# Patient Record
Sex: FEMALE | Race: WHITE | NOT HISPANIC OR LATINO
[De-identification: names, ages, dates, MRNs, and addresses within clinical notes are randomized per-mention and may not be internally consistent; named-entity substitution may affect disease eponyms.]

---

## 2017-03-30 ENCOUNTER — APPOINTMENT (OUTPATIENT)
Dept: OPHTHALMOLOGY | Facility: CLINIC | Age: 82
End: 2017-03-30

## 2017-04-17 ENCOUNTER — APPOINTMENT (OUTPATIENT)
Dept: OPHTHALMOLOGY | Facility: CLINIC | Age: 82
End: 2017-04-17

## 2017-04-17 DIAGNOSIS — H40.003 PREGLAUCOMA, UNSPECIFIED, BILATERAL: ICD-10-CM

## 2017-05-11 ENCOUNTER — APPOINTMENT (OUTPATIENT)
Dept: OPHTHALMOLOGY | Facility: CLINIC | Age: 82
End: 2017-05-11

## 2017-05-18 ENCOUNTER — APPOINTMENT (OUTPATIENT)
Dept: OPHTHALMOLOGY | Facility: CLINIC | Age: 82
End: 2017-05-18

## 2017-05-18 ENCOUNTER — OUTPATIENT (OUTPATIENT)
Dept: OUTPATIENT SERVICES | Facility: HOSPITAL | Age: 82
LOS: 1 days | End: 2017-05-18

## 2017-05-18 DIAGNOSIS — H40.051 OCULAR HYPERTENSION, RIGHT EYE: ICD-10-CM

## 2017-06-09 ENCOUNTER — APPOINTMENT (OUTPATIENT)
Dept: OPHTHALMOLOGY | Facility: CLINIC | Age: 82
End: 2017-06-09

## 2017-10-31 ENCOUNTER — APPOINTMENT (OUTPATIENT)
Dept: OPHTHALMOLOGY | Facility: CLINIC | Age: 82
End: 2017-10-31
Payer: MEDICARE

## 2017-10-31 PROCEDURE — 92012 INTRM OPH EXAM EST PATIENT: CPT

## 2018-05-18 ENCOUNTER — APPOINTMENT (OUTPATIENT)
Dept: OPHTHALMOLOGY | Facility: CLINIC | Age: 83
End: 2018-05-18
Payer: MEDICARE

## 2018-05-18 DIAGNOSIS — H26.8 OTHER SPECIFIED CATARACT: ICD-10-CM

## 2018-05-18 DIAGNOSIS — H40.052 OCULAR HYPERTENSION, LEFT EYE: ICD-10-CM

## 2018-05-18 DIAGNOSIS — H40.051 OCULAR HYPERTENSION, RIGHT EYE: ICD-10-CM

## 2018-05-18 DIAGNOSIS — H35.371 PUCKERING OF MACULA, RIGHT EYE: ICD-10-CM

## 2018-05-18 PROCEDURE — 92134 CPTRZ OPH DX IMG PST SGM RTA: CPT

## 2018-05-18 PROCEDURE — 92014 COMPRE OPH EXAM EST PT 1/>: CPT

## 2018-05-30 PROBLEM — H26.8 PSEUDOEXFOLIATION OF LENS CAPSULE, RIGHT EYE: Status: ACTIVE | Noted: 2017-10-31

## 2018-05-30 PROBLEM — H26.8 PSEUDOEXFOLIATION SYNDROME: Status: ACTIVE | Noted: 2017-04-17

## 2018-05-30 PROBLEM — H40.051 OCULAR HYPERTENSION OF RIGHT EYE: Status: ACTIVE | Noted: 2017-04-17

## 2018-05-30 PROBLEM — H40.052 OCULAR HYPERTENSION OF LEFT EYE: Status: ACTIVE | Noted: 2017-04-17

## 2018-05-30 PROBLEM — H35.371 EPIRETINAL MEMBRANE (ERM) OF RIGHT EYE: Status: ACTIVE | Noted: 2018-05-30

## 2018-12-14 ENCOUNTER — APPOINTMENT (OUTPATIENT)
Dept: OPHTHALMOLOGY | Facility: CLINIC | Age: 83
End: 2018-12-14
Payer: MEDICARE

## 2018-12-14 DIAGNOSIS — H25.11 AGE-RELATED NUCLEAR CATARACT, RIGHT EYE: ICD-10-CM

## 2018-12-14 PROCEDURE — 92014 COMPRE OPH EXAM EST PT 1/>: CPT

## 2018-12-14 PROCEDURE — 92136 OPHTHALMIC BIOMETRY: CPT

## 2019-02-04 RX ORDER — LATANOPROST/PF 0.005 %
0.01 DROPS OPHTHALMIC (EYE)
Qty: 3 | Refills: 6 | Status: ACTIVE | COMMUNITY
Start: 2017-10-31 | End: 1900-01-01

## 2020-03-05 ENCOUNTER — NON-APPOINTMENT (OUTPATIENT)
Age: 85
End: 2020-03-05

## 2020-03-05 ENCOUNTER — APPOINTMENT (OUTPATIENT)
Dept: OPHTHALMOLOGY | Facility: CLINIC | Age: 85
End: 2020-03-05
Payer: MEDICARE

## 2020-03-05 PROCEDURE — 92014 COMPRE OPH EXAM EST PT 1/>: CPT

## 2021-09-17 ENCOUNTER — NON-APPOINTMENT (OUTPATIENT)
Age: 86
End: 2021-09-17

## 2021-09-17 ENCOUNTER — APPOINTMENT (OUTPATIENT)
Dept: OPHTHALMOLOGY | Facility: CLINIC | Age: 86
End: 2021-09-17
Payer: MEDICARE

## 2021-09-17 PROCEDURE — 92134 CPTRZ OPH DX IMG PST SGM RTA: CPT

## 2021-09-17 PROCEDURE — 92014 COMPRE OPH EXAM EST PT 1/>: CPT

## 2022-05-01 ENCOUNTER — INPATIENT (INPATIENT)
Facility: HOSPITAL | Age: 87
LOS: 7 days | Discharge: HOME HEALTH | DRG: 149 | End: 2022-05-09
Attending: PSYCHIATRY & NEUROLOGY | Admitting: INTERNAL MEDICINE
Payer: MEDICARE

## 2022-05-01 VITALS
TEMPERATURE: 98 F | RESPIRATION RATE: 16 BRPM | SYSTOLIC BLOOD PRESSURE: 158 MMHG | OXYGEN SATURATION: 97 % | DIASTOLIC BLOOD PRESSURE: 92 MMHG | HEART RATE: 85 BPM | WEIGHT: 110.01 LBS

## 2022-05-01 LAB
ALBUMIN SERPL ELPH-MCNC: 4.5 G/DL — SIGNIFICANT CHANGE UP (ref 3.3–5)
ALP SERPL-CCNC: 78 U/L — SIGNIFICANT CHANGE UP (ref 40–120)
ALT FLD-CCNC: 12 U/L — SIGNIFICANT CHANGE UP (ref 10–45)
ANION GAP SERPL CALC-SCNC: 17 MMOL/L — SIGNIFICANT CHANGE UP (ref 5–17)
APPEARANCE UR: CLEAR — SIGNIFICANT CHANGE UP
APTT BLD: 31 SEC — SIGNIFICANT CHANGE UP (ref 27.5–35.5)
AST SERPL-CCNC: 24 U/L — SIGNIFICANT CHANGE UP (ref 10–40)
BACTERIA # UR AUTO: PRESENT /HPF
BASOPHILS # BLD AUTO: 0.06 K/UL — SIGNIFICANT CHANGE UP (ref 0–0.2)
BASOPHILS NFR BLD AUTO: 0.6 % — SIGNIFICANT CHANGE UP (ref 0–2)
BILIRUB SERPL-MCNC: 0.3 MG/DL — SIGNIFICANT CHANGE UP (ref 0.2–1.2)
BILIRUB UR-MCNC: NEGATIVE — SIGNIFICANT CHANGE UP
BUN SERPL-MCNC: 31 MG/DL — HIGH (ref 7–23)
CALCIUM SERPL-MCNC: 9.2 MG/DL — SIGNIFICANT CHANGE UP (ref 8.4–10.5)
CHLORIDE SERPL-SCNC: 102 MMOL/L — SIGNIFICANT CHANGE UP (ref 96–108)
CO2 SERPL-SCNC: 22 MMOL/L — SIGNIFICANT CHANGE UP (ref 22–31)
COLOR SPEC: YELLOW — SIGNIFICANT CHANGE UP
CREAT SERPL-MCNC: 0.65 MG/DL — SIGNIFICANT CHANGE UP (ref 0.5–1.3)
DIFF PNL FLD: ABNORMAL
EGFR: 83 ML/MIN/1.73M2 — SIGNIFICANT CHANGE UP
EOSINOPHIL # BLD AUTO: 0.08 K/UL — SIGNIFICANT CHANGE UP (ref 0–0.5)
EOSINOPHIL NFR BLD AUTO: 0.8 % — SIGNIFICANT CHANGE UP (ref 0–6)
EPI CELLS # UR: SIGNIFICANT CHANGE UP /HPF (ref 0–5)
GLUCOSE SERPL-MCNC: 162 MG/DL — HIGH (ref 70–99)
GLUCOSE UR QL: NEGATIVE — SIGNIFICANT CHANGE UP
HCT VFR BLD CALC: 43.6 % — SIGNIFICANT CHANGE UP (ref 34.5–45)
HGB BLD-MCNC: 14.2 G/DL — SIGNIFICANT CHANGE UP (ref 11.5–15.5)
IMM GRANULOCYTES NFR BLD AUTO: 0.4 % — SIGNIFICANT CHANGE UP (ref 0–1.5)
INR BLD: 0.89 — SIGNIFICANT CHANGE UP (ref 0.88–1.16)
KETONES UR-MCNC: 40 MG/DL
LEUKOCYTE ESTERASE UR-ACNC: NEGATIVE — SIGNIFICANT CHANGE UP
LYMPHOCYTES # BLD AUTO: 1.44 K/UL — SIGNIFICANT CHANGE UP (ref 1–3.3)
LYMPHOCYTES # BLD AUTO: 14.9 % — SIGNIFICANT CHANGE UP (ref 13–44)
MCHC RBC-ENTMCNC: 31.8 PG — SIGNIFICANT CHANGE UP (ref 27–34)
MCHC RBC-ENTMCNC: 32.6 GM/DL — SIGNIFICANT CHANGE UP (ref 32–36)
MCV RBC AUTO: 97.5 FL — SIGNIFICANT CHANGE UP (ref 80–100)
MONOCYTES # BLD AUTO: 0.63 K/UL — SIGNIFICANT CHANGE UP (ref 0–0.9)
MONOCYTES NFR BLD AUTO: 6.5 % — SIGNIFICANT CHANGE UP (ref 2–14)
NEUTROPHILS # BLD AUTO: 7.43 K/UL — HIGH (ref 1.8–7.4)
NEUTROPHILS NFR BLD AUTO: 76.8 % — SIGNIFICANT CHANGE UP (ref 43–77)
NITRITE UR-MCNC: NEGATIVE — SIGNIFICANT CHANGE UP
NRBC # BLD: 0 /100 WBCS — SIGNIFICANT CHANGE UP (ref 0–0)
PH UR: 6.5 — SIGNIFICANT CHANGE UP (ref 5–8)
PLATELET # BLD AUTO: 305 K/UL — SIGNIFICANT CHANGE UP (ref 150–400)
POTASSIUM SERPL-MCNC: 3.2 MMOL/L — LOW (ref 3.5–5.3)
POTASSIUM SERPL-SCNC: 3.2 MMOL/L — LOW (ref 3.5–5.3)
PROT SERPL-MCNC: 8.2 G/DL — SIGNIFICANT CHANGE UP (ref 6–8.3)
PROT UR-MCNC: NEGATIVE MG/DL — SIGNIFICANT CHANGE UP
PROTHROM AB SERPL-ACNC: 10.6 SEC — SIGNIFICANT CHANGE UP (ref 10.5–13.4)
RBC # BLD: 4.47 M/UL — SIGNIFICANT CHANGE UP (ref 3.8–5.2)
RBC # FLD: 13.1 % — SIGNIFICANT CHANGE UP (ref 10.3–14.5)
RBC CASTS # UR COMP ASSIST: < 5 /HPF — SIGNIFICANT CHANGE UP
SODIUM SERPL-SCNC: 141 MMOL/L — SIGNIFICANT CHANGE UP (ref 135–145)
SP GR SPEC: 1.02 — SIGNIFICANT CHANGE UP (ref 1–1.03)
TROPONIN T SERPL-MCNC: 0.01 NG/ML — SIGNIFICANT CHANGE UP (ref 0–0.01)
UROBILINOGEN FLD QL: 0.2 E.U./DL — SIGNIFICANT CHANGE UP
WBC # BLD: 9.68 K/UL — SIGNIFICANT CHANGE UP (ref 3.8–10.5)
WBC # FLD AUTO: 9.68 K/UL — SIGNIFICANT CHANGE UP (ref 3.8–10.5)
WBC UR QL: ABNORMAL /HPF

## 2022-05-01 PROCEDURE — 70496 CT ANGIOGRAPHY HEAD: CPT | Mod: 26,MA

## 2022-05-01 PROCEDURE — 0042T: CPT

## 2022-05-01 PROCEDURE — 99291 CRITICAL CARE FIRST HOUR: CPT

## 2022-05-01 PROCEDURE — 70498 CT ANGIOGRAPHY NECK: CPT | Mod: 26,MA

## 2022-05-01 PROCEDURE — 93010 ELECTROCARDIOGRAM REPORT: CPT

## 2022-05-01 RX ORDER — SODIUM CHLORIDE 9 MG/ML
1000 INJECTION INTRAMUSCULAR; INTRAVENOUS; SUBCUTANEOUS ONCE
Refills: 0 | Status: COMPLETED | OUTPATIENT
Start: 2022-05-01 | End: 2022-05-01

## 2022-05-01 RX ORDER — CLOPIDOGREL BISULFATE 75 MG/1
75 TABLET, FILM COATED ORAL ONCE
Refills: 0 | Status: COMPLETED | OUTPATIENT
Start: 2022-05-01 | End: 2022-05-01

## 2022-05-01 RX ORDER — ASPIRIN/CALCIUM CARB/MAGNESIUM 324 MG
81 TABLET ORAL DAILY
Refills: 0 | Status: DISCONTINUED | OUTPATIENT
Start: 2022-05-01 | End: 2022-05-03

## 2022-05-01 RX ORDER — MECLIZINE HCL 12.5 MG
25 TABLET ORAL ONCE
Refills: 0 | Status: COMPLETED | OUTPATIENT
Start: 2022-05-01 | End: 2022-05-01

## 2022-05-01 RX ADMIN — Medication 81 MILLIGRAM(S): at 23:43

## 2022-05-01 RX ADMIN — SODIUM CHLORIDE 1000 MILLILITER(S): 9 INJECTION INTRAMUSCULAR; INTRAVENOUS; SUBCUTANEOUS at 23:46

## 2022-05-01 RX ADMIN — Medication 25 MILLIGRAM(S): at 23:43

## 2022-05-01 RX ADMIN — CLOPIDOGREL BISULFATE 75 MILLIGRAM(S): 75 TABLET, FILM COATED ORAL at 23:43

## 2022-05-01 NOTE — ED PROVIDER NOTE - OBJECTIVE STATEMENT
89 F w ho htn- w sudden onset severe dizziness about 2 hours ago-room spinning- very nauseated- got zofran from the medics w some improvement of nausea - alexus ha  dizziness-worse with head movement/worse w eye opening  no f/c no cp/sob  no prior cva/mi  no sig allev factors

## 2022-05-01 NOTE — ED PROVIDER NOTE - CONSTITUTIONAL, MLM
uncomfortable, doesn't want to open eyes, awake, alert, oriented to person, place, time/situation and in no apparent distress. normal...

## 2022-05-01 NOTE — ED ADULT TRIAGE NOTE - CHIEF COMPLAINT QUOTE
pt BIBA from home for eval of room spinning dizziness pt BIBA from home for eval of room spinning dizziness onset a couple of hours ago with N/V multiple episodes pt denies head ache blurry vision numbness/ tingling. EMS placed 20G to right hand gave 5mg Zofran

## 2022-05-01 NOTE — ED PROVIDER NOTE - NORMAL, MLM
Delivery Note    Labor and Delivery Summary    Natalya Fuller is a 31 year old  female who presented to labor and delivery at 39w0d on 2021 for primary  section secondary to active herpetic outbreak. Her pregnancy is further significant for hx of GDM in G1, Hx of preeclampsia in G2, obesity, hx cholecystectomy, tobacco use, hx of bipolar/depression/schizophrenia not on medications. After thorough discussion of risks, benefits, and alternatives, patient consented for primary  surgery. Spinal was placed for pain control. The operation was completed without complication, and a viable male infant weighing 3460g, with Apgars 8 /9  at 1/5 minutes, respectively, was delivered via hysterotomy in ROT position. The patient received Gent/Clinda for prophylaxis. The patient tolerated the procedure well, and was transported to the recovery room in stable condition. Please see operative note for details. EBL for the delivery was 700mL.     Information for the patient's :  Devendra Fuller [[id number]]   7 lb 10.1 oz (3460 g)       Mother's Information      Delivery Blood Loss  214 - 21 1004      QBL:  Section Volume Hospital Encounter 410 ML    Total  410               Devendra Fuller [[id number]]       Delivery    Birth date/time: 2021 08:44:00  Delivery type:        Placenta    Placenta delivery date/time:   Placenta removal:        Apgars    Living status: Living  Apgars:  1 min.:  5 min.:  10 min.:  15 min.:  20 min.:    Skin color:  0  1       Heart rate:  2  2       Reflex irritability:  2  2       Muscle tone:  2  2       Respiratory effort:  2  2       Total:  8  9       Apgars assigned by: CHANTELLE NEAL RN       Resuscitation    Method: Suctioning       Measurements    Weight: 3460 g  Weight (lbs): 7 lb 10.1 oz  Length: 49.5 cm  Length (in): 19.5\"  Head circumference: 33.5 cm       Delivery Providers    Delivering clinician: Catina Srivastava  MD   Provider Role    Aidan Mittal MD Resident    Mera Mckeon, RRT Respiratory Therapist    Kristin Martinez, RN Registered Nurse    Shellie Arellano, RN Delivery Nurse                   Review the Delivery Report for details       Attending physician, Dr. Srivastava, was present for entire procedure.    Aidan Mittal MD  PGY III  6/7/2021    I was present for the entire procedure.  Catina Srivastava MD, MPH  Obstetrics and Gynecology        karlos all pertinent systems normal

## 2022-05-02 DIAGNOSIS — E87.6 HYPOKALEMIA: ICD-10-CM

## 2022-05-02 DIAGNOSIS — E78.5 HYPERLIPIDEMIA, UNSPECIFIED: ICD-10-CM

## 2022-05-02 DIAGNOSIS — I10 ESSENTIAL (PRIMARY) HYPERTENSION: ICD-10-CM

## 2022-05-02 DIAGNOSIS — D72.829 ELEVATED WHITE BLOOD CELL COUNT, UNSPECIFIED: ICD-10-CM

## 2022-05-02 DIAGNOSIS — R42 DIZZINESS AND GIDDINESS: ICD-10-CM

## 2022-05-02 LAB
A1C WITH ESTIMATED AVERAGE GLUCOSE RESULT: 5.4 % — SIGNIFICANT CHANGE UP (ref 4–5.6)
ANION GAP SERPL CALC-SCNC: 15 MMOL/L — SIGNIFICANT CHANGE UP (ref 5–17)
ANION GAP SERPL CALC-SCNC: 16 MMOL/L — SIGNIFICANT CHANGE UP (ref 5–17)
APPEARANCE UR: CLEAR — SIGNIFICANT CHANGE UP
BACTERIA # UR AUTO: ABNORMAL /HPF
BILIRUB UR-MCNC: NEGATIVE — SIGNIFICANT CHANGE UP
BUN SERPL-MCNC: 22 MG/DL — SIGNIFICANT CHANGE UP (ref 7–23)
BUN SERPL-MCNC: 22 MG/DL — SIGNIFICANT CHANGE UP (ref 7–23)
CALCIUM SERPL-MCNC: 9.2 MG/DL — SIGNIFICANT CHANGE UP (ref 8.4–10.5)
CALCIUM SERPL-MCNC: 9.8 MG/DL — SIGNIFICANT CHANGE UP (ref 8.4–10.5)
CHLORIDE SERPL-SCNC: 100 MMOL/L — SIGNIFICANT CHANGE UP (ref 96–108)
CHLORIDE SERPL-SCNC: 101 MMOL/L — SIGNIFICANT CHANGE UP (ref 96–108)
CHOLEST SERPL-MCNC: 274 MG/DL — HIGH
CO2 SERPL-SCNC: 24 MMOL/L — SIGNIFICANT CHANGE UP (ref 22–31)
CO2 SERPL-SCNC: 26 MMOL/L — SIGNIFICANT CHANGE UP (ref 22–31)
COLOR SPEC: YELLOW — SIGNIFICANT CHANGE UP
CREAT SERPL-MCNC: 0.55 MG/DL — SIGNIFICANT CHANGE UP (ref 0.5–1.3)
CREAT SERPL-MCNC: 0.67 MG/DL — SIGNIFICANT CHANGE UP (ref 0.5–1.3)
DIFF PNL FLD: ABNORMAL
EGFR: 82 ML/MIN/1.73M2 — SIGNIFICANT CHANGE UP
EGFR: 86 ML/MIN/1.73M2 — SIGNIFICANT CHANGE UP
EPI CELLS # UR: SIGNIFICANT CHANGE UP /HPF (ref 0–5)
ESTIMATED AVERAGE GLUCOSE: 108 MG/DL — SIGNIFICANT CHANGE UP (ref 68–114)
GLUCOSE SERPL-MCNC: 113 MG/DL — HIGH (ref 70–99)
GLUCOSE SERPL-MCNC: 116 MG/DL — HIGH (ref 70–99)
GLUCOSE UR QL: NEGATIVE — SIGNIFICANT CHANGE UP
HCT VFR BLD CALC: 44 % — SIGNIFICANT CHANGE UP (ref 34.5–45)
HDLC SERPL-MCNC: 94 MG/DL — SIGNIFICANT CHANGE UP
HGB BLD-MCNC: 14.5 G/DL — SIGNIFICANT CHANGE UP (ref 11.5–15.5)
KETONES UR-MCNC: 15 MG/DL
LEUKOCYTE ESTERASE UR-ACNC: ABNORMAL
LIPID PNL WITH DIRECT LDL SERPL: 168 MG/DL — HIGH
MAGNESIUM SERPL-MCNC: 1.9 MG/DL — SIGNIFICANT CHANGE UP (ref 1.6–2.6)
MCHC RBC-ENTMCNC: 32.1 PG — SIGNIFICANT CHANGE UP (ref 27–34)
MCHC RBC-ENTMCNC: 33 GM/DL — SIGNIFICANT CHANGE UP (ref 32–36)
MCV RBC AUTO: 97.3 FL — SIGNIFICANT CHANGE UP (ref 80–100)
NITRITE UR-MCNC: NEGATIVE — SIGNIFICANT CHANGE UP
NON HDL CHOLESTEROL: 180 MG/DL — HIGH
NRBC # BLD: 0 /100 WBCS — SIGNIFICANT CHANGE UP (ref 0–0)
PH UR: 6 — SIGNIFICANT CHANGE UP (ref 5–8)
PHOSPHATE SERPL-MCNC: 2.9 MG/DL — SIGNIFICANT CHANGE UP (ref 2.5–4.5)
PLATELET # BLD AUTO: 187 K/UL — SIGNIFICANT CHANGE UP (ref 150–400)
POTASSIUM SERPL-MCNC: 4.4 MMOL/L — SIGNIFICANT CHANGE UP (ref 3.5–5.3)
POTASSIUM SERPL-MCNC: SIGNIFICANT CHANGE UP MMOL/L (ref 3.5–5.3)
POTASSIUM SERPL-SCNC: 4.4 MMOL/L — SIGNIFICANT CHANGE UP (ref 3.5–5.3)
POTASSIUM SERPL-SCNC: SIGNIFICANT CHANGE UP MMOL/L (ref 3.5–5.3)
PROT UR-MCNC: NEGATIVE MG/DL — SIGNIFICANT CHANGE UP
RBC # BLD: 4.52 M/UL — SIGNIFICANT CHANGE UP (ref 3.8–5.2)
RBC # FLD: 13.3 % — SIGNIFICANT CHANGE UP (ref 10.3–14.5)
RBC CASTS # UR COMP ASSIST: ABNORMAL /HPF
SARS-COV-2 RNA SPEC QL NAA+PROBE: NEGATIVE — SIGNIFICANT CHANGE UP
SODIUM SERPL-SCNC: 139 MMOL/L — SIGNIFICANT CHANGE UP (ref 135–145)
SODIUM SERPL-SCNC: 143 MMOL/L — SIGNIFICANT CHANGE UP (ref 135–145)
SP GR SPEC: 1.02 — SIGNIFICANT CHANGE UP (ref 1–1.03)
TRIGL SERPL-MCNC: 58 MG/DL — SIGNIFICANT CHANGE UP
TSH SERPL-MCNC: 1.74 UIU/ML — SIGNIFICANT CHANGE UP (ref 0.27–4.2)
UROBILINOGEN FLD QL: 0.2 E.U./DL — SIGNIFICANT CHANGE UP
WBC # BLD: 12.1 K/UL — HIGH (ref 3.8–10.5)
WBC # FLD AUTO: 12.1 K/UL — HIGH (ref 3.8–10.5)
WBC UR QL: ABNORMAL /HPF

## 2022-05-02 PROCEDURE — 99222 1ST HOSP IP/OBS MODERATE 55: CPT

## 2022-05-02 PROCEDURE — 70551 MRI BRAIN STEM W/O DYE: CPT | Mod: 26

## 2022-05-02 PROCEDURE — 99221 1ST HOSP IP/OBS SF/LOW 40: CPT

## 2022-05-02 RX ORDER — ONDANSETRON 8 MG/1
4 TABLET, FILM COATED ORAL ONCE
Refills: 0 | Status: COMPLETED | OUTPATIENT
Start: 2022-05-02 | End: 2022-05-02

## 2022-05-02 RX ORDER — CEFTRIAXONE 500 MG/1
1000 INJECTION, POWDER, FOR SOLUTION INTRAMUSCULAR; INTRAVENOUS ONCE
Refills: 0 | Status: COMPLETED | OUTPATIENT
Start: 2022-05-02 | End: 2022-05-02

## 2022-05-02 RX ORDER — CEFTRIAXONE 500 MG/1
1000 INJECTION, POWDER, FOR SOLUTION INTRAMUSCULAR; INTRAVENOUS EVERY 24 HOURS
Refills: 0 | Status: COMPLETED | OUTPATIENT
Start: 2022-05-03 | End: 2022-05-05

## 2022-05-02 RX ORDER — MECLIZINE HCL 12.5 MG
12.5 TABLET ORAL EVERY 6 HOURS
Refills: 0 | Status: DISCONTINUED | OUTPATIENT
Start: 2022-05-02 | End: 2022-05-02

## 2022-05-02 RX ORDER — CLOPIDOGREL BISULFATE 75 MG/1
75 TABLET, FILM COATED ORAL DAILY
Refills: 0 | Status: DISCONTINUED | OUTPATIENT
Start: 2022-05-02 | End: 2022-05-03

## 2022-05-02 RX ORDER — ENOXAPARIN SODIUM 100 MG/ML
30 INJECTION SUBCUTANEOUS EVERY 24 HOURS
Refills: 0 | Status: DISCONTINUED | OUTPATIENT
Start: 2022-05-02 | End: 2022-05-09

## 2022-05-02 RX ORDER — LATANOPROST 0.05 MG/ML
1 SOLUTION/ DROPS OPHTHALMIC; TOPICAL AT BEDTIME
Refills: 0 | Status: DISCONTINUED | OUTPATIENT
Start: 2022-05-02 | End: 2022-05-09

## 2022-05-02 RX ORDER — POTASSIUM CHLORIDE 20 MEQ
40 PACKET (EA) ORAL ONCE
Refills: 0 | Status: COMPLETED | OUTPATIENT
Start: 2022-05-02 | End: 2022-05-02

## 2022-05-02 RX ORDER — ATORVASTATIN CALCIUM 80 MG/1
80 TABLET, FILM COATED ORAL AT BEDTIME
Refills: 0 | Status: DISCONTINUED | OUTPATIENT
Start: 2022-05-02 | End: 2022-05-09

## 2022-05-02 RX ORDER — AMLODIPINE BESYLATE 2.5 MG/1
5 TABLET ORAL DAILY
Refills: 0 | Status: DISCONTINUED | OUTPATIENT
Start: 2022-05-02 | End: 2022-05-02

## 2022-05-02 RX ORDER — CEFTRIAXONE 500 MG/1
INJECTION, POWDER, FOR SOLUTION INTRAMUSCULAR; INTRAVENOUS
Refills: 0 | Status: COMPLETED | OUTPATIENT
Start: 2022-05-02 | End: 2022-05-06

## 2022-05-02 RX ORDER — MECLIZINE HCL 12.5 MG
12.5 TABLET ORAL EVERY 8 HOURS
Refills: 0 | Status: DISCONTINUED | OUTPATIENT
Start: 2022-05-02 | End: 2022-05-05

## 2022-05-02 RX ADMIN — ONDANSETRON 4 MILLIGRAM(S): 8 TABLET, FILM COATED ORAL at 08:33

## 2022-05-02 RX ADMIN — CEFTRIAXONE 1000 MILLIGRAM(S): 500 INJECTION, POWDER, FOR SOLUTION INTRAMUSCULAR; INTRAVENOUS at 17:45

## 2022-05-02 RX ADMIN — ATORVASTATIN CALCIUM 80 MILLIGRAM(S): 80 TABLET, FILM COATED ORAL at 22:09

## 2022-05-02 RX ADMIN — CLOPIDOGREL BISULFATE 75 MILLIGRAM(S): 75 TABLET, FILM COATED ORAL at 11:36

## 2022-05-02 RX ADMIN — Medication 12.5 MILLIGRAM(S): at 14:30

## 2022-05-02 RX ADMIN — ENOXAPARIN SODIUM 30 MILLIGRAM(S): 100 INJECTION SUBCUTANEOUS at 22:09

## 2022-05-02 RX ADMIN — Medication 40 MILLIEQUIVALENT(S): at 00:10

## 2022-05-02 RX ADMIN — Medication 81 MILLIGRAM(S): at 11:36

## 2022-05-02 RX ADMIN — Medication 12.5 MILLIGRAM(S): at 11:37

## 2022-05-02 RX ADMIN — Medication 12.5 MILLIGRAM(S): at 22:09

## 2022-05-02 NOTE — CONSULT NOTE ADULT - PROBLEM SELECTOR RECOMMENDATION 2
borderline; UA with only 5-10 WBCs and negative nitrite/LE, but given new urinary frequency today, would repeat UA and send UCx; leukocytosis may also be due to N/V; monitor CBC off abx for now

## 2022-05-02 NOTE — CONSULT NOTE ADULT - PROBLEM SELECTOR RECOMMENDATION 9
a/w N/V; sx persist but improved; ddx includes acute CVA vs. peripheral vertigo (neuronitis?); cont. meclizine and antiemetics PRN; additional work-up and mgmt per Neuro; PT/OT; on DAPT + statin for now, plan for MRI brain

## 2022-05-02 NOTE — CONSULT NOTE ADULT - ASSESSMENT
per Neurology    89 yo F with pmhx HTN presenting to ED with room spinning dizziness, nausea, and vomiting. Stroke code called, CTH and CTA head/neck unremarkable. Pt admitted to tele for stroke/TIA workup.     Neuro  #CVA workup  - aspirin 81mg and plavix 75mg daily  - continue atorvastatin 80mg daily  - q4hr stroke neuro checks and vitals  - obtain MRI Brain without contrast  - Stroke Code HCT Results: negative  - Stroke Code CTA Results: negative  - Stroke education    Cards  #HTN  - permissive hypertension, Goal -180  - hold home blood pressure medication for now  - obtain TTE with bubble  - Stroke Code EKG Results:    #HLD  - high dose statin as above in CVA  - LDL results:     Pulm  - call provider if SPO2 < 94%    GI  #Nutrition/Fluids/Electrolytes   - replete K<4 and Mg <2  - Diet:  - IVF:     Renal  -     Infectious Disease  - Stroke Code CXR results:     Endocrine  #DM  - A1C results:   - ISS    - TSH results:    DVT Prophylaxis  - SCDs for DVT prophylaxis 
89 y/o F w/

## 2022-05-02 NOTE — H&P ADULT - ASSESSMENT
89 yo F with pmhx HTN presenting to ED with room spinning dizziness, nausea, and vomiting. Stroke code called, CTH and CTA head/neck unremarkable. Pt admitted to tele for stroke/TIA workup.     Neuro  #CVA workup  - aspirin 81mg and plavix 75mg daily  - continue atorvastatin 80mg daily  - q4hr stroke neuro checks and vitals  - obtain MRI Brain without contrast  - Stroke Code HCT Results: negative  - Stroke Code CTA Results: negative  - Stroke education    Cards  #HTN  - permissive hypertension, Goal -180  - hold home blood pressure medication for now  - obtain TTE with bubble  - Stroke Code EKG Results:    #HLD  - high dose statin as above in CVA  - LDL results:     Pulm  - call provider if SPO2 < 94%    GI  #Nutrition/Fluids/Electrolytes   - replete K<4 and Mg <2  - Diet:  - IVF:     Renal  -     Infectious Disease  - Stroke Code CXR results:     Endocrine  #DM  - A1C results:   - ISS    - TSH results:    DVT Prophylaxis  - SCDs for DVT prophylaxis     Discussed with Neurology Attending

## 2022-05-02 NOTE — PATIENT PROFILE ADULT - FALL HARM RISK - HARM RISK INTERVENTIONS

## 2022-05-02 NOTE — H&P ADULT - NSHPLABSRESULTS_GEN_ALL_CORE
< from: CT Brain Stroke Protocol (05.01.22 @ 22:57) >      IMPRESSION:  No acute intracranial hemorrhage or demarcated transcortical infarction.    < end of copied text >    < from: CT Perfusion w/ Maps w/ IV Cont (05.01.22 @ 22:58) >    IMPRESSION:  Normal CT perfusion study.    < end of copied text >    < from: CT Angio Head w/ IV Cont (05.01.22 @ 22:59) >    IMPRESSION:  No high-grade stenotic or occlusive intracranial arterial lesion.    < end of copied text >    < from: CT Angio Head w/ IV Cont (05.01.22 @ 22:59) >    IMPRESSION:  Unremarkable CTA examination of the neck.    < end of copied text >

## 2022-05-02 NOTE — ED ADULT NURSE NOTE - CHIEF COMPLAINT QUOTE
pt BIBA from home for eval of room spinning dizziness onset a couple of hours ago with N/V multiple episodes pt denies head ache blurry vision numbness/ tingling. EMS placed 20G to right hand gave 5mg Zofran

## 2022-05-02 NOTE — H&P ADULT - NSHPREVIEWOFSYSTEMS_GEN_ALL_CORE
ROS:   Constitutional: No fever, weight loss or fatigue  Eyes: No eye pain or discharge  ENMT:  No difficulty hearing or tinnitus; No sinus or throat pain  Neck: No pain or stiffness  Respiratory: No cough, wheezing, chills or hemoptysis  Cardiovascular: No chest pain, palpitations, shortness of breath, or leg swelling  Gastrointestinal: No abdominal pain. No nausea, vomiting or hematemesis; No diarrhea or constipation. No hematochezia.  Genitourinary: No dysuria, frequency, hematuria or incontinence  Neurological: As per HPI  Skin: No itching, burning, rashes or lesions   Endocrine: No heat or cold intolerance; No hair loss  Musculoskeletal: No joint pain or swelling; no back pain  Psychiatric: No depression, anxiety, mood swings or difficulty sleeping  Heme/Lymph: No easy bruising or bleeding gums

## 2022-05-02 NOTE — H&P ADULT - NSHPPHYSICALEXAM_GEN_ALL_CORE
Physical exam:  General: No acute distress, awake and alert  Cardiovascular: Regular rate and rhythm, no murmurs, rubs, or gallops. No bruits  Pulmonary: Anterior breath sounds clear bilaterally, no crackles or wheezing. No use of accessory muscles  GI: Abdomen soft, non-tender, non-distended    Neurologic:  -Mental status: Awake, alert, oriented to person, place, and time. Speech is fluent with intact naming, repetition, and comprehension, no dysarthria. Recent and remote memory intact. Follows commands. Attention/concentration intact. Fund of knowledge appropriate.  -Cranial nerves:   II: Visual fields are full to confrontation.  III, IV, VI: Extraocular movements are intact, + horizontal nystagmus . Pupils equally round and reactive to light  V:  Facial sensation V1-V3 equal and intact   VII: Face is symmetric with normal eye closure and smile  VIII: Hearing is bilaterally intact to finger rub  IX, X: Uvula is midline and soft palate rises symmetrically  XI: Head turning and shoulder shrug are intact.  XII: Tongue protrudes midline  Motor: Normal bulk and tone. No pronator drift. Strength bilateral upper extremity 5/5, bilateral lower extremities 5/5.  Rapid alternating movements intact and symmetric  Sensation: Intact to light touch bilaterally. No neglect or extinction on double simultaneous testing.  Coordination: No dysmetria on finger-to-nose and heel-to-shin bilaterally    NIHSS: 0 ASPECT Score: 7

## 2022-05-02 NOTE — PROGRESS NOTE ADULT - ASSESSMENT
89 yo F with pmhx HTN presenting to ED with complaints of dizziness, nausea, and vomiting. Stroke code called on 5/1, CTH and CTA head/neck unremarkable. Etiology of patient's symptoms concerning for peripheral vertigo vs. stroke, currently admitted to stroke/tele unit for further work up and management.     Neuro  #CVA workup  - aspirin 81mg and plavix 75mg daily  - continue atorvastatin 80mg daily  - q4hr stroke neuro checks and vitals  - obtain MRI Brain without contrast, short stroke protocol  - Stroke Code HCT Results: negative  - Stroke Code CTA Results: negative  - Stroke education    Cards  #HTN  - permissive hypertension, Goal -180  - hold home blood pressure medication for now  - obtain TTE with bubble  - Stroke Code EKG Results:    #HLD  - high dose statin as above in CVA  - LDL results: 168    Pulm  - call provider if SPO2 < 94%    GI  #Nutrition/Fluids/Electrolytes   - replete K<4 and Mg <2  - Diet: Regular  - If patient is persistently vomiting, consider starting IV fluids    Renal  - daily BMP    Infectious Disease  - Stroke Code CXR results:     Endocrine  #DM  - A1C results: 5.4%      - TSH results: 1.740    DVT Prophylaxis  - SCDs for DVT prophylaxis  - Lovenox SQ    Discussed with Neurology Attending Dr. Filippo Swartz   89 yo F with pmhx HTN presenting to ED with complaints of dizziness, nausea, and vomiting. Stroke code called on 5/1, CTH and CTA head/neck unremarkable. Etiology of patient's symptoms concerning for peripheral vertigo vs. stroke, currently admitted to stroke/tele unit for further work up and management.     Neuro  #CVA workup  - aspirin 81mg and plavix 75mg daily  - continue atorvastatin 80mg daily  - q4hr stroke neuro checks and vitals  - obtain MRI Brain without contrast, short stroke protocol  - Stroke Code HCT Results: negative  - Stroke Code CTA Results: negative  - Stroke education    #dizziness  - initiated 12.mg meclizine q8h    Cards  #HTN  - permissive hypertension, Goal -180  - hold home blood pressure medication for now  - obtain TTE with bubble  - Stroke Code EKG Results:    #HLD  - high dose statin as above in CVA  - LDL results: 168    Pulm  - call provider if SPO2 < 94%    GI  #Nutrition/Fluids/Electrolytes   - replete K<4 and Mg <2  - Diet: Regular  - If patient is persistently vomiting, consider starting IV fluids    Renal  - daily BMP    #UA with WBC 5-10, positive bacteria on admission  - resent UA and UCx on 5/2, follow up    Infectious Disease  - Stroke Code CXR results:     Endocrine  #DM  - A1C results: 5.4%      - TSH results: 1.740    DVT Prophylaxis  - SCDs for DVT prophylaxis  - Lovenox SQ    Discussed with Neurology Attending Dr. Filippo Swartz   89 yo F with pmhx HTN presenting to ED with complaints of dizziness, nausea, and vomiting. Stroke code called on 5/1, CTH and CTA head/neck unremarkable. Etiology of patient's symptoms concerning for peripheral vertigo vs. stroke, currently admitted to stroke/tele unit for further work up and management.     Neuro  #CVA workup  - aspirin 81mg and plavix 75mg daily  - continue atorvastatin 80mg daily  - q4hr stroke neuro checks and vitals  - obtain MRI Brain without contrast, short stroke protocol  - Stroke Code HCT Results: negative  - Stroke Code CTA Results: negative  - Stroke education    #dizziness  - initiated 12.mg meclizine q8h    Cards  #HTN  - permissive hypertension, Goal -180  - hold home blood pressure medication for now  - obtain TTE without bubble  - Stroke Code EKG Results:    #HLD  - high dose statin as above in CVA  - LDL results: 168    Pulm  - call provider if SPO2 < 94%    GI  #Nutrition/Fluids/Electrolytes   - replete K<4 and Mg <2  - Diet: Regular  - If patient is persistently vomiting, consider starting IV fluids    Renal  - daily BMP    #UA with WBC 5-10, positive bacteria on admission  - resent UA and UCx on 5/2, follow up    Infectious Disease  - Stroke Code CXR results:     Endocrine  #DM  - A1C results: 5.4%      - TSH results: 1.740    DVT Prophylaxis  - SCDs for DVT prophylaxis  - Lovenox SQ    Discussed with Neurology Attending Dr. Filippo Swartz

## 2022-05-02 NOTE — PROGRESS NOTE ADULT - SUBJECTIVE AND OBJECTIVE BOX
Neurology Stroke Progress Note    INTERVAL HPI/OVERNIGHT EVENTS:  Patient seen and examined. Patient had 1 episode of emesis this morning, was given 1 dose of zofran with relief. Overall, reports not feeling well and remains dizzy, especially while testing EOM's. She is also reporting some pressure in her lower abdomen as well as increased urine frequency. She was started on standing meclizine and is pending a MRI and TTE.     MEDICATIONS  (STANDING):  aspirin  chewable 81 milliGRAM(s) Oral daily  atorvastatin 80 milliGRAM(s) Oral at bedtime  clopidogrel Tablet 75 milliGRAM(s) Oral daily  enoxaparin Injectable 30 milliGRAM(s) SubCutaneous every 24 hours  latanoprost 0.005% Ophthalmic Solution 1 Drop(s) Both EYES at bedtime  meclizine 12.5 milliGRAM(s) Oral every 8 hours    MEDICATIONS  (PRN):      Allergies    No Known Allergies    Intolerances      Vital Signs Last 24 Hrs  T(C): 36.3 (02 May 2022 10:00), Max: 36.8 (02 May 2022 01:26)  T(F): 97.4 (02 May 2022 10:00), Max: 98.3 (02 May 2022 01:26)  HR: 66 (02 May 2022 12:10) (66 - 85)  BP: 178/72 (02 May 2022 12:10) (155/69 - 185/80)  BP(mean): 104 (02 May 2022 12:10) (99 - 118)  RR: 16 (02 May 2022 12:10) (16 - 20)  SpO2: 95% (02 May 2022 12:10) (95% - 98%)    Physical exam:  General: Laying in bed, does not appear to be feeling well  Eyes: Anicteric sclerae, moist conjunctivae, see below for CNs  Neck: trachea midline  Cardiovascular: Regular rate and rhythm on monitor  Pulmonary: No use of accessory muscles  GI: Abdomen soft, non-distended, non-tender  Extremities: no edema    Neurologic:  -Mental status: Awake, alert, oriented to person, place, and time. Speech is fluent with intact naming, repetition, and comprehension, no dysarthria. Recent and remote memory intact. Follows commands. Attention/concentration intact. Fund of knowledge appropriate.  -Cranial nerves:   II: Visual fields are full to confrontation.  III, IV, VI: Left beating nystagmus. Pupils equally round and reactive to light  V:  Facial sensation V1-V3 equal and intact   VII: Face is symmetric with normal eye closure and smile  XII: Tongue protrudes midline  Motor: Normal bulk and tone. No pronator drift. Strength bilateral upper extremity 5/5, bilateral lower extremities 5/5.  Sensation: Intact to light touch bilaterally. No neglect or extinction on double simultaneous testing.  Coordination: No dysmetria on finger-to-nose    LABS:                        14.5   12.10 )-----------( 187      ( 02 May 2022 07:06 )             44.0     05-    139  |  100  |  22  ----------------------------<  116<H>  see note   |  24  |  0.55    Ca    9.2      02 May 2022 07:06  Phos  2.9     -  Mg     1.9     -    TPro  8.2  /  Alb  4.5  /  TBili  0.3  /  DBili  x   /  AST  24  /  ALT  12  /  AlkPhos  78  05-01    PT/INR - ( 01 May 2022 22:41 )   PT: 10.6 sec;   INR: 0.89          PTT - ( 01 May 2022 22:41 )  PTT:31.0 sec  Urinalysis Basic - ( 01 May 2022 23:19 )    Color: Yellow / Appearance: Clear / S.020 / pH: x  Gluc: x / Ketone: 40 mg/dL  / Bili: Negative / Urobili: 0.2 E.U./dL   Blood: x / Protein: NEGATIVE mg/dL / Nitrite: NEGATIVE   Leuk Esterase: NEGATIVE / RBC: < 5 /HPF / WBC 5-10 /HPF   Sq Epi: x / Non Sq Epi: 0-5 /HPF / Bacteria: Present /HPF        RADIOLOGY & ADDITIONAL TESTS:  < from: CT Brain Stroke Protocol (22 @ 22:57) >  IMPRESSION:  No acute intracranial hemorrhage or demarcated transcortical infarction.    < end of copied text >  < from: CT Angio Head w/ IV Cont (22 @ 22:59) >  IMPRESSION:  No high-grade stenotic or occlusive intracranial arterial lesion.    < end of copied text >  < from: CT Angio Head w/ IV Cont (22 @ 22:59) >  IMPRESSION:  Unremarkable CTA examination of the neck.      < end of copied text >  < from: CT Perfusion w/ Maps w/ IV Cont (22 @ 22:58) >  IMPRESSION:  Normal CT perfusion study.    < end of copied text >     Neurology Stroke Progress Note    INTERVAL HPI/OVERNIGHT EVENTS:  Patient seen and examined. Patient had 1 episode of emesis this morning, was given 1 dose of zofran with relief. Overall, reports not feeling well and remains dizzy, especially while testing EOM's. She is also reporting some pressure in her lower abdomen as well as increased urine frequency. She was started on standing meclizine and is pending a MRI and TTE.     MEDICATIONS  (STANDING):  aspirin  chewable 81 milliGRAM(s) Oral daily  atorvastatin 80 milliGRAM(s) Oral at bedtime  clopidogrel Tablet 75 milliGRAM(s) Oral daily  enoxaparin Injectable 30 milliGRAM(s) SubCutaneous every 24 hours  latanoprost 0.005% Ophthalmic Solution 1 Drop(s) Both EYES at bedtime  meclizine 12.5 milliGRAM(s) Oral every 8 hours    MEDICATIONS  (PRN):      Allergies    No Known Allergies    Intolerances      Vital Signs Last 24 Hrs  T(C): 36.3 (02 May 2022 10:00), Max: 36.8 (02 May 2022 01:26)  T(F): 97.4 (02 May 2022 10:00), Max: 98.3 (02 May 2022 01:26)  HR: 66 (02 May 2022 12:10) (66 - 85)  BP: 178/72 (02 May 2022 12:10) (155/69 - 185/80)  BP(mean): 104 (02 May 2022 12:10) (99 - 118)  RR: 16 (02 May 2022 12:10) (16 - 20)  SpO2: 95% (02 May 2022 12:10) (95% - 98%)    Physical exam:  General: Laying in bed, does not appear to be feeling well  Eyes: Anicteric sclerae, moist conjunctivae, see below for CNs  Neck: trachea midline  Cardiovascular: Regular rate and rhythm on monitor  Pulmonary: No use of accessory muscles  GI: Abdomen soft, non-distended, non-tender  Extremities: no edema    Neurologic:  -Mental status: Awake, alert, oriented to person, place, and time. Speech is fluent with intact naming, repetition, and comprehension, no dysarthria. Recent and remote memory intact. Follows commands. Attention/concentration intact. Fund of knowledge appropriate.  -Cranial nerves:   II: Visual fields are full to confrontation.  III, IV, VI: Left horizontal beating nystagmus. Pupils equally round and reactive to light  V:  Facial sensation V1-V3 equal and intact   VII: Face is symmetric with normal eye closure and smile  XII: Tongue protrudes midline  Motor: Normal bulk and tone. No pronator drift. Strength bilateral upper extremity 5/5, bilateral lower extremities 5/5.  Sensation: Intact to light touch bilaterally. No neglect or extinction on double simultaneous testing.  Coordination: No dysmetria on finger-to-nose    LABS:                        14.5   12.10 )-----------( 187      ( 02 May 2022 07:06 )             44.0     05-02    139  |  100  |  22  ----------------------------<  116<H>  see note   |  24  |  0.55    Ca    9.2      02 May 2022 07:06  Phos  2.9     -  Mg     1.9     -    TPro  8.2  /  Alb  4.5  /  TBili  0.3  /  DBili  x   /  AST  24  /  ALT  12  /  AlkPhos  78  05-01    PT/INR - ( 01 May 2022 22:41 )   PT: 10.6 sec;   INR: 0.89          PTT - ( 01 May 2022 22:41 )  PTT:31.0 sec  Urinalysis Basic - ( 01 May 2022 23:19 )    Color: Yellow / Appearance: Clear / S.020 / pH: x  Gluc: x / Ketone: 40 mg/dL  / Bili: Negative / Urobili: 0.2 E.U./dL   Blood: x / Protein: NEGATIVE mg/dL / Nitrite: NEGATIVE   Leuk Esterase: NEGATIVE / RBC: < 5 /HPF / WBC 5-10 /HPF   Sq Epi: x / Non Sq Epi: 0-5 /HPF / Bacteria: Present /HPF        RADIOLOGY & ADDITIONAL TESTS:  < from: CT Brain Stroke Protocol (22 @ 22:57) >  IMPRESSION:  No acute intracranial hemorrhage or demarcated transcortical infarction.    < end of copied text >  < from: CT Angio Head w/ IV Cont (22 @ 22:59) >  IMPRESSION:  No high-grade stenotic or occlusive intracranial arterial lesion.    < end of copied text >  < from: CT Angio Head w/ IV Cont (22 @ 22:59) >  IMPRESSION:  Unremarkable CTA examination of the neck.      < end of copied text >  < from: CT Perfusion w/ Maps w/ IV Cont (22 @ 22:58) >  IMPRESSION:  Normal CT perfusion study.    < end of copied text >

## 2022-05-02 NOTE — H&P ADULT - HISTORY OF PRESENT ILLNESS
89 yo F with pmhx HTN presenting to ED with dizziness. Pt had a slow relaxing day, feeling well, was standing in her apartment and felt sudden onset room spinning dizziness, along with nausea and vomiting, around 7 pm. Dizziness not improving, pt came to ED, stroke code called, CTH noncontrast with no acute pathology, CTA head/neck with no significant stenosis or occlusion, CTP normal. NIHSS 0.  Dizziness improving in ED, discussed with neurology attending, decision made not to administer tpa. UA with 5-10 WBC as well. Pt denies headache, numbness, weakness, vision changes, or speech changes. MRS 0, does not use cane or walker, cooks and cleans for herself and her .

## 2022-05-02 NOTE — CONSULT NOTE ADULT - SUBJECTIVE AND OBJECTIVE BOX
Patient is a 89y old  Female who presents with a chief complaint of Dizziness (02 May 2022 00:07)        HPI:  89 yo F with pmhx HTN presenting to ED with dizziness. Pt had a slow relaxing day, feeling well, was standing in her apartment and felt sudden onset room spinning dizziness, along with nausea and vomiting, around 7 pm. Dizziness not improving, pt came to ED, stroke code called, CTH noncontrast with no acute pathology, CTA head/neck with no significant stenosis or occlusion, CTP normal. NIHSS 0.  Dizziness improving in ED, discussed with neurology attending, decision made not to administer tpa. UA with 5-10 WBC as well. Pt denies headache, numbness, weakness, vision changes, or speech changes. MRS 0, does not use cane or walker, cooks and cleans for herself and her . (02 May 2022 00:07)      PAST MEDICAL & SURGICAL HISTORY:      MEDICATIONS  (STANDING):  aspirin  chewable 81 milliGRAM(s) Oral daily  atorvastatin 80 milliGRAM(s) Oral at bedtime  clopidogrel Tablet 75 milliGRAM(s) Oral daily  latanoprost 0.005% Ophthalmic Solution 1 Drop(s) Both EYES at bedtime    MEDICATIONS  (PRN):      FAMILY HISTORY:    CBC Full  -  ( 02 May 2022 07:06 )  WBC Count : 12.10 K/uL  RBC Count : 4.52 M/uL  Hemoglobin : 14.5 g/dL  Hematocrit : 44.0 %  Platelet Count - Automated : 187 K/uL  Mean Cell Volume : 97.3 fl  Mean Cell Hemoglobin : 32.1 pg  Mean Cell Hemoglobin Concentration : 33.0 gm/dL  Auto Neutrophil # : x  Auto Lymphocyte # : x  Auto Monocyte # : x  Auto Eosinophil # : x  Auto Basophil # : x  Auto Neutrophil % : x  Auto Lymphocyte % : x  Auto Monocyte % : x  Auto Eosinophil % : x  Auto Basophil % : x      05-    139  |  100  |  22  ----------------------------<  116<H>  see note   |  24  |  0.55    Ca    9.2      02 May 2022 07:06  Phos  2.9     05-02  Mg     1.9     05-02    TPro  8.2  /  Alb  4.5  /  TBili  0.3  /  DBili  x   /  AST  24  /  ALT  12  /  AlkPhos  78  05-01      Urinalysis Basic - ( 01 May 2022 23:19 )    Color: Yellow / Appearance: Clear / S.020 / pH: x  Gluc: x / Ketone: 40 mg/dL  / Bili: Negative / Urobili: 0.2 E.U./dL   Blood: x / Protein: NEGATIVE mg/dL / Nitrite: NEGATIVE   Leuk Esterase: NEGATIVE / RBC: < 5 /HPF / WBC 5-10 /HPF   Sq Epi: x / Non Sq Epi: 0-5 /HPF / Bacteria: Present /HPF          Radiology:    < from: CT Brain Stroke Protocol (22 @ 22:57) >  ACC: [id number] EXAM:  CT BRAIN STROKE PROTOCOL                          PROCEDURE DATE:  2022          INTERPRETATION:  INDICATIONS: Stroke code, sudden onset dizziness    TECHNIQUE:  Serial axial images were obtained from the skull base to the   vertex without the use of intravenous contrast. Coronal and sagittal   reformatted images were obtained.    COMPARISON EXAMINATION: None.    FINDINGS:  VENTRICLES AND SULCI: The ventricles and sulci are within normal limits   for the patient's age.  INTRA-AXIAL: No intracranial mass, acute hemorrhage, or midline shift is   present. There is preservation of the gray-white matter differentiation   without evidence of acute transcortical infarction. There is subcortical   and periventricular-white matter hypoattenuation, nonspecific but favored   to reflect sequela of chronic microvascular ischemia. Linear chronic   infarct extending from right caudate head.  EXTRA-AXIAL: No extra-axial fluid collection is present.  VISUALIZED SINUSES: No air-fluid levels are identified.  VISUALIZED MASTOIDS:  Clear.  CALVARIUM:  Normal.  MISCELLANEOUS: Questionable dense right P2, see concurrent CTA head and   neck.      IMPRESSION:  No acute intracranial hemorrhage or demarcated transcortical infarction.      < from: CT Perfusion w/ Maps w/ IV Cont (22 @ 22:58) >  ACC: [id number] EXAM:  CT PERFUSION W MAPS IC                          PROCEDURE DATE:  2022          INTERPRETATION:  CT Perfusion    INDICATION: Stroke code, sudden onset dizziness    TECHNIQUE: After the bolus administration of 40 mL of Isovue-370, serial   axial images were obtained through the brain. The CT perfusion data set   was post processed per [facility] protocol generating color maps of   CBF, CBV, MTT, and Tmax.    COMPARISON: None.    Following measurements were obtained on perfusion maps:  CBF <  30%: 0 mL  Tmax >6s: 0 mL  Mismatched volume: 0 mL  Mismatched ratio: None.      IMPRESSION:    Normal CT perfusion study        < from: CT Angio Head w/ IV Cont (22 @ 22:59) >  ACC: [id number] EXAM:  CT ANGIO NECK (W)AW IC                        ACC: [id number] EXAM:  CT ANGIO BRAIN (W)AW IC                          PROCEDURE DATE:  2022          INTERPRETATION:  PROCEDURE: CTA brain with intravenous contrast.    INDICATION: Stroke code, sudden onset dizziness    TECHNIQUE: Multiple axial thin section were obtained through the [place]   [place] following the intravenous bolus injection of 79 mL of   Isovue-370. MIP series are provided.    COMPARISON: None.    FINDINGS:  The internal carotid arteries are patent at the skull base and   intracranial compartment without occlusion or high grade stenosis. The   anterior and middle cerebral arteries are patent at their 1st and 2nd   order segments, and appear symmetric caliber. The posterior circulation   demonstrates segmental area of mild to moderate narrowing involving the   right there is also area of mild to moderate narrowing involving the left   P1/P2 junction. P2 segment. There is no occlusion. The intracranial   vertebral arteries, the basilar artery and both posterior cerebral   arteries are patent. There is no site of aneurysm within the resolution   limitations of CTA.      IMPRESSION:  No high-grade stenotic or occlusive intracranial arterial lesion.    --------------------------------------------------------------  PROCEDURE: CTA neck with intravenous contrast.    INDICATION: Stroke code, sudden onset dizziness    TECHNIQUE: Multiple axial thin section were obtained through the neck   following the intravenous bolus injection of Isovue-370 as above. MIP   series are provided.    COMPARISON: None.    FINDINGS:  The aortic arch is normal size and shows patency, with normal 3 vessel   configuration.    Both common carotid arteries are patentup to the bifurcations.    The right internal carotid artery is patent up to the skull base, without   hemodynamically significant stenosis or occlusion.    The left internal carotid artery is patent up to the skull base, without   hemodynamically significant stenosis or occlusion.    The cervical vertebral arteries are patent throughout, without   hemodynamically significant stenosis or occlusion.      IMPRESSION:  Unremarkable CTA examination of the neck.                  Vital Signs Last 24 Hrs  T(C): 36.3 (02 May 2022 06:16), Max: 36.8 (02 May 2022 01:26)  T(F): 97.3 (02 May 2022 06:16), Max: 98.3 (02 May 2022 01:26)  HR: 84 (02 May 2022 04:52) (72 - 85)  BP: 181/82 (02 May 2022 04:52) (155/69 - 181/82)  BP(mean): 118 (02 May 2022 04:52) (99 - 118)  RR: 20 (02 May 2022 04:52) (16 - 20)  SpO2: 98% (02 May 2022 04:52) (97% - 98%)        REVIEW OF SYSTEMS:      CONSTITUTIONAL: No fever, weight loss, or fatigue  EYES: No eye pain, visual disturbances, or discharge  ENMT:  No difficulty hearing, tinnitus, vertigo; No sinus or throat pain  NECK: No pain or stiffness  BREASTS: No pain, masses, or nipple discharge  RESPIRATORY: No cough, wheezing, chills or hemoptysis; No shortness of breath  CARDIOVASCULAR: No chest pain, palpitations, dizziness, or leg swelling  GASTROINTESTINAL: No abdominal or epigastric pain. No nausea, vomiting, or hematemesis; No diarrhea or constipation. No melena or hematochezia.  GENITOURINARY: No dysuria, frequency, hematuria, or incontinence  NEUROLOGICAL:   per HPI  SKIN: No itching, burning, rashes, or lesions   LYMPH NODES: No enlarged glands  ENDOCRINE: No heat or cold intolerance; No hair loss  MUSCULOSKELETAL: No joint pain or swelling; No muscle, back, or extremity pain  PSYCHIATRIC: No depression, anxiety, mood swings, or difficulty sleeping  HEME/LYMPH: No easy bruising, or bleeding gums  ALLERGY AND IMMUNOLOGIC: No hives or eczema  VASCULAR: no swelling, erythema,            Physical Exam:  89 yo woman lying in semi Tiwari's position, awake, alert,  no acute complaints    Head: normocephalic, atraumatic    Eyes: PERRLA, EOMI, no nystagmus, sclera anicteric    ENT: nasal discharge, uvula midline, no oropharyngeal erythema/exudate    Neck: supple, negative JVD, negative carotid bruits, no thyromegaly    Chest: CTA bilaterally, neg wheeze/ rhonchi/ rales/ crackles/ egophany    Cardiovascular: regular rate and rhythm, neg murmurs/rubs/gallops    Abdomen: soft, non distended, non tender to palpation in all 4 quadrants, negative rebound/guarding, normal bowel sounds    Extremities: WWP, neg cyanosis/clubbing/edema      Neurologic Exam:    Alert and oriented to person, place, date/year, speech fluent w/o dysarthria, follows commands, recent and remote memory intact, repetition intact, comprehension intact,  attention/concentration intact, fund of knowledge appropriate    Cranial Nerves:     II:                         pupils equal, round and reactive to light, visual fields intact   III/ IV/VI:              extraocular movements intact, neg nystagmus, neg ptosis  V:                        facial sensation intact, V1-3 normal  VII:                      face symmetric, no droop, normal eye closure and smile  VIII:                     hearing intact to finger rub bilaterally  IX and X:             no hoarseness, gag intact, palate/ uvula rise symmetrically  XI:                       SCM/ trapezius strength intact bilateral  XII:                      no tongue deviation    Motor Exam:    Right UE:           no focal weakness,  >4/5 throughout,  pronator drift: neg                                 Left UE:              no focal weakness,  >4/5 throughout,  pronator drift: neg        Right LE:             no focal weakness,  >4/5 throughout      Left LE:             no focal weakness,  >4/5 throughout      Sensation:         intact to light touch x 4 extremities                         no neglect or extinction on double simultaneous testing                       DTR:                     biceps/brachioradialis: equal                                              patella/ankle: equal     neg Babinski                        Coordination:      Finger to Nose:  neg dysmetria bilaterally      Gait:  not tested              PM&R Impression:    1) dizziness  2) admitted for stroke workup, NIHSS 0  3) no focal weakness    Recommendations/ Plan :    1) Physical / Occupational therapy focusing on therapeutic exercises, bed mobility/transfer out of bed evaluation, progressive ambulation with assistive devices prn.    2) Anticipated Disposition Plan/Recs  :    pending functional progress                  
CC: dizziness    HPI: 89 y/o F c/o dizziness a/w N/V; Pt. denies HA, CP, SOB, tinnitus, hearing loss, and/or recent URI; Pt. reports her sx are not related to head movement; on arrival to [facility] ED, NIHSS 0, tPA not administered; today, sx persist, but have improved somewhat since admission; Pt. also reports new urinary frequency today, but denies F/C, flank pain, and/or dysuria  12-pt ROS otherwise negative    PMH: HTN  PSH: s/p appendectomy  SH: non-smoker, drinks ~1 glass of wine/day  FH: mother had a stroke  Allergies: NKDA    VS: 97.3 185/80 74 18 96% on RA    Exam:   Gen: nauseous appearing in NAD   HEENT: MMM   CV: RRR, no JVD   Lungs: CTA B/L, normal WOB on RA   Abdomen: soft NT ND   Extremities: no edema B/L LE   Skin: WWP   Neuro: A&Ox3, no dysarthria    Labs reviewed; HbA1c 5.4%, , TSH 1.740, WBC 12.10;   UA 5-10 WBCs, negative nitrite and LE    Images reviewed; CT brain stroke protocol shows "no acute intracranial hemorrhage or demarcated transcortical infarction;" normal CT perfusion study; CTA head and neck shows "no high-grade stenotic or occlusive intracranial arterial lesion"    EKG reviewed; NSR

## 2022-05-03 DIAGNOSIS — N30.00 ACUTE CYSTITIS WITHOUT HEMATURIA: ICD-10-CM

## 2022-05-03 LAB
ANION GAP SERPL CALC-SCNC: 11 MMOL/L — SIGNIFICANT CHANGE UP (ref 5–17)
BUN SERPL-MCNC: 27 MG/DL — HIGH (ref 7–23)
CALCIUM SERPL-MCNC: 9.4 MG/DL — SIGNIFICANT CHANGE UP (ref 8.4–10.5)
CHLORIDE SERPL-SCNC: 101 MMOL/L — SIGNIFICANT CHANGE UP (ref 96–108)
CO2 SERPL-SCNC: 27 MMOL/L — SIGNIFICANT CHANGE UP (ref 22–31)
CREAT SERPL-MCNC: 0.84 MG/DL — SIGNIFICANT CHANGE UP (ref 0.5–1.3)
EGFR: 65 ML/MIN/1.73M2 — SIGNIFICANT CHANGE UP
GLUCOSE SERPL-MCNC: 96 MG/DL — SIGNIFICANT CHANGE UP (ref 70–99)
HCT VFR BLD CALC: 40.8 % — SIGNIFICANT CHANGE UP (ref 34.5–45)
HGB BLD-MCNC: 13.4 G/DL — SIGNIFICANT CHANGE UP (ref 11.5–15.5)
MAGNESIUM SERPL-MCNC: 2.1 MG/DL — SIGNIFICANT CHANGE UP (ref 1.6–2.6)
MCHC RBC-ENTMCNC: 31.8 PG — SIGNIFICANT CHANGE UP (ref 27–34)
MCHC RBC-ENTMCNC: 32.8 GM/DL — SIGNIFICANT CHANGE UP (ref 32–36)
MCV RBC AUTO: 96.7 FL — SIGNIFICANT CHANGE UP (ref 80–100)
NRBC # BLD: 0 /100 WBCS — SIGNIFICANT CHANGE UP (ref 0–0)
PHOSPHATE SERPL-MCNC: 2.9 MG/DL — SIGNIFICANT CHANGE UP (ref 2.5–4.5)
PLATELET # BLD AUTO: 297 K/UL — SIGNIFICANT CHANGE UP (ref 150–400)
POTASSIUM SERPL-MCNC: 3.6 MMOL/L — SIGNIFICANT CHANGE UP (ref 3.5–5.3)
POTASSIUM SERPL-SCNC: 3.6 MMOL/L — SIGNIFICANT CHANGE UP (ref 3.5–5.3)
RBC # BLD: 4.22 M/UL — SIGNIFICANT CHANGE UP (ref 3.8–5.2)
RBC # FLD: 13.6 % — SIGNIFICANT CHANGE UP (ref 10.3–14.5)
SODIUM SERPL-SCNC: 139 MMOL/L — SIGNIFICANT CHANGE UP (ref 135–145)
WBC # BLD: 8.49 K/UL — SIGNIFICANT CHANGE UP (ref 3.8–10.5)
WBC # FLD AUTO: 8.49 K/UL — SIGNIFICANT CHANGE UP (ref 3.8–10.5)

## 2022-05-03 PROCEDURE — 99233 SBSQ HOSP IP/OBS HIGH 50: CPT

## 2022-05-03 RX ORDER — AMLODIPINE BESYLATE 2.5 MG/1
5 TABLET ORAL DAILY
Refills: 0 | Status: DISCONTINUED | OUTPATIENT
Start: 2022-05-03 | End: 2022-05-09

## 2022-05-03 RX ORDER — DIAZEPAM 5 MG
2 TABLET ORAL AT BEDTIME
Refills: 0 | Status: DISCONTINUED | OUTPATIENT
Start: 2022-05-03 | End: 2022-05-04

## 2022-05-03 RX ORDER — DIAZEPAM 5 MG
2 TABLET ORAL ONCE
Refills: 0 | Status: DISCONTINUED | OUTPATIENT
Start: 2022-05-03 | End: 2022-05-03

## 2022-05-03 RX ORDER — POTASSIUM CHLORIDE 20 MEQ
40 PACKET (EA) ORAL ONCE
Refills: 0 | Status: COMPLETED | OUTPATIENT
Start: 2022-05-03 | End: 2022-05-03

## 2022-05-03 RX ORDER — AMLODIPINE BESYLATE 2.5 MG/1
1 TABLET ORAL
Qty: 0 | Refills: 0 | DISCHARGE

## 2022-05-03 RX ORDER — DESMOPRESSIN ACETATE 0.1 MG/1
1 TABLET ORAL
Qty: 0 | Refills: 0 | DISCHARGE

## 2022-05-03 RX ADMIN — CEFTRIAXONE 100 MILLIGRAM(S): 500 INJECTION, POWDER, FOR SOLUTION INTRAMUSCULAR; INTRAVENOUS at 16:52

## 2022-05-03 RX ADMIN — Medication 12.5 MILLIGRAM(S): at 13:05

## 2022-05-03 RX ADMIN — LATANOPROST 1 DROP(S): 0.05 SOLUTION/ DROPS OPHTHALMIC; TOPICAL at 22:36

## 2022-05-03 RX ADMIN — ATORVASTATIN CALCIUM 80 MILLIGRAM(S): 80 TABLET, FILM COATED ORAL at 22:36

## 2022-05-03 RX ADMIN — ENOXAPARIN SODIUM 30 MILLIGRAM(S): 100 INJECTION SUBCUTANEOUS at 22:41

## 2022-05-03 RX ADMIN — Medication 12.5 MILLIGRAM(S): at 06:15

## 2022-05-03 RX ADMIN — Medication 12.5 MILLIGRAM(S): at 22:37

## 2022-05-03 RX ADMIN — Medication 40 MILLIEQUIVALENT(S): at 13:05

## 2022-05-03 RX ADMIN — AMLODIPINE BESYLATE 5 MILLIGRAM(S): 2.5 TABLET ORAL at 16:52

## 2022-05-03 RX ADMIN — Medication 2 MILLIGRAM(S): at 11:55

## 2022-05-03 NOTE — PHYSICAL THERAPY INITIAL EVALUATION ADULT - MANUAL MUSCLE TESTING RESULTS, REHAB EVAL
pt's participation and tolerance for mobility. UE/LE strength grossly assessed with functional mobility. Bilaterally pt strength is equal/greater than 3/5./grossly assessed due to

## 2022-05-03 NOTE — OCCUPATIONAL THERAPY INITIAL EVALUATION ADULT - ADDITIONAL COMMENTS
Pt lives w/ her  in private house w/ no stairs to negotiate. Pt states that she was independent prior to admission. No prior possession/use of AEs/DMEs reported.

## 2022-05-03 NOTE — PHYSICAL THERAPY INITIAL EVALUATION ADULT - MODALITIES TREATMENT COMMENTS
Neuro exam limited 2/2 pt dizziness. Dizziness more apparent with L head turns, +L Ry hallpike, partially resolved dizziness with Epley maneuver for L ear. Cranial Nerves II - XII: II: PERRLA; (wears glasses at baseline) visual fields are full to confrontation III, IV, VI: L horizontal nystagmus smooth pursuit and reported dizziness. Vision Quadrant Test: intact VII: no ptosis, no facial droop, symmetric eyebrow raise and closure VIII: hearing intact to finger rub b/l; denied hearing deficits XI: head turning XII: tongue protrusion midline. Neuro exam limited 2/2 pt dizziness. Dizziness more apparent with L head turns, +L Ry hallpike with L horizontal nystagmus with slight rotational component, partially resolved dizziness with Epley maneuver for L ear. Cranial Nerves II - XII: II: PERRLA; (wears glasses at baseline) visual fields are full to confrontation III, IV, VI: L horizontal nystagmus smooth pursuit and reported dizziness. Vision Quadrant Test: intact VII: no ptosis, no facial droop, symmetric eyebrow raise and closure VIII: hearing intact to finger rub b/l; denied hearing deficits XI: head turning XII: tongue protrusion midline.

## 2022-05-03 NOTE — PROGRESS NOTE ADULT - ASSESSMENT
89 yo F with pmhx HTN presenting to ED with complaints of dizziness, nausea, and vomiting. Stroke code called on 5/1, CTH and CTA head/neck unremarkable. MRI brain negative for stroke.Etiology of patient's symptoms concerning for peripheral vertigo.    Neuro  #peripheral vertigo  - q8hr general neuro checks and vitals  - MRI brain negative for acute ischemia  - Stroke Code HCT Results: negative  - Stroke Code CTA Results: negative  - Stroke education  -Vestibular therapy if possible    #dizziness  - initiated 12.mg meclizine q8h    Cards  - obtain TTE without bubble    #HLD  - high dose statin as above in CVA  - LDL results: 168    Pulm  - call provider if SPO2 < 94%    GI  #Nutrition/Fluids/Electrolytes   - replete K<4 and Mg <2  - Diet: Regular    Renal  - daily BMP    Infectious Disease  #UTI  - UA with WBC 5-10, positive bacteria on admission  - 2nd UA +, gave one dose of Ceftriaxone on 5/2. F/u UCx sent on 5/2, follow up    Endocrine  #DM  - A1C results: 5.4%      - TSH results: 1.740    DVT Prophylaxis  - SCDs for DVT prophylaxis  - Lovenox SQ    Discussed with Neurology Attending Dr. Filippo Swartz   89 yo F with pmhx HTN presenting to ED with complaints of dizziness, nausea, and vomiting. Stroke code called on 5/1, CTH and CTA head/neck unremarkable. MRI brain negative for stroke.Etiology of patient's symptoms concerning for peripheral vertigo.    Neuro  #peripheral vertigo  - q8hr general neuro checks and vitals  - MRI brain negative for acute ischemia  - Stroke Code HCT Results: negative  - Stroke Code CTA Results: negative  - Stroke education  -Vestibular therapy if possible    #dizziness  - initiated 12.mg meclizine q8h    Cards  - obtain TTE without bubble    #HLD  - high dose statin as above in CVA  - LDL results: 168    Pulm  - call provider if SPO2 < 94%    GI  #Nutrition/Fluids/Electrolytes   - replete K<4 and Mg <2  - Diet: Regular    Renal  - daily BMP    Infectious Disease  #UTI  - UA with WBC 5-10, positive bacteria on admission  - 2nd UA +, gave one dose of Ceftriaxone on 5/2. F/u UCx sent on 5/2, follow up    Endocrine  #DM  - A1C results: 5.4%      - TSH results: 1.740    DVT Prophylaxis  - SCDs for DVT prophylaxis  - Lovenox SQ    Discussed with Neurology Attending Dr. Filippo Swartz    Dispo: home pending further PT assessment for walking 89 yo F with pmhx HTN presenting to ED with complaints of dizziness, nausea, and vomiting. Stroke code called on 5/1, CTH and CTA head/neck unremarkable. MRI brain negative for stroke.Etiology of patient's symptoms concerning for peripheral vertigo.    Neuro  #peripheral vertigo  - q8hr general neuro checks and vitals  - MRI brain negative for acute ischemia  - Stroke Code HCT Results: negative  - Stroke Code CTA Results: negative  - Stroke education  -Vestibular therapy if possible    #dizziness  - initiated 12.mg meclizine q8h    #HLD  - high dose statin as above in CVA  - LDL results: 168    Pulm  - call provider if SPO2 < 94%    GI  #Nutrition/Fluids/Electrolytes   - replete K<4 and Mg <2  - Diet: Regular    Renal  - daily BMP    Infectious Disease  #UTI  - UA with WBC 5-10, positive bacteria on admission  - 2nd UA +, gave one dose of Ceftriaxone on 5/2. F/u UCx sent on 5/2, follow up    Endocrine  #DM  - A1C results: 5.4%      - TSH results: 1.740    DVT Prophylaxis  - SCDs for DVT prophylaxis  - Lovenox SQ    Discussed with Neurology Attending Dr. Filippo Swartz    Dispo: home pending further PT assessment for walking 89 yo F with pmhx HTN presenting to ED with complaints of dizziness, nausea, and vomiting. Stroke code called on 5/1, CTH and CTA head/neck unremarkable. MRI brain negative for stroke.Etiology of patient's symptoms concerning for peripheral vertigo.    Neuro  #peripheral vertigo  - q8hr general neuro checks and vitals  - MRI brain negative for acute ischemia  - Stroke Code HCT Results: negative  - Stroke Code CTA Results: negative  - Stroke education  -Vestibular therapy if possible    #dizziness  - initiated 12.mg meclizine q8h  -trial 2 mg Valium    #HLD  - high dose statin as above in CVA  - LDL results: 168    Pulm  - call provider if SPO2 < 94%    GI  #Nutrition/Fluids/Electrolytes   - replete K<4 and Mg <2  - Diet: Regular    Renal  - daily BMP    Infectious Disease  #UTI  - UA with WBC 5-10, positive bacteria on admission  - 2nd UA +, gave one dose of Ceftriaxone on 5/2. F/u UCx sent on 5/2, follow up    Endocrine  #DM  - A1C results: 5.4%      - TSH results: 1.740    DVT Prophylaxis  - SCDs for DVT prophylaxis  - Lovenox SQ    Discussed with Neurology Attending Dr. Filippo Swartz    Dispo: home pending further PT assessment for walking 89 yo F with pmhx HTN presenting to ED with complaints of dizziness, nausea, and vomiting. Stroke code called on 5/1, CTH and CTA head/neck unremarkable. MRI brain negative for stroke.Etiology of patient's symptoms concerning for peripheral vertigo.    Neuro  #peripheral vertigo  - q8hr general neuro checks and vitals  - MRI brain negative for acute ischemia  - Stroke Code HCT Results: negative  - Stroke Code CTA Results: negative  - Stroke education  -Vestibular therapy if possible    #dizziness  - initiated 12.mg meclizine q8h  -trial 2 mg Valium    Cardiology  #HTN  -restarted home amlodipine    #HLD  - high dose statin as above in CVA  - LDL results: 168    Pulm  - call provider if SPO2 < 94%    GI  #Nutrition/Fluids/Electrolytes   - replete K<4 and Mg <2  - Diet: Regular    Renal  - daily BMP    Infectious Disease  #UTI  - UA with WBC 5-10, positive bacteria on admission  - 2nd UA +, gave one dose of Ceftriaxone on 5/2. F/u UCx sent on 5/2, follow up    Endocrine  #DM  - A1C results: 5.4%      - TSH results: 1.740    DVT Prophylaxis  - SCDs for DVT prophylaxis  - Lovenox SQ    Discussed with Neurology Attending Dr. Filippo Swartz    Dispo: home pending further PT assessment for walking

## 2022-05-03 NOTE — OCCUPATIONAL THERAPY INITIAL EVALUATION ADULT - MD ORDER
89 yo F with pmhx HTN presenting to ED with dizziness. Pt had a slow relaxing day, feeling well, was standing in her apartment and felt sudden onset room spinning dizziness, along with nausea and vomiting, around 7 pm. Dizziness not improving, pt came to ED, stroke code called,

## 2022-05-03 NOTE — OCCUPATIONAL THERAPY INITIAL EVALUATION ADULT - GENERAL OBSERVATIONS, REHAB EVAL
Pt's RN Srikanth aware of intent to eval/tx; cleared Pt. Pt received in supine - + at bedside. +Telemetry, heplock, scds, primafit. Pt agreeable to OT.

## 2022-05-03 NOTE — PHYSICAL THERAPY INITIAL EVALUATION ADULT - ADDITIONAL COMMENTS
Pt lives in an apartment with no TERRIE and has elevator access. Pt is the caregiver for her  and is independent with all ADLs. Pt denies use of assistive devices.

## 2022-05-03 NOTE — OCCUPATIONAL THERAPY INITIAL EVALUATION ADULT - PERTINENT HX OF CURRENT PROBLEM, REHAB EVAL
S/P Stroke code. CTH noncontrast with no acute pathology, CTA head/neck with no significant stenosis or occlusion, CTP normal. NIHSS 0.  +CVA work up MRI Brain without contrast 5/2/22 negative.

## 2022-05-03 NOTE — PHYSICAL THERAPY INITIAL EVALUATION ADULT - GENERAL OBSERVATIONS, REHAB EVAL
PT IE Completed. Pt received semi-supine in bed, +heplock, +tele, A&Ox4, +RA, in NAD and agreeable to work with PT, BASILIO Michelle. OT Yuni present upon arrival.Pt left in semi-supine, +bed alarm, +call nelson within reach, BASILIO Michelle notified. Pt can benefit from PT in order to improve quality of life by increasing balance and gait ability with functional mobility and ADLS and f/u for vestibular assess and treatment.

## 2022-05-03 NOTE — PHYSICAL THERAPY INITIAL EVALUATION ADULT - PERTINENT HX OF CURRENT PROBLEM, REHAB EVAL
89 yo F with pmhx HTN presenting to ED with dizziness. Pt had a slow relaxing day, feeling well, was standing in her apartment and felt sudden onset room spinning dizziness, along with nausea and vomiting, around 7 pm. Dizziness not improving, pt came to ED, stroke code called. CTH noncontrast with no acute pathology, CTA head/neck with no significant stenosis or occlusion, CTP normal. NIHSS 0.  +CVA work up MRI Brain without contrast 5/2/22 negative.

## 2022-05-03 NOTE — PROGRESS NOTE ADULT - SUBJECTIVE AND OBJECTIVE BOX
Patient is a 89y old  Female who presents with a chief complaint of Dizziness (03 May 2022 09:09)      INTERVAL HPI/OVERNIGHT EVENTS:    Pt. seen and examined earlier today  Pt.'s  present at bedside  MRI results d/w Pt.  Pt. still c/o intermittent dizziness  Pt. reports her nausea is better, vomiting resolved, and she has been able to tolerate PO  Pt. denies F/C, CP, SOB, tinnitus, hearing loss    Review of Systems: 12 point review of systems otherwise negative    MEDICATIONS  (STANDING):  atorvastatin 80 milliGRAM(s) Oral at bedtime  cefTRIAXone   IVPB      cefTRIAXone   IVPB 1000 milliGRAM(s) IV Intermittent every 24 hours  enoxaparin Injectable 30 milliGRAM(s) SubCutaneous every 24 hours  latanoprost 0.005% Ophthalmic Solution 1 Drop(s) Both EYES at bedtime  meclizine 12.5 milliGRAM(s) Oral every 8 hours  potassium chloride   Solution 40 milliEquivalent(s) Oral once    MEDICATIONS  (PRN):      Allergies    No Known Allergies    Intolerances          Vital Signs Last 24 Hrs  T(C): 36.6 (03 May 2022 09:28), Max: 36.8 (03 May 2022 04:45)  T(F): 97.9 (03 May 2022 09:28), Max: 98.3 (03 May 2022 06:01)  HR: 62 (03 May 2022 11:50) (52 - 80)  BP: 148/66 (03 May 2022 11:50) (103/77 - 185/75)  BP(mean): 95 (03 May 2022 11:50) (94 - 111)  RR: 16 (03 May 2022 11:50) (16 - 18)  SpO2: 99% (03 May 2022 11:50) (94% - 99%)  CAPILLARY BLOOD GLUCOSE          -02 @ 07:01  -  05-03 @ 07:00  --------------------------------------------------------  IN: 0 mL / OUT: 400 mL / NET: -400 mL        Physical Exam:  (earlier today)  Daily     Daily   General:  appears less nauseous today  HEENT:  MMM  CV:  RRR, no JVD  Lungs:  CTA B/L  Abdomen:  soft NT ND  Extremities:  no edema B/L LE  Skin:  WWP  Neuro:  AAOx3, non-focal    LABS:                        13.4   8.49  )-----------( 297      ( 03 May 2022 06:27 )             40.8     05-03    139  |  101  |  27<H>  ----------------------------<  96  3.6   |  27  |  0.84    Ca    9.4      03 May 2022 06:27  Phos  2.9     05-03  Mg     2.1     05-03    TPro  8.2  /  Alb  4.5  /  TBili  0.3  /  DBili  x   /  AST  24  /  ALT  12  /  AlkPhos  78  05-01    PT/INR - ( 01 May 2022 22:41 )   PT: 10.6 sec;   INR: 0.89          PTT - ( 01 May 2022 22:41 )  PTT:31.0 sec  Urinalysis Basic - ( 02 May 2022 15:14 )    Color: Yellow / Appearance: Clear / S.020 / pH: x  Gluc: x / Ketone: 15 mg/dL  / Bili: Negative / Urobili: 0.2 E.U./dL   Blood: x / Protein: NEGATIVE mg/dL / Nitrite: NEGATIVE   Leuk Esterase: Small / RBC: 5-10 /HPF / WBC Many /HPF   Sq Epi: x / Non Sq Epi: 0-5 /HPF / Bacteria: Many /HPF

## 2022-05-03 NOTE — PROGRESS NOTE ADULT - SUBJECTIVE AND OBJECTIVE BOX
Neurology Stroke Progress Note    INTERVAL HPI/OVERNIGHT EVENTS:  Patient seen and examined.  number ______ used.    MEDICATIONS  (STANDING):  aspirin  chewable 81 milliGRAM(s) Oral daily  atorvastatin 80 milliGRAM(s) Oral at bedtime  cefTRIAXone   IVPB      cefTRIAXone   IVPB 1000 milliGRAM(s) IV Intermittent every 24 hours  clopidogrel Tablet 75 milliGRAM(s) Oral daily  enoxaparin Injectable 30 milliGRAM(s) SubCutaneous every 24 hours  latanoprost 0.005% Ophthalmic Solution 1 Drop(s) Both EYES at bedtime  meclizine 12.5 milliGRAM(s) Oral every 8 hours  potassium chloride   Solution 40 milliEquivalent(s) Oral once    MEDICATIONS  (PRN):      Allergies    No Known Allergies    Intolerances        Vital Signs Last 24 Hrs  T(C): 36.8 (03 May 2022 06:01), Max: 36.8 (03 May 2022 04:45)  T(F): 98.3 (03 May 2022 06:01), Max: 98.3 (03 May 2022 06:01)  HR: 55 (03 May 2022 04:45) (54 - 80)  BP: 103/77 (03 May 2022 04:45) (103/77 - 185/75)  BP(mean): 94 (03 May 2022 02:31) (94 - 111)  RR: 18 (03 May 2022 04:45) (16 - 18)  SpO2: 97% (03 May 2022 04:45) (95% - 97%)    Physical exam:  General: No acute distress, awake and alert  Eyes: Anicteric sclerae, moist conjunctivae, see below for CNs  Neck: trachea midline,  Cardiovascular: Regular rate and rhythm, no murmurs  Pulmonary: Anterior breath sounds clear bilaterally No use of accessory muscles  GI: Abdomen soft, non-distended, non-tender  Extremities: no edema    Neurologic:  -Mental status: Awake, alert, oriented to person, place, and time. Speech is fluent with intact naming, repetition, and comprehension, no dysarthria. Recent and remote memory intact. Follows commands. Attention/concentration intact. Fund of knowledge appropriate.  -Cranial nerves:   II: Visual fields are full to confrontation.  III, IV, VI: Extraocular movements are intact without nystagmus. Pupils equally round and reactive to light  V:  Facial sensation V1-V3 equal and intact   VII: Face is symmetric with normal eye closure and smile  VIII: Hearing is bilaterally intact to finger rub  IX, X: Uvula is midline and soft palate rises symmetrically  XI: Head turning and shoulder shrug are intact.  XII: Tongue protrudes midline  Motor: Normal bulk and tone. No pronator drift. Strength bilateral upper extremity 5/5, bilateral lower extremities 5/5.  Rapid alternating movements intact and symmetric  Sensation: Intact to light touch bilaterally. No neglect or extinction on double simultaneous testing.  Coordination: No dysmetria on finger-to-nose and heel-to-shin bilaterally  Reflexes: Downgoing toes bilaterally   Gait: Narrow gait and steady    LABS:                        13.4   8.49  )-----------( 297      ( 03 May 2022 06:27 )             40.8     05-03    139  |  101  |  27<H>  ----------------------------<  96  3.6   |  27  |  0.84    Ca    9.4      03 May 2022 06:27  Phos  2.9     05-03  Mg     2.1     05-03    TPro  8.2  /  Alb  4.5  /  TBili  0.3  /  DBili  x   /  AST  24  /  ALT  12  /  AlkPhos  78  05-01    PT/INR - ( 01 May 2022 22:41 )   PT: 10.6 sec;   INR: 0.89          PTT - ( 01 May 2022 22:41 )  PTT:31.0 sec  Urinalysis Basic - ( 02 May 2022 15:14 )    Color: Yellow / Appearance: Clear / S.020 / pH: x  Gluc: x / Ketone: 15 mg/dL  / Bili: Negative / Urobili: 0.2 E.U./dL   Blood: x / Protein: NEGATIVE mg/dL / Nitrite: NEGATIVE   Leuk Esterase: Small / RBC: 5-10 /HPF / WBC Many /HPF   Sq Epi: x / Non Sq Epi: 0-5 /HPF / Bacteria: Many /HPF        RADIOLOGY & ADDITIONAL TESTS:     Neurology Stroke Progress Note    INTERVAL HPI/OVERNIGHT EVENTS:  Patient seen and examined. Patient states she still feels dizzy even when she is sitting down, like the room is spinning, but says it is much worse when she tries to sit up. She says she is starving and is waiting for her breakfast. Says she has a urinary infection so her lower abdomen hurts a little when she pees.    MEDICATIONS  (STANDING):  aspirin  chewable 81 milliGRAM(s) Oral daily  atorvastatin 80 milliGRAM(s) Oral at bedtime  cefTRIAXone   IVPB      cefTRIAXone   IVPB 1000 milliGRAM(s) IV Intermittent every 24 hours  clopidogrel Tablet 75 milliGRAM(s) Oral daily  enoxaparin Injectable 30 milliGRAM(s) SubCutaneous every 24 hours  latanoprost 0.005% Ophthalmic Solution 1 Drop(s) Both EYES at bedtime  meclizine 12.5 milliGRAM(s) Oral every 8 hours  potassium chloride   Solution 40 milliEquivalent(s) Oral once    MEDICATIONS  (PRN):      Allergies    No Known Allergies    Intolerances        Vital Signs Last 24 Hrs  T(C): 36.8 (03 May 2022 06:01), Max: 36.8 (03 May 2022 04:45)  T(F): 98.3 (03 May 2022 06:01), Max: 98.3 (03 May 2022 06:01)  HR: 55 (03 May 2022 04:45) (54 - 80)  BP: 103/77 (03 May 2022 04:45) (103/77 - 185/75)  BP(mean): 94 (03 May 2022 02:31) (94 - 111)  RR: 18 (03 May 2022 04:45) (16 - 18)  SpO2: 97% (03 May 2022 04:45) (95% - 97%)    Physical exam:  General: No acute distress, awake and alert  Eyes: Anicteric sclerae, moist conjunctivae, see below for CNs  Neck: trachea midline,  Cardiovascular: Regular rate and rhythm, no murmurs  Pulmonary: Anterior breath sounds clear bilaterally No use of accessory muscles  GI: Abdomen soft, non-distended, non-tender, subjective discomfort on palpation of lower abdomen.  Extremities: no edema    Neurologic:  -Mental status: Awake, alert, oriented to person, place, and time. Speech is fluent with intact naming, repetition, and comprehension, no dysarthria. Recent and remote memory intact. Follows commands. Attention/concentration intact. Fund of knowledge appropriate.  -Cranial nerves:   II: Visual fields are full to confrontation.  III, IV, VI: Extraocular movements are intact with left beating nystagmus when looking to the left. + nystagmus with margaret hallpike maneuver when looking to the right. Pupils equally round and reactive to light  VII: Face is symmetric with normal eye closure and smile  Motor: Normal bulk and tone. No pronator drift. Strength bilateral upper extremity 5/5, bilateral lower extremities 5/5.  Rapid alternating movements intact and symmetric  Sensation: Intact to light touch bilaterally. No neglect or extinction on double simultaneous testing.  Coordination: No dysmetria on finger-to-nose bilaterally  Reflexes: Downgoing toes bilaterally   Gait: Pt felt too dizzy to walk.    LABS:                        13.4   8.49  )-----------( 297      ( 03 May 2022 06:27 )             40.8     05-03    139  |  101  |  27<H>  ----------------------------<  96  3.6   |  27  |  0.84    Ca    9.4      03 May 2022 06:27  Phos  2.9     05-03  Mg     2.1     05-03    TPro  8.2  /  Alb  4.5  /  TBili  0.3  /  DBili  x   /  AST  24  /  ALT  12  /  AlkPhos  78  05-01    PT/INR - ( 01 May 2022 22:41 )   PT: 10.6 sec;   INR: 0.89          PTT - ( 01 May 2022 22:41 )  PTT:31.0 sec  Urinalysis Basic - ( 02 May 2022 15:14 )    Color: Yellow / Appearance: Clear / S.020 / pH: x  Gluc: x / Ketone: 15 mg/dL  / Bili: Negative / Urobili: 0.2 E.U./dL   Blood: x / Protein: NEGATIVE mg/dL / Nitrite: NEGATIVE   Leuk Esterase: Small / RBC: 5-10 /HPF / WBC Many /HPF   Sq Epi: x / Non Sq Epi: 0-5 /HPF / Bacteria: Many /HPF        RADIOLOGY & ADDITIONAL TESTS:

## 2022-05-04 LAB
-  AMPICILLIN/SULBACTAM: SIGNIFICANT CHANGE UP
-  AMPICILLIN: SIGNIFICANT CHANGE UP
-  CEFAZOLIN: SIGNIFICANT CHANGE UP
-  CEFTRIAXONE: SIGNIFICANT CHANGE UP
-  CIPROFLOXACIN: SIGNIFICANT CHANGE UP
-  ERTAPENEM: SIGNIFICANT CHANGE UP
-  GENTAMICIN: SIGNIFICANT CHANGE UP
-  NITROFURANTOIN: SIGNIFICANT CHANGE UP
-  PIPERACILLIN/TAZOBACTAM: SIGNIFICANT CHANGE UP
-  TOBRAMYCIN: SIGNIFICANT CHANGE UP
-  TRIMETHOPRIM/SULFAMETHOXAZOLE: SIGNIFICANT CHANGE UP
ANION GAP SERPL CALC-SCNC: 10 MMOL/L — SIGNIFICANT CHANGE UP (ref 5–17)
BUN SERPL-MCNC: 24 MG/DL — HIGH (ref 7–23)
CALCIUM SERPL-MCNC: 9.7 MG/DL — SIGNIFICANT CHANGE UP (ref 8.4–10.5)
CHLORIDE SERPL-SCNC: 101 MMOL/L — SIGNIFICANT CHANGE UP (ref 96–108)
CO2 SERPL-SCNC: 27 MMOL/L — SIGNIFICANT CHANGE UP (ref 22–31)
CREAT SERPL-MCNC: 0.66 MG/DL — SIGNIFICANT CHANGE UP (ref 0.5–1.3)
CULTURE RESULTS: SIGNIFICANT CHANGE UP
EGFR: 82 ML/MIN/1.73M2 — SIGNIFICANT CHANGE UP
GLUCOSE SERPL-MCNC: 107 MG/DL — HIGH (ref 70–99)
HCT VFR BLD CALC: 43.2 % — SIGNIFICANT CHANGE UP (ref 34.5–45)
HGB BLD-MCNC: 14.3 G/DL — SIGNIFICANT CHANGE UP (ref 11.5–15.5)
MAGNESIUM SERPL-MCNC: 2 MG/DL — SIGNIFICANT CHANGE UP (ref 1.6–2.6)
MCHC RBC-ENTMCNC: 31.8 PG — SIGNIFICANT CHANGE UP (ref 27–34)
MCHC RBC-ENTMCNC: 33.1 GM/DL — SIGNIFICANT CHANGE UP (ref 32–36)
MCV RBC AUTO: 96 FL — SIGNIFICANT CHANGE UP (ref 80–100)
METHOD TYPE: SIGNIFICANT CHANGE UP
NRBC # BLD: 0 /100 WBCS — SIGNIFICANT CHANGE UP (ref 0–0)
ORGANISM # SPEC MICROSCOPIC CNT: SIGNIFICANT CHANGE UP
ORGANISM # SPEC MICROSCOPIC CNT: SIGNIFICANT CHANGE UP
PHOSPHATE SERPL-MCNC: 2.7 MG/DL — SIGNIFICANT CHANGE UP (ref 2.5–4.5)
PLATELET # BLD AUTO: 305 K/UL — SIGNIFICANT CHANGE UP (ref 150–400)
POTASSIUM SERPL-MCNC: 4.3 MMOL/L — SIGNIFICANT CHANGE UP (ref 3.5–5.3)
POTASSIUM SERPL-SCNC: 4.3 MMOL/L — SIGNIFICANT CHANGE UP (ref 3.5–5.3)
RBC # BLD: 4.5 M/UL — SIGNIFICANT CHANGE UP (ref 3.8–5.2)
RBC # FLD: 13.2 % — SIGNIFICANT CHANGE UP (ref 10.3–14.5)
SODIUM SERPL-SCNC: 138 MMOL/L — SIGNIFICANT CHANGE UP (ref 135–145)
SPECIMEN SOURCE: SIGNIFICANT CHANGE UP
WBC # BLD: 8.57 K/UL — SIGNIFICANT CHANGE UP (ref 3.8–10.5)
WBC # FLD AUTO: 8.57 K/UL — SIGNIFICANT CHANGE UP (ref 3.8–10.5)

## 2022-05-04 PROCEDURE — 99231 SBSQ HOSP IP/OBS SF/LOW 25: CPT

## 2022-05-04 PROCEDURE — 99233 SBSQ HOSP IP/OBS HIGH 50: CPT

## 2022-05-04 RX ORDER — DIAZEPAM 5 MG
2 TABLET ORAL AT BEDTIME
Refills: 0 | Status: DISCONTINUED | OUTPATIENT
Start: 2022-05-04 | End: 2022-05-05

## 2022-05-04 RX ORDER — SODIUM CHLORIDE 9 MG/ML
1000 INJECTION INTRAMUSCULAR; INTRAVENOUS; SUBCUTANEOUS
Refills: 0 | Status: DISCONTINUED | OUTPATIENT
Start: 2022-05-04 | End: 2022-05-05

## 2022-05-04 RX ORDER — DIAZEPAM 5 MG
5 TABLET ORAL AT BEDTIME
Refills: 0 | Status: DISCONTINUED | OUTPATIENT
Start: 2022-05-04 | End: 2022-05-04

## 2022-05-04 RX ADMIN — LATANOPROST 1 DROP(S): 0.05 SOLUTION/ DROPS OPHTHALMIC; TOPICAL at 22:03

## 2022-05-04 RX ADMIN — Medication 12.5 MILLIGRAM(S): at 22:02

## 2022-05-04 RX ADMIN — Medication 12.5 MILLIGRAM(S): at 13:59

## 2022-05-04 RX ADMIN — AMLODIPINE BESYLATE 5 MILLIGRAM(S): 2.5 TABLET ORAL at 06:43

## 2022-05-04 RX ADMIN — CEFTRIAXONE 100 MILLIGRAM(S): 500 INJECTION, POWDER, FOR SOLUTION INTRAMUSCULAR; INTRAVENOUS at 17:08

## 2022-05-04 RX ADMIN — SODIUM CHLORIDE 60 MILLILITER(S): 9 INJECTION INTRAMUSCULAR; INTRAVENOUS; SUBCUTANEOUS at 13:33

## 2022-05-04 RX ADMIN — ENOXAPARIN SODIUM 30 MILLIGRAM(S): 100 INJECTION SUBCUTANEOUS at 22:02

## 2022-05-04 RX ADMIN — Medication 12.5 MILLIGRAM(S): at 06:45

## 2022-05-04 RX ADMIN — Medication 2 MILLIGRAM(S): at 22:02

## 2022-05-04 RX ADMIN — ATORVASTATIN CALCIUM 80 MILLIGRAM(S): 80 TABLET, FILM COATED ORAL at 22:02

## 2022-05-04 NOTE — PROGRESS NOTE ADULT - SUBJECTIVE AND OBJECTIVE BOX
Physical Medicine and Rehabilitation Progress Note:    Patient is a 89y old  Female who presents with a chief complaint of Dizziness (04 May 2022 12:39)      HPI:  89 yo F with pmhx HTN presenting to ED with dizziness. Pt had a slow relaxing day, feeling well, was standing in her apartment and felt sudden onset room spinning dizziness, along with nausea and vomiting, around 7 pm. Dizziness not improving, pt came to ED, stroke code called, CTH noncontrast with no acute pathology, CTA head/neck with no significant stenosis or occlusion, CTP normal. NIHSS 0.  Dizziness improving in ED, discussed with neurology attending, decision made not to administer tpa. UA with 5-10 WBC as well. Pt denies headache, numbness, weakness, vision changes, or speech changes. MRS 0, does not use cane or walker, cooks and cleans for herself and her . (02 May 2022 00:07)                            14.3   8.57  )-----------( 305      ( 04 May 2022 06:22 )             43.2       05-04    138  |  101  |  24<H>  ----------------------------<  107<H>  4.3   |  27  |  0.66    Ca    9.7      04 May 2022 06:22  Phos  2.7     05-04  Mg     2.0     05-04      Vital Signs Last 24 Hrs  T(C): 37.1 (04 May 2022 15:00), Max: 37.1 (04 May 2022 15:00)  T(F): 98.7 (04 May 2022 15:00), Max: 98.7 (04 May 2022 15:00)  HR: 114 (04 May 2022 13:08) (60 - 114)  BP: 147/70 (04 May 2022 13:08) (147/70 - 193/82)  BP(mean): 97 (04 May 2022 13:08) (94 - 131)  RR: 16 (04 May 2022 12:28) (16 - 18)  SpO2: 95% (04 May 2022 13:08) (93% - 97%)    MEDICATIONS  (STANDING):  amLODIPine   Tablet 5 milliGRAM(s) Oral daily  atorvastatin 80 milliGRAM(s) Oral at bedtime  cefTRIAXone   IVPB      cefTRIAXone   IVPB 1000 milliGRAM(s) IV Intermittent every 24 hours  enoxaparin Injectable 30 milliGRAM(s) SubCutaneous every 24 hours  latanoprost 0.005% Ophthalmic Solution 1 Drop(s) Both EYES at bedtime  meclizine 12.5 milliGRAM(s) Oral every 8 hours  sodium chloride 0.9%. 1000 milliLiter(s) (60 mL/Hr) IV Continuous <Continuous>    MEDICATIONS  (PRN):  diazepam    Tablet 2 milliGRAM(s) Oral at bedtime PRN dizziness    Currently Undergoing Physical/ Occupational Therapy at bedside.    PT/OT Functional Status Assessment:         Cognitive/Neuro/Behavioral  Cognitive/Neuro/Behavioral [WDL Definition: Alert; opens eyes spontaneously; arouses to voice or touch; oriented x 4; follows commands; speech spontaneous, logical; purposeful motor response; behavior appropriate to situation]: WDL    Language Assistance  Preferred Language to Address Healthcare Preferred Language to Address Healthcare: English    Therapeutic Interventions      Bed Mobility  Bed Mobility Training Scooting: contact guard;  1 person assist;  verbal cues  Bed Mobility Training Sit-to-Supine: minimum assist (75% patient effort);  1 person assist;  verbal cues  Bed Mobility Training Supine-to-Sit: minimum assist (75% patient effort);  1 person assist;  verbal cues  Bed Mobility Training Limitations: decreased ability to use arms for pushing/pulling;  decreased ability to use legs for bridging/pushing;  impaired ability to control trunk for mobility;  decreased strength;  impaired balance;  impaired postural control;  dizziness, room spinning    Sit-Stand Transfer Training  Transfer Training Sit-to-Stand Transfer: verbal cues;  min A x 1-2 ;  b/l hand held assist   Transfer Training Stand-to-Sit Transfer: min A x 1-2 ;  b/l hand held assist   Sit-to-Stand Transfer Training Transfer Safety Analysis: decreased balance;  decreased strength;  impaired balance;  impaired postural control;  dizziness, room spinning     Gait Training  Gait Training: min A x 2 ;  b/l hand held assist ;  6 side steps   Gait Analysis: 2-point gait   increased time in double stance;  slightly unsteady gait, no lose of balance, slightly increased lateral sway. ;  decreased strength;  impaired balance;  impaired postural control;  dizziness, room spinning.   Duration of Rest Duration of Rest: unable to ambulate further secondary increased dizziness, pt also presents orthostatic hypotension, with heart rate increased to 120 bpm during standing.     Therapeutic Exercise  Therapeutic Exercise Detail: CN Testing: B/L Frontalis intact; B/L buccinator intact; smile symmetrical; tongue protrusion at midline; B/L eyes open/close intact; Shoulder elevation: intact bilaterally; Vision H-Test: bilateral tracking:L horizontal nystagmus smooth pursuit and reported dizziness.; Convergence/Divergence: intact; Vision Quadrant Test: intact bilaterallyFNF: intact bilaterally.           PM&R Impression: as above    Current Disposition Plan Recommendations:    subacute rehab placement

## 2022-05-04 NOTE — PROGRESS NOTE ADULT - SUBJECTIVE AND OBJECTIVE BOX
Patient is a 89y old  Female who presents with a chief complaint of Dizziness (04 May 2022 08:55)      INTERVAL HPI/OVERNIGHT EVENTS:    Pt. seen and examined earlier today  Pt. c/o intermittent dizziness, marginally better, sx worse w/ standing, Valium and meclizine don't seem to help much  Pt. reports her N/V has resolved; she is tolerating PO  Pt. denies tinnitus, hearing loss    Review of Systems: 12 point review of systems otherwise negative    MEDICATIONS  (STANDING):  amLODIPine   Tablet 5 milliGRAM(s) Oral daily  atorvastatin 80 milliGRAM(s) Oral at bedtime  cefTRIAXone   IVPB      cefTRIAXone   IVPB 1000 milliGRAM(s) IV Intermittent every 24 hours  enoxaparin Injectable 30 milliGRAM(s) SubCutaneous every 24 hours  latanoprost 0.005% Ophthalmic Solution 1 Drop(s) Both EYES at bedtime  meclizine 12.5 milliGRAM(s) Oral every 8 hours    MEDICATIONS  (PRN):  diazepam    Tablet 2 milliGRAM(s) Oral at bedtime PRN dizziness      Allergies    No Known Allergies    Intolerances          Vital Signs Last 24 Hrs  T(C): 35.7 (04 May 2022 09:20), Max: 37 (04 May 2022 01:09)  T(F): 96.2 (04 May 2022 09:20), Max: 98.6 (04 May 2022 01:09)  HR: 76 (04 May 2022 12:28) (60 - 82)  BP: 169/75 (04 May 2022 12:28) (148/75 - 193/82)  BP(mean): 108 (04 May 2022 12:28) (94 - 131)  RR: 16 (04 May 2022 12:28) (16 - 18)  SpO2: 97% (04 May 2022 12:28) (93% - 97%)  CAPILLARY BLOOD GLUCOSE          -03 @ 07:01  -  -04 @ 07:00  --------------------------------------------------------  IN: 0 mL / OUT: 600 mL / NET: -600 mL        Physical Exam:  (earlier today)  Daily     General:  comfortable-appearing in NAD  HEENT:  MMM  CV:  RRR, no JVD  Lungs:  CTA B/L  Abdomen:  soft NT ND  Extremities:  no edema B/L LE  Skin:  WWP  Neuro:  AAOx3, non-focal    LABS:                        14.3   8.57  )-----------( 305      ( 04 May 2022 06:22 )             43.2     05-04    138  |  101  |  24<H>  ----------------------------<  107<H>  4.3   |  27  |  0.66    Ca    9.7      04 May 2022 06:22  Phos  2.7     05-04  Mg     2.0     05-04        Urinalysis Basic - ( 02 May 2022 15:14 )    Color: Yellow / Appearance: Clear / S.020 / pH: x  Gluc: x / Ketone: 15 mg/dL  / Bili: Negative / Urobili: 0.2 E.U./dL   Blood: x / Protein: NEGATIVE mg/dL / Nitrite: NEGATIVE   Leuk Esterase: Small / RBC: 5-10 /HPF / WBC Many /HPF   Sq Epi: x / Non Sq Epi: 0-5 /HPF / Bacteria: Many /HPF

## 2022-05-04 NOTE — PROGRESS NOTE ADULT - NS ATTEND AMEND GEN_ALL_CORE FT
Patient feels better today and was able to get out of bed and take 2 steps before feeling quite vertiginous and had to get back into bed.  On exam persistent right beating horizontal nystagmus that is maximal on looking to the left.  In addition she was found to be orthostatic today most likely secondary to poor p.o. intake due to the nausea induced by vertigo.    Impression: Peripheral vertigo with inability to ambulate in elderly patient.  Gentle hydration.  Continue meclizine 12.5 mg    3 times a day  Continue Valium 2 mg at bedtime.
Patient remains quite vertiginous today and is unable to get out of bed.  Also accompanied with nausea.  MRI brain is negative for stroke.    Peripheral vertigo with severe vertigo and nausea.  Continue meclizine 12.5 3 times daily and add Valium 2 mg twice daily.  Continue antiemetic.  Bedrest to be continued.
patient was seen on rounds with  ACPs.   Agree with findings and exam. Patient with persistent positional vertigo. MRI pending  cont dapt.   started on meclizine.   Imp. Probable peripheral vertigo  as above

## 2022-05-04 NOTE — PROGRESS NOTE ADULT - SUBJECTIVE AND OBJECTIVE BOX
Neurology Stroke Progress Note    INTERVAL HPI/OVERNIGHT EVENTS:  Patient seen and examined. Pt would like to go home and help her , because he fell last night and could not get up. She knows that she will require some extra help at home, and that currently she is not in a state to be able to care for him.     MEDICATIONS  (STANDING):  amLODIPine   Tablet 5 milliGRAM(s) Oral daily  atorvastatin 80 milliGRAM(s) Oral at bedtime  cefTRIAXone   IVPB      cefTRIAXone   IVPB 1000 milliGRAM(s) IV Intermittent every 24 hours  enoxaparin Injectable 30 milliGRAM(s) SubCutaneous every 24 hours  latanoprost 0.005% Ophthalmic Solution 1 Drop(s) Both EYES at bedtime  meclizine 12.5 milliGRAM(s) Oral every 8 hours    MEDICATIONS  (PRN):  diazepam    Tablet 2 milliGRAM(s) Oral at bedtime PRN dizziness      Allergies    No Known Allergies    Intolerances        Vital Signs Last 24 Hrs  T(C): 36.4 (04 May 2022 06:02), Max: 37 (04 May 2022 01:09)  T(F): 97.5 (04 May 2022 06:02), Max: 98.6 (04 May 2022 01:09)  HR: 82 (04 May 2022 08:32) (58 - 82)  BP: 148/75 (04 May 2022 08:32) (148/66 - 193/82)  BP(mean): 105 (04 May 2022 08:32) (94 - 131)  RR: 17 (04 May 2022 08:32) (16 - 18)  SpO2: 96% (04 May 2022 08:32) (93% - 99%)    Physical exam:  General: No acute distress, awake and alert  Eyes: Anicteric sclerae, moist conjunctivae, see below for CNs  Neck: trachea midline,  Cardiovascular: Regular rate and rhythm, no murmurs  Pulmonary: Anterior breath sounds clear bilaterally No use of accessory muscles  GI: Abdomen soft, non-distended, non-tender  Extremities: no edema    Neurologic:  -Mental status: Awake, alert, oriented to person, place, and time. Speech is fluent with intact naming, repetition, and comprehension, no dysarthria. Recent and remote memory intact. Follows commands. Attention/concentration intact. Fund of knowledge appropriate.  -Cranial nerves:   II: Visual fields are full to confrontation.  III, IV, VI: Extraocular movements are intact with left beating nystagmus. Pupils equally round and reactive to light  V:  Facial sensation V1-V3 equal and intact   VII: Face is symmetric   Motor: Normal bulk and tone. No pronator drift. Strength bilateral upper extremity 5/5, bilateral lower extremities 5/5.  Rapid alternating movements intact and symmetric  Sensation: Intact to light touch bilaterally. No neglect or extinction on double simultaneous testing.  Coordination: No dysmetria on finger-to-nose bilaterally  Reflexes: Downgoing toes bilaterally   Gait: Able to stand but only took 2 steps and began to stumble, narrow based gait.    LABS:                        14.3   8.57  )-----------( 305      ( 04 May 2022 06:22 )             43.2     05-04    138  |  101  |  24<H>  ----------------------------<  107<H>  4.3   |  27  |  0.66    Ca    9.7      04 May 2022 06:22  Phos  2.7     05-04  Mg     2.0     05-04        Urinalysis Basic - ( 02 May 2022 15:14 )    Color: Yellow / Appearance: Clear / S.020 / pH: x  Gluc: x / Ketone: 15 mg/dL  / Bili: Negative / Urobili: 0.2 E.U./dL   Blood: x / Protein: NEGATIVE mg/dL / Nitrite: NEGATIVE   Leuk Esterase: Small / RBC: 5-10 /HPF / WBC Many /HPF   Sq Epi: x / Non Sq Epi: 0-5 /HPF / Bacteria: Many /HPF        RADIOLOGY & ADDITIONAL TESTS:

## 2022-05-04 NOTE — PROGRESS NOTE ADULT - ASSESSMENT
per Neurology    89 yo F with pmhx HTN presenting to ED with complaints of dizziness, nausea, and vomiting. Stroke code called on 5/1, CTH and CTA head/neck unremarkable. MRI brain negative for stroke.Etiology of patient's symptoms concerning for peripheral vertigo. Pending improvement in walking    Neuro  #peripheral vertigo  - q8hr general neuro checks and vitals  - MRI brain negative for acute ischemia  - Stroke Code HCT Results: negative  - Stroke Code CTA Results: negative  - Stroke education  -Vestibular therapy if possible    #dizziness  - initiated 12.mg meclizine q8h  -trial 2 mg Valium    Cardiology  #HTN  -restarted home amlodipine    #HLD  - high dose statin as above in CVA  - LDL results: 168    Pulm  - call provider if SPO2 < 94%    GI  #Nutrition/Fluids/Electrolytes   - replete K<4 and Mg <2  - Diet: Regular    Renal  - daily BMP    Infectious Disease  #UTI  - UA with WBC 5-10, positive bacteria on admission  - 2nd UA +, UCx with >100,000 CFU Ecoli: on day 3 of Ceftriaxone     Endocrine  #DM  - A1C results: 5.4%      - TSH results: 1.740    DVT Prophylaxis  - SCDs for DVT prophylaxis  - Lovenox SQ

## 2022-05-04 NOTE — PROGRESS NOTE ADULT - ASSESSMENT
89 yo F with pmhx HTN presenting to ED with complaints of dizziness, nausea, and vomiting. Stroke code called on 5/1, CTH and CTA head/neck unremarkable. MRI brain negative for stroke.Etiology of patient's symptoms concerning for peripheral vertigo. Pending improvement in walking    Neuro  #peripheral vertigo  - q8hr general neuro checks and vitals  - MRI brain negative for acute ischemia  - Stroke Code HCT Results: negative  - Stroke Code CTA Results: negative  - Stroke education  -Vestibular therapy if possible    #dizziness  - initiated 12.mg meclizine q8h  -trial 2 mg Valium    Cardiology  #HTN  -restarted home amlodipine    #HLD  - high dose statin as above in CVA  - LDL results: 168    Pulm  - call provider if SPO2 < 94%    GI  #Nutrition/Fluids/Electrolytes   - replete K<4 and Mg <2  - Diet: Regular    Renal  - daily BMP    Infectious Disease  #UTI  - UA with WBC 5-10, positive bacteria on admission  - 2nd UA +, UCx with >100,000 CFU Ecoli: on day 3 of Ceftriaxone     Endocrine  #DM  - A1C results: 5.4%      - TSH results: 1.740    DVT Prophylaxis  - SCDs for DVT prophylaxis  - Lovenox SQ    Discussed with Neurology Attending Dr. Filippo Swartz    Dispo: home pending further PT assessment for walking

## 2022-05-05 ENCOUNTER — TRANSCRIPTION ENCOUNTER (OUTPATIENT)
Age: 87
End: 2022-05-05

## 2022-05-05 DIAGNOSIS — H81.399 OTHER PERIPHERAL VERTIGO, UNSPECIFIED EAR: ICD-10-CM

## 2022-05-05 DIAGNOSIS — Z29.9 ENCOUNTER FOR PROPHYLACTIC MEASURES, UNSPECIFIED: ICD-10-CM

## 2022-05-05 LAB
ANION GAP SERPL CALC-SCNC: 12 MMOL/L — SIGNIFICANT CHANGE UP (ref 5–17)
BUN SERPL-MCNC: 20 MG/DL — SIGNIFICANT CHANGE UP (ref 7–23)
CALCIUM SERPL-MCNC: 8.9 MG/DL — SIGNIFICANT CHANGE UP (ref 8.4–10.5)
CHLORIDE SERPL-SCNC: 106 MMOL/L — SIGNIFICANT CHANGE UP (ref 96–108)
CO2 SERPL-SCNC: 26 MMOL/L — SIGNIFICANT CHANGE UP (ref 22–31)
CREAT SERPL-MCNC: 0.56 MG/DL — SIGNIFICANT CHANGE UP (ref 0.5–1.3)
EGFR: 86 ML/MIN/1.73M2 — SIGNIFICANT CHANGE UP
GLUCOSE SERPL-MCNC: 99 MG/DL — SIGNIFICANT CHANGE UP (ref 70–99)
HCT VFR BLD CALC: 42.3 % — SIGNIFICANT CHANGE UP (ref 34.5–45)
HGB BLD-MCNC: 13.9 G/DL — SIGNIFICANT CHANGE UP (ref 11.5–15.5)
MAGNESIUM SERPL-MCNC: 1.8 MG/DL — SIGNIFICANT CHANGE UP (ref 1.6–2.6)
MCHC RBC-ENTMCNC: 31.5 PG — SIGNIFICANT CHANGE UP (ref 27–34)
MCHC RBC-ENTMCNC: 32.9 GM/DL — SIGNIFICANT CHANGE UP (ref 32–36)
MCV RBC AUTO: 95.9 FL — SIGNIFICANT CHANGE UP (ref 80–100)
NRBC # BLD: 0 /100 WBCS — SIGNIFICANT CHANGE UP (ref 0–0)
PHOSPHATE SERPL-MCNC: 3 MG/DL — SIGNIFICANT CHANGE UP (ref 2.5–4.5)
PLATELET # BLD AUTO: 279 K/UL — SIGNIFICANT CHANGE UP (ref 150–400)
POTASSIUM SERPL-MCNC: 3.9 MMOL/L — SIGNIFICANT CHANGE UP (ref 3.5–5.3)
POTASSIUM SERPL-SCNC: 3.9 MMOL/L — SIGNIFICANT CHANGE UP (ref 3.5–5.3)
RBC # BLD: 4.41 M/UL — SIGNIFICANT CHANGE UP (ref 3.8–5.2)
RBC # FLD: 13.2 % — SIGNIFICANT CHANGE UP (ref 10.3–14.5)
SODIUM SERPL-SCNC: 144 MMOL/L — SIGNIFICANT CHANGE UP (ref 135–145)
WBC # BLD: 8.37 K/UL — SIGNIFICANT CHANGE UP (ref 3.8–10.5)
WBC # FLD AUTO: 8.37 K/UL — SIGNIFICANT CHANGE UP (ref 3.8–10.5)

## 2022-05-05 PROCEDURE — 99231 SBSQ HOSP IP/OBS SF/LOW 25: CPT

## 2022-05-05 PROCEDURE — 99233 SBSQ HOSP IP/OBS HIGH 50: CPT

## 2022-05-05 RX ORDER — MECLIZINE HCL 12.5 MG
25 TABLET ORAL EVERY 12 HOURS
Refills: 0 | Status: DISCONTINUED | OUTPATIENT
Start: 2022-05-05 | End: 2022-05-09

## 2022-05-05 RX ORDER — MAGNESIUM OXIDE 400 MG ORAL TABLET 241.3 MG
400 TABLET ORAL ONCE
Refills: 0 | Status: COMPLETED | OUTPATIENT
Start: 2022-05-05 | End: 2022-05-05

## 2022-05-05 RX ORDER — DIAZEPAM 5 MG
1 TABLET ORAL ONCE
Refills: 0 | Status: DISCONTINUED | OUTPATIENT
Start: 2022-05-05 | End: 2022-05-05

## 2022-05-05 RX ADMIN — Medication 0.5 MILLIGRAM(S): at 18:40

## 2022-05-05 RX ADMIN — CEFTRIAXONE 100 MILLIGRAM(S): 500 INJECTION, POWDER, FOR SOLUTION INTRAMUSCULAR; INTRAVENOUS at 18:39

## 2022-05-05 RX ADMIN — ENOXAPARIN SODIUM 30 MILLIGRAM(S): 100 INJECTION SUBCUTANEOUS at 21:16

## 2022-05-05 RX ADMIN — Medication 1 MILLIGRAM(S): at 08:58

## 2022-05-05 RX ADMIN — ATORVASTATIN CALCIUM 80 MILLIGRAM(S): 80 TABLET, FILM COATED ORAL at 21:16

## 2022-05-05 RX ADMIN — MAGNESIUM OXIDE 400 MG ORAL TABLET 400 MILLIGRAM(S): 241.3 TABLET ORAL at 08:19

## 2022-05-05 RX ADMIN — Medication 25 MILLIGRAM(S): at 18:40

## 2022-05-05 RX ADMIN — AMLODIPINE BESYLATE 5 MILLIGRAM(S): 2.5 TABLET ORAL at 05:08

## 2022-05-05 RX ADMIN — LATANOPROST 1 DROP(S): 0.05 SOLUTION/ DROPS OPHTHALMIC; TOPICAL at 21:22

## 2022-05-05 RX ADMIN — Medication 12.5 MILLIGRAM(S): at 05:08

## 2022-05-05 NOTE — DISCHARGE NOTE PROVIDER - PROVIDER TOKENS
PROVIDER:[TOKEN:[[id number]]] without difficulty PROVIDER:[TOKEN:[[id number]],SCHEDULEDAPPT:[05/23/2022],SCHEDULEDAPPTTIME:[10:40 AM]]

## 2022-05-05 NOTE — PROGRESS NOTE ADULT - SUBJECTIVE AND OBJECTIVE BOX
Neurology Stroke Progress Note    INTERVAL HPI/OVERNIGHT EVENTS:  Overnight pt slightly hypertensive to 180, amlodipine given early and fluids stopped  Patient seen and examined this morning. Pt states she feels fine sitting down, but gets dizzy when she stands and wants to sit back down.     MEDICATIONS  (STANDING):  amLODIPine   Tablet 5 milliGRAM(s) Oral daily  atorvastatin 80 milliGRAM(s) Oral at bedtime  cefTRIAXone   IVPB      cefTRIAXone   IVPB 1000 milliGRAM(s) IV Intermittent every 24 hours  diazepam    Tablet 2 milliGRAM(s) Oral at bedtime  diazepam    Tablet 1 milliGRAM(s) Oral once  enoxaparin Injectable 30 milliGRAM(s) SubCutaneous every 24 hours  latanoprost 0.005% Ophthalmic Solution 1 Drop(s) Both EYES at bedtime  meclizine 12.5 milliGRAM(s) Oral every 8 hours    MEDICATIONS  (PRN):      Allergies    No Known Allergies    Intolerances        Vital Signs Last 24 Hrs  T(C): 36.8 (05 May 2022 06:02), Max: 37.1 (04 May 2022 15:00)  T(F): 98.2 (05 May 2022 06:02), Max: 98.7 (04 May 2022 15:00)  HR: 72 (05 May 2022 04:55) (66 - 114)  BP: 182/84 (05 May 2022 04:55) (145/72 - 182/84)  BP(mean): 120 (05 May 2022 04:55) (97 - 120)  RR: 17 (04 May 2022 16:05) (16 - 17)  SpO2: 97% (05 May 2022 04:55) (93% - 97%)    Physical exam:  General: No acute distress, awake and alert  Eyes: Anicteric sclerae, moist conjunctivae, see below for CNs  Neck: trachea midline,  Cardiovascular: Regular rate and rhythm, no murmurs  Pulmonary: Anterior breath sounds clear bilaterally No use of accessory muscles  GI: Abdomen soft, non-distended, non-tender  Extremities: no edema    Neurologic:  -Mental status: Awake, alert, oriented to person, place, and time. Speech is fluent with intact naming, repetition, and comprehension, no dysarthria. Recent and remote memory intact. Follows commands. Attention/concentration intact. Fund of knowledge appropriate.  -Cranial nerves:   II: Visual fields are full to confrontation.  III, IV, VI: Extraocular movements are intact with left beating nystagmus. Pupils equally round and reactive to light  VII: Face is symmetric  Motor: Normal bulk and tone. No pronator drift. Strength bilateral upper extremity 5/5, bilateral lower extremities 5/5.  Rapid alternating movements intact and symmetric  Sensation: Intact to light touch bilaterally. No neglect or extinction on double simultaneous testing.  Coordination: No dysmetria on finger-to-nose bilaterally  Reflexes: Downgoing toes bilaterally   Gait: Did not want to take any steps this morning, got dizzy standing    LABS:                        13.9   8.37  )-----------( 279      ( 05 May 2022 06:54 )             42.3     05-05    144  |  106  |  20  ----------------------------<  99  3.9   |  26  |  0.56    Ca    8.9      05 May 2022 06:54  Phos  3.0     05-05  Mg     1.8     05-05            RADIOLOGY & ADDITIONAL TESTS:     Neurology Stroke Progress Note  Transfer note [facility] to regional medical floor:  89 yo F with pmhx HTN presenting to ED with complaints of dizziness, nausea, and vomiting. Stroke code called on 5/1, CTH and CTA head/neck unremarkable. MRI brain negative for stroke. Etiology of patient's symptoms concerning for peripheral vertigo. Restarted on home amlodipine. Pending improvement in walking, stable for step down to regional medical floors. Started on meclizine, uptitrated today, and also added valium.     INTERVAL HPI/OVERNIGHT EVENTS:  Overnight pt slightly hypertensive to 180, amlodipine given early and fluids stopped  Patient seen and examined this morning. Pt states she feels fine sitting down, but gets dizzy when she stands and wants to sit back down.     MEDICATIONS  (STANDING):  amLODIPine   Tablet 5 milliGRAM(s) Oral daily  atorvastatin 80 milliGRAM(s) Oral at bedtime  cefTRIAXone   IVPB      cefTRIAXone   IVPB 1000 milliGRAM(s) IV Intermittent every 24 hours  diazepam    Tablet 2 milliGRAM(s) Oral at bedtime  diazepam    Tablet 1 milliGRAM(s) Oral once  enoxaparin Injectable 30 milliGRAM(s) SubCutaneous every 24 hours  latanoprost 0.005% Ophthalmic Solution 1 Drop(s) Both EYES at bedtime  meclizine 12.5 milliGRAM(s) Oral every 8 hours    MEDICATIONS  (PRN):      Allergies    No Known Allergies    Intolerances        Vital Signs Last 24 Hrs  T(C): 36.8 (05 May 2022 06:02), Max: 37.1 (04 May 2022 15:00)  T(F): 98.2 (05 May 2022 06:02), Max: 98.7 (04 May 2022 15:00)  HR: 72 (05 May 2022 04:55) (66 - 114)  BP: 182/84 (05 May 2022 04:55) (145/72 - 182/84)  BP(mean): 120 (05 May 2022 04:55) (97 - 120)  RR: 17 (04 May 2022 16:05) (16 - 17)  SpO2: 97% (05 May 2022 04:55) (93% - 97%)    Physical exam:  General: No acute distress, awake and alert  Eyes: Anicteric sclerae, moist conjunctivae, see below for CNs  Neck: trachea midline,  Cardiovascular: Regular rate and rhythm, no murmurs  Pulmonary: Anterior breath sounds clear bilaterally No use of accessory muscles  GI: Abdomen soft, non-distended, non-tender  Extremities: no edema    Neurologic:  -Mental status: Awake, alert, oriented to person, place, and time. Speech is fluent with intact naming, repetition, and comprehension, no dysarthria. Recent and remote memory intact. Follows commands. Attention/concentration intact. Fund of knowledge appropriate.  -Cranial nerves:   II: Visual fields are full to confrontation.  III, IV, VI: Extraocular movements are intact with left beating nystagmus. Pupils equally round and reactive to light  VII: Face is symmetric  Motor: Normal bulk and tone. No pronator drift. Strength bilateral upper extremity 5/5, bilateral lower extremities 5/5.  Rapid alternating movements intact and symmetric  Sensation: Intact to light touch bilaterally. No neglect or extinction on double simultaneous testing.  Coordination: No dysmetria on finger-to-nose bilaterally  Reflexes: Downgoing toes bilaterally   Gait: Did not want to take any steps this morning, got dizzy standing    LABS:                        13.9   8.37  )-----------( 279      ( 05 May 2022 06:54 )             42.3     05-05    144  |  106  |  20  ----------------------------<  99  3.9   |  26  |  0.56    Ca    8.9      05 May 2022 06:54  Phos  3.0     05-05  Mg     1.8     05-05            RADIOLOGY & ADDITIONAL TESTS:

## 2022-05-05 NOTE — DISCHARGE NOTE PROVIDER - CARE PROVIDER_API CALL
Filippo Swartz)  Neurology; Vascular Neurology  [address], [address] [place] [address]  Phone: [phone]  Fax: [phone]  Follow Up Time:    Filippo Swartz)  Neurology; Vascular Neurology  [address], [address] [place] [address]  Phone: [phone]  Fax: [phone]  Scheduled Appointment: 05/23/2022 10:40 AM

## 2022-05-05 NOTE — PROGRESS NOTE ADULT - ASSESSMENT
89 yo F with pmhx HTN presenting to ED with complaints of dizziness, nausea, and vomiting. Stroke code called on 5/1, CTH and CTA head/neck unremarkable. MRI brain negative for stroke. Etiology of patient's symptoms concerning for peripheral vertigo. Pending improvement in walking

## 2022-05-05 NOTE — DIETITIAN INITIAL EVALUATION ADULT - ADD RECOMMEND
1. Continue Regular diet  2. Monitor labs, GI distress, skin integrity  3. Provided education and will reinforce as able  Pt aware RD is available for any questions/concerns

## 2022-05-05 NOTE — PROGRESS NOTE ADULT - SUBJECTIVE AND OBJECTIVE BOX
Patient is a 89y old  Female who presents with a chief complaint of Dizziness (05 May 2022 12:08)      INTERVAL HPI/OVERNIGHT EVENTS:    Pt. seen and examined earlier today  Pt. still c/o intermittent dizziness, but says the episodes are less frequent and less severe  Pt. says meclizine and Valium don't seem to help much  Pt. denies F/C, CP, SOB, N/V  Tolerating PO    Review of Systems: 12 point review of systems otherwise negative    MEDICATIONS  (STANDING):  amLODIPine   Tablet 5 milliGRAM(s) Oral daily  atorvastatin 80 milliGRAM(s) Oral at bedtime  cefTRIAXone   IVPB      cefTRIAXone   IVPB 1000 milliGRAM(s) IV Intermittent every 24 hours  enoxaparin Injectable 30 milliGRAM(s) SubCutaneous every 24 hours  latanoprost 0.005% Ophthalmic Solution 1 Drop(s) Both EYES at bedtime  LORazepam     Tablet 0.5 milliGRAM(s) Oral two times a day  meclizine 25 milliGRAM(s) Oral every 12 hours    MEDICATIONS  (PRN):      Allergies    No Known Allergies    Intolerances          Vital Signs Last 24 Hrs  T(C): 36.7 (05 May 2022 10:10), Max: 37.1 (04 May 2022 15:00)  T(F): 98.1 (05 May 2022 10:10), Max: 98.7 (04 May 2022 15:00)  HR: 68 (05 May 2022 08:31) (66 - 114)  BP: 160/67 (05 May 2022 08:31) (145/72 - 182/84)  BP(mean): 97 (05 May 2022 08:31) (97 - 120)  RR: 16 (05 May 2022 08:31) (16 - 17)  SpO2: 96% (05 May 2022 08:31) (93% - 97%)  CAPILLARY BLOOD GLUCOSE          05-04 @ 07:01  -  05-05 @ 07:00  --------------------------------------------------------  IN: 900 mL / OUT: 500 mL / NET: 400 mL        Physical Exam:  (earlier today)  Daily     Daily   General:  comfortable-appearing in NAD  HEENT:  MMM  CV:  RRR, no JVD  Lungs:  CTA B/L  Abdomen:  soft NT ND  Extremities:  no edema B/L LE  Skin:  WWP  Neuro:  AAOx3, non-focal    LABS:                        13.9   8.37  )-----------( 279      ( 05 May 2022 06:54 )             42.3     05-05    144  |  106  |  20  ----------------------------<  99  3.9   |  26  |  0.56    Ca    8.9      05 May 2022 06:54  Phos  3.0     05-05  Mg     1.8     05-05

## 2022-05-05 NOTE — PROGRESS NOTE ADULT - ASSESSMENT
89 yo F with pmhx HTN presenting to ED with complaints of dizziness, nausea, and vomiting. Stroke code called on 5/1, CTH and CTA head/neck unremarkable. MRI brain negative for stroke. Etiology of patient's symptoms concerning for peripheral vertigo. Pending improvement in walking    Neuro  #peripheral vertigo  - q8hr general neuro checks and vitals  - MRI brain negative for acute ischemia  - Stroke Code HCT Results: negative  - Stroke Code CTA Results: negative  - Stroke education  -Vestibular therapy if possible    #dizziness  - initiated 12.mg meclizine q8h  -trial 2 mg Valium at bedtime, added 1 mg valium today during the day    Cardiology  #HTN  -restarted home amlodipine    #HLD  - high dose statin as above in CVA  - LDL results: 168    Pulm  - call provider if SPO2 < 94%    GI  #Nutrition/Fluids/Electrolytes   - replete K<4 and Mg <2  - Diet: Regular    Renal  - daily BMP    Infectious Disease  #UTI  - UA with WBC 5-10, positive bacteria on admission  - 2nd UA +, UCx with >100,000 CFU Ecoli: s/p 3 days of Ceftriaxone     Endocrine  #DM  - A1C results: 5.4%    - TSH results: 1.740    DVT Prophylaxis  - SCDs for DVT prophylaxis  - Lovenox SQ    Discussed with Neurology Attending Dr. Filippo Swartz    Dispo: home pending further PT assessment for walking   89 yo F with pmhx HTN presenting to ED with complaints of dizziness, nausea, and vomiting. Stroke code called on 5/1, CTH and CTA head/neck unremarkable. MRI brain negative for stroke. Etiology of patient's symptoms concerning for peripheral vertigo. Pending improvement in walking, stable for step down to regional medical floors    Neuro  #peripheral vertigo  - q8hr general neuro checks and vitals  - MRI brain negative for acute ischemia  - Stroke Code HCT Results: negative  - Stroke Code CTA Results: negative  - Stroke education  -Vestibular therapy if possible    #dizziness  - initiated 12.mg meclizine q8h, uptitrate to 25 mg q12  -trial 2 mg Valium at bedtime, added 1 mg valium today during the day    Cardiology  #HTN  -restarted home amlodipine    #HLD  - high dose statin as above in CVA  - LDL results: 168    Pulm  - call provider if SPO2 < 94%    GI  #Nutrition/Fluids/Electrolytes   - replete K<4 and Mg <2  - Diet: Regular    Renal  - daily BMP    Infectious Disease  #UTI  - UA with WBC 5-10, positive bacteria on admission  - 2nd UA +, UCx with >100,000 CFU Ecoli: s/p 3 days of Ceftriaxone     Endocrine  #DM  - A1C results: 5.4%    - TSH results: 1.740    DVT Prophylaxis  - SCDs for DVT prophylaxis  - Lovenox SQ    Discussed with Neurology Attending Dr. Filippo Swartz    Dispo: [facility] vs home pending further PT assessment for walking

## 2022-05-05 NOTE — DIETITIAN INITIAL EVALUATION ADULT - PERTINENT LABORATORY DATA
05-05    144  |  106  |  20  ----------------------------<  99  3.9   |  26  |  0.56    Ca    8.9      05 May 2022 06:54  Phos  3.0     05-05  Mg     1.8     05-05    A1C with Estimated Average Glucose Result: 5.4 % (05-02-22 @ 07:06)

## 2022-05-05 NOTE — DIETITIAN INITIAL EVALUATION ADULT - OTHER INFO
89 yo F with pmhx HTN presenting to ED with room spinning dizziness, nausea, and vomiting. Stroke code called, CTH and CTA head/neck unremarkable. Pt admitted to tele for stroke/TIA workup. Per RN plan of care assessment, pt with oral intake without aspiration.    Pt seen resting in bed. No cultural, ethnic, Hoahaoism food preferences noted. Pt reports good appetite/PO intake PTA, consuming 3 meals per day and completing >75% of meals. Pt endorses weight gain during COVID-19 pandemic and has since been stable at 110lbs. Patient reported height is 5ft 3inch. Denies food allergies. In-house, pt reports good appetite/PO intake, completing >75% of meals. Encouraged pt to maintain adequate po intake, aware that RD is available for any questions or concerns. Denies N/V/D/C, per flowsheets +BM last night and none thus far today. Skin: intact, tamera 18. No edema noted. Denies pain.

## 2022-05-05 NOTE — DISCHARGE NOTE PROVIDER - HOSPITAL COURSE
89 yo F with pmhx HTN presented to ED with complaints of dizziness, nausea, and vomiting. Stroke code called on 5/1, CTH and CTA head/neck unremarkable. MRI brain negative for stroke. Etiology of patient's symptoms concerning for peripheral vertigo. Restarted on home amlodipine. Started on meclizine and valium for symptomatic treatment with some improvement. Course also complicated by UTI, s/p 3 days of Ceftriaxone 89 yo F with pmhx HTN presenting to ED with complaints of dizziness, nausea, and vomiting. MRI neg for stroke. Peripheral vertigo, improving symptoms on meclizine and ativan    Problem lists:  1. Peripheral vertigo  ·  Plan: - MRI brain negative for acute ischemia  - Stroke Code HCT Results: negative  - Stroke Code CTA Results: negative  - Stroke education  - initiated 25 mg meclizine q12h  - trial of 0.5 mg of ativan BID.  Pt reported improvements in her symptoms, with dizziness and room spinning sensation absent on 5/9/2022    2. Essential hypertension  Hypertensive to 180s overnight  previously held amlodipine  - restarted amlodipine.    3. HLD (hyperlipidemia)  ·- LDL results: 168  - c/w atorvastatin 80 mg qhs.    New medications: Meclizine 25 mg BID,

## 2022-05-05 NOTE — DIETITIAN INITIAL EVALUATION ADULT - OTHER CALCULATIONS
IBW 115lbs +/- 10%.  CBW used to calculate estimated nutrition needs based on Standards of Care given pt between % IBW (96). Adjusting for age, BMI (19.4 kg/m2)

## 2022-05-05 NOTE — DISCHARGE NOTE PROVIDER - NSDCMRMEDTOKEN_GEN_ALL_CORE_FT
amLODIPine 5 mg oral tablet: 1 tab(s) orally once a day  desmopressin 0.2 mg oral tablet: 1 tab(s) orally once a day  latanoprost 0.005% ophthalmic solution: 1 drop(s) to each affected eye once a day (in the evening)   amLODIPine 5 mg oral tablet: 1 tab(s) orally once a day  Crestor 5 mg oral tablet: 1 tab(s) orally once a day (at bedtime)   desmopressin 0.2 mg oral tablet: 1 tab(s) orally once a day  latanoprost 0.005% ophthalmic solution: 1 drop(s) to each affected eye once a day (in the evening)  meclizine 25 mg oral tablet: 1 tab(s) orally every 12 hours

## 2022-05-05 NOTE — DIETITIAN INITIAL EVALUATION ADULT - PERTINENT MEDS FT
MEDICATIONS  (STANDING):  amLODIPine   Tablet 5 milliGRAM(s) Oral daily  atorvastatin 80 milliGRAM(s) Oral at bedtime  cefTRIAXone   IVPB      cefTRIAXone   IVPB 1000 milliGRAM(s) IV Intermittent every 24 hours  diazepam    Tablet 2 milliGRAM(s) Oral at bedtime  enoxaparin Injectable 30 milliGRAM(s) SubCutaneous every 24 hours  latanoprost 0.005% Ophthalmic Solution 1 Drop(s) Both EYES at bedtime  meclizine 25 milliGRAM(s) Oral every 12 hours    MEDICATIONS  (PRN):

## 2022-05-05 NOTE — PROGRESS NOTE ADULT - SUBJECTIVE AND OBJECTIVE BOX
**Incomplete Note**   Transfer note [facility] to regional medical floor:  89 yo F with pmhx HTN presenting to ED with complaints of dizziness, nausea, and vomiting. Stroke code called on 5/1, CTH and CTA head/neck unremarkable. MRI brain negative for stroke. Etiology of patient's symptoms concerning for peripheral vertigo. Restarted on home amlodipine. Pending improvement in walking, stable for step down to regional medical floors. Started on meclizine, uptitrated today, and also added ativan.     OVERNIGHT EVENTS: Overnight pt slightly hypertensive to 180, amlodipine given early and fluids stopped  Patient seen and examined this morning. Pt states she feels fine sitting down, but gets dizzy when she stands and wants to sit back down.     SUBJECTIVE / INTERVAL HPI: Patient seen and examined at bedside. Pt still reports having dizziness but less severe than on admission. Pt reports continued feelings of room spinning when she stands up. She worked with PT this morning, and feels like she's improving. Denies any other symptoms.    MEDICATIONS  (STANDING):  amLODIPine   Tablet 5 milliGRAM(s) Oral daily  atorvastatin 80 milliGRAM(s) Oral at bedtime  cefTRIAXone   IVPB      cefTRIAXone   IVPB 1000 milliGRAM(s) IV Intermittent every 24 hours  enoxaparin Injectable 30 milliGRAM(s) SubCutaneous every 24 hours  latanoprost 0.005% Ophthalmic Solution 1 Drop(s) Both EYES at bedtime  LORazepam     Tablet 0.5 milliGRAM(s) Oral two times a day  meclizine 25 milliGRAM(s) Oral every 12 hours    MEDICATIONS  (PRN):    Allergies    No Known Allergies    Intolerances        VITAL SIGNS:  Vital Signs Last 24 Hrs  T(C): 36.7 (05 May 2022 10:10), Max: 37.1 (04 May 2022 15:00)  T(F): 98.1 (05 May 2022 10:10), Max: 98.7 (04 May 2022 15:00)  HR: 88 (05 May 2022 11:10) (66 - 104)  BP: 150/87 (05 May 2022 10:35) (145/72 - 182/84)  BP(mean): 111 (05 May 2022 10:35) (97 - 120)  RR: 16 (05 May 2022 08:31) (16 - 17)  SpO2: 96% (05 May 2022 11:10) (93% - 97%)      05-04-22 @ 07:01  -  05-05-22 @ 07:00  --------------------------------------------------------  IN: 900 mL / OUT: 500 mL / NET: 400 mL        PHYSICAL EXAM:  General: NAD, Laying comfortably in bed  HEENT: NC/AT, anicteric sclera, MMM  Neck: supple  Cardiovascular: +S1/S2, RRR, No murmurs, rubs, gallops  Respiratory: CTA B/L, no W/R/R  Gastrointestinal: soft, NT/ND, +BSx4  Extremities: WWP, no edema, clubbing or cyanosis  Vascular: 2+ radial, DP/PT pulses B/L  Neurological: AAOx3, no focal deficits      LABS:                        13.9   8.37  )-----------( 279      ( 05 May 2022 06:54 )             42.3     05-05    144  |  106  |  20  ----------------------------<  99  3.9   |  26  |  0.56    Ca    8.9      05 May 2022 06:54  Phos  3.0     05-05  Mg     1.8     05-05          CAPILLARY BLOOD GLUCOSE              RADIOLOGY & ADDITIONAL TESTS: Reviewed.

## 2022-05-05 NOTE — DISCHARGE NOTE PROVIDER - NSDCCPCAREPLAN_GEN_ALL_CORE_FT
PRINCIPAL DISCHARGE DIAGNOSIS  Diagnosis: Dizziness  Assessment and Plan of Treatment: When you're dizzy, you may feel lightheaded, woozy, or disoriented. If you feel like you or the room are spinning, you have vertigo. These feelings may make you lose your balance.  Dizziness can have many different causes. A sudden drop in blood pressure or being dehydrated can make you dizzy. Many people feel lightheaded if they get up too quickly from sitting or lying down. Certain medicines and problems with your inner ear may cause dizziness. So can motion sickness. Sometimes dizziness can be a symptom of other disorders.       PRINCIPAL DISCHARGE DIAGNOSIS  Diagnosis: Peripheral vertigo  Assessment and Plan of Treatment: Peripheral vertigo is due to a problem in the part of the inner ear that controls balance. Common symptoms include dizziness, feeling like you're spinning, problems focusing the eyes, balance problems and nausea and vomiting. It can last a few hours to a few days. It can be treated with a 15-minute exericse known as the Epley maneuver. This series of movements, done in your doctor's office, helps return the crystals that control balance to the correct place in your inner ear. Other treatment options include meclizine which is effective in treating symptoms of motion sickness or vertigo. You should continue taking meclizine 25 mg every 12 hours in addition to following up with your vestibular therapy sessions where you will receive Epley maneuvers, as well as following up with your scheduled appointment with the nurse practitioner.      SECONDARY DISCHARGE DIAGNOSES  Diagnosis: Essential hypertension  Assessment and Plan of Treatment: Hypertension is the medical term for high blood pressure. Blood pressure refers to the pressure that blood applies to the inner walls of the arteries. Arteries carry blood from the heart to other organs and parts of the body. Untreated high blood pressure increases the strain on the heart and arteries, eventually causing organ damage. High blood pressure increases the risk of heart failure, heart attack (myocardial infarction), stroke, and kidney failure. High blood pressure does not usually cause any symptoms. Treatment of hypertension usually begins with lifestyle changes. Making these lifestyle changes involves little or no risk. Recommended changes often include reducing the amount of salt in your diet, losing weight if you are overweight or obese, avoiding drinking too much alcohol, stopping smoking and exercising at least 30 minutes per day most days of the week. If you are prescribed medication for your hypertension it is important to take these as prescribed to prevent the possible complications of uncontrolled hypertension.      Diagnosis: HLD (hyperlipidemia)  Assessment and Plan of Treatment: You have high cholesterol. Cholesterol is a substance that is found in the blood. Everyone has some. It is needed for good health. The problem is, people sometimes have too much cholesterol. Compared with people with normal cholesterol, people with high cholesterol have a higher risk of heart attacks, strokes, and other health problems. The higher your cholesterol, the higher your risk of these problems. It is important to take your medications for high cholesterol as prescribed to prevent the complications of this condition.

## 2022-05-06 LAB
ALBUMIN SERPL ELPH-MCNC: 3.4 G/DL — SIGNIFICANT CHANGE UP (ref 3.3–5)
ALP SERPL-CCNC: 71 U/L — SIGNIFICANT CHANGE UP (ref 40–120)
ALT FLD-CCNC: 13 U/L — SIGNIFICANT CHANGE UP (ref 10–45)
ANION GAP SERPL CALC-SCNC: 8 MMOL/L — SIGNIFICANT CHANGE UP (ref 5–17)
AST SERPL-CCNC: 24 U/L — SIGNIFICANT CHANGE UP (ref 10–40)
BASOPHILS # BLD AUTO: 0.08 K/UL — SIGNIFICANT CHANGE UP (ref 0–0.2)
BASOPHILS NFR BLD AUTO: 1.2 % — SIGNIFICANT CHANGE UP (ref 0–2)
BILIRUB SERPL-MCNC: 0.3 MG/DL — SIGNIFICANT CHANGE UP (ref 0.2–1.2)
BUN SERPL-MCNC: 17 MG/DL — SIGNIFICANT CHANGE UP (ref 7–23)
CALCIUM SERPL-MCNC: 9 MG/DL — SIGNIFICANT CHANGE UP (ref 8.4–10.5)
CHLORIDE SERPL-SCNC: 102 MMOL/L — SIGNIFICANT CHANGE UP (ref 96–108)
CO2 SERPL-SCNC: 26 MMOL/L — SIGNIFICANT CHANGE UP (ref 22–31)
CREAT SERPL-MCNC: 0.58 MG/DL — SIGNIFICANT CHANGE UP (ref 0.5–1.3)
EGFR: 85 ML/MIN/1.73M2 — SIGNIFICANT CHANGE UP
EOSINOPHIL # BLD AUTO: 0.24 K/UL — SIGNIFICANT CHANGE UP (ref 0–0.5)
EOSINOPHIL NFR BLD AUTO: 3.5 % — SIGNIFICANT CHANGE UP (ref 0–6)
GLUCOSE SERPL-MCNC: 99 MG/DL — SIGNIFICANT CHANGE UP (ref 70–99)
HCT VFR BLD CALC: 42.6 % — SIGNIFICANT CHANGE UP (ref 34.5–45)
HGB BLD-MCNC: 13.5 G/DL — SIGNIFICANT CHANGE UP (ref 11.5–15.5)
IMM GRANULOCYTES NFR BLD AUTO: 0.6 % — SIGNIFICANT CHANGE UP (ref 0–1.5)
LYMPHOCYTES # BLD AUTO: 1.62 K/UL — SIGNIFICANT CHANGE UP (ref 1–3.3)
LYMPHOCYTES # BLD AUTO: 23.4 % — SIGNIFICANT CHANGE UP (ref 13–44)
MAGNESIUM SERPL-MCNC: 2 MG/DL — SIGNIFICANT CHANGE UP (ref 1.6–2.6)
MCHC RBC-ENTMCNC: 30.5 PG — SIGNIFICANT CHANGE UP (ref 27–34)
MCHC RBC-ENTMCNC: 31.7 GM/DL — LOW (ref 32–36)
MCV RBC AUTO: 96.4 FL — SIGNIFICANT CHANGE UP (ref 80–100)
MONOCYTES # BLD AUTO: 0.75 K/UL — SIGNIFICANT CHANGE UP (ref 0–0.9)
MONOCYTES NFR BLD AUTO: 10.8 % — SIGNIFICANT CHANGE UP (ref 2–14)
NEUTROPHILS # BLD AUTO: 4.2 K/UL — SIGNIFICANT CHANGE UP (ref 1.8–7.4)
NEUTROPHILS NFR BLD AUTO: 60.5 % — SIGNIFICANT CHANGE UP (ref 43–77)
NRBC # BLD: 0 /100 WBCS — SIGNIFICANT CHANGE UP (ref 0–0)
PHOSPHATE SERPL-MCNC: 3.1 MG/DL — SIGNIFICANT CHANGE UP (ref 2.5–4.5)
PLATELET # BLD AUTO: 297 K/UL — SIGNIFICANT CHANGE UP (ref 150–400)
POTASSIUM SERPL-MCNC: 3.9 MMOL/L — SIGNIFICANT CHANGE UP (ref 3.5–5.3)
POTASSIUM SERPL-SCNC: 3.9 MMOL/L — SIGNIFICANT CHANGE UP (ref 3.5–5.3)
PROT SERPL-MCNC: 6.9 G/DL — SIGNIFICANT CHANGE UP (ref 6–8.3)
RBC # BLD: 4.42 M/UL — SIGNIFICANT CHANGE UP (ref 3.8–5.2)
RBC # FLD: 13.1 % — SIGNIFICANT CHANGE UP (ref 10.3–14.5)
SODIUM SERPL-SCNC: 136 MMOL/L — SIGNIFICANT CHANGE UP (ref 135–145)
WBC # BLD: 6.93 K/UL — SIGNIFICANT CHANGE UP (ref 3.8–10.5)
WBC # FLD AUTO: 6.93 K/UL — SIGNIFICANT CHANGE UP (ref 3.8–10.5)

## 2022-05-06 PROCEDURE — 99233 SBSQ HOSP IP/OBS HIGH 50: CPT

## 2022-05-06 RX ADMIN — Medication 0.5 MILLIGRAM(S): at 06:44

## 2022-05-06 RX ADMIN — Medication 25 MILLIGRAM(S): at 06:44

## 2022-05-06 RX ADMIN — LATANOPROST 1 DROP(S): 0.05 SOLUTION/ DROPS OPHTHALMIC; TOPICAL at 22:19

## 2022-05-06 RX ADMIN — ENOXAPARIN SODIUM 30 MILLIGRAM(S): 100 INJECTION SUBCUTANEOUS at 22:19

## 2022-05-06 RX ADMIN — Medication 0.5 MILLIGRAM(S): at 17:46

## 2022-05-06 RX ADMIN — Medication 25 MILLIGRAM(S): at 17:47

## 2022-05-06 RX ADMIN — AMLODIPINE BESYLATE 5 MILLIGRAM(S): 2.5 TABLET ORAL at 06:43

## 2022-05-06 RX ADMIN — ATORVASTATIN CALCIUM 80 MILLIGRAM(S): 80 TABLET, FILM COATED ORAL at 22:19

## 2022-05-06 NOTE — PROGRESS NOTE ADULT - SUBJECTIVE AND OBJECTIVE BOX
**Incomplete Note**   OVERNIGHT EVENTS: NAOE    SUBJECTIVE / INTERVAL HPI: Patient seen and examined at bedside. Pt still reports having dizziness but less severe than on admission. Pt reports continued feelings of room spinning when she stands up. She worked with PT this morning, and feels like she's improving. Denies any other symptoms.    MEDICATIONS  (STANDING):  amLODIPine   Tablet 5 milliGRAM(s) Oral daily  atorvastatin 80 milliGRAM(s) Oral at bedtime  enoxaparin Injectable 30 milliGRAM(s) SubCutaneous every 24 hours  latanoprost 0.005% Ophthalmic Solution 1 Drop(s) Both EYES at bedtime  LORazepam     Tablet 0.5 milliGRAM(s) Oral two times a day  meclizine 25 milliGRAM(s) Oral every 12 hours    MEDICATIONS  (PRN):    Allergies    No Known Allergies    Intolerances        VITAL SIGNS:  Vital Signs Last 24 Hrs  T(C): 36.5 (06 May 2022 06:11), Max: 36.6 (05 May 2022 13:45)  T(F): 97.7 (06 May 2022 06:11), Max: 97.9 (05 May 2022 20:52)  HR: 72 (06 May 2022 07:23) (72 - 88)  BP: 152/74 (06 May 2022 07:23) (134/77 - 170/96)  BP(mean): --  RR: 18 (06 May 2022 06:11) (18 - 19)  SpO2: 96% (06 May 2022 06:11) (96% - 97%)      05-05-22 @ 07:01  -  05-06-22 @ 07:00  --------------------------------------------------------  IN: 0 mL / OUT: 600 mL / NET: -600 mL        PHYSICAL EXAM:  General: NAD, Laying comfortably in bed  HEENT: NC/AT, anicteric sclera, MMM  Neck: supple  Cardiovascular: +S1/S2, RRR, No murmurs, rubs, gallops  Respiratory: CTA B/L, no W/R/R  Gastrointestinal: soft, NT/ND, +BSx4  Extremities: WWP, no edema, clubbing or cyanosis  Vascular: 2+ radial, DP/PT pulses B/L  Neurological: AAOx3, no focal deficits      LABS:                        13.5   6.93  )-----------( 297      ( 06 May 2022 07:48 )             42.6     05-06    136  |  102  |  17  ----------------------------<  99  3.9   |  26  |  0.58    Ca    9.0      06 May 2022 07:48  Phos  3.1     05-06  Mg     2.0     05-06    TPro  6.9  /  Alb  3.4  /  TBili  0.3  /  DBili  x   /  AST  24  /  ALT  13  /  AlkPhos  71  05-06        CAPILLARY BLOOD GLUCOSE              RADIOLOGY & ADDITIONAL TESTS: Reviewed.

## 2022-05-06 NOTE — PROGRESS NOTE ADULT - ASSESSMENT
89 yo F with pmhx HTN presenting to ED with complaints of dizziness, nausea, and vomiting. Stroke code called on 5/1, CTH and CTA head/neck unremarkable. MRI brain negative for stroke. Etiology of patient's symptoms concerning for peripheral vertigo. Pending improvement in walking

## 2022-05-06 NOTE — PROGRESS NOTE ADULT - SUBJECTIVE AND OBJECTIVE BOX
Physical Medicine and Rehabilitation Progress Note:    Patient is a 89y old  Female who presents with a chief complaint of Dizziness (06 May 2022 10:54)      HPI:  89 yo F with pmhx HTN presenting to ED with dizziness. Pt had a slow relaxing day, feeling well, was standing in her apartment and felt sudden onset room spinning dizziness, along with nausea and vomiting, around 7 pm. Dizziness not improving, pt came to ED, stroke code called, CTH noncontrast with no acute pathology, CTA head/neck with no significant stenosis or occlusion, CTP normal. NIHSS 0.  Dizziness improving in ED, discussed with neurology attending, decision made not to administer tpa. UA with 5-10 WBC as well. Pt denies headache, numbness, weakness, vision changes, or speech changes. MRS 0, does not use cane or walker, cooks and cleans for herself and her . (02 May 2022 00:07)                            13.5   6.93  )-----------( 297      ( 06 May 2022 07:48 )             42.6       05-06    136  |  102  |  17  ----------------------------<  99  3.9   |  26  |  0.58    Ca    9.0      06 May 2022 07:48  Phos  3.1     05-06  Mg     2.0     05-06    TPro  6.9  /  Alb  3.4  /  TBili  0.3  /  DBili  x   /  AST  24  /  ALT  13  /  AlkPhos  71  05-06    Vital Signs Last 24 Hrs  T(C): 36.5 (06 May 2022 06:11), Max: 36.6 (05 May 2022 13:45)  T(F): 97.7 (06 May 2022 06:11), Max: 97.9 (05 May 2022 20:52)  HR: 72 (06 May 2022 07:23) (72 - 80)  BP: 152/74 (06 May 2022 07:23) (134/77 - 170/96)  BP(mean): --  RR: 18 (06 May 2022 06:11) (18 - 19)  SpO2: 96% (06 May 2022 06:11) (96% - 97%)    MEDICATIONS  (STANDING):  amLODIPine   Tablet 5 milliGRAM(s) Oral daily  atorvastatin 80 milliGRAM(s) Oral at bedtime  enoxaparin Injectable 30 milliGRAM(s) SubCutaneous every 24 hours  latanoprost 0.005% Ophthalmic Solution 1 Drop(s) Both EYES at bedtime  LORazepam     Tablet 0.5 milliGRAM(s) Oral two times a day  meclizine 25 milliGRAM(s) Oral every 12 hours    MEDICATIONS  (PRN):    Currently Undergoing Physical/ Occupational Therapy at bedside.    PT/OT Functional Status Assessment:   5/5/2022    Cognitive/Neuro/Behavioral  Cognitive/Neuro/Behavioral [WDL Definition: Alert; opens eyes spontaneously; arouses to voice or touch; oriented x 4; follows commands; speech spontaneous, logical; purposeful motor response; behavior appropriate to situation]: WDL    Language Assistance  Preferred Language to Address Healthcare Preferred Language to Address Healthcare: English    Therapeutic Interventions      Bed Mobility  Bed Mobility Training Symptoms Noted During/After Treatment: fatigue  Bed Mobility Training Rolling/Turning: supervision;  supervision;  verbal cues  Bed Mobility Training Scooting: supervision;  supervision  Bed Mobility Training Sit-to-Supine: contact guard  Bed Mobility Training Supine-to-Sit: supervision;  supervision  Bed Mobility Training Limitations: decreased flexibility;  decreased strength;  impaired balance;  Dizziness    Bed-Chair Transfer Training  Transfer Training Bed-to-Chair Transfer: minimum assist (75% patient effort);  1 person assist;  nonverbal cues (demo/gestures);  verbal cues;  HHA/support  Transfer Training Chair-to-Bed Transfer: minimum assist (75% patient effort);  1 person assist;  nonverbal cues (demo/gestures);  verbal cues;  HHA/support  Bed-to-Chair Transfer Training Transfer Safety Analysis: decreased balance;  decreased sequencing ability;  decreased strength;  impaired balance;  impaired coordination;  decreased flexibility;  HHA/support    Sit-Stand Transfer Training  Transfer Training Sit-to-Stand Transfer: minimum assist (75% patient effort);  1 person assist;  nonverbal cues (demo/gestures);  verbal cues;  HHA (+Pt's preference)  Transfer Training Stand-to-Sit Transfer: minimum assist (75% patient effort);  1 person assist;  nonverbal cues (demo/gestures);  verbal cues  Sit-to-Stand Transfer Training Transfer Safety Analysis: decreased balance;  decreased sequencing ability;  decreased flexibility;  decreased strength;  impaired balance;  impaired coordination;  impaired postural control;  Dizziness - +vertigo    Therapeutic Exercise  Therapeutic Exercise Charges: Pt ambulated in hallway ~25ftx2 w/ Min A - HHA/support given (Pt w/ preference for hhA vs, wlaker 2/2 anxiety, dizziness w/ movement)   Therapeutic Exercise Detail: BUE, BLE AROMs (Preparatory exercises) - multiple reps through varied place.     Lower Body Dressing Training  Lower Body Dressing Training Assistance: minimum assist (75% patient effort);  1 person assist;  nonverbal cues (demo/gestures);  verbal cues;  decreased strength;  impaired balance;  impaired coordination    Upper Body Dressing Training  Upper Body Dressing Training Assistance: supervision;  supervision;  verbal cues;  impaired balance;  impaired coordination    Grooming Training  Grooming Training Assistance: supervision;  supervision;  to perform oral, hair and hands care hygiene standing at sink.;  decreased strength;  impaired balance;  impaired coordination            PM&R Impression: as above    Current Disposition Plan Recommendations:    subacute rehab placement

## 2022-05-06 NOTE — PROGRESS NOTE ADULT - ASSESSMENT
per Internal Medicine    89 y/o F w/    Problem/Plan - 1:  ·  Problem: Dizziness.   ·  Plan: initially a/w N/V; sx persist but improving; no e/o CVA on imaging -- DAPT d/c'ed, per Neuro; dx likely peripheral vertigo (neuronitis?); cont. meclizine and benzos PRN; additional work-up and mgmt per Neuro; vestibular PT/OT as tolerated; plan to d/c home once sx improved.    Problem/Plan - 2:  ·  Problem: Acute cystitis without hematuria.   ·  Plan: due to E. coli; completed course of CTX.    Problem/Plan - 3:  ·  Problem: Essential hypertension.   ·  Plan: cont. Norvasc, DASH diet.    Problem/Plan - 4:  ·  Problem: HLD (hyperlipidemia).   ·  Plan: cont. statin.    Problem/Plan - 5:  ·  Problem: Hypokalemia.   ·  Plan: repleted.

## 2022-05-06 NOTE — PROGRESS NOTE ADULT - SUBJECTIVE AND OBJECTIVE BOX
Patient is a 89y old  Female who presents with a chief complaint of Dizziness (06 May 2022 08:22)      INTERVAL HPI/OVERNIGHT EVENTS:    Pt. seen and examined earlier today  Pt. reports her episodes of dizziness are less frequent and less severe  N/V resolved; tolerating PO  Pt. denies F/C, CP, SOB, tinnitus, hearing loss    Review of Systems: 12 point review of systems otherwise negative    MEDICATIONS  (STANDING):  amLODIPine   Tablet 5 milliGRAM(s) Oral daily  atorvastatin 80 milliGRAM(s) Oral at bedtime  enoxaparin Injectable 30 milliGRAM(s) SubCutaneous every 24 hours  latanoprost 0.005% Ophthalmic Solution 1 Drop(s) Both EYES at bedtime  LORazepam     Tablet 0.5 milliGRAM(s) Oral two times a day  meclizine 25 milliGRAM(s) Oral every 12 hours    MEDICATIONS  (PRN):      Allergies    No Known Allergies    Intolerances          Vital Signs Last 24 Hrs  T(C): 36.5 (06 May 2022 06:11), Max: 36.6 (05 May 2022 13:45)  T(F): 97.7 (06 May 2022 06:11), Max: 97.9 (05 May 2022 20:52)  HR: 72 (06 May 2022 07:23) (72 - 88)  BP: 152/74 (06 May 2022 07:23) (134/77 - 170/96)  BP(mean): --  RR: 18 (06 May 2022 06:11) (18 - 19)  SpO2: 96% (06 May 2022 06:11) (96% - 97%)  CAPILLARY BLOOD GLUCOSE          05-05 @ 07:01  -  05-06 @ 07:00  --------------------------------------------------------  IN: 0 mL / OUT: 600 mL / NET: -600 mL        Physical Exam:  (earlier today)  Daily     Daily   General:  comfortable-appearing in NAD  HEENT:  MMM  CV:  RRR, no JVD  Lungs:  CTA B/L  Abdomen:  soft NT ND  Extremities:  no edema B/L LE  Skin:  WWP  Neuro:  AAOx3, non-focal    LABS:                        13.5   6.93  )-----------( 297      ( 06 May 2022 07:48 )             42.6     05-06    136  |  102  |  17  ----------------------------<  99  3.9   |  26  |  0.58    Ca    9.0      06 May 2022 07:48  Phos  3.1     05-06  Mg     2.0     05-06    TPro  6.9  /  Alb  3.4  /  TBili  0.3  /  DBili  x   /  AST  24  /  ALT  13  /  AlkPhos  71  05-06

## 2022-05-07 LAB
ALBUMIN SERPL ELPH-MCNC: 3.4 G/DL — SIGNIFICANT CHANGE UP (ref 3.3–5)
ALP SERPL-CCNC: 74 U/L — SIGNIFICANT CHANGE UP (ref 40–120)
ALT FLD-CCNC: 21 U/L — SIGNIFICANT CHANGE UP (ref 10–45)
ANION GAP SERPL CALC-SCNC: 9 MMOL/L — SIGNIFICANT CHANGE UP (ref 5–17)
AST SERPL-CCNC: 30 U/L — SIGNIFICANT CHANGE UP (ref 10–40)
BASOPHILS # BLD AUTO: 0.06 K/UL — SIGNIFICANT CHANGE UP (ref 0–0.2)
BASOPHILS NFR BLD AUTO: 0.8 % — SIGNIFICANT CHANGE UP (ref 0–2)
BILIRUB SERPL-MCNC: 0.2 MG/DL — SIGNIFICANT CHANGE UP (ref 0.2–1.2)
BUN SERPL-MCNC: 21 MG/DL — SIGNIFICANT CHANGE UP (ref 7–23)
CALCIUM SERPL-MCNC: 9.1 MG/DL — SIGNIFICANT CHANGE UP (ref 8.4–10.5)
CHLORIDE SERPL-SCNC: 104 MMOL/L — SIGNIFICANT CHANGE UP (ref 96–108)
CO2 SERPL-SCNC: 26 MMOL/L — SIGNIFICANT CHANGE UP (ref 22–31)
CREAT SERPL-MCNC: 0.66 MG/DL — SIGNIFICANT CHANGE UP (ref 0.5–1.3)
EGFR: 82 ML/MIN/1.73M2 — SIGNIFICANT CHANGE UP
EOSINOPHIL # BLD AUTO: 0.2 K/UL — SIGNIFICANT CHANGE UP (ref 0–0.5)
EOSINOPHIL NFR BLD AUTO: 2.7 % — SIGNIFICANT CHANGE UP (ref 0–6)
GLUCOSE SERPL-MCNC: 95 MG/DL — SIGNIFICANT CHANGE UP (ref 70–99)
HCT VFR BLD CALC: 42.1 % — SIGNIFICANT CHANGE UP (ref 34.5–45)
HGB BLD-MCNC: 13.8 G/DL — SIGNIFICANT CHANGE UP (ref 11.5–15.5)
IMM GRANULOCYTES NFR BLD AUTO: 0.8 % — SIGNIFICANT CHANGE UP (ref 0–1.5)
LYMPHOCYTES # BLD AUTO: 1.68 K/UL — SIGNIFICANT CHANGE UP (ref 1–3.3)
LYMPHOCYTES # BLD AUTO: 22.4 % — SIGNIFICANT CHANGE UP (ref 13–44)
MAGNESIUM SERPL-MCNC: 2 MG/DL — SIGNIFICANT CHANGE UP (ref 1.6–2.6)
MCHC RBC-ENTMCNC: 31.2 PG — SIGNIFICANT CHANGE UP (ref 27–34)
MCHC RBC-ENTMCNC: 32.8 GM/DL — SIGNIFICANT CHANGE UP (ref 32–36)
MCV RBC AUTO: 95 FL — SIGNIFICANT CHANGE UP (ref 80–100)
MONOCYTES # BLD AUTO: 0.71 K/UL — SIGNIFICANT CHANGE UP (ref 0–0.9)
MONOCYTES NFR BLD AUTO: 9.5 % — SIGNIFICANT CHANGE UP (ref 2–14)
NEUTROPHILS # BLD AUTO: 4.78 K/UL — SIGNIFICANT CHANGE UP (ref 1.8–7.4)
NEUTROPHILS NFR BLD AUTO: 63.8 % — SIGNIFICANT CHANGE UP (ref 43–77)
NRBC # BLD: 0 /100 WBCS — SIGNIFICANT CHANGE UP (ref 0–0)
PHOSPHATE SERPL-MCNC: 3.3 MG/DL — SIGNIFICANT CHANGE UP (ref 2.5–4.5)
PLATELET # BLD AUTO: 309 K/UL — SIGNIFICANT CHANGE UP (ref 150–400)
POTASSIUM SERPL-MCNC: 4 MMOL/L — SIGNIFICANT CHANGE UP (ref 3.5–5.3)
POTASSIUM SERPL-SCNC: 4 MMOL/L — SIGNIFICANT CHANGE UP (ref 3.5–5.3)
PROT SERPL-MCNC: 6.8 G/DL — SIGNIFICANT CHANGE UP (ref 6–8.3)
RBC # BLD: 4.43 M/UL — SIGNIFICANT CHANGE UP (ref 3.8–5.2)
RBC # FLD: 13 % — SIGNIFICANT CHANGE UP (ref 10.3–14.5)
SODIUM SERPL-SCNC: 139 MMOL/L — SIGNIFICANT CHANGE UP (ref 135–145)
WBC # BLD: 7.49 K/UL — SIGNIFICANT CHANGE UP (ref 3.8–10.5)
WBC # FLD AUTO: 7.49 K/UL — SIGNIFICANT CHANGE UP (ref 3.8–10.5)

## 2022-05-07 PROCEDURE — 99232 SBSQ HOSP IP/OBS MODERATE 35: CPT

## 2022-05-07 PROCEDURE — 99233 SBSQ HOSP IP/OBS HIGH 50: CPT

## 2022-05-07 RX ADMIN — Medication 0.5 MILLIGRAM(S): at 18:39

## 2022-05-07 RX ADMIN — LATANOPROST 1 DROP(S): 0.05 SOLUTION/ DROPS OPHTHALMIC; TOPICAL at 21:20

## 2022-05-07 RX ADMIN — ENOXAPARIN SODIUM 30 MILLIGRAM(S): 100 INJECTION SUBCUTANEOUS at 21:19

## 2022-05-07 RX ADMIN — ATORVASTATIN CALCIUM 80 MILLIGRAM(S): 80 TABLET, FILM COATED ORAL at 21:20

## 2022-05-07 RX ADMIN — AMLODIPINE BESYLATE 5 MILLIGRAM(S): 2.5 TABLET ORAL at 05:47

## 2022-05-07 RX ADMIN — Medication 0.5 MILLIGRAM(S): at 05:48

## 2022-05-07 RX ADMIN — Medication 25 MILLIGRAM(S): at 05:47

## 2022-05-07 RX ADMIN — Medication 25 MILLIGRAM(S): at 18:39

## 2022-05-07 NOTE — PROGRESS NOTE ADULT - SUBJECTIVE AND OBJECTIVE BOX
**Incomplete Note**   OVERNIGHT EVENTS: NAOE    SUBJECTIVE / INTERVAL HPI: Patient seen and examined at bedside. Pt reports she took a few steps around the room with the aid yesterday and felt fine, decrease in room spinning sensation and decrease dizziness since starting trial of ativan. Pt denies any HA, visual changes or new onset weakness or numbness. Pt denies any other symptoms. Urinating well, no dysuria.     MEDICATIONS  (STANDING):  amLODIPine   Tablet 5 milliGRAM(s) Oral daily  atorvastatin 80 milliGRAM(s) Oral at bedtime  enoxaparin Injectable 30 milliGRAM(s) SubCutaneous every 24 hours  latanoprost 0.005% Ophthalmic Solution 1 Drop(s) Both EYES at bedtime  LORazepam     Tablet 0.5 milliGRAM(s) Oral two times a day  meclizine 25 milliGRAM(s) Oral every 12 hours    MEDICATIONS  (PRN):    Allergies    No Known Allergies    Intolerances        VITAL SIGNS:  Vital Signs Last 24 Hrs  T(C): 36.7 (07 May 2022 05:12), Max: 36.7 (06 May 2022 20:50)  T(F): 98 (07 May 2022 05:12), Max: 98 (06 May 2022 20:50)  HR: 70 (07 May 2022 06:41) (70 - 76)  BP: 150/82 (07 May 2022 06:41) (137/77 - 150/84)  BP(mean): 97 (06 May 2022 20:50) (97 - 97)  RR: 17 (07 May 2022 05:12) (17 - 18)  SpO2: 97% (07 May 2022 05:12) (96% - 97%)      05-06-22 @ 07:01  -  05-07-22 @ 07:00  --------------------------------------------------------  IN: 0 mL / OUT: 1100 mL / NET: -1100 mL        PHYSICAL EXAM:  General: NAD, Laying comfortably in bed  HEENT: NC/AT, anicteric sclera, MMM  Neck: supple  Cardiovascular: +S1/S2, RRR, No murmurs, rubs, gallops  Respiratory: CTA B/L, no W/R/R  Gastrointestinal: soft, NT/ND, +BSx4  Extremities: WWP, no edema, clubbing or cyanosis  Vascular: 2+ radial, DP/PT pulses B/L  Neurological: AAOx3, no focal deficits      LABS:                        13.5   6.93  )-----------( 297      ( 06 May 2022 07:48 )             42.6     05-06    136  |  102  |  17  ----------------------------<  99  3.9   |  26  |  0.58    Ca    9.0      06 May 2022 07:48  Phos  3.1     05-06  Mg     2.0     05-06    TPro  6.9  /  Alb  3.4  /  TBili  0.3  /  DBili  x   /  AST  24  /  ALT  13  /  AlkPhos  71  05-06        CAPILLARY BLOOD GLUCOSE              RADIOLOGY & ADDITIONAL TESTS: Reviewed.

## 2022-05-07 NOTE — PROGRESS NOTE ADULT - ASSESSMENT
89 yo F with pmhx HTN presenting to ED with complaints of dizziness, nausea, and vomiting. MRI neg for stroke. Peripheral vertigo, improving symptoms on meclizine and ativan, pending [facility] placement

## 2022-05-07 NOTE — PROGRESS NOTE ADULT - ASSESSMENT
***Note in progress***    OVERNIGHT EVENTS: NAEO    SUBJECTIVE / INTERVAL HPI: Patient seen and examined at bedside. Patient denying chest pain, SOB, palpitations, cough. Patient denies fever, chills, HA, Dizziness, change in vision/hearing, N/V, abdominal pain, diarrhea, constipation, hematochezia/melena, dysuria, hematuria, new onset weakness/numbness, LE pain and/or swelling.    Remaining ROS negative       PHYSICAL EXAM:    General: no acute distress, sitting up in bed  HEENT: NC/AT  Cardiovascular: RRR  Respiratory: CTA B/L; no W/R/R  Gastrointestinal: soft, NT/ND; +BSx4  Extremities: WWP; no edema, clubbing or cyanosis  Vascular: 2+ radial, DP/PT pulses B/L  Neurological: AAOx3; no focal deficits  Psychiatric: pleasant mood and affect  Dermatologic: no appreciable wounds or damage to the skin    VITAL SIGNS:  Vital Signs Last 24 Hrs  T(C): 36.6 (07 May 2022 12:42), Max: 36.7 (06 May 2022 20:50)  T(F): 97.8 (07 May 2022 12:42), Max: 98 (06 May 2022 20:50)  HR: 86 (07 May 2022 12:42) (70 - 86)  BP: 123/80 (07 May 2022 12:42) (123/80 - 150/84)  BP(mean): 97 (06 May 2022 20:50) (97 - 97)  RR: 18 (07 May 2022 12:42) (17 - 18)  SpO2: 96% (07 May 2022 12:42) (96% - 97%)      MEDICATIONS:  MEDICATIONS  (STANDING):  amLODIPine   Tablet 5 milliGRAM(s) Oral daily  atorvastatin 80 milliGRAM(s) Oral at bedtime  enoxaparin Injectable 30 milliGRAM(s) SubCutaneous every 24 hours  latanoprost 0.005% Ophthalmic Solution 1 Drop(s) Both EYES at bedtime  LORazepam     Tablet 0.5 milliGRAM(s) Oral two times a day  meclizine 25 milliGRAM(s) Oral every 12 hours    MEDICATIONS  (PRN):      ALLERGIES:  Allergies    No Known Allergies    Intolerances        LABS:                        13.8   7.49  )-----------( 309      ( 07 May 2022 08:02 )             42.1     05-07    139  |  104  |  21  ----------------------------<  95  4.0   |  26  |  0.66    Ca    9.1      07 May 2022 08:02  Phos  3.3     05-07  Mg     2.0     05-07    TPro  6.8  /  Alb  3.4  /  TBili  0.2  /  DBili  x   /  AST  30  /  ALT  21  /  AlkPhos  74  05-07        CAPILLARY BLOOD GLUCOSE          RADIOLOGY & ADDITIONAL TESTS: Reviewed.    89 y/o F w/

## 2022-05-08 LAB
ALBUMIN SERPL ELPH-MCNC: 3.4 G/DL — SIGNIFICANT CHANGE UP (ref 3.3–5)
ALP SERPL-CCNC: 71 U/L — SIGNIFICANT CHANGE UP (ref 40–120)
ALT FLD-CCNC: 21 U/L — SIGNIFICANT CHANGE UP (ref 10–45)
ANION GAP SERPL CALC-SCNC: 11 MMOL/L — SIGNIFICANT CHANGE UP (ref 5–17)
AST SERPL-CCNC: 26 U/L — SIGNIFICANT CHANGE UP (ref 10–40)
BASOPHILS # BLD AUTO: 0.06 K/UL — SIGNIFICANT CHANGE UP (ref 0–0.2)
BASOPHILS NFR BLD AUTO: 0.6 % — SIGNIFICANT CHANGE UP (ref 0–2)
BILIRUB SERPL-MCNC: 0.3 MG/DL — SIGNIFICANT CHANGE UP (ref 0.2–1.2)
BUN SERPL-MCNC: 30 MG/DL — HIGH (ref 7–23)
CALCIUM SERPL-MCNC: 9 MG/DL — SIGNIFICANT CHANGE UP (ref 8.4–10.5)
CHLORIDE SERPL-SCNC: 101 MMOL/L — SIGNIFICANT CHANGE UP (ref 96–108)
CO2 SERPL-SCNC: 25 MMOL/L — SIGNIFICANT CHANGE UP (ref 22–31)
CREAT SERPL-MCNC: 0.75 MG/DL — SIGNIFICANT CHANGE UP (ref 0.5–1.3)
EGFR: 75 ML/MIN/1.73M2 — SIGNIFICANT CHANGE UP
EOSINOPHIL # BLD AUTO: 0.19 K/UL — SIGNIFICANT CHANGE UP (ref 0–0.5)
EOSINOPHIL NFR BLD AUTO: 1.9 % — SIGNIFICANT CHANGE UP (ref 0–6)
GLUCOSE SERPL-MCNC: 94 MG/DL — SIGNIFICANT CHANGE UP (ref 70–99)
HCT VFR BLD CALC: 41.3 % — SIGNIFICANT CHANGE UP (ref 34.5–45)
HGB BLD-MCNC: 13.7 G/DL — SIGNIFICANT CHANGE UP (ref 11.5–15.5)
IMM GRANULOCYTES NFR BLD AUTO: 0.6 % — SIGNIFICANT CHANGE UP (ref 0–1.5)
LYMPHOCYTES # BLD AUTO: 1.81 K/UL — SIGNIFICANT CHANGE UP (ref 1–3.3)
LYMPHOCYTES # BLD AUTO: 18 % — SIGNIFICANT CHANGE UP (ref 13–44)
MAGNESIUM SERPL-MCNC: 2 MG/DL — SIGNIFICANT CHANGE UP (ref 1.6–2.6)
MCHC RBC-ENTMCNC: 32.1 PG — SIGNIFICANT CHANGE UP (ref 27–34)
MCHC RBC-ENTMCNC: 33.2 GM/DL — SIGNIFICANT CHANGE UP (ref 32–36)
MCV RBC AUTO: 96.7 FL — SIGNIFICANT CHANGE UP (ref 80–100)
MONOCYTES # BLD AUTO: 1.03 K/UL — HIGH (ref 0–0.9)
MONOCYTES NFR BLD AUTO: 10.2 % — SIGNIFICANT CHANGE UP (ref 2–14)
NEUTROPHILS # BLD AUTO: 6.93 K/UL — SIGNIFICANT CHANGE UP (ref 1.8–7.4)
NEUTROPHILS NFR BLD AUTO: 68.7 % — SIGNIFICANT CHANGE UP (ref 43–77)
NRBC # BLD: 0 /100 WBCS — SIGNIFICANT CHANGE UP (ref 0–0)
PHOSPHATE SERPL-MCNC: 3.3 MG/DL — SIGNIFICANT CHANGE UP (ref 2.5–4.5)
PLATELET # BLD AUTO: 280 K/UL — SIGNIFICANT CHANGE UP (ref 150–400)
POTASSIUM SERPL-MCNC: 3.9 MMOL/L — SIGNIFICANT CHANGE UP (ref 3.5–5.3)
POTASSIUM SERPL-SCNC: 3.9 MMOL/L — SIGNIFICANT CHANGE UP (ref 3.5–5.3)
PROT SERPL-MCNC: 6.7 G/DL — SIGNIFICANT CHANGE UP (ref 6–8.3)
RBC # BLD: 4.27 M/UL — SIGNIFICANT CHANGE UP (ref 3.8–5.2)
RBC # FLD: 12.8 % — SIGNIFICANT CHANGE UP (ref 10.3–14.5)
SARS-COV-2 RNA SPEC QL NAA+PROBE: SIGNIFICANT CHANGE UP
SODIUM SERPL-SCNC: 137 MMOL/L — SIGNIFICANT CHANGE UP (ref 135–145)
WBC # BLD: 10.08 K/UL — SIGNIFICANT CHANGE UP (ref 3.8–10.5)
WBC # FLD AUTO: 10.08 K/UL — SIGNIFICANT CHANGE UP (ref 3.8–10.5)

## 2022-05-08 PROCEDURE — 99232 SBSQ HOSP IP/OBS MODERATE 35: CPT

## 2022-05-08 PROCEDURE — 99233 SBSQ HOSP IP/OBS HIGH 50: CPT

## 2022-05-08 RX ADMIN — Medication 0.5 MILLIGRAM(S): at 18:34

## 2022-05-08 RX ADMIN — LATANOPROST 1 DROP(S): 0.05 SOLUTION/ DROPS OPHTHALMIC; TOPICAL at 22:31

## 2022-05-08 RX ADMIN — Medication 25 MILLIGRAM(S): at 18:34

## 2022-05-08 RX ADMIN — Medication 25 MILLIGRAM(S): at 05:39

## 2022-05-08 RX ADMIN — ENOXAPARIN SODIUM 30 MILLIGRAM(S): 100 INJECTION SUBCUTANEOUS at 22:31

## 2022-05-08 RX ADMIN — ATORVASTATIN CALCIUM 80 MILLIGRAM(S): 80 TABLET, FILM COATED ORAL at 22:31

## 2022-05-08 RX ADMIN — Medication 0.5 MILLIGRAM(S): at 05:39

## 2022-05-08 RX ADMIN — AMLODIPINE BESYLATE 5 MILLIGRAM(S): 2.5 TABLET ORAL at 05:38

## 2022-05-08 NOTE — PROGRESS NOTE ADULT - SUBJECTIVE AND OBJECTIVE BOX
Not feeling any dizziness at all.  Does not want to go to rehab.  She wants to get back home to her elderly  who needs help.

## 2022-05-08 NOTE — PROGRESS NOTE ADULT - ASSESSMENT
OVERNIGHT EVENTS: NAEO      Remaining ROS negative       PHYSICAL EXAM:    General: no acute distress, sitting comfortably in bed  HEENT: NC/AT  Neck: supple  Cardiovascular: +S1/S2, RRR  Respiratory: CTA B/L; no W/R/R  Gastrointestinal: soft, NT/ND; +BSx4  Extremities: WWP; no edema  Neurological: no focal deficits  Psychiatric: pleasant mood and affect      VITAL SIGNS:  Vital Signs Last 24 Hrs  T(C): 36.6 (08 May 2022 05:47), Max: 36.7 (07 May 2022 19:59)  T(F): 97.8 (08 May 2022 05:47), Max: 98 (07 May 2022 19:59)  HR: 90 (08 May 2022 05:47) (86 - 90)  BP: 138/70 (08 May 2022 05:47) (123/80 - 138/70)  BP(mean): 90 (08 May 2022 05:47) (90 - 91)  RR: 18 (08 May 2022 05:47) (18 - 18)  SpO2: 94% (08 May 2022 05:47) (94% - 96%)      MEDICATIONS:  MEDICATIONS  (STANDING):  amLODIPine   Tablet 5 milliGRAM(s) Oral daily  atorvastatin 80 milliGRAM(s) Oral at bedtime  enoxaparin Injectable 30 milliGRAM(s) SubCutaneous every 24 hours  latanoprost 0.005% Ophthalmic Solution 1 Drop(s) Both EYES at bedtime  LORazepam     Tablet 0.5 milliGRAM(s) Oral two times a day  meclizine 25 milliGRAM(s) Oral every 12 hours    MEDICATIONS  (PRN):      ALLERGIES:  Allergies    No Known Allergies    Intolerances        LABS:                        13.7   10.08 )-----------( 280      ( 08 May 2022 07:06 )             41.3     05-08    137  |  101  |  30<H>  ----------------------------<  94  3.9   |  25  |  0.75    Ca    9.0      08 May 2022 07:06  Phos  3.3     05-08  Mg     2.0     05-08    TPro  6.7  /  Alb  3.4  /  TBili  0.3  /  DBili  x   /  AST  26  /  ALT  21  /  AlkPhos  71  05-08        CAPILLARY BLOOD GLUCOSE          RADIOLOGY & ADDITIONAL TESTS: Reviewed.

## 2022-05-09 ENCOUNTER — TRANSCRIPTION ENCOUNTER (OUTPATIENT)
Age: 87
End: 2022-05-09

## 2022-05-09 VITALS
SYSTOLIC BLOOD PRESSURE: 123 MMHG | RESPIRATION RATE: 18 BRPM | DIASTOLIC BLOOD PRESSURE: 80 MMHG | HEART RATE: 87 BPM | OXYGEN SATURATION: 97 % | TEMPERATURE: 98 F

## 2022-05-09 PROCEDURE — 87086 URINE CULTURE/COLONY COUNT: CPT

## 2022-05-09 PROCEDURE — 80053 COMPREHEN METABOLIC PANEL: CPT

## 2022-05-09 PROCEDURE — 70450 CT HEAD/BRAIN W/O DYE: CPT | Mod: MA

## 2022-05-09 PROCEDURE — 97161 PT EVAL LOW COMPLEX 20 MIN: CPT

## 2022-05-09 PROCEDURE — 0042T: CPT

## 2022-05-09 PROCEDURE — 85610 PROTHROMBIN TIME: CPT

## 2022-05-09 PROCEDURE — 87635 SARS-COV-2 COVID-19 AMP PRB: CPT

## 2022-05-09 PROCEDURE — 83735 ASSAY OF MAGNESIUM: CPT

## 2022-05-09 PROCEDURE — U0005: CPT

## 2022-05-09 PROCEDURE — 70496 CT ANGIOGRAPHY HEAD: CPT | Mod: MA

## 2022-05-09 PROCEDURE — 70498 CT ANGIOGRAPHY NECK: CPT | Mod: MA

## 2022-05-09 PROCEDURE — 84443 ASSAY THYROID STIM HORMONE: CPT

## 2022-05-09 PROCEDURE — 97162 PT EVAL MOD COMPLEX 30 MIN: CPT

## 2022-05-09 PROCEDURE — 99291 CRITICAL CARE FIRST HOUR: CPT | Mod: 25

## 2022-05-09 PROCEDURE — 84100 ASSAY OF PHOSPHORUS: CPT

## 2022-05-09 PROCEDURE — 36415 COLL VENOUS BLD VENIPUNCTURE: CPT

## 2022-05-09 PROCEDURE — 83036 HEMOGLOBIN GLYCOSYLATED A1C: CPT

## 2022-05-09 PROCEDURE — 87186 SC STD MICRODIL/AGAR DIL: CPT

## 2022-05-09 PROCEDURE — 80048 BASIC METABOLIC PNL TOTAL CA: CPT

## 2022-05-09 PROCEDURE — 81001 URINALYSIS AUTO W/SCOPE: CPT

## 2022-05-09 PROCEDURE — 82962 GLUCOSE BLOOD TEST: CPT

## 2022-05-09 PROCEDURE — 99233 SBSQ HOSP IP/OBS HIGH 50: CPT

## 2022-05-09 PROCEDURE — 85027 COMPLETE CBC AUTOMATED: CPT

## 2022-05-09 PROCEDURE — 85730 THROMBOPLASTIN TIME PARTIAL: CPT

## 2022-05-09 PROCEDURE — U0003: CPT

## 2022-05-09 PROCEDURE — 85025 COMPLETE CBC W/AUTO DIFF WBC: CPT

## 2022-05-09 PROCEDURE — 97530 THERAPEUTIC ACTIVITIES: CPT

## 2022-05-09 PROCEDURE — 99238 HOSP IP/OBS DSCHRG MGMT 30/<: CPT

## 2022-05-09 PROCEDURE — 97535 SELF CARE MNGMENT TRAINING: CPT

## 2022-05-09 PROCEDURE — 80061 LIPID PANEL: CPT

## 2022-05-09 PROCEDURE — 70551 MRI BRAIN STEM W/O DYE: CPT

## 2022-05-09 PROCEDURE — 84484 ASSAY OF TROPONIN QUANT: CPT

## 2022-05-09 PROCEDURE — 97116 GAIT TRAINING THERAPY: CPT

## 2022-05-09 RX ORDER — MECLIZINE HCL 12.5 MG
1 TABLET ORAL
Qty: 60 | Refills: 1
Start: 2022-05-09 | End: 2022-07-07

## 2022-05-09 RX ORDER — ROSUVASTATIN CALCIUM 5 MG/1
1 TABLET ORAL
Qty: 30 | Refills: 1
Start: 2022-05-09 | End: 2022-07-07

## 2022-05-09 RX ADMIN — AMLODIPINE BESYLATE 5 MILLIGRAM(S): 2.5 TABLET ORAL at 06:14

## 2022-05-09 RX ADMIN — Medication 25 MILLIGRAM(S): at 06:14

## 2022-05-09 RX ADMIN — Medication 0.5 MILLIGRAM(S): at 06:14

## 2022-05-09 NOTE — DISCHARGE NOTE NURSING/CASE MANAGEMENT/SOCIAL WORK - PATIENT PORTAL LINK FT
You can access the FollowMyHealth Patient Portal offered by [facility] by registering at the following website: [email or web address]. By joining [facility]’s FollowMyHealth portal, you will also be able to view your health information using other applications (apps) compatible with our system.

## 2022-05-09 NOTE — PROGRESS NOTE ADULT - PROBLEM SELECTOR PROBLEM 3
Essential hypertension
HLD (hyperlipidemia)
Essential hypertension
Essential hypertension
HLD (hyperlipidemia)
Essential hypertension

## 2022-05-09 NOTE — PROGRESS NOTE ADULT - SUBJECTIVE AND OBJECTIVE BOX
Patient is a 89y old  Female who presents with a chief complaint of Dizziness (09 May 2022 07:15)      INTERVAL HPI/OVERNIGHT EVENTS:    Pt. seen and examined earlier today  Pt. reports feeling much better  Pt. says her dizziness has resolved  Pt. denies N/V; tolerating PO    Review of Systems: 12 point review of systems otherwise negative    MEDICATIONS  (STANDING):  amLODIPine   Tablet 5 milliGRAM(s) Oral daily  atorvastatin 80 milliGRAM(s) Oral at bedtime  enoxaparin Injectable 30 milliGRAM(s) SubCutaneous every 24 hours  latanoprost 0.005% Ophthalmic Solution 1 Drop(s) Both EYES at bedtime  LORazepam     Tablet 0.5 milliGRAM(s) Oral two times a day  meclizine 25 milliGRAM(s) Oral every 12 hours    MEDICATIONS  (PRN):      Allergies    No Known Allergies    Intolerances          Vital Signs Last 24 Hrs  T(C): 36.4 (09 May 2022 11:25), Max: 36.6 (08 May 2022 20:11)  T(F): 97.5 (09 May 2022 11:25), Max: 97.9 (08 May 2022 20:11)  HR: 87 (09 May 2022 11:25) (77 - 87)  BP: 123/80 (09 May 2022 11:25) (123/80 - 135/78)  BP(mean): --  RR: 18 (09 May 2022 11:25) (18 - 18)  SpO2: 97% (09 May 2022 11:25) (97% - 98%)  CAPILLARY BLOOD GLUCOSE          05-08 @ 07:01  -  05-09 @ 07:00  --------------------------------------------------------  IN: 0 mL / OUT: 300 mL / NET: -300 mL    05-09 @ 07:01  -  05-09 @ 14:11  --------------------------------------------------------  IN: 0 mL / OUT: 250 mL / NET: -250 mL        Physical Exam:  (earlier today)  Daily     Daily   General:  well-appearing in NAD  HEENT:  MMM  Abdomen:  soft NT ND  Extremities:  no edema B/L LE  Skin:  WWP  Neuro:  AAOx3, non-focal    LABS:                        13.7   10.08 )-----------( 280      ( 08 May 2022 07:06 )             41.3     05-08    137  |  101  |  30<H>  ----------------------------<  94  3.9   |  25  |  0.75    Ca    9.0      08 May 2022 07:06  Phos  3.3     05-08  Mg     2.0     05-08    TPro  6.7  /  Alb  3.4  /  TBili  0.3  /  DBili  x   /  AST  26  /  ALT  21  /  AlkPhos  71  05-08

## 2022-05-09 NOTE — PROGRESS NOTE ADULT - PROBLEM SELECTOR PLAN 1
initially a/w N/V; sx persist but improving; no e/o CVA on imaging.  No longer on DAPT.  Neuro suspects neuronitis, and continue on meclizine and benzo.
a/w N/V; sx persist but improving; no e/o CVA on imaging -- DAPT d/c'ed, per Neuro; dx likely peripheral vertigo (neuronitis?); cont. meclizine and antiemetics PRN; additional work-up and mgmt per Neuro; vestibular PT/OT
- MRI brain negative for acute ischemia  - Stroke Code HCT Results: negative  - Stroke Code CTA Results: negative  - Stroke education  - initiated 12.mg meclizine q8h, uptitrate to 25 mg q12  - trial of 0.5 mg of ativan BID
- MRI brain negative for acute ischemia  - Stroke Code HCT Results: negative  - Stroke Code CTA Results: negative  - Stroke education  - initiated 12.mg meclizine q8h, uptitrate to 25 mg q12  - trial of 0.5 mg of ativan BID
initially a/w N/V;; no e/o CVA on imaging.  No longer on DAPT.  Neuro suspects neuronitis, and continue on meclizine and benzo.  Symptoms have fully resolved today.
initially a/w N/V; sx persist but improving; no e/o CVA on imaging -- DAPT d/c'ed, per Neuro; dx likely peripheral vertigo (neuronitis?); cont. meclizine and Valium PRN; additional work-up and mgmt per Neuro; vestibular PT/OT as tolerated
initially a/w N/V; sx persist but improving; no e/o CVA on imaging -- DAPT d/c'ed, per Neuro; dx likely peripheral vertigo (neuronitis?); cont. meclizine and benzos PRN; additional work-up and mgmt per Neuro; vestibular PT/OT as tolerated; plan to d/c home once sx improved
- MRI brain negative for acute ischemia  - Stroke Code HCT Results: negative  - Stroke Code CTA Results: negative  - Stroke education  - initiated 12.mg meclizine q8h, uptitrate to 25 mg q12  - trial of 0.5 mg of ativan BID
- MRI brain negative for acute ischemia  - Stroke Code HCT Results: negative  - Stroke Code CTA Results: negative  - Stroke education  - initiated 12.mg meclizine q8h, uptitrate to 25 mg q12  - trial of 0.5 mg of ativan BID
initially a/w N/V; sx persist but improving; no e/o CVA on imaging -- DAPT d/c'ed, per Neuro; dx likely peripheral vertigo (neuronitis?); cont. meclizine and Valium PRN; additional work-up and mgmt per Neuro; vestibular PT/OT as tolerated
initially a/w N/V; no e/o CVA on imaging -- DAPT d/c'ed, per Neuro; dx likely peripheral vertigo (neuronitis?); now clinically resolving; cont. meclizine and benzos PRN; vestibular PT/OT as tolerated

## 2022-05-09 NOTE — DISCHARGE NOTE OB - NS MD DC FALL RISK RISK
For information on Fall & Injury Prevention, visit: [email or web address] OR  [email or web address] OR  https://www.cdc.gov/steadi/patient.html

## 2022-05-09 NOTE — PROGRESS NOTE ADULT - PROBLEM SELECTOR PROBLEM 2
Acute cystitis without hematuria
Essential hypertension
Acute cystitis without hematuria
Essential hypertension
Acute cystitis without hematuria

## 2022-05-09 NOTE — PROGRESS NOTE ADULT - PROBLEM SELECTOR PLAN 4
cont. statin
cont. statin
F: none  E: replete PRN  Diet: regular  DVT PPx: lovenox 30 qd  GI PPx: none  Code: full  dispo: [facility] vs home pending further PT assessment for walking
cont. statin
F: none  E: replete PRN  Diet: regular  DVT PPx: lovenox 30 qd  GI PPx: none  Code: full  dispo: [facility] vs home pending further PT assessment for walking

## 2022-05-09 NOTE — PROGRESS NOTE ADULT - PROBLEM SELECTOR PLAN 2
due to E. coli; completed course of CTX
completed course of ceftriaxone
Hypertensive to 180s overnight  previously held amlodipine  - restarted amlodipine
completed course of ceftriaxone
due to E. coli; completed course of CTX
due to E. coli; urinary sx improved, WBC has normalized; cont. CTX (day #2/3), f/u UCx
Hypertensive to 180s overnight  previously held amlodipine  - restarted amlodipine
due to E. coli; completed course of CTX
due to E. coli; urinary sx improved, WBC has normalized; cont. CTX (day #3/3) per UCx sensitivities

## 2022-05-09 NOTE — PROGRESS NOTE ADULT - ASSESSMENT
89 yo F with pmhx HTN presenting to ED with complaints of dizziness, nausea, and vomiting. MRI neg for stroke. Peripheral vertigo, improving symptoms on meclizine and ativan, pending ZAHIDA placement

## 2022-05-09 NOTE — DISCHARGE NOTE OB - PATIENT PORTAL LINK FT
You can access the FollowMyHealth Patient Portal offered by [facility] by registering at the following website: [email or web address]. By joining [employer]’s FollowMyHealth portal, you will also be able to view your health information using other applications (apps) compatible with our system.

## 2022-05-09 NOTE — PROGRESS NOTE ADULT - PROBLEM SELECTOR PROBLEM 4
Prophylactic measure
HLD (hyperlipidemia)
HLD (hyperlipidemia)
Prophylactic measure
HLD (hyperlipidemia)

## 2022-05-09 NOTE — PROGRESS NOTE ADULT - REASON FOR ADMISSION
Dizziness

## 2022-05-09 NOTE — PROGRESS NOTE ADULT - SUBJECTIVE AND OBJECTIVE BOX
**Incomplete Note**  OVERNIGHT EVENTS:    SUBJECTIVE / INTERVAL HPI: Patient seen and examined at bedside. Denies f/c, n/v, HA, chest pain, SOB, abdominal pain, diarrhea, constipation, melena, hematochezia, hematuria, dysuria    MEDICATIONS  (STANDING):  amLODIPine   Tablet 5 milliGRAM(s) Oral daily  atorvastatin 80 milliGRAM(s) Oral at bedtime  enoxaparin Injectable 30 milliGRAM(s) SubCutaneous every 24 hours  latanoprost 0.005% Ophthalmic Solution 1 Drop(s) Both EYES at bedtime  LORazepam     Tablet 0.5 milliGRAM(s) Oral two times a day  meclizine 25 milliGRAM(s) Oral every 12 hours    MEDICATIONS  (PRN):    Allergies    No Known Allergies    Intolerances        VITAL SIGNS:  Vital Signs Last 24 Hrs  T(C): 36.6 (09 May 2022 05:13), Max: 36.6 (08 May 2022 20:11)  T(F): 97.8 (09 May 2022 05:13), Max: 97.9 (08 May 2022 20:11)  HR: 79 (09 May 2022 05:13) (77 - 82)  BP: 133/78 (09 May 2022 05:13) (131/74 - 135/78)  BP(mean): --  RR: 18 (09 May 2022 05:13) (18 - 18)  SpO2: 98% (09 May 2022 05:13) (97% - 98%)      05-08-22 @ 07:01  -  05-09-22 @ 07:00  --------------------------------------------------------  IN: 0 mL / OUT: 300 mL / NET: -300 mL        PHYSICAL EXAM:  General: NAD, Laying comfortably in bed  HEENT: NC/AT, anicteric sclera, MMM  Neck: supple  Cardiovascular: +S1/S2, RRR, No murmurs, rubs, gallops  Respiratory: CTA B/L, no W/R/R  Gastrointestinal: soft, NT/ND, +BSx4  Extremities: WWP, no edema, clubbing or cyanosis  Vascular: 2+ radial, DP/PT pulses B/L  Neurological: AAOx3, no focal deficits      LABS:                        13.7   10.08 )-----------( 280      ( 08 May 2022 07:06 )             41.3     05-08    137  |  101  |  30<H>  ----------------------------<  94  3.9   |  25  |  0.75    Ca    9.0      08 May 2022 07:06  Phos  3.3     05-08  Mg     2.0     05-08    TPro  6.7  /  Alb  3.4  /  TBili  0.3  /  DBili  x   /  AST  26  /  ALT  21  /  AlkPhos  71  05-08        CAPILLARY BLOOD GLUCOSE              RADIOLOGY & ADDITIONAL TESTS: Reviewed. OVERNIGHT EVENTS: NAOE    SUBJECTIVE / INTERVAL HPI: Patient seen and examined at bedside. Pt reports absence of dizziness or vertigo symptoms today. No other complaints.    MEDICATIONS  (STANDING):  amLODIPine   Tablet 5 milliGRAM(s) Oral daily  atorvastatin 80 milliGRAM(s) Oral at bedtime  enoxaparin Injectable 30 milliGRAM(s) SubCutaneous every 24 hours  latanoprost 0.005% Ophthalmic Solution 1 Drop(s) Both EYES at bedtime  LORazepam     Tablet 0.5 milliGRAM(s) Oral two times a day  meclizine 25 milliGRAM(s) Oral every 12 hours    MEDICATIONS  (PRN):    Allergies    No Known Allergies    Intolerances        VITAL SIGNS:  Vital Signs Last 24 Hrs  T(C): 36.6 (09 May 2022 05:13), Max: 36.6 (08 May 2022 20:11)  T(F): 97.8 (09 May 2022 05:13), Max: 97.9 (08 May 2022 20:11)  HR: 79 (09 May 2022 05:13) (77 - 82)  BP: 133/78 (09 May 2022 05:13) (131/74 - 135/78)  BP(mean): --  RR: 18 (09 May 2022 05:13) (18 - 18)  SpO2: 98% (09 May 2022 05:13) (97% - 98%)      05-08-22 @ 07:01  -  05-09-22 @ 07:00  --------------------------------------------------------  IN: 0 mL / OUT: 300 mL / NET: -300 mL        PHYSICAL EXAM:  General: NAD, Laying comfortably in bed  HEENT: NC/AT, anicteric sclera, MMM  Neck: supple  Cardiovascular: +S1/S2, RRR, No murmurs, rubs, gallops  Respiratory: CTA B/L, no W/R/R  Gastrointestinal: soft, NT/ND, +BSx4  Extremities: WWP, no edema, clubbing or cyanosis  Vascular: 2+ radial, DP/PT pulses B/L  Neurological: AAOx3, no focal deficits      LABS:                        13.7   10.08 )-----------( 280      ( 08 May 2022 07:06 )             41.3     05-08    137  |  101  |  30<H>  ----------------------------<  94  3.9   |  25  |  0.75    Ca    9.0      08 May 2022 07:06  Phos  3.3     05-08  Mg     2.0     05-08    TPro  6.7  /  Alb  3.4  /  TBili  0.3  /  DBili  x   /  AST  26  /  ALT  21  /  AlkPhos  71  05-08        CAPILLARY BLOOD GLUCOSE              RADIOLOGY & ADDITIONAL TESTS: Reviewed.

## 2022-05-09 NOTE — PROGRESS NOTE ADULT - PROBLEM SELECTOR PLAN 3
cont. Norvasc, DASH diet
- LDL results: 168  - c/w atorvastatin 80 mg qhs
better controlled; cont. Norvasc, DASH diet
clarify home antihypertensive regimen
cont. Norvasc, DASH diet
- LDL results: 168  - c/w atorvastatin 80 mg qhs
- LDL results: 168  - c/w atorvastatin 80 mg qhs
cont. Norvasc, DASH diet
- LDL results: 168  - c/w atorvastatin 80 mg qhs
cont. Norvasc, DASH diet
much better controlled; cont. Norvasc, DASH diet

## 2022-05-09 NOTE — PROGRESS NOTE ADULT - PROBLEM SELECTOR PROBLEM 1
Dizziness
Peripheral vertigo
Dizziness
Peripheral vertigo
Dizziness

## 2022-05-09 NOTE — PROGRESS NOTE ADULT - PROVIDER SPECIALTY LIST ADULT
Neurology
Internal Medicine
Rehab Medicine
Rehab Medicine
Internal Medicine
Hospitalist
Internal Medicine
Hospitalist
Internal Medicine
Neurology
Hospitalist

## 2022-05-09 NOTE — PROGRESS NOTE ADULT - TIME BILLING
safe d/c planning  will follow w/ you  d/w Stroke NP
will follow w/ you  d/w Stroke NP
d/c home today  d/w Stroke NP
safe d/c planning  will follow w/ you  d/w Stroke NP
will follow w/ you  d/w Stroke PA

## 2022-05-09 NOTE — DISCHARGE NOTE NURSING/CASE MANAGEMENT/SOCIAL WORK - NSDCPEFALRISK_GEN_ALL_CORE
For information on Fall & Injury Prevention, visit: [email or web address] OR  [email or web address] OR  https://www.cdc.gov/steadi/patient.html

## 2022-05-13 DIAGNOSIS — I10 ESSENTIAL (PRIMARY) HYPERTENSION: ICD-10-CM

## 2022-05-13 DIAGNOSIS — E78.5 HYPERLIPIDEMIA, UNSPECIFIED: ICD-10-CM

## 2022-05-13 DIAGNOSIS — E87.6 HYPOKALEMIA: ICD-10-CM

## 2022-05-13 DIAGNOSIS — R11.2 NAUSEA WITH VOMITING, UNSPECIFIED: ICD-10-CM

## 2022-05-13 DIAGNOSIS — H81.399 OTHER PERIPHERAL VERTIGO, UNSPECIFIED EAR: ICD-10-CM

## 2022-05-13 DIAGNOSIS — N30.00 ACUTE CYSTITIS WITHOUT HEMATURIA: ICD-10-CM

## 2022-05-13 DIAGNOSIS — B96.29 OTHER ESCHERICHIA COLI [E. COLI] AS THE CAUSE OF DISEASES CLASSIFIED ELSEWHERE: ICD-10-CM

## 2022-05-13 DIAGNOSIS — R42 DIZZINESS AND GIDDINESS: ICD-10-CM

## 2022-05-23 ENCOUNTER — APPOINTMENT (OUTPATIENT)
Dept: NEUROLOGY | Facility: CLINIC | Age: 87
End: 2022-05-23

## 2022-07-19 PROBLEM — H40.003 GLAUCOMA SUSPECT OF BOTH EYES: Status: ACTIVE | Noted: 2017-04-17

## 2022-09-12 NOTE — PROGRESS NOTE ADULT - PROBLEM/PLAN-1
DISPLAY PLAN FREE TEXT
DISPLAY PLAN FREE TEXT
Received call from [employer] to verify patient's contact lens prescription which was incorrect. I called patient and left message for her to return my call.     [employer]   phone number [phone]  Fax number [phone]    Record number [id number]  
DISPLAY PLAN FREE TEXT

## 2022-11-22 NOTE — DISCHARGE NOTE NURSING/CASE MANAGEMENT/SOCIAL WORK - FLU SEASON?
Subjective:       Patient ID: Dominick Alvarenga is a 19 y.o. female.    Chief Complaint:  Follow-up (Pt is following up from er visit to discuss test results)      History of Present Illness  HPI  Pt is here for ER follow up.  She was seen in the ER 2 days ago for vaginal discharge.  She has not been given any results or treatment yet.   Chlamydia test positive.  Wetprep negative.    GYN & OB History  Patient's last menstrual period was 2022.   Date of Last Pap: No result found    OB History    Para Term  AB Living   0 0 0 0 0 0   SAB IAB Ectopic Multiple Live Births   0 0 0 0 0       Review of Systems  Review of Systems   Constitutional:  Negative for chills, diaphoresis, fatigue, fever and unexpected weight change.   HENT:  Negative for congestion, hearing loss, rhinorrhea and sore throat.    Eyes:  Negative for pain, discharge and visual disturbance.   Respiratory:  Negative for apnea, cough, shortness of breath and wheezing.    Cardiovascular:  Negative for chest pain, palpitations and leg swelling.   Gastrointestinal:  Negative for abdominal pain, constipation, diarrhea, nausea and vomiting.   Endocrine: Negative for cold intolerance and heat intolerance.   Genitourinary:  Positive for vaginal discharge. Negative for difficulty urinating, dyspareunia, dysuria, flank pain, frequency, genital sores, hematuria, menstrual problem, pelvic pain, vaginal bleeding and vaginal pain.   Musculoskeletal:  Negative for arthralgias, back pain and joint swelling.   Skin:  Negative for rash.   Neurological:  Negative for dizziness, weakness, light-headedness, numbness and headaches.   Psychiatric/Behavioral:  Negative for agitation and confusion. The patient is not nervous/anxious.          Objective:    Physical Exam:   Constitutional: She is oriented to person, place, and time. She appears well-developed and well-nourished. No distress.    HENT:   Head: Normocephalic and atraumatic.    Eyes:  Conjunctivae and EOM are normal.      Pulmonary/Chest: Effort normal. No respiratory distress.                  Musculoskeletal: Normal range of motion.       Neurological: She is alert and oriented to person, place, and time.    Skin: Skin is warm and dry.    Psychiatric: She has a normal mood and affect. Her behavior is normal. Judgment and thought content normal.        Assessment:        1. Chlamydia               Plan:      Dominick was seen today for follow-up.    Diagnoses and all orders for this visit:    Chlamydia  -     azithromycin (ZITHROMAX) 1 gram Pack; Take 1 g by mouth once. for 1 dose      Discussed positive Chlamydia results.    Discussed that this is a sexually transmitted infection.    Offered expedited partner therapy and prescription sent for partner.     Instructed to abstain for intercourse for 7 days after treatment.     Discussed safe sex practices.    Patient desires test of cure.  Scheduled follow up.          No

## 2022-12-08 ENCOUNTER — EMERGENCY (EMERGENCY)
Facility: HOSPITAL | Age: 87
LOS: 1 days | Discharge: HOME | End: 2022-12-08
Attending: EMERGENCY MEDICINE | Admitting: EMERGENCY MEDICINE
Payer: MEDICARE

## 2022-12-08 VITALS
SYSTOLIC BLOOD PRESSURE: 153 MMHG | WEIGHT: 113.98 LBS | DIASTOLIC BLOOD PRESSURE: 83 MMHG | HEART RATE: 61 BPM | RESPIRATION RATE: 18 BRPM | TEMPERATURE: 98 F | OXYGEN SATURATION: 98 %

## 2022-12-08 VITALS
SYSTOLIC BLOOD PRESSURE: 162 MMHG | HEART RATE: 60 BPM | OXYGEN SATURATION: 95 % | RESPIRATION RATE: 18 BRPM | DIASTOLIC BLOOD PRESSURE: 84 MMHG

## 2022-12-08 PROCEDURE — 99283 EMERGENCY DEPT VISIT LOW MDM: CPT | Mod: 25

## 2022-12-08 PROCEDURE — 90471 IMMUNIZATION ADMIN: CPT

## 2022-12-08 PROCEDURE — 90715 TDAP VACCINE 7 YRS/> IM: CPT

## 2022-12-08 PROCEDURE — 99283 EMERGENCY DEPT VISIT LOW MDM: CPT

## 2022-12-08 RX ORDER — TETANUS TOXOID, REDUCED DIPHTHERIA TOXOID AND ACELLULAR PERTUSSIS VACCINE, ADSORBED 5; 2.5; 8; 8; 2.5 [IU]/.5ML; [IU]/.5ML; UG/.5ML; UG/.5ML; UG/.5ML
0.5 SUSPENSION INTRAMUSCULAR ONCE
Refills: 0 | Status: COMPLETED | OUTPATIENT
Start: 2022-12-08 | End: 2022-12-08

## 2022-12-08 RX ADMIN — TETANUS TOXOID, REDUCED DIPHTHERIA TOXOID AND ACELLULAR PERTUSSIS VACCINE, ADSORBED 0.5 MILLILITER(S): 5; 2.5; 8; 8; 2.5 SUSPENSION INTRAMUSCULAR at 12:14

## 2022-12-08 NOTE — ED PROVIDER NOTE - PATIENT PORTAL LINK FT
You can access the FollowMyHealth Patient Portal offered by [facility] by registering at the following website: [email or web address]. By joining [employer]’s FollowMyHealth portal, you will also be able to view your health information using other applications (apps) compatible with our system.

## 2022-12-08 NOTE — ED PROVIDER NOTE - CLINICAL SUMMARY MEDICAL DECISION MAKING FREE TEXT BOX
Laceration to posterior right ankle; occurred 3 days ago so cannot suture, as risk of developing infection would be increased.  Has no signs of infection at this time, and pt has no diabetes or immunosuppression; antibiotic not indicated at this time.  Wound is over the Achilles tendon, which appears to be intact based on exam.  I discussed possible US to further assess the tendon for partial tear, but she declined; I do not feel strongly that US would alter care, so it is reasonable for her to decline the test.  No calf swelling or tenderness to suggest DVT, does not need vascular US.  Will give tetanus booster.  Wound cleaned and dressed with Xeroform dressing, Kerlix, and ACE bandage.  Will speak with her PCP regarding wound care follow up.

## 2022-12-08 NOTE — ED PROVIDER NOTE - ATTENDING APP SHARED VISIT CONTRIBUTION OF CARE
Attending Statement: I have personally performed a face to face diagnostic evaluation on this patient. I have reviewed the ACP note and agree with the history, exam and plan of care, except as noted.     Attending Contribution to Care:  89F who p/w Laceration to posterior right ankle 3 days ago, Agree with PA exam.  occurred 3 days ago so cannot suture, as risk of developing infection would be increased but no si of infx currently.  Pt given  tetanus booster.  Wound cleaned and dressed with Xeroform dressing, Kerlix, and ACE bandage. Stable for Dc with pcp follow up and return precautions.
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
## 2022-12-08 NOTE — ED PROVIDER NOTE - PHYSICAL EXAMINATION
CONSTITUTIONAL: elderly fem in NAD   SKIN: Normal color and turgor.    HEAD: NC/AT.  EYES: Conjunctiva clear. EOMI. PERRL.    ENT: Airway clear. Normal voice.   RESPIRATORY:  Normal work of breathing. Lungs CTAB.  CARDIOVASCULAR:  RRR, S1S2. No M/R/G.      GI:  Abdomen soft, nontender.    MSK:  6 cm v-shaped skin laceration/avulsion over the posterior right ankle, overlying the Achilles tendon.  Wound explored to its base and the Achilles tendon is not exposed; wound depth subcutaneous.  No erythema/warmth/drainage/swelling.  No step-off/defect appreciated over the Achilles and Rock test is negative (normal).  No swelling of lower leg and no calf tenderness. Ankle & pedal pulses intact and foot WWP.  Ambulates without limp.   NEURO: Alert; CN: grossly intact. Speech clear.  BORDEN. Gait steady.

## 2022-12-08 NOTE — ED PROVIDER NOTE - PRINCIPAL DIAGNOSIS
Ongoing problems with anemia. Due for recheck labs. She has been taking her iron supplements off and on. She did have an evaluation per gynecology, ultrasound of the uterus last completed in April. She did have a D&C. She was treated for endometritis.
Thyroid function test have been stable on levothyroxine at 100 mcg daily. Continue the same dose of medications and will recheck labs as ordered.
Laceration of right lower leg

## 2022-12-08 NOTE — ED ADULT NURSE NOTE - OBJECTIVE STATEMENT
pt is 89y female, here for abrasion to lateral R ankle that happened on Monday, pt referred to ED for abx therapy, denies any fevers, R back pf the ankle with marked abrasion, mild redness, no drainage or bleeding, no swellings, NAD present

## 2022-12-08 NOTE — ED PROVIDER NOTE - PROGRESS NOTE DETAILS
d/w pt's PCP Dr Eddie Paulino.  He says that if we cannot easily arrange wound care follow up through [facility], that patient should call him and he will make arrangements for wound care follow up. CM providing wound care instruction to pt.

## 2022-12-08 NOTE — ED PROVIDER NOTE - NSFOLLOWUPINSTRUCTIONS_ED_ALL_ED_FT
Wash the wound once or twice each day.  After washing, pat dry with a clean towel, and apply Xeroform gauze (or bacitracin ointment); cover with new sterile gauze and apply ACE bandage.      Follow up with Dr Paulino early next week for a wound check.  Call tomorrow to schedule.    Return to the Emergency Department if you have any new or worsening symptoms, or if you have any concerns.  ====================    Nonsutured Laceration Care      A laceration is a cut that may go through all layers of the skin and into the tissue that is right under the skin.    A laceration is usually stitched up (sutured) or closed with adhesive strips or skin glue shortly after the injury happens. However, if the wound is dirty or if several hours pass before medical treatment is provided, it is likely that bacteria will enter the wound. Closing a laceration after bacteria have entered it increases the risk for infection. In these cases, your health care provider may leave the laceration open (nonsutured) and cover it with a bandage (dressing). This type of treatment helps prevent infection and allows the wound to heal from the deepest layer of tissue damage up to the surface.    Nonsutured healing is also known as secondary wound healing. This is more common for wounds that involve loss of tissue, are irregular in shape and size, or are on surfaces of the body where movement makes sutures or other closure methods impossible.      How to care for your nonsutured laceration  Two open wounds. One is normal. The other is red with pus and infected.   Follow instructions from your health care provider about how to take care of your wound.  •Keep the wound clean and dry.      •Change any dressings as told by your health care provider. This includes changing the dressing when it starts to smell, or when it gets wet or dirty.    •Clean the wound one time each day, or as often as told by your health care provider. To clean your wound:  1.Wash your hands with soap and water for at least 20 seconds before and after touching your wound or changing your dressing. If soap and water are not available, use hand .      2.Remove any dressing as told by your health care provider.      3.Clean the wound with water or irrigation solution as told by your health care provider.      4.Pat the wound dry with a clean towel. Do not rub the wound.      5.Apply a thin layer of antibiotic ointment or another topical ointment to the wound as told by your health care provider. This will prevent infection and keep the dressing from sticking to the wound.      6.Apply a new dressing as told by your health care provider.      •Check your wound every day for signs of infection. Watch for:  •More redness, swelling, or pain.      •Fluid or blood.      •Warmth.      •Pus or a bad smell.        •Do not take baths, swim, or do anything that puts your wound underwater until your health care provider approves.      •Do not scratch or pick at the wound.      •Do not usedisinfectants or antiseptics, such as rubbing alcohol, to clean your wound unless told by your health care provider.        Follow these instructions at home:    Medicines     •Take over-the-counter and prescription medicines only as told by your health care provider.      •If you were prescribed an antibiotic medicine, take or apply it as told by your health care provider. Do not stop using the antibiotic even if your condition improves.      Managing pain and swelling   •If directed, put ice on the injured area. To do this:  •Put ice in a plastic bag.      •Place a towel between your skin and the bag.      •Leave the ice on for 20 minutes, 2–3 times a day.      •Remove the ice if your skin turns bright red. This is very important. If you cannot feel pain, heat, or cold, you have a greater risk of damage to the area.        •Raise (elevate) the injured area above the level of your heart while you are sitting or lying down.      General instructions     •Avoid any activity that could cause your laceration to reopen.      •Keep all follow-up visits. This is important.        Contact a health care provider if:    •You received a tetanus shot and you have swelling, severe pain, redness, or bleeding at the injection site.      •Your pain is not controlled with medicine.    •You have any of these signs of infection:  •More redness, swelling, or pain around your wound.      •Fluid or blood coming from your wound.      •Warmth coming from your wound.      •Pus or a bad smell coming from your wound.      •A fever.        •You notice something coming out of the wound, such as wood or glass.      •You notice a change in the color of your skin near your wound.      •You develop a new rash.      •You need to change the dressing often.      •You develop numbness around your wound.        Get help right away if:    •Your pain suddenly increases and is severe.      •You develop severe swelling around the wound.      •The wound is on your hand or foot, and you cannot properly move a finger or toe.      •The wound is on your hand or foot, and you notice that your fingers or toes look pale or bluish.      •You have a red streak going away from your wound.      •You develop painful lumps near the wound or on skin anywhere else on your body.        Summary    •A laceration is a cut that may go through all layers of the skin and into the tissue that is right under the skin. It is usually closed with stitches, tape, or skin glue shortly after the injury happens.      •If a wound is dirty or if several hours pass before medical treatment is provided, the laceration may be kept open (nonsutured) and covered with a bandage.      •Nonsutured laceration helps prevent infection and allows the wound to heal from the deepest layer of tissue damage up to the surface.      •Follow instructions from your health care provider about how to take care of your wound.      This information is not intended to replace advice given to you by your health care provider. Make sure you discuss any questions you have with your health care provider.
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
## 2022-12-11 DIAGNOSIS — S81.811A LACERATION WITHOUT FOREIGN BODY, RIGHT LOWER LEG, INITIAL ENCOUNTER: ICD-10-CM
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
## 2022-12-11 DIAGNOSIS — Z23 ENCOUNTER FOR IMMUNIZATION: ICD-10-CM
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
## 2022-12-11 DIAGNOSIS — I10 ESSENTIAL (PRIMARY) HYPERTENSION: ICD-10-CM

## 2022-12-29 NOTE — OCCUPATIONAL THERAPY INITIAL EVALUATION ADULT - RANGE OF MOTION EXAMINATION, LOWER EXTREMITY
25% of the time/able to follow single-step instructions bilateral LE Active ROM was WFL  (within functional limits)/bilateral LE Passive ROM was WFL  (within functional limits)

## 2023-01-17 ENCOUNTER — INPATIENT (INPATIENT)
Facility: HOSPITAL | Age: 88
LOS: 7 days | Discharge: HOME HEALTH | DRG: 563 | End: 2023-01-25
Attending: STUDENT IN AN ORGANIZED HEALTH CARE EDUCATION/TRAINING PROGRAM | Admitting: INTERNAL MEDICINE
Payer: MEDICARE

## 2023-01-17 VITALS
HEART RATE: 64 BPM | OXYGEN SATURATION: 96 % | WEIGHT: 111.99 LBS | RESPIRATION RATE: 18 BRPM | SYSTOLIC BLOOD PRESSURE: 153 MMHG | TEMPERATURE: 98 F | DIASTOLIC BLOOD PRESSURE: 79 MMHG

## 2023-01-17 LAB
ANION GAP SERPL CALC-SCNC: 10 MMOL/L — SIGNIFICANT CHANGE UP (ref 5–17)
BUN SERPL-MCNC: 22 MG/DL — SIGNIFICANT CHANGE UP (ref 7–23)
CALCIUM SERPL-MCNC: 9.3 MG/DL — SIGNIFICANT CHANGE UP (ref 8.4–10.5)
CHLORIDE SERPL-SCNC: 99 MMOL/L — SIGNIFICANT CHANGE UP (ref 96–108)
CO2 SERPL-SCNC: 24 MMOL/L — SIGNIFICANT CHANGE UP (ref 22–31)
CREAT SERPL-MCNC: 0.72 MG/DL — SIGNIFICANT CHANGE UP (ref 0.5–1.3)
EGFR: 78 ML/MIN/1.73M2 — SIGNIFICANT CHANGE UP
GLUCOSE SERPL-MCNC: 119 MG/DL — HIGH (ref 70–99)
HCT VFR BLD CALC: 45.7 % — HIGH (ref 34.5–45)
HGB BLD-MCNC: 14.8 G/DL — SIGNIFICANT CHANGE UP (ref 11.5–15.5)
MCHC RBC-ENTMCNC: 32.1 PG — SIGNIFICANT CHANGE UP (ref 27–34)
MCHC RBC-ENTMCNC: 32.4 GM/DL — SIGNIFICANT CHANGE UP (ref 32–36)
MCV RBC AUTO: 99.1 FL — SIGNIFICANT CHANGE UP (ref 80–100)
NRBC # BLD: 0 /100 WBCS — SIGNIFICANT CHANGE UP (ref 0–0)
PLATELET # BLD AUTO: 247 K/UL — SIGNIFICANT CHANGE UP (ref 150–400)
POTASSIUM SERPL-MCNC: SIGNIFICANT CHANGE UP MMOL/L (ref 3.5–5.3)
POTASSIUM SERPL-SCNC: SIGNIFICANT CHANGE UP MMOL/L (ref 3.5–5.3)
RBC # BLD: 4.61 M/UL — SIGNIFICANT CHANGE UP (ref 3.8–5.2)
RBC # FLD: 12.7 % — SIGNIFICANT CHANGE UP (ref 10.3–14.5)
SARS-COV-2 RNA SPEC QL NAA+PROBE: NEGATIVE — SIGNIFICANT CHANGE UP
SODIUM SERPL-SCNC: 133 MMOL/L — LOW (ref 135–145)
WBC # BLD: 16.75 K/UL — HIGH (ref 3.8–10.5)
WBC # FLD AUTO: 16.75 K/UL — HIGH (ref 3.8–10.5)

## 2023-01-17 PROCEDURE — 72192 CT PELVIS W/O DYE: CPT | Mod: 26,MG

## 2023-01-17 PROCEDURE — 72125 CT NECK SPINE W/O DYE: CPT | Mod: 26,MA

## 2023-01-17 PROCEDURE — 99285 EMERGENCY DEPT VISIT HI MDM: CPT

## 2023-01-17 PROCEDURE — G1004: CPT

## 2023-01-17 PROCEDURE — 70450 CT HEAD/BRAIN W/O DYE: CPT | Mod: 26,MA

## 2023-01-17 PROCEDURE — 73100 X-RAY EXAM OF WRIST: CPT | Mod: 26,RT

## 2023-01-17 PROCEDURE — 73523 X-RAY EXAM HIPS BI 5/> VIEWS: CPT | Mod: 26

## 2023-01-17 RX ORDER — HYDROMORPHONE HYDROCHLORIDE 2 MG/ML
0.5 INJECTION INTRAMUSCULAR; INTRAVENOUS; SUBCUTANEOUS ONCE
Refills: 0 | Status: DISCONTINUED | OUTPATIENT
Start: 2023-01-17 | End: 2023-01-17

## 2023-01-17 RX ORDER — KETOROLAC TROMETHAMINE 30 MG/ML
15 SYRINGE (ML) INJECTION ONCE
Refills: 0 | Status: DISCONTINUED | OUTPATIENT
Start: 2023-01-17 | End: 2023-01-17

## 2023-01-17 RX ADMIN — HYDROMORPHONE HYDROCHLORIDE 0.5 MILLIGRAM(S): 2 INJECTION INTRAMUSCULAR; INTRAVENOUS; SUBCUTANEOUS at 16:00

## 2023-01-17 RX ADMIN — HYDROMORPHONE HYDROCHLORIDE 0.5 MILLIGRAM(S): 2 INJECTION INTRAMUSCULAR; INTRAVENOUS; SUBCUTANEOUS at 15:47

## 2023-01-17 RX ADMIN — Medication 15 MILLIGRAM(S): at 20:36

## 2023-01-17 RX ADMIN — HYDROMORPHONE HYDROCHLORIDE 0.5 MILLIGRAM(S): 2 INJECTION INTRAMUSCULAR; INTRAVENOUS; SUBCUTANEOUS at 15:15

## 2023-01-17 RX ADMIN — HYDROMORPHONE HYDROCHLORIDE 0.5 MILLIGRAM(S): 2 INJECTION INTRAMUSCULAR; INTRAVENOUS; SUBCUTANEOUS at 16:30

## 2023-01-17 NOTE — ED PROVIDER NOTE - OBJECTIVE STATEMENT
89 y/o F with PMHx HTN presents to ED from urgent care with right wrist fracture and head trauma after mechanical slip and fall. Pt states she slipped in her apartment and fell backwards, hitting the right side of her head with FOOSH injury to right wrist. Pt was able to get up afterwards with pain in right hip while walking. Now c/o dull headache. Pt went to urgent care and was found to have a right wrist fracture, was placed in RUE splint, and was referred to ED with for head imaging. Pt took tylenol with minimal improvement. Denies fever, chills, vision changes, nausea, vomiting, numbness, weakness, paresthesias, or other injuries. 89 y/o F with PMHx HTN presents to ED from urgent care with right wrist fracture and head trauma after mechanical slip and fall. Pt states she slipped in her apartment and fell backwards, hitting the right side of her head with FOOSH injury to right wrist. Pt was able to get up afterwards with pain in right hip while walking. Now c/o dull headache. Pt went to urgent care and was found to have a right wrist fracture, was placed in RUE splint, and was referred to ED with for head imaging. Pt took tylenol with minimal improvement. Denies fever, chills, vision changes, nausea, vomiting, numbness, weakness, paresthesias, or other injuries.  no AC or loc.

## 2023-01-17 NOTE — ED PROVIDER NOTE - PHYSICAL EXAMINATION
Vitals reviewed  Gen: well appearing, nad, speaking in full sentences  Skin: wwp, no rash/lesions  HEENT: nc, small contusion and ecchymosis to right parietal scalp, no midline spine tenderness or step off, eomi, mmm, perrla, no hemotympanum  CV: rrr, no audible m/r/g  Resp: symmetrical expansion, ctab, no w/r/r  Abd: nondistended, soft/nt  Ext: no peripheral edema, right wrist in short arm splint, able to move fingers, palpable radial pulse, FROM all other extremities without any joint tenderness, NVI   Neuro: alert/oriented, no focal deficits, steady gait with limp Vitals reviewed  Gen: well appearing elderly F, nad, speaking in full sentences  Skin: wwp, no rash/lesions  HEENT: nc, small contusion and ecchymosis to right parietal scalp, eomi, perrla, no hemotympanum, mmm  Neck/back: no midline spine tenderness or step off  CV: rrr, no audible m/r/g  Resp: symmetrical expansion, ctab, no w/r/r  Abd: nondistended, soft/nt  FROM all other extremities, minimal ttp over R lateral hip without ecchymosis or deformity, NVI   RUE: short arm splint in place, moves all fingers, cap refill < 2 sec, radial pulse palpable  Neuro: alert/oriented, no focal deficits, steady gait with limp Vitals reviewed  Gen: well appearing elderly F, nad, speaking in full sentences  Skin: wwp, no rash/lesions  HEENT: nc, small contusion and ecchymosis to right parietal scalp, eomi, perrla, no hemotympanum, mmm  Neck/back: no midline spine tenderness or step off  CV: rrr, no audible m/r/g  Resp: symmetrical expansion, ctab, no w/r/r  Abd: nondistended, soft/nt  FROM all other extremities, minimal ttp over R lateral hip without ecchymosis or deformity, NVI   RUE: short arm splint in place, moves all fingers, cap refill < 2 sec, radial pulse palpable  Neuro: alert/oriented, no focal deficits, limping w/ assistance

## 2023-01-17 NOTE — ED PROVIDER NOTE - CLINICAL SUMMARY MEDICAL DECISION MAKING FREE TEXT BOX
89 y/o F with PMHx HTN presents to ED from urgent care with right wrist fracture and head trauma after mechanical slip and fall w/ head trauma, no loc.  on exam pt in RUE short arm splint, NVI, ecchymosis/contusion to R parietal scalp, no midline spinal ttp, limping 2/2 R hip pain, mild ttp over R lateral hip w/o deformity and NVI.  will upload xrays from  and obtain CT head/cspine to r/o ICH/fracture. 89 y/o F with PMHx HTN presents to ED from urgent care with right wrist fracture and head trauma after mechanical slip and fall w/ head trauma, no loc.  on exam pt in RUE short arm splint, NVI, ecchymosis/contusion to R parietal scalp, no midline spinal ttp, limping 2/2 R hip pain w/ assistance, mild ttp over R lateral hip w/o deformity and NVI.  will upload xrays from  and obtain CT head/cspine to r/o ICH/fracture.

## 2023-01-17 NOTE — ED ADULT NURSE REASSESSMENT NOTE - NS ED NURSE REASSESS COMMENT FT1
Received report from BASILIO Shultz. Received patient in stretcher. AOX4. Vital signs as noted in flowsheet. Pt complained of R arm pain, addressed with Toradol as ordered. Patient oriented to ED area. Plan of care discussed and verbalized understanding. All needs attended. Fall risk precautions maintained. Purposeful proactive hourly rounding in progress.

## 2023-01-17 NOTE — ED ADULT NURSE REASSESSMENT NOTE - COMFORT CARE
assisted to bathroom/plan of care explained/po fluids offered/wait time explained/warm blanket provided

## 2023-01-17 NOTE — PATIENT PROFILE ADULT - FALL HARM RISK - HARM RISK INTERVENTIONS

## 2023-01-17 NOTE — ED ADULT NURSE NOTE - OBJECTIVE STATEMENT
89 yo F presents to ED s/p mechanical trip and fall with + head injury. Pt states, "I just tripped and fell onto my R arm and hit my head, I went to urgent care I have a fracture on my arm." PT w cast to R arm, scheduled f/u w orthopedist Dr. West tomorrow AM. Full ROM of R fingers, cap refill < 2 seconds, sensation int Sent by  d/t head injury. Denies AC use, LOC, headache, blurred vision, N/V.

## 2023-01-17 NOTE — ED PROVIDER NOTE - NSFOLLOWUPINSTRUCTIONS_ED_ALL_ED_FT
Take tylenol 650mg or motrin 400-800mg as needed every 4-6 hours for pain.   REST- Rest your hurting/injured joint or extremity to decrease pain and swelling for 24-48 hours    ICE- Apply ice to area of pain to decreased inflammation and pain, put towel/barrier between ice and skin. You can keep ice on for 20 minutes at a time 4-8 times daily   COMPRESSION- Wear ace wrap or brace for support to reduce swelling.  Make sure not to wrap too tight, loosen if skin feeling numb/tingling or skin turns blue   ELEVATION- Elevate hurting/injured area 6 or more inches about level of heart to decrease swelling/inflammation.  Use pillow under joint to elevate area    Call to arrange follow up with your orthopedic specialist   You may call our referrals coordinator at [phone] Monday to Friday 11am-7pm for assistance with making an appointment

## 2023-01-17 NOTE — ED PROVIDER NOTE - PROGRESS NOTE DETAILS
ortho reduced displaced R distal radius fx and resplinted. CTH/cspine no acute pathology.  xray/CT pelvis w/ R inferior pubic rami fx.  pt unable to ambulate without assistance 2/2 pain, d/w ortho and granddaughter, pt unsafe for dc home.  will need PT/ZAHIDA as does not have help at home.  will obtain labs and admit to medicine

## 2023-01-18 ENCOUNTER — TRANSCRIPTION ENCOUNTER (OUTPATIENT)
Age: 88
End: 2023-01-18

## 2023-01-18 DIAGNOSIS — S72.001A FRACTURE OF UNSPECIFIED PART OF NECK OF RIGHT FEMUR, INITIAL ENCOUNTER FOR CLOSED FRACTURE: ICD-10-CM

## 2023-01-18 DIAGNOSIS — S62.101A FRACTURE OF UNSPECIFIED CARPAL BONE, RIGHT WRIST, INITIAL ENCOUNTER FOR CLOSED FRACTURE: ICD-10-CM

## 2023-01-18 DIAGNOSIS — I10 ESSENTIAL (PRIMARY) HYPERTENSION: ICD-10-CM
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
## 2023-01-18 DIAGNOSIS — S32.599A OTHER SPECIFIED FRACTURE OF UNSPECIFIED PUBIS, INITIAL ENCOUNTER FOR CLOSED FRACTURE: ICD-10-CM

## 2023-01-18 DIAGNOSIS — Z29.9 ENCOUNTER FOR PROPHYLACTIC MEASURES, UNSPECIFIED: ICD-10-CM

## 2023-01-18 DIAGNOSIS — D72.829 ELEVATED WHITE BLOOD CELL COUNT, UNSPECIFIED: ICD-10-CM

## 2023-01-18 LAB
ALBUMIN SERPL ELPH-MCNC: 3.7 G/DL — SIGNIFICANT CHANGE UP (ref 3.3–5)
ALP SERPL-CCNC: 67 U/L — SIGNIFICANT CHANGE UP (ref 40–120)
ALT FLD-CCNC: 13 U/L — SIGNIFICANT CHANGE UP (ref 10–45)
ANION GAP SERPL CALC-SCNC: 11 MMOL/L — SIGNIFICANT CHANGE UP (ref 5–17)
AST SERPL-CCNC: 22 U/L — SIGNIFICANT CHANGE UP (ref 10–40)
BASOPHILS # BLD AUTO: 0.02 K/UL — SIGNIFICANT CHANGE UP (ref 0–0.2)
BASOPHILS NFR BLD AUTO: 0.2 % — SIGNIFICANT CHANGE UP (ref 0–2)
BILIRUB SERPL-MCNC: 0.5 MG/DL — SIGNIFICANT CHANGE UP (ref 0.2–1.2)
BUN SERPL-MCNC: 22 MG/DL — SIGNIFICANT CHANGE UP (ref 7–23)
CALCIUM SERPL-MCNC: 9.1 MG/DL — SIGNIFICANT CHANGE UP (ref 8.4–10.5)
CHLORIDE SERPL-SCNC: 98 MMOL/L — SIGNIFICANT CHANGE UP (ref 96–108)
CK SERPL-CCNC: 212 U/L — HIGH (ref 25–170)
CO2 SERPL-SCNC: 27 MMOL/L — SIGNIFICANT CHANGE UP (ref 22–31)
CREAT SERPL-MCNC: 0.79 MG/DL — SIGNIFICANT CHANGE UP (ref 0.5–1.3)
EGFR: 70 ML/MIN/1.73M2 — SIGNIFICANT CHANGE UP
EOSINOPHIL # BLD AUTO: 0.01 K/UL — SIGNIFICANT CHANGE UP (ref 0–0.5)
EOSINOPHIL NFR BLD AUTO: 0.1 % — SIGNIFICANT CHANGE UP (ref 0–6)
GLUCOSE SERPL-MCNC: 112 MG/DL — HIGH (ref 70–99)
HCT VFR BLD CALC: 39.6 % — SIGNIFICANT CHANGE UP (ref 34.5–45)
HGB BLD-MCNC: 12.8 G/DL — SIGNIFICANT CHANGE UP (ref 11.5–15.5)
IMM GRANULOCYTES NFR BLD AUTO: 0.5 % — SIGNIFICANT CHANGE UP (ref 0–0.9)
LYMPHOCYTES # BLD AUTO: 1.23 K/UL — SIGNIFICANT CHANGE UP (ref 1–3.3)
LYMPHOCYTES # BLD AUTO: 15.4 % — SIGNIFICANT CHANGE UP (ref 13–44)
MAGNESIUM SERPL-MCNC: 2 MG/DL — SIGNIFICANT CHANGE UP (ref 1.6–2.6)
MCHC RBC-ENTMCNC: 31.8 PG — SIGNIFICANT CHANGE UP (ref 27–34)
MCHC RBC-ENTMCNC: 32.3 GM/DL — SIGNIFICANT CHANGE UP (ref 32–36)
MCV RBC AUTO: 98.5 FL — SIGNIFICANT CHANGE UP (ref 80–100)
MONOCYTES # BLD AUTO: 1.03 K/UL — HIGH (ref 0–0.9)
MONOCYTES NFR BLD AUTO: 12.9 % — SIGNIFICANT CHANGE UP (ref 2–14)
NEUTROPHILS # BLD AUTO: 5.68 K/UL — SIGNIFICANT CHANGE UP (ref 1.8–7.4)
NEUTROPHILS NFR BLD AUTO: 70.9 % — SIGNIFICANT CHANGE UP (ref 43–77)
NRBC # BLD: 0 /100 WBCS — SIGNIFICANT CHANGE UP (ref 0–0)
PHOSPHATE SERPL-MCNC: 3.8 MG/DL — SIGNIFICANT CHANGE UP (ref 2.5–4.5)
PLATELET # BLD AUTO: 275 K/UL — SIGNIFICANT CHANGE UP (ref 150–400)
POTASSIUM SERPL-MCNC: 4 MMOL/L — SIGNIFICANT CHANGE UP (ref 3.5–5.3)
POTASSIUM SERPL-SCNC: 4 MMOL/L — SIGNIFICANT CHANGE UP (ref 3.5–5.3)
PROT SERPL-MCNC: 7 G/DL — SIGNIFICANT CHANGE UP (ref 6–8.3)
RBC # BLD: 4.02 M/UL — SIGNIFICANT CHANGE UP (ref 3.8–5.2)
RBC # FLD: 12.8 % — SIGNIFICANT CHANGE UP (ref 10.3–14.5)
SARS-COV-2 RNA SPEC QL NAA+PROBE: SIGNIFICANT CHANGE UP
SODIUM SERPL-SCNC: 136 MMOL/L — SIGNIFICANT CHANGE UP (ref 135–145)
WBC # BLD: 8.01 K/UL — SIGNIFICANT CHANGE UP (ref 3.8–10.5)
WBC # FLD AUTO: 8.01 K/UL — SIGNIFICANT CHANGE UP (ref 3.8–10.5)

## 2023-01-18 PROCEDURE — 99223 1ST HOSP IP/OBS HIGH 75: CPT | Mod: GC

## 2023-01-18 RX ORDER — OXYCODONE HYDROCHLORIDE 5 MG/1
5 TABLET ORAL EVERY 6 HOURS
Refills: 0 | Status: DISCONTINUED | OUTPATIENT
Start: 2023-01-18 | End: 2023-01-24

## 2023-01-18 RX ORDER — HYDROMORPHONE HYDROCHLORIDE 2 MG/ML
0.25 INJECTION INTRAMUSCULAR; INTRAVENOUS; SUBCUTANEOUS ONCE
Refills: 0 | Status: DISCONTINUED | OUTPATIENT
Start: 2023-01-18 | End: 2023-01-18

## 2023-01-18 RX ORDER — OXYCODONE HYDROCHLORIDE 5 MG/1
5 TABLET ORAL EVERY 6 HOURS
Refills: 0 | Status: DISCONTINUED | OUTPATIENT
Start: 2023-01-18 | End: 2023-01-18

## 2023-01-18 RX ORDER — LATANOPROST 0.05 MG/ML
1 SOLUTION/ DROPS OPHTHALMIC; TOPICAL
Qty: 0 | Refills: 0 | DISCHARGE

## 2023-01-18 RX ORDER — ACETAMINOPHEN 500 MG
2 TABLET ORAL
Qty: 0 | Refills: 0 | DISCHARGE
Start: 2023-01-18

## 2023-01-18 RX ORDER — LIDOCAINE 4 G/100G
1 CREAM TOPICAL
Qty: 0 | Refills: 0 | DISCHARGE
Start: 2023-01-18

## 2023-01-18 RX ORDER — SENNA PLUS 8.6 MG/1
1 TABLET ORAL DAILY
Refills: 0 | Status: DISCONTINUED | OUTPATIENT
Start: 2023-01-18 | End: 2023-01-23

## 2023-01-18 RX ORDER — AMLODIPINE BESYLATE 2.5 MG/1
5 TABLET ORAL EVERY 24 HOURS
Refills: 0 | Status: DISCONTINUED | OUTPATIENT
Start: 2023-01-18 | End: 2023-01-25

## 2023-01-18 RX ORDER — DESMOPRESSIN ACETATE 0.1 MG/1
1 TABLET ORAL
Qty: 0 | Refills: 0 | DISCHARGE

## 2023-01-18 RX ORDER — ACETAMINOPHEN 500 MG
650 TABLET ORAL EVERY 6 HOURS
Refills: 0 | Status: DISCONTINUED | OUTPATIENT
Start: 2023-01-18 | End: 2023-01-25

## 2023-01-18 RX ORDER — SODIUM CHLORIDE 9 MG/ML
1000 INJECTION, SOLUTION INTRAVENOUS
Refills: 0 | Status: DISCONTINUED | OUTPATIENT
Start: 2023-01-18 | End: 2023-01-25

## 2023-01-18 RX ORDER — MORPHINE SULFATE 50 MG/1
2 CAPSULE, EXTENDED RELEASE ORAL EVERY 6 HOURS
Refills: 0 | Status: DISCONTINUED | OUTPATIENT
Start: 2023-01-18 | End: 2023-01-18

## 2023-01-18 RX ORDER — LIDOCAINE 4 G/100G
1 CREAM TOPICAL DAILY
Refills: 0 | Status: DISCONTINUED | OUTPATIENT
Start: 2023-01-18 | End: 2023-01-25

## 2023-01-18 RX ORDER — ENOXAPARIN SODIUM 100 MG/ML
40 INJECTION SUBCUTANEOUS EVERY 24 HOURS
Refills: 0 | Status: DISCONTINUED | OUTPATIENT
Start: 2023-01-18 | End: 2023-01-25

## 2023-01-18 RX ORDER — POLYETHYLENE GLYCOL 3350 17 G/17G
17 POWDER, FOR SOLUTION ORAL
Refills: 0 | Status: DISCONTINUED | OUTPATIENT
Start: 2023-01-18 | End: 2023-01-25

## 2023-01-18 RX ORDER — IBUPROFEN 200 MG
200 TABLET ORAL EVERY 12 HOURS
Refills: 0 | Status: DISCONTINUED | OUTPATIENT
Start: 2023-01-18 | End: 2023-01-23

## 2023-01-18 RX ADMIN — LIDOCAINE 1 PATCH: 4 CREAM TOPICAL at 10:46

## 2023-01-18 RX ADMIN — OXYCODONE HYDROCHLORIDE 5 MILLIGRAM(S): 5 TABLET ORAL at 22:41

## 2023-01-18 RX ADMIN — Medication 650 MILLIGRAM(S): at 01:38

## 2023-01-18 RX ADMIN — AMLODIPINE BESYLATE 5 MILLIGRAM(S): 2.5 TABLET ORAL at 07:00

## 2023-01-18 RX ADMIN — LIDOCAINE 1 PATCH: 4 CREAM TOPICAL at 22:00

## 2023-01-18 RX ADMIN — OXYCODONE HYDROCHLORIDE 5 MILLIGRAM(S): 5 TABLET ORAL at 21:41

## 2023-01-18 RX ADMIN — Medication 650 MILLIGRAM(S): at 08:00

## 2023-01-18 RX ADMIN — Medication 650 MILLIGRAM(S): at 18:45

## 2023-01-18 RX ADMIN — HYDROMORPHONE HYDROCHLORIDE 0.25 MILLIGRAM(S): 2 INJECTION INTRAMUSCULAR; INTRAVENOUS; SUBCUTANEOUS at 01:25

## 2023-01-18 RX ADMIN — HYDROMORPHONE HYDROCHLORIDE 0.25 MILLIGRAM(S): 2 INJECTION INTRAMUSCULAR; INTRAVENOUS; SUBCUTANEOUS at 02:20

## 2023-01-18 RX ADMIN — Medication 650 MILLIGRAM(S): at 13:16

## 2023-01-18 RX ADMIN — Medication 650 MILLIGRAM(S): at 02:30

## 2023-01-18 RX ADMIN — ENOXAPARIN SODIUM 40 MILLIGRAM(S): 100 INJECTION SUBCUTANEOUS at 07:00

## 2023-01-18 RX ADMIN — LIDOCAINE 1 PATCH: 4 CREAM TOPICAL at 18:01

## 2023-01-18 RX ADMIN — SODIUM CHLORIDE 60 MILLILITER(S): 9 INJECTION, SOLUTION INTRAVENOUS at 01:42

## 2023-01-18 RX ADMIN — Medication 650 MILLIGRAM(S): at 17:45

## 2023-01-18 RX ADMIN — Medication 650 MILLIGRAM(S): at 12:16

## 2023-01-18 RX ADMIN — Medication 200 MILLIGRAM(S): at 17:46

## 2023-01-18 RX ADMIN — SENNA PLUS 1 TABLET(S): 8.6 TABLET ORAL at 15:10

## 2023-01-18 RX ADMIN — Medication 650 MILLIGRAM(S): at 07:00

## 2023-01-18 RX ADMIN — POLYETHYLENE GLYCOL 3350 17 GRAM(S): 17 POWDER, FOR SOLUTION ORAL at 17:44

## 2023-01-18 RX ADMIN — Medication 200 MILLIGRAM(S): at 11:45

## 2023-01-18 RX ADMIN — Medication 200 MILLIGRAM(S): at 10:45

## 2023-01-18 RX ADMIN — Medication 200 MILLIGRAM(S): at 18:45

## 2023-01-18 NOTE — H&P ADULT - PROBLEM SELECTOR PLAN 5
WBC 16.75 on admission, likely reactive in setting of fractures. Patient afebrile, no signs of infection. Monitor off antibiotics for now.   -continue to trend CBC

## 2023-01-18 NOTE — H&P ADULT - PROBLEM SELECTOR PLAN 3
Patient presenting after having a mechanical fall at home; no LOC however reports headstrike.   -CT head negative   -CT C-spine no fracture; cervical spondylosis  -CT pelvis: Nondisplaced fracture of the right inferior pubic ramus.  Xray hip/pelvis: Frontal view of the pelvis demonstrates a nondisplaced fracture right inferior pubic ramus. Narrowing of the bilateral hip joints with periarticular osteophytosis. Degenerative change and curvature of the lower lumbar spine.  -Xray right wrist: Frontal, and lateral views of the right wrist demonstrate cast overlying and impacted intra-articular fracture of the distal radius with improved anatomic alignment. There is fragmentation of the ulnar styloid.  -management of fractures as above   -PT eval Patient presenting after having a mechanical fall at home; no LOC however reports headstrike.   -EKG NSR  -CT head negative   -CT C-spine no fracture; cervical spondylosis  -CT pelvis: Nondisplaced fracture of the right inferior pubic ramus.  Xray hip/pelvis: Frontal view of the pelvis demonstrates a nondisplaced fracture right inferior pubic ramus. Narrowing of the bilateral hip joints with periarticular osteophytosis. Degenerative change and curvature of the lower lumbar spine.  -Xray right wrist: Frontal, and lateral views of the right wrist demonstrate cast overlying and impacted intra-articular fracture of the distal radius with improved anatomic alignment. There is fragmentation of the ulnar styloid.  -management of fractures as above   -PT eval  -f/u CK

## 2023-01-18 NOTE — PHYSICAL THERAPY INITIAL EVALUATION ADULT - GENERAL OBSERVATIONS, REHAB EVAL
Patient received in semi-supine with R forearm/wrist hard cast on, in no acute distress, and agreeable to participate in PT eval. Patient requires CGA with bed mobility, min-A (handheld assist on LUE) for transfers and ambulation. Patient demonstrates antalgic gait pattern due to pain and reduced WBing tolerance on RLE. Patient additionally demonstrates reduced RLE and RUE strength, ROM, WBing tolerance/restrictions, and balance deficits secondary to R wrist fracture and R pubic ramis fracture. Patient deemed high risk of falls based on current gait assessment and need for assistance to perform ADLs. Patient can strongly benefit from skilled PT intervention to minimize risk of falls and maximize pt's functional capacity.

## 2023-01-18 NOTE — H&P ADULT - NSHPSOCIALHISTORY_GEN_ALL_CORE
Patient lives at home with her    Functionally independent with ADLs; ambulates independently without cane or walker   Never smoker, no tobacco use   Glass of wine on occasion   Denies recreational drug use

## 2023-01-18 NOTE — CONSULT NOTE ADULT - SUBJECTIVE AND OBJECTIVE BOX
Orthopaedic Surgery Consult Note    For Surgeon: Thomas     HPI:  Ms Santamaria is a 89F with PMH of HTN, HLD, vertigo who presents after having a fall at home. Patient reports she slipped and fell this morning in her bedroom; was just walking, slipped and fell backwards, hitting her head on furniture and broke her fall with her right hand/wrist. Denies LOC/syncope. Her  helped her up off the floor and they went to urgent care where xrays were done and her arm was put in a sling; however was referred to ED for CT head. Patient reports this has never happened to her before and she is usually functionally independent and ambulates independently. Currently endorses severe 10/10 right wrist pain and mid-back pain. Reports she cannot move her wrist however is able to move her fingers. Otherwise denies headache, blurry vision, dizziness/lightheadedness, chest pain, palpitations, fevers/chills, SOB, abd pain, N/V/D, LE pain or swelling.    In the ED:   Initial vitals: Temp 98.4F, HR 64, /79, RR 18, O2 96% RA   Labs significant for WBC 16.75, Na 133  EKG NSR, no ischemic changes, QTc 440  CT head negative   CT C-spine no fracture; cervical spondylosis  CT pelvis: Nondisplaced fracture of the right inferior pubic ramus.  Xray hip/pelvis: Frontal view of the pelvis demonstrates a nondisplaced fracture right inferior pubic ramus. Narrowing of the bilateral hip joints with periarticular osteophytosis. Degenerative change and curvature of the lower lumbar spine.  Xray right wrist: Frontal, and lateral views of the right wrist demonstrate cast overlying and impacted intra-articular fracture of the distal radius with improved anatomic alignment. There is fragmentation of the ulnar styloid.  Medications: 0.5mg IV dilaudid x2, toradol 15mg IVP x1, percocet PO x1   Consults: ortho        (18 Jan 2023 00:44)    Ortho Addendum  89y old  Female who presents with a chief complaint of wrist pain after a ground level fall.  Pt denies any numbness/tingling, head trauma/LOC. Endorses moderate pain to her wrist. Able to wiggle her fingers. Also endorses moderate to severe R groin pain that makes amb difficult     Allergies    No Known Allergies    Intolerances      PAST MEDICAL & SURGICAL HISTORY:  HTN (hypertension)      HLD (hyperlipidemia)      Vertigo        MEDICATIONS  (STANDING):  acetaminophen     Tablet .. 650 milliGRAM(s) Oral every 6 hours  amLODIPine   Tablet 5 milliGRAM(s) Oral every 24 hours  enoxaparin Injectable 40 milliGRAM(s) SubCutaneous every 24 hours  ibuprofen  Tablet. 200 milliGRAM(s) Oral every 12 hours  lactated ringers. 1000 milliLiter(s) (60 mL/Hr) IV Continuous <Continuous>  lidocaine   4% Patch 1 Patch Transdermal daily  polyethylene glycol 3350 17 Gram(s) Oral two times a day  senna 1 Tablet(s) Oral daily    MEDICATIONS  (PRN):  oxyCODONE    IR 5 milliGRAM(s) Oral every 6 hours PRN Severe Pain (7 - 10)      Vital Signs Last 24 Hrs  T(C): 37 (18 Jan 2023 08:52), Max: 37 (18 Jan 2023 08:52)  T(F): 98.6 (18 Jan 2023 08:52), Max: 98.6 (18 Jan 2023 08:52)  HR: 74 (18 Jan 2023 08:52) (61 - 74)  BP: 106/64 (18 Jan 2023 08:52) (106/64 - 178/72)  BP(mean): 78 (18 Jan 2023 08:52) (78 - 78)  RR: 16 (18 Jan 2023 08:52) (16 - 18)  SpO2: 94% (18 Jan 2023 08:52) (94% - 97%)    Parameters below as of 18 Jan 2023 08:52  Patient On (Oxygen Delivery Method): room air        Physical Exam:  Right upper extremity  Skin intact, +Swelling, +Ecchymosis  Decreased rom wrist 2/2 pain  Pulses intact, brisk cap refill  Sensation intact M/R/U  Motor intact AIN/PIN/U  WF/WE ROM reduced compared to contralateral side  Supination/Pronation ROM reduced compared to contralateral side   Elbow NT full active rom w/o pain    RLE  mild pain with hip flexion, pain with log roll,   silt sural/saph/dpn/spn/tib   2+ DP     Imaging:   Xray Right wrist shows +distal radius fracture  XR pelvis w/ L inferior pubic ramus fx     Procedure: Using aseptic technique injected 10cc 1% lidocaine w/o epi into Right wrist performing hematoma block. Reduction performed, sugartong splint applied. Patient tolerated procedure well, neurovasc intact afterwards.    A/P: 89yFemale with Right distal radius fx  - fracture reduction performed  -platform walker for RUE, WBAT RLE   -Pain control  -ice/elevation  -Post splint xray reviewed, fracture well aligned   -f/u with Dr. Guzman in 3-5 days to discuss definitive treatment   -given pts living situation at home, and difficulty ambulating in ED, would rec med admission for PT, pain control, and possible rehab placement  -Case including imaging and plan reviewed and discussed with Dr. Thomas Nunez, PGY-2  Orthopedic Surgery  Ortho Pager [phone]

## 2023-01-18 NOTE — PHYSICAL THERAPY INITIAL EVALUATION ADULT - PERTINENT HX OF CURRENT PROBLEM, REHAB EVAL
Patient 89 y/o Female who presents to ED after a slip and fall at home (hitting her head on furniture and severe R arm/wrist pain after attempt to stop the fall with outstretched arm). Denies LOC/syncope. Her  helped her up off the floor and they went to urgent care where x-rays were done and her arm was put in a sling. However, patient was referred to ED for CT head. Patient reports this has never happened to her before and she is usually independent and has no history of falls. Currently endorses severe 10/10 right wrist pain and mid-back pain. Reports she cannot move her wrist however is able to move her fingers. Otherwise denies headache, blurry vision, dizziness/lightheadedness, chest pain, palpitations, fevers/chills, SOB, abd pain, N/V/D, LE pain or swelling. Patient has PMHx of: HTN, HLD, vertigo.    CT Pelvis: revealed non-displaced acute fracture of right inferior pubic ramis.  CT Head: (-) acute fracture or findings.  CT C-spine: (-) acute injury/trauma/malalignment; C4-C5 spondylosis.   X-ray of Right wrist: intra-articular fracture of distal radius, fragmentation of ulnar styloid.

## 2023-01-18 NOTE — DISCHARGE NOTE PROVIDER - NSDCFUADDAPPT_GEN_ALL_CORE_FT
Please follow up with your out patient primary care provider  (1) Please follow up with your out patient primary care provider.    Please bring your Insurance card, Photo ID and Discharge paperwork to the following appointment:    (2) Please follow up with your Orthopaedic Surgery Provider, Dr. Tanner Fairchild at [address] [place] [address], [address], [place] [address] on 01/30/2023 at 12:45pm.    Appointment was scheduled by Ms. BRENNEN Cuenca, Referral Coordinator.

## 2023-01-18 NOTE — DISCHARGE NOTE PROVIDER - PROVIDER TOKENS
PROVIDER:[TOKEN:[[id number]],FOLLOWUP:[1 week]] PROVIDER:[TOKEN:[[id number]],SCHEDULEDAPPT:[01/30/2023],SCHEDULEDAPPTTIME:[12:45 PM]] PROVIDER:[TOKEN:[[id number]],SCHEDULEDAPPT:[01/30/2023],SCHEDULEDAPPTTIME:[12:45 PM]],PROVIDER:[TOKEN:[[id number]],SCHEDULEDAPPT:[01/31/2023],SCHEDULEDAPPTTIME:[10:00 AM],ESTABLISHEDPATIENT:[T]]

## 2023-01-18 NOTE — PHYSICAL THERAPY INITIAL EVALUATION ADULT - DIAGNOSIS, PT EVAL
4G: impaired joint mobility, motor function, muscle performance, and range of motion associated with fracture; 5A: Primary prevention/risk reduction for loss of balance and falling

## 2023-01-18 NOTE — DISCHARGE NOTE PROVIDER - HOSPITAL COURSE
#Discharge: do not delete    JENIFER KIMBALL is a 89y Female with a past medical history of HTN, HLD, vertigo presents after a fall at home. Pt claims to have slipped on the rug and fell hitting her head and landing on her out stretched hand. She immediately had severe pain and presented to the ED after having xrays of the wrist done, for further workup. Per imaging pt had fracture of distal radius as well as right inferior pubic ramus. Pt was seens     Presented with _____, found to have _____    Problem List/Main Diagnoses (system-based):     1)    2)    3)    Patient was discharged to: ()    New medications:   Changes to old medications:  Medications that were stopped:    Items to follow up as outpatient:    Physical exam at the time of discharge:        #Discharge: do not delete    JENIFER KIMBALL is a 89y Female with a past medical history of HTN, HLD, vertigo presents after a fall at home. Pt claims to have slipped on the rug and fell hitting her head and landing on her out stretched hand. She immediately had severe pain and presented to the ED after having xrays of the wrist done, for further workup. Per imaging pt had fracture of distal radius as well as right inferior pubic ramus. Pt was seen by orthopedic team and casts were placed and the fractures were stabilized. Physical therapy worked with the patient and recommended continued care at ...............  For pain management, pt was started on Standing Tylenol, lidocaine patches regimen with oxycodone for severe pain. Pt was educated to apply ice as well.       Problem List/Main Diagnoses (system-based):     1) Acute facture post FOOSH injury  Pt had FOOSH injury showing fracture of distal radius. Casts were placed and pt to be having further care via PT.     2) HTN  Pt with hx of HTN, on home Amlodipine 5mg      Patient was discharged to: ()    New medications: Oxycodone,   Changes to old medications: None  Medications that were stopped: None    Items to follow up as outpatient: PT f/u, PCP f/u    Physical exam at the time of discharge:       VITAL SIGNS:  T(C): 37 (01-18-23 @ 08:52), Max: 37 (01-18-23 @ 08:52)  T(F): 98.6 (01-18-23 @ 08:52), Max: 98.6 (01-18-23 @ 08:52)  HR: 74 (01-18-23 @ 08:52) (61 - 74)  BP: 106/64 (01-18-23 @ 08:52) (106/64 - 178/72)  BP(mean): 78 (01-18-23 @ 08:52) (78 - 78)  RR: 16 (01-18-23 @ 08:52) (16 - 18)  SpO2: 94% (01-18-23 @ 08:52) (94% - 97%)  Wt(kg): --    PHYSICAL EXAM:    GENERAL: NAD, uncomfortable appearing in pain, unable to sit up in bed from pain  HEAD:  Atraumatic, normocephalic  EYES: EOMI, PERRLA, conjunctiva and sclera clear  ENT: Moist mucous membranes  NECK: Supple, no JVD  HEART: Regular rate and rhythm, no murmurs, rubs, or gallops  LUNGS: Unlabored respirations. Clear to auscultation anteriorly, no crackles, wheezing, or rhonchi  ABDOMEN: Soft, nontender, nondistended, +BS  EXTREMITIES: 2+ peripheral pulses bilaterally. Right arm immobilized in splint and wrapped in ace from hand to elbow; digits exposed and mobile. No anterior hip tenderness.  NERVOUS SYSTEM:  A&Ox3, no focal deficits   #Discharge: do not delete    JENIFER KIMBALL is a 89y Female with a past medical history of HTN, HLD, vertigo presents after a fall at home. Pt claims to have slipped on the rug and fell hitting her head and landing on her out stretched hand. She immediately had severe pain and presented to the ED after having xrays of the wrist done, for further workup. Per imaging pt had fracture of distal radius as well as right inferior pubic ramus. Pt was seen by orthopedic team and casts were placed and the fractures were stabilized. Physical therapy worked with the patient and recommended continued care at home.  For pain management, pt was started on Standing Tylenol, lidocaine patches regimen with oxycodone for severe pain. Pt was educated to apply ice as well.       Problem List/Main Diagnoses (system-based):     1) Acute facture post FOOSH injury  Pt had FOOSH injury showing fracture of distal radius. Casts were placed and pt to be having further care via PT.     2) HTN  Pt with hx of HTN, on home Amlodipine 5mg      Patient was discharged to: (home)    New medications: Oxycodone,   Changes to old medications: None  Medications that were stopped: None    Items to follow up as outpatient: PT f/u, PCP f/u    Physical exam at the time of discharge:       VITAL SIGNS:  T(C): 37 (01-18-23 @ 08:52), Max: 37 (01-18-23 @ 08:52)  T(F): 98.6 (01-18-23 @ 08:52), Max: 98.6 (01-18-23 @ 08:52)  HR: 74 (01-18-23 @ 08:52) (61 - 74)  BP: 106/64 (01-18-23 @ 08:52) (106/64 - 178/72)  BP(mean): 78 (01-18-23 @ 08:52) (78 - 78)  RR: 16 (01-18-23 @ 08:52) (16 - 18)  SpO2: 94% (01-18-23 @ 08:52) (94% - 97%)  Wt(kg): --    PHYSICAL EXAM:    GENERAL: NAD, uncomfortable appearing in pain, unable to sit up in bed from pain  HEAD:  Atraumatic, normocephalic  EYES: EOMI, PERRLA, conjunctiva and sclera clear  ENT: Moist mucous membranes  NECK: Supple, no JVD  HEART: Regular rate and rhythm, no murmurs, rubs, or gallops  LUNGS: Unlabored respirations. Clear to auscultation anteriorly, no crackles, wheezing, or rhonchi  ABDOMEN: Soft, nontender, nondistended, +BS  EXTREMITIES: 2+ peripheral pulses bilaterally. Right arm immobilized in splint and wrapped in ace from hand to elbow; digits exposed and mobile. No anterior hip tenderness.  NERVOUS SYSTEM:  A&Ox3, no focal deficits   #Discharge: do not delete    JENIFER KIMBALL is a 89y Female with a past medical history of HTN, HLD, vertigo presents after a fall at home. Pt claims to have slipped on the rug and fell hitting her head and landing on her out stretched hand. She immediately had severe pain and presented to the ED after having xrays of the wrist done, for further workup. Per imaging pt had fracture of distal radius as well as right inferior pubic ramus. Pt was seen by orthopedic team and casts were placed and the fractures were stabilized. Physical therapy worked with the patient and recommended continued care at home.  For pain management, pt was started on Standing Tylenol, lidocaine patches regimen with oxycodone for severe pain. Pt was educated to apply ice as well.       Problem List/Main Diagnoses (system-based):     1) Acute facture post FOOSH injury  Pt had FOOSH injury showing fracture of distal radius. Per ortho no surgical intervention at this time. Casts were placed and pt to be having further care via PT.     2) HTN  Pt with hx of HTN, on home Amlodipine 5mg      Patient was discharged to: (home)    New medications: Oxycodone,   Changes to old medications: None  Medications that were stopped: None    Items to follow up as outpatient: PT f/u, PCP f/u    Physical exam at the time of discharge:       VITAL SIGNS:  T(C): 37 (01-18-23 @ 08:52), Max: 37 (01-18-23 @ 08:52)  T(F): 98.6 (01-18-23 @ 08:52), Max: 98.6 (01-18-23 @ 08:52)  HR: 74 (01-18-23 @ 08:52) (61 - 74)  BP: 106/64 (01-18-23 @ 08:52) (106/64 - 178/72)  BP(mean): 78 (01-18-23 @ 08:52) (78 - 78)  RR: 16 (01-18-23 @ 08:52) (16 - 18)  SpO2: 94% (01-18-23 @ 08:52) (94% - 97%)  Wt(kg): --    PHYSICAL EXAM:    GENERAL: NAD, uncomfortable appearing in pain, unable to sit up in bed from pain  HEAD:  Atraumatic, normocephalic  EYES: EOMI, PERRLA, conjunctiva and sclera clear  ENT: Moist mucous membranes  NECK: Supple, no JVD  HEART: Regular rate and rhythm, no murmurs, rubs, or gallops  LUNGS: Unlabored respirations. Clear to auscultation anteriorly, no crackles, wheezing, or rhonchi  ABDOMEN: Soft, nontender, nondistended, +BS  EXTREMITIES: 2+ peripheral pulses bilaterally. Right arm immobilized in splint and wrapped in ace from hand to elbow; digits exposed and mobile. No anterior hip tenderness.  NERVOUS SYSTEM:  A&Ox3, no focal deficits   #Discharge: do not delete    JENIFER KIMBALL is a 89y Female with a past medical history of HTN, HLD, vertigo presents after a fall at home. Pt claims to have slipped on the rug and fell hitting her head and landing on her out stretched hand. She immediately had severe pain and presented to the ED after having xrays of the wrist done, for further workup. Per imaging pt had fracture of distal radius as well as right inferior pubic ramus. Pt was seen by orthopedic team and casts were placed and the fractures were stabilized. Physical therapy worked with the patient and recommended continued care at home.  For pain management, pt was started on Standing Tylenol, lidocaine patches regimen with oxycodone for severe pain. Pt was educated to apply ice as well.       Problem List/Main Diagnoses (system-based):     1) Acute facture post FOOSH injury  Pt had FOOSH injury showing fracture of distal radius. Per ortho no surgical intervention at this time. Casts were placed and pt to be having further care via PT. C/w tylenol for pain control.     2) HTN  Pt with hx of HTN, on home Amlodipine 5mg      Patient was discharged to: (home)    New medications: None  Changes to old medications: None  Medications that were stopped: None    Items to follow up as outpatient: PT f/u, PCP f/u    Physical exam at the time of discharge:       VITAL SIGNS:  T(C): 37 (01-18-23 @ 08:52), Max: 37 (01-18-23 @ 08:52)  T(F): 98.6 (01-18-23 @ 08:52), Max: 98.6 (01-18-23 @ 08:52)  HR: 74 (01-18-23 @ 08:52) (61 - 74)  BP: 106/64 (01-18-23 @ 08:52) (106/64 - 178/72)  BP(mean): 78 (01-18-23 @ 08:52) (78 - 78)  RR: 16 (01-18-23 @ 08:52) (16 - 18)  SpO2: 94% (01-18-23 @ 08:52) (94% - 97%)  Wt(kg): --    PHYSICAL EXAM:    GENERAL: NAD, uncomfortable appearing in pain, unable to sit up in bed from pain  HEAD:  Atraumatic, normocephalic  EYES: EOMI, PERRLA, conjunctiva and sclera clear  ENT: Moist mucous membranes  NECK: Supple, no JVD  HEART: Regular rate and rhythm, no murmurs, rubs, or gallops  LUNGS: Unlabored respirations. Clear to auscultation anteriorly, no crackles, wheezing, or rhonchi  ABDOMEN: Soft, nontender, nondistended, +BS  EXTREMITIES: 2+ peripheral pulses bilaterally. Right arm immobilized in splint and wrapped in ace from hand to elbow; digits exposed and mobile. No anterior hip tenderness.  NERVOUS SYSTEM:  A&Ox3, no focal deficits   #Discharge: do not delete    JENIFER KIMBALL is a 89y Female with a past medical history of HTN, HLD, vertigo presents after a fall at home. Pt claims to have slipped on the rug and fell hitting her head and landing on her out stretched hand. She immediately had severe pain and presented to the ED after having xrays of the wrist done, for further workup. Per imaging pt had fracture of distal radius as well as right inferior pubic ramus. Pt was seen by orthopedic team and casts were placed and the fractures were stabilized. Physical therapy worked with the patient and recommended continued care at home.  For pain management, pt was started on Standing Tylenol, lidocaine patches regimen with oxycodone for severe pain. Pt was educated to apply ice as well.       Problem List/Main Diagnoses (system-based):     1) Acute facture post FOOSH injury  Pt had FOOSH injury showing fracture of distal radius. Per ortho no surgical intervention at this time. Casts were placed and pt to be having further care via PT. C/w tylenol for pain control.   - f/u outpatient with orthopedics Dr. Guzman within 1.5 weeks of d/c. Information provided to patient.     2) HTN  Pt with hx of HTN, on home Amlodipine 5mg  - c/w home amlodipine 5mg.       Patient was discharged to: (home)    New medications: None  Changes to old medications: None  Medications that were stopped: None    Items to follow up as outpatient: PT f/u, PCP f/u      PHYSICAL EXAM:    GENERAL: NAD, uncomfortable appearing in pain, unable to sit up in bed from pain  HEAD:  Atraumatic, normocephalic  EYES: EOMI, PERRLA, conjunctiva and sclera clear  ENT: Moist mucous membranes  NECK: Supple, no JVD  HEART: Regular rate and rhythm, no murmurs, rubs, or gallops  LUNGS: Unlabored respirations. Clear to auscultation anteriorly, no crackles, wheezing, or rhonchi  ABDOMEN: Soft, nontender, nondistended, +BS  EXTREMITIES: 2+ peripheral pulses bilaterally. Right arm immobilized in splint and wrapped in ace from hand to elbow; digits exposed and mobile. No anterior hip tenderness.  NERVOUS SYSTEM:  A&Ox3, no focal deficits    New Medications: None  Previous Medications Held: None.

## 2023-01-18 NOTE — PHYSICAL THERAPY INITIAL EVALUATION ADULT - PLANNED THERAPY INTERVENTIONS, PT EVAL
balance training/gait training/manual therapy techniques/neuromuscular re-education/ROM/strengthening/stretching/transfer training

## 2023-01-18 NOTE — PROGRESS NOTE ADULT - SUBJECTIVE AND OBJECTIVE BOX
----- INTERVAL HPI/OVERNIGHT EVENTS -----     Patient was seen and examined at bedside. As per nurse and patient, no o/n events, patient resting comfortably. No complaints at this time. Patient denies: fever, chills, dizziness, weakness, HA, Changes in vision, CP, palpitations, SOB, cough, N/V/D/C, dysuria, changes in bowel movements, LE edema. ROS otherwise negative.      Subjective: Patient is a 89y old  Female who presents with a chief complaint of fall (18 Jan 2023 11:35)      ----- VITAL SIGNS -----  T(F): 98.1 (01-18-23 @ 16:33)  HR: 97 (01-18-23 @ 16:33)  BP: 150/- (01-18-23 @ 16:33)  RR: 18 (01-18-23 @ 16:33)  SpO2: 97% (01-18-23 @ 16:33)  Wt(kg): --      01-17-23 @ 07:01  -  01-18-23 @ 07:00  --------------------------------------------------------  IN: 0 mL / OUT: 350 mL / NET: -350 mL        ----- Physical Exam -----     GENERAL: NAD, uncomfortable appearing in pain, unable to sit up in bed from pain  HEAD:  Atraumatic, normocephalic  EYES: EOMI, PERRLA, conjunctiva and sclera clear  ENT: Moist mucous membranes  NECK: Supple, no JVD  HEART: Regular rate and rhythm, no murmurs, rubs, or gallops  LUNGS: Unlabored respirations. Clear to auscultation anteriorly (pt refusing to sit up), no crackles, wheezing, or rhonchi  ABDOMEN: Soft, nontender, nondistended, +BS  EXTREMITIES: 2+ peripheral pulses bilaterally. Right arm immobilized in splint and wrapped in ace from hand to elbow; digits exposed and mobile. No anterior hip tenderness.  NERVOUS SYSTEM:  A&Ox3, no focal deficits    MEDICATIONS  (STANDING):  acetaminophen     Tablet .. 650 milliGRAM(s) Oral every 6 hours  amLODIPine   Tablet 5 milliGRAM(s) Oral every 24 hours  enoxaparin Injectable 40 milliGRAM(s) SubCutaneous every 24 hours  ibuprofen  Tablet. 200 milliGRAM(s) Oral every 12 hours  lactated ringers. 1000 milliLiter(s) (60 mL/Hr) IV Continuous <Continuous>  lidocaine   4% Patch 1 Patch Transdermal daily  polyethylene glycol 3350 17 Gram(s) Oral two times a day  senna 1 Tablet(s) Oral daily    MEDICATIONS  (PRN):  oxyCODONE    IR 5 milliGRAM(s) Oral every 6 hours PRN Severe Pain (7 - 10)      Allergies    No Known Allergies    Intolerances        ----- LABS -----                         12.8   8.01  )-----------( 275      ( 18 Jan 2023 05:30 )             39.6     01-18    136  |  98  |  22  ----------------------------<  112<H>  4.0   |  27  |  0.79    Ca    9.1      18 Jan 2023 05:30  Phos  3.8     01-18  Mg     2.0     01-18    TPro  7.0  /  Alb  3.7  /  TBili  0.5  /  DBili  x   /  AST  22  /  ALT  13  /  AlkPhos  67  01-18            ----- RADIOLOGY & ADDITIONAL TESTS -----     Reviewed

## 2023-01-18 NOTE — PHYSICAL THERAPY INITIAL EVALUATION ADULT - MANUAL MUSCLE TESTING RESULTS, REHAB EVAL
functional mobility observed against gravity as: LUE 4/5; LLE 3+/5; RUE 3+/5, except for wrist flexion/extension/deviation and ; RLE 2/5 with pain./grossly assessed due to

## 2023-01-18 NOTE — H&P ADULT - PROBLEM SELECTOR PLAN 2
Patient presenting after mechanical fall at home. CT pelvis showing nondisplaced fracture of the right inferior pubic ramus.  -Xray hip/pelvis: Frontal view of the pelvis demonstrates a nondisplaced fracture right inferior pubic ramus. Narrowing of the bilateral hip joints with periarticular osteophytosis.   -ortho consulted in ED; no surgical intervention at this time; will need PT eval and rolling walker   -f/u further ortho recs   -PT eval

## 2023-01-18 NOTE — H&P ADULT - ASSESSMENT
Ms Santamaria is a 89F with PMH of HTN, HLD, vertigo who presents after a mechanical fall at home, found to have right hip fracture and right distal radius fracture, admitted for further management.

## 2023-01-18 NOTE — H&P ADULT - ATTENDING COMMENTS
#Fall: described as mechanical fall, no LOC. CTH cspine wnl. No FND on exam. +Right distal radius fx, +R pubic ramus fx. No surgical intervention, c/w pain control, f/up CMP, CK, trend leukocytosis. PT/SW consults. Fall precautions    #R distal radius fx: No surgical intervention per ortho. c/w pain control. PT/OT    #Pubic ramus fx: No FND, sensation intact, no Surgical intervention. c/w pain control.

## 2023-01-18 NOTE — DISCHARGE NOTE PROVIDER - CARE PROVIDERS DIRECT ADDRESSES
,DirectAddress_Unknown ,[email or web address],DirectAddress_Unknown

## 2023-01-18 NOTE — DISCHARGE NOTE PROVIDER - NSDCMRMEDTOKEN_GEN_ALL_CORE_FT
amLODIPine 5 mg oral tablet: 1 tab(s) orally once a day  PLATFORM WALKER:    acetaminophen 325 mg oral tablet: 2 tab(s) orally every 6 hours  amLODIPine 5 mg oral tablet: 1 tab(s) orally once a day  lidocaine 4% topical film: Apply topically to affected area once a day  PLATFORM WALKER:    acetaminophen 325 mg oral tablet: 2 tab(s) orally every 6 hours  amLODIPine 5 mg oral tablet: 1 tab(s) orally once a day  cane: buccal once a day (at bedtime)   lidocaine 4% topical film: Apply topically to affected area once a day  oxyCODONE 5 mg oral tablet: 1 tab(s) orally every 6 hours, As needed, Severe Pain (7 - 10)  PLATFORM WALKER:   polyethylene glycol 3350 oral powder for reconstitution: 17 gram(s) orally 2 times a day  senna leaf extract oral tablet: 2 tab(s) orally once a day (at bedtime)   acetaminophen 325 mg oral tablet: 2 tab(s) orally every 6 hours  amLODIPine 5 mg oral tablet: 1 tab(s) orally once a day  cane: buccal once a day (at bedtime)   lidocaine 4% topical film: Apply topically to affected area once a day  oxyCODONE 5 mg oral tablet: 1 tab(s) orally every 6 hours, As needed, Severe Pain (7 - 10)  oxyCODONE 5 mg oral tablet: 1 tab(s) orally every 6 hours, As Needed -for severe pain MDD:20mg   PLATFORM WALKER:   polyethylene glycol 3350 oral powder for reconstitution: 17 gram(s) orally 2 times a day  senna leaf extract oral tablet: 2 tab(s) orally once a day (at bedtime)

## 2023-01-18 NOTE — H&P ADULT - HISTORY OF PRESENT ILLNESS
Ms Santamaria is a 89F with PMH of HTN, HLD, vertigo who presents after having a fall at home. Patient reports she slipped and fell this morning in her bedroom; was just walking, slipped and fell backwards, hitting her head on furniture and broke her fall with her right hand/wrist. Denies LOC/syncope. Her  helped her up off the floor and they went to urgent care where xrays were done and her arm was put in a sling; however was referred to ED for CT head. Patient reports this has never happened to her before and she is usually functionally independent and ambulates independently. Currently endorses severe 10/10 right wrist pain and mid-back pain. Reports she cannot move her wrist however is able to move her fingers. Otherwise denies headache, blurry vision, dizziness/lightheadedness, chest pain, palpitations, fevers/chills, SOB, abd pain, N/V/D, LE pain or swelling.    In the ED:   Initial vitals: Temp 98.4F, HR 64, /79, RR 18, O2 96% RA   Labs significant for WBC 16.75, Na 133  EKG NSR, no ischemic changes, QTc 440  CT head negative   CT C-spine no fracture; cervical spondylosis  CT pelvis: Nondisplaced fracture of the right inferior pubic ramus.  Xray hip/pelvis: Frontal view of the pelvis demonstrates a nondisplaced fracture right inferior pubic ramus. Narrowing of the bilateral hip joints with periarticular osteophytosis. Degenerative change and curvature of the lower lumbar spine.  Xray right wrist: Frontal, and lateral views of the right wrist demonstrate cast overlying and impacted intra-articular fracture of the distal radius with improved anatomic alignment. There is fragmentation of the ulnar styloid.  Medications: 0.5mg IV dilaudid x2, toradol 15mg IVP x1, percocet PO x1   Consults: ortho

## 2023-01-18 NOTE — DISCHARGE NOTE PROVIDER - NSDCCPCAREPLAN_GEN_ALL_CORE_FT
PRINCIPAL DISCHARGE DIAGNOSIS  Diagnosis: Distal radius fracture, right  Assessment and Plan of Treatment: You presented after a fall on your hand. Because of this impact, some of the bones in your hand were broken. You were seen by the orthopedic team and a cast was placed on your hand.   At the rehab center, you will be working with the physcical therapiest nav to improve the functionality of your wrist.      SECONDARY DISCHARGE DIAGNOSES  Diagnosis: HTN (hypertension)  Assessment and Plan of Treatment: You have high blood , please continue taking your home medication of Amlodipine and continue monitoring your blood pressure either at home or via visitng your primary care physician to access if additional blood pressure medication may be required.     PRINCIPAL DISCHARGE DIAGNOSIS  Diagnosis: Distal radius fracture, right  Assessment and Plan of Treatment: You presented after a fall on your hand. Because of this impact, some of the bones in your hand were broken. You will be discharged home. Please follow up outpatient with Dr. Guzman within 1.5 weeks. We have called the office to schedule an appointment for you; if you do not hear from them within a day of discharge you should call the office to schedule an appointment.      SECONDARY DISCHARGE DIAGNOSES  Diagnosis: Inferior pubic ramus fracture  Assessment and Plan of Treatment: You presented after a fall on your hand. Because of the fall, you had some parts of your pelvis broken. You were seen by the orthopedic team and a cast was placed.  We have called the office to schedule an appointment for you; if you do not hear from them within a day of discharge you should call the office to schedule an appointment.    Diagnosis: HTN (hypertension)  Assessment and Plan of Treatment: You have high blood , please continue taking your home medication of Amlodipine and continue monitoring your blood pressure either at home or via visitng your primary care physician to access if additional blood pressure medication may be required.     PRINCIPAL DISCHARGE DIAGNOSIS  Diagnosis: Distal radius fracture, right  Assessment and Plan of Treatment: You presented after a fall on your hand. Because of this impact, some of the bones in your hand were broken. You will be discharged home. Please follow up outpatient with orthopedics . We have called the office to schedule an appointment for you with Dr. Fairchild on 01/30.  You also have an appointment with Dr. Paulino your PCP Tuesday January 31st at 10AM.      SECONDARY DISCHARGE DIAGNOSES  Diagnosis: Inferior pubic ramus fracture  Assessment and Plan of Treatment: You presented after a fall on your hand. Because of the fall, you had some parts of your pelvis broken. You were seen by the orthopedic team and a cast was placed.  We have called the office to schedule an appointment for you with Dr. Fairchild on 01/30.  You also have an appointment with Dr. Paulino your PCP Tuesday January 31st at 10AM. to schedule an appointment.    Diagnosis: HTN (hypertension)  Assessment and Plan of Treatment: You have high blood , please continue taking your home medication of Amlodipine and continue monitoring your blood pressure either at home or via visitng your primary care physician to access if additional blood pressure medication may be required.

## 2023-01-18 NOTE — PHYSICAL THERAPY INITIAL EVALUATION ADULT - NSPTDISCHREC_GEN_A_CORE
[facility] vs Home PT with [facility] (needed to assist with ADLs, due to multiple fx's/high fall risk, and primary caretaker for 89 y/o ) Subacute Rehab (should patient discharge home, patient would require Home PT and [facility] for assistance with ADLs)

## 2023-01-18 NOTE — H&P ADULT - PROBLEM SELECTOR PLAN 1
Patient presenting after a FOOSH injury at home.   -Xray right wrist: Frontal, and lateral views of the right wrist demonstrate cast overlying and impacted intra-articular fracture of the distal radius with improved anatomic alignment. There is fragmentation of the ulnar styloid.  -s/p reduction of right wrist by ortho in ED; per ortho no surgical intervention at this time and admit to medicine for PT eval as pt will need rolling walker   -NWB to RUE   -c/w splint and immobilization   -pain control with standing tylenol,   -PT eval   -f/u further ortho recs Patient presenting after a FOOSH injury at home.   -Xray right wrist: Frontal, and lateral views of the right wrist demonstrate cast overlying and impacted intra-articular fracture of the distal radius with improved anatomic alignment. There is fragmentation of the ulnar styloid.  -s/p reduction of right wrist by ortho in ED; per ortho no surgical intervention at this time and admit to medicine for PT eval as pt will need rolling walker   -NWB to RUE   -c/w splint and immobilization   -pain control with standing tylenol, oxycodone PO 5mg q6 prn moderate pain, and morphine 2mg IV q6 prn severe pain  -PT eval   -f/u further ortho recs

## 2023-01-18 NOTE — DISCHARGE NOTE PROVIDER - CARE PROVIDER_API CALL
Girish Guzman)  Orthopaedic Surgery  [address] [place] [address]  Phone: [phone]  Fax: [phone]  Follow Up Time: 1 week   Tanner Fairchild  ORTHOPAEDIC SURGERY  [address] [place] [address]  Phone: [phone]  Fax: [phone]  Scheduled Appointment: 01/30/2023 12:45 PM   Tanner Fairchild  ORTHOPAEDIC SURGERY  [address] [facility] [address] [place] [address]  Phone: [phone]  Fax: [phone]  Scheduled Appointment: 01/30/2023 12:45 PM    JULIANE WILKES  Internal Medicine  [address]  [place] [address]  Phone: [phone]  Fax: [phone]  Established Patient  Scheduled Appointment: 01/31/2023 10:00 AM

## 2023-01-18 NOTE — PHYSICAL THERAPY INITIAL EVALUATION ADULT - GAIT DEVIATIONS NOTED, PT EVAL
step-to, antalgic gait pattern; reduced WBing tolerance on RLE/decreased zay/decreased step length/decreased weight-shifting ability

## 2023-01-18 NOTE — H&P ADULT - ATTENDING SUPERVISION STATEMENT
I have placed a second outreach call to patient today, but he is not available.  I left another  requesting a return call.  I have scheduled a third outreach call for 1/4/23.   Resident

## 2023-01-18 NOTE — H&P ADULT - PROBLEM SELECTOR PLAN 4
History of HTN on home amlodipine 5mg qd   -monitor BPs; hypertensive on admission however likely 2/2 pain from fractures   -c/w home amlodipine   -pain control

## 2023-01-18 NOTE — PHYSICAL THERAPY INITIAL EVALUATION ADULT - NSWHEELCHAIREQUIP_GEN_A_PT
(should patient discharge home, patient can strongly benefit from a transport chair to aid with attending OP follow up appointments)/transport/elevating leg rests

## 2023-01-18 NOTE — H&P ADULT - NSHPLABSRESULTS_GEN_ALL_CORE
.  LABS:                         14.8   16.75 )-----------( 247      ( 17 Jan 2023 18:55 )             45.7     01-17    133<L>  |  99  |  22  ----------------------------<  119<H>  SEE NOTE   |  24  |  0.72    Ca    9.3      17 Jan 2023 18:55                RADIOLOGY, EKG & ADDITIONAL TESTS: Reviewed.

## 2023-01-18 NOTE — PHYSICAL THERAPY INITIAL EVALUATION ADULT - ACTIVE RANGE OF MOTION EXAMINATION, REHAB EVAL
RLE limited in hip mobility in all directions secondary to pain; R wrist/hand limited in ROM due to hardcast present immobilizing joints./Left UE Active ROM was WFL (within functional limits)/Left LE Active ROM was WFL (within functional limits)/deficits as listed below

## 2023-01-18 NOTE — PHYSICAL THERAPY INITIAL EVALUATION ADULT - ADDITIONAL COMMENTS
Patient reports she previously lived with  in an elevator access [facility] with no steps to enter. Patient states her  is 89 y/o with medical conditions that require her full time care for him. Patient reports she did not have any previous HHA or family assistance for IADLs/ADLs as she was very independent with her mobility. Patient denies owning any DME at home, stating she did not need it prior to her current injuries.

## 2023-01-18 NOTE — H&P ADULT - NSHPPHYSICALEXAM_GEN_ALL_CORE
VITALS:   T(C): 36.6 (01-17-23 @ 23:05), Max: 36.9 (01-17-23 @ 13:12)  HR: 61 (01-17-23 @ 23:05) (61 - 70)  BP: 178/72 (01-17-23 @ 23:05) (140/66 - 178/72)  RR: 18 (01-17-23 @ 23:05) (18 - 18)  SpO2: 96% (01-17-23 @ 23:05) (96% - 97%)    GENERAL: NAD, uncomfortable appearing in pain, unable to sit up in bed from pain  HEAD:  Atraumatic, normocephalic  EYES: EOMI, PERRLA, conjunctiva and sclera clear  ENT: Moist mucous membranes  NECK: Supple, no JVD  HEART: Regular rate and rhythm, no murmurs, rubs, or gallops  LUNGS: Unlabored respirations. Clear to auscultation anteriorly (pt refusing to sit up), no crackles, wheezing, or rhonchi  ABDOMEN: Soft, nontender, nondistended, +BS  EXTREMITIES: 2+ peripheral pulses bilaterally. Right arm immobilized in splint and wrapped in ace from hand to elbow; digits exposed and mobile. No anterior hip tenderness.  NERVOUS SYSTEM:  A&Ox3, no focal deficits

## 2023-01-19 DIAGNOSIS — E83.39 OTHER DISORDERS OF PHOSPHORUS METABOLISM: ICD-10-CM

## 2023-01-19 DIAGNOSIS — S52.501A UNSPECIFIED FRACTURE OF THE LOWER END OF RIGHT RADIUS, INITIAL ENCOUNTER FOR CLOSED FRACTURE: ICD-10-CM

## 2023-01-19 DIAGNOSIS — I10 ESSENTIAL (PRIMARY) HYPERTENSION: ICD-10-CM

## 2023-01-19 DIAGNOSIS — S32.591A OTHER SPECIFIED FRACTURE OF RIGHT PUBIS, INITIAL ENCOUNTER FOR CLOSED FRACTURE: ICD-10-CM

## 2023-01-19 LAB
ANION GAP SERPL CALC-SCNC: 8 MMOL/L — SIGNIFICANT CHANGE UP (ref 5–17)
BASOPHILS # BLD AUTO: 0.04 K/UL — SIGNIFICANT CHANGE UP (ref 0–0.2)
BASOPHILS NFR BLD AUTO: 0.4 % — SIGNIFICANT CHANGE UP (ref 0–2)
BUN SERPL-MCNC: 20 MG/DL — SIGNIFICANT CHANGE UP (ref 7–23)
CALCIUM SERPL-MCNC: 9.1 MG/DL — SIGNIFICANT CHANGE UP (ref 8.4–10.5)
CHLORIDE SERPL-SCNC: 98 MMOL/L — SIGNIFICANT CHANGE UP (ref 96–108)
CO2 SERPL-SCNC: 27 MMOL/L — SIGNIFICANT CHANGE UP (ref 22–31)
CREAT SERPL-MCNC: 0.63 MG/DL — SIGNIFICANT CHANGE UP (ref 0.5–1.3)
EGFR: 83 ML/MIN/1.73M2 — SIGNIFICANT CHANGE UP
EOSINOPHIL # BLD AUTO: 0.07 K/UL — SIGNIFICANT CHANGE UP (ref 0–0.5)
EOSINOPHIL NFR BLD AUTO: 0.8 % — SIGNIFICANT CHANGE UP (ref 0–6)
GLUCOSE BLDC GLUCOMTR-MCNC: 125 MG/DL — HIGH (ref 70–99)
GLUCOSE BLDC GLUCOMTR-MCNC: 98 MG/DL — SIGNIFICANT CHANGE UP (ref 70–99)
GLUCOSE SERPL-MCNC: 114 MG/DL — HIGH (ref 70–99)
HCT VFR BLD CALC: 38.6 % — SIGNIFICANT CHANGE UP (ref 34.5–45)
HGB BLD-MCNC: 12.7 G/DL — SIGNIFICANT CHANGE UP (ref 11.5–15.5)
IMM GRANULOCYTES NFR BLD AUTO: 0.6 % — SIGNIFICANT CHANGE UP (ref 0–0.9)
LYMPHOCYTES # BLD AUTO: 1.17 K/UL — SIGNIFICANT CHANGE UP (ref 1–3.3)
LYMPHOCYTES # BLD AUTO: 13 % — SIGNIFICANT CHANGE UP (ref 13–44)
MAGNESIUM SERPL-MCNC: 1.9 MG/DL — SIGNIFICANT CHANGE UP (ref 1.6–2.6)
MCHC RBC-ENTMCNC: 32.6 PG — SIGNIFICANT CHANGE UP (ref 27–34)
MCHC RBC-ENTMCNC: 32.9 GM/DL — SIGNIFICANT CHANGE UP (ref 32–36)
MCV RBC AUTO: 99.2 FL — SIGNIFICANT CHANGE UP (ref 80–100)
MONOCYTES # BLD AUTO: 0.89 K/UL — SIGNIFICANT CHANGE UP (ref 0–0.9)
MONOCYTES NFR BLD AUTO: 9.9 % — SIGNIFICANT CHANGE UP (ref 2–14)
NEUTROPHILS # BLD AUTO: 6.78 K/UL — SIGNIFICANT CHANGE UP (ref 1.8–7.4)
NEUTROPHILS NFR BLD AUTO: 75.3 % — SIGNIFICANT CHANGE UP (ref 43–77)
NRBC # BLD: 0 /100 WBCS — SIGNIFICANT CHANGE UP (ref 0–0)
PHOSPHATE SERPL-MCNC: 2.1 MG/DL — LOW (ref 2.5–4.5)
PLATELET # BLD AUTO: 255 K/UL — SIGNIFICANT CHANGE UP (ref 150–400)
POTASSIUM SERPL-MCNC: 3.6 MMOL/L — SIGNIFICANT CHANGE UP (ref 3.5–5.3)
POTASSIUM SERPL-SCNC: 3.6 MMOL/L — SIGNIFICANT CHANGE UP (ref 3.5–5.3)
RBC # BLD: 3.89 M/UL — SIGNIFICANT CHANGE UP (ref 3.8–5.2)
RBC # FLD: 12.9 % — SIGNIFICANT CHANGE UP (ref 10.3–14.5)
SODIUM SERPL-SCNC: 133 MMOL/L — LOW (ref 135–145)
WBC # BLD: 9 K/UL — SIGNIFICANT CHANGE UP (ref 3.8–10.5)
WBC # FLD AUTO: 9 K/UL — SIGNIFICANT CHANGE UP (ref 3.8–10.5)

## 2023-01-19 PROCEDURE — 99233 SBSQ HOSP IP/OBS HIGH 50: CPT | Mod: GC

## 2023-01-19 RX ADMIN — Medication 650 MILLIGRAM(S): at 13:26

## 2023-01-19 RX ADMIN — Medication 650 MILLIGRAM(S): at 19:15

## 2023-01-19 RX ADMIN — Medication 650 MILLIGRAM(S): at 02:58

## 2023-01-19 RX ADMIN — LIDOCAINE 1 PATCH: 4 CREAM TOPICAL at 20:12

## 2023-01-19 RX ADMIN — Medication 62.5 MILLIMOLE(S): at 12:32

## 2023-01-19 RX ADMIN — Medication 200 MILLIGRAM(S): at 08:09

## 2023-01-19 RX ADMIN — Medication 200 MILLIGRAM(S): at 18:15

## 2023-01-19 RX ADMIN — LIDOCAINE 1 PATCH: 4 CREAM TOPICAL at 12:26

## 2023-01-19 RX ADMIN — Medication 650 MILLIGRAM(S): at 12:26

## 2023-01-19 RX ADMIN — Medication 650 MILLIGRAM(S): at 18:15

## 2023-01-19 RX ADMIN — AMLODIPINE BESYLATE 5 MILLIGRAM(S): 2.5 TABLET ORAL at 07:09

## 2023-01-19 RX ADMIN — Medication 650 MILLIGRAM(S): at 07:09

## 2023-01-19 RX ADMIN — ENOXAPARIN SODIUM 40 MILLIGRAM(S): 100 INJECTION SUBCUTANEOUS at 07:09

## 2023-01-19 RX ADMIN — OXYCODONE HYDROCHLORIDE 5 MILLIGRAM(S): 5 TABLET ORAL at 23:13

## 2023-01-19 RX ADMIN — POLYETHYLENE GLYCOL 3350 17 GRAM(S): 17 POWDER, FOR SOLUTION ORAL at 07:09

## 2023-01-19 RX ADMIN — SENNA PLUS 1 TABLET(S): 8.6 TABLET ORAL at 12:26

## 2023-01-19 RX ADMIN — Medication 650 MILLIGRAM(S): at 08:09

## 2023-01-19 RX ADMIN — Medication 650 MILLIGRAM(S): at 01:58

## 2023-01-19 RX ADMIN — Medication 200 MILLIGRAM(S): at 07:09

## 2023-01-19 RX ADMIN — Medication 650 MILLIGRAM(S): at 23:13

## 2023-01-19 RX ADMIN — Medication 200 MILLIGRAM(S): at 19:15

## 2023-01-19 NOTE — OCCUPATIONAL THERAPY INITIAL EVALUATION ADULT - PERTINENT HX OF CURRENT PROBLEM, REHAB EVAL
Ms Santamaria is a 89F with PMH of HTN, HLD, vertigo who presents after a mechanical fall at home, found to have right inferior pubic rami hip fracture and right distal radius fracture, admitted for further management.

## 2023-01-19 NOTE — OCCUPATIONAL THERAPY INITIAL EVALUATION ADULT - GENERAL OBSERVATIONS, REHAB EVAL
RN Crystal clearing patient for OT evaluation. Pt. received semi-supine in bed, + (R) IV, RUE in hard cast, patient reporting a 8/10 pain in the RUE ( RN made aware at handoff) RN Crystal clearing patient for OT evaluation. Pt. received semi-supine in bed, NAD, + (R) IV, RUE in hard cast, patient reporting a 8/10 pain in the RUE ( RN made aware at handoff)

## 2023-01-19 NOTE — OCCUPATIONAL THERAPY INITIAL EVALUATION ADULT - ADDITIONAL COMMENTS
Per pt. she lives w/ her  in an elevator access bl, no TERRIE. Patient states her  is 89 y/o with medical conditions that require her full time care for him. She endorses no HHA or assistance from friends/family for ADLs, or IADLs  very independent with both and her mobility. Patient denies owning any AE/AD at home, stating she did not need it prior to her current injuries.

## 2023-01-19 NOTE — OCCUPATIONAL THERAPY INITIAL EVALUATION ADULT - PERSONAL SAFETY AND JUDGMENT, REHAB EVAL
slight impuslivity before starting therapeutic tasks and activities, however aware of NWB status and able to follow precautions/intact slight impulsivity before starting therapeutic tasks and activities, however aware of NWB status and able to follow precautions/intact

## 2023-01-19 NOTE — OCCUPATIONAL THERAPY INITIAL EVALUATION ADULT - DIAGNOSIS, OT EVAL
Patient is now requiring an increase in assistance required to perform ADLs, and functional mobility and transfers than her PLOF. Pt. demonstrates generalized muscle weakness, decreased activity tolerance, impairments in balance, as well as decreased ROM and pain in the RUE, with inability to use to participate 2/2 NWB status. Patient is now requiring an increase in assistance required to perform ADLs, and functional mobility and transfers than her PLOF. Pt. demonstrates generalized muscle weakness, decreased activity tolerance, impairments in balance, as well as decreased ROM and pain in the RUE, with difficulty to use to participate 2/2 PWB status.

## 2023-01-19 NOTE — OCCUPATIONAL THERAPY INITIAL EVALUATION ADULT - MD ORDER
Right wrist fx  Inferior pubic kelsey fx  Fall  Leuokocytosis Right wrist fx  Inferior pubic rami fx  Fall  Leuokocytosis

## 2023-01-19 NOTE — OCCUPATIONAL THERAPY INITIAL EVALUATION ADULT - MANUAL MUSCLE TESTING RESULTS, REHAB EVAL
RUE not tested 2/2 NWB, LUE shoulder flexion 3+/5, RUE forearm 3+/5. LLE knee extension 4-/5, LLE DF 3/5, LLE hip flexion 4-/5, RLE knee extension 3/5 RUE not tested 2/2 PWB, LUE shoulder flexion 3+/5, RUE forearm 3+/5. LLE knee extension 4-/5, LLE DF 3/5, LLE hip flexion 4-/5, RLE knee extension 3/5

## 2023-01-19 NOTE — PROGRESS NOTE ADULT - SUBJECTIVE AND OBJECTIVE BOX
----- INTERVAL HPI/OVERNIGHT EVENTS -----     Patient was seen and examined at bedside. As per nurse and patient, no o/n events, patient resting comfortably. No complaints at this time. Per pt the pain is well controlled with current medication regimen. Patient denies: fever, chills, dizziness, weakness, HA, Changes in vision, CP, palpitations, SOB, cough, N/V/D/C, dysuria, changes in bowel movements, LE edema. ROS otherwise negative.      Subjective: Patient is a 89y old  Female who presents with a chief complaint of fall (18 Jan 2023 17:31)      ----- VITAL SIGNS -----  T(F): 97.6 (01-19-23 @ 08:34)  HR: 90 (01-19-23 @ 08:34)  BP: 135/84 (01-19-23 @ 08:34)  RR: 16 (01-19-23 @ 08:34)  SpO2: 95% (01-19-23 @ 08:34)  Wt(kg): --        ----- Physical Exam -----     GENERAL: NAD, uncomfortable appearing in pain, unable to sit up in bed from pain  HEAD:  Atraumatic, normocephalic  EYES: EOMI, PERRLA, conjunctiva and sclera clear  ENT: Moist mucous membranes  NECK: Supple, no JVD  HEART: Regular rate and rhythm, no murmurs, rubs, or gallops  LUNGS: Unlabored respirations. Clear to auscultation anteriorly (pt refusing to sit up), no crackles, wheezing, or rhonchi  ABDOMEN: Soft, nontender, nondistended, +BS  EXTREMITIES: 2+ peripheral pulses bilaterally. Right arm immobilized in splint and wrapped in ace from hand to elbow; digits exposed and mobile. No anterior hip tenderness.  NERVOUS SYSTEM:  A&Ox3, no focal deficits    MEDICATIONS  (STANDING):  acetaminophen     Tablet .. 650 milliGRAM(s) Oral every 6 hours  amLODIPine   Tablet 5 milliGRAM(s) Oral every 24 hours  enoxaparin Injectable 40 milliGRAM(s) SubCutaneous every 24 hours  ibuprofen  Tablet. 200 milliGRAM(s) Oral every 12 hours  lactated ringers. 1000 milliLiter(s) (60 mL/Hr) IV Continuous <Continuous>  lidocaine   4% Patch 1 Patch Transdermal daily  polyethylene glycol 3350 17 Gram(s) Oral two times a day  senna 1 Tablet(s) Oral daily  sodium phosphate 15 milliMole(s)/250 mL IVPB 15 milliMole(s) IV Intermittent once    MEDICATIONS  (PRN):  oxyCODONE    IR 5 milliGRAM(s) Oral every 6 hours PRN Severe Pain (7 - 10)      Allergies    No Known Allergies    Intolerances        ----- LABS -----                         12.7   9.00  )-----------( 255      ( 19 Jan 2023 06:44 )             38.6     01-19    133<L>  |  98  |  20  ----------------------------<  114<H>  3.6   |  27  |  0.63    Ca    9.1      19 Jan 2023 06:44  Phos  2.1     01-19  Mg     1.9     01-19    TPro  7.0  /  Alb  3.7  /  TBili  0.5  /  DBili  x   /  AST  22  /  ALT  13  /  AlkPhos  67  01-18            ----- RADIOLOGY & ADDITIONAL TESTS -----     Reviewed

## 2023-01-19 NOTE — OCCUPATIONAL THERAPY INITIAL EVALUATION ADULT - RANGE OF MOTION EXAMINATION, UPPER EXTREMITY
Left UE Active ROM was WFL (within functional limits)/Left UE Passive ROM was WFL  (within functional limits)

## 2023-01-19 NOTE — PROGRESS NOTE ADULT - SUBJECTIVE AND OBJECTIVE BOX
Patient is a 89y old  Female who presents with a chief complaint of fall (19 Jan 2023 11:27)      INTERVAL HPI/OVERNIGHT EVENTS:    Pt. seen and examined earlier today  Pt. c/o minimal R arm and hip pain, well-controlled w/ Tylenol  Pt. is agreeable to ZAHIDA placement  Pt. denies CP, SOB    Review of Systems: 12 point review of systems otherwise negative    MEDICATIONS  (STANDING):  acetaminophen     Tablet .. 650 milliGRAM(s) Oral every 6 hours  amLODIPine   Tablet 5 milliGRAM(s) Oral every 24 hours  enoxaparin Injectable 40 milliGRAM(s) SubCutaneous every 24 hours  ibuprofen  Tablet. 200 milliGRAM(s) Oral every 12 hours  lactated ringers. 1000 milliLiter(s) (60 mL/Hr) IV Continuous <Continuous>  lidocaine   4% Patch 1 Patch Transdermal daily  polyethylene glycol 3350 17 Gram(s) Oral two times a day  senna 1 Tablet(s) Oral daily    MEDICATIONS  (PRN):  oxyCODONE    IR 5 milliGRAM(s) Oral every 6 hours PRN Severe Pain (7 - 10)      Allergies    No Known Allergies    Intolerances          Vital Signs Last 24 Hrs  T(C): 36.4 (19 Jan 2023 08:34), Max: 36.7 (18 Jan 2023 16:33)  T(F): 97.6 (19 Jan 2023 08:34), Max: 98.1 (18 Jan 2023 16:33)  HR: 90 (19 Jan 2023 08:34) (68 - 97)  BP: 135/84 (19 Jan 2023 08:34) (135/84 - 170/81)  BP(mean): 101 (19 Jan 2023 08:34) (101 - 101)  RR: 16 (19 Jan 2023 08:34) (16 - 18)  SpO2: 95% (19 Jan 2023 08:34) (95% - 97%)    Parameters below as of 19 Jan 2023 08:34  Patient On (Oxygen Delivery Method): room air      CAPILLARY BLOOD GLUCOSE            Physical Exam:  (earlier today)  Daily     Daily   General:  comfortable-appearing in NAD  HEENT:  MMM  CV:  RRR  Lungs:  CTA B/L anteriorly  Abdomen:  soft NT ND  Extremities:  RUE splint  Skin:  WWP  Neuro:  AAOx3    LABS:                        12.7   9.00  )-----------( 255      ( 19 Jan 2023 06:44 )             38.6     01-19    133<L>  |  98  |  20  ----------------------------<  114<H>  3.6   |  27  |  0.63    Ca    9.1      19 Jan 2023 06:44  Phos  2.1     01-19  Mg     1.9     01-19    TPro  7.0  /  Alb  3.7  /  TBili  0.5  /  DBili  x   /  AST  22  /  ALT  13  /  AlkPhos  67  01-18

## 2023-01-20 PROCEDURE — 99232 SBSQ HOSP IP/OBS MODERATE 35: CPT | Mod: GC

## 2023-01-20 RX ADMIN — Medication 650 MILLIGRAM(S): at 18:56

## 2023-01-20 RX ADMIN — SENNA PLUS 1 TABLET(S): 8.6 TABLET ORAL at 11:53

## 2023-01-20 RX ADMIN — Medication 650 MILLIGRAM(S): at 07:47

## 2023-01-20 RX ADMIN — Medication 200 MILLIGRAM(S): at 19:56

## 2023-01-20 RX ADMIN — Medication 200 MILLIGRAM(S): at 18:56

## 2023-01-20 RX ADMIN — Medication 200 MILLIGRAM(S): at 06:47

## 2023-01-20 RX ADMIN — OXYCODONE HYDROCHLORIDE 5 MILLIGRAM(S): 5 TABLET ORAL at 00:13

## 2023-01-20 RX ADMIN — Medication 650 MILLIGRAM(S): at 12:53

## 2023-01-20 RX ADMIN — Medication 200 MILLIGRAM(S): at 07:47

## 2023-01-20 RX ADMIN — Medication 650 MILLIGRAM(S): at 19:56

## 2023-01-20 RX ADMIN — ENOXAPARIN SODIUM 40 MILLIGRAM(S): 100 INJECTION SUBCUTANEOUS at 06:49

## 2023-01-20 RX ADMIN — Medication 650 MILLIGRAM(S): at 06:47

## 2023-01-20 RX ADMIN — Medication 650 MILLIGRAM(S): at 11:53

## 2023-01-20 RX ADMIN — OXYCODONE HYDROCHLORIDE 5 MILLIGRAM(S): 5 TABLET ORAL at 14:44

## 2023-01-20 RX ADMIN — LIDOCAINE 1 PATCH: 4 CREAM TOPICAL at 20:30

## 2023-01-20 RX ADMIN — OXYCODONE HYDROCHLORIDE 5 MILLIGRAM(S): 5 TABLET ORAL at 15:44

## 2023-01-20 RX ADMIN — LIDOCAINE 1 PATCH: 4 CREAM TOPICAL at 00:00

## 2023-01-20 RX ADMIN — Medication 650 MILLIGRAM(S): at 00:13

## 2023-01-20 RX ADMIN — AMLODIPINE BESYLATE 5 MILLIGRAM(S): 2.5 TABLET ORAL at 06:47

## 2023-01-20 RX ADMIN — LIDOCAINE 1 PATCH: 4 CREAM TOPICAL at 11:52

## 2023-01-20 NOTE — PROGRESS NOTE ADULT - SUBJECTIVE AND OBJECTIVE BOX
----- INTERVAL HPI/OVERNIGHT EVENTS -----     Patient was seen and examined at bedside. As per nurse and patient, no o/n events, patient resting comfortably. No complaints at this time. Patient denies: fever, chills, dizziness, weakness, HA, Changes in vision, CP, palpitations, SOB, cough, N/V/D/C, dysuria, changes in bowel movements, LE edema. ROS otherwise negative.      Subjective: Patient is a 89y old  Female who presents with a chief complaint of fall (20 Jan 2023 14:32)      ----- VITAL SIGNS -----  T(F): 97.9 (01-20-23 @ 09:03)  HR: 81 (01-20-23 @ 09:03)  BP: 140/84 (01-20-23 @ 09:03)  RR: 16 (01-20-23 @ 09:03)  SpO2: 96% (01-20-23 @ 09:03)  Wt(kg): --        ----- Physical Exam -----       GENERAL: NAD, uncomfortable appearing in pain, unable to sit up in bed from pain  HEAD:  Atraumatic, normocephalic  EYES: EOMI, PERRLA, conjunctiva and sclera clear  ENT: Moist mucous membranes  NECK: Supple, no JVD  HEART: Regular rate and rhythm, no murmurs, rubs, or gallops  LUNGS: Unlabored respirations. Clear to auscultation anteriorly (pt refusing to sit up), no crackles, wheezing, or rhonchi  ABDOMEN: Soft, nontender, nondistended, +BS  EXTREMITIES: 2+ peripheral pulses bilaterally. Right arm immobilized in splint and wrapped in ace from hand to elbow; digits exposed and mobile. No anterior hip tenderness.  NERVOUS SYSTEM:  A&Ox3, no focal deficits    MEDICATIONS  (STANDING):  acetaminophen     Tablet .. 650 milliGRAM(s) Oral every 6 hours  amLODIPine   Tablet 5 milliGRAM(s) Oral every 24 hours  enoxaparin Injectable 40 milliGRAM(s) SubCutaneous every 24 hours  ibuprofen  Tablet. 200 milliGRAM(s) Oral every 12 hours  lactated ringers. 1000 milliLiter(s) (60 mL/Hr) IV Continuous <Continuous>  lidocaine   4% Patch 1 Patch Transdermal daily  polyethylene glycol 3350 17 Gram(s) Oral two times a day  senna 1 Tablet(s) Oral daily    MEDICATIONS  (PRN):  oxyCODONE    IR 5 milliGRAM(s) Oral every 6 hours PRN Severe Pain (7 - 10)      Allergies    No Known Allergies    Intolerances        ----- LABS -----                         12.7   9.00  )-----------( 255      ( 19 Jan 2023 06:44 )             38.6     01-19    133<L>  |  98  |  20  ----------------------------<  114<H>  3.6   |  27  |  0.63    Ca    9.1      19 Jan 2023 06:44  Phos  2.1     01-19  Mg     1.9     01-19              ----- RADIOLOGY & ADDITIONAL TESTS -----     Reviewed

## 2023-01-21 LAB
GLUCOSE BLDC GLUCOMTR-MCNC: 108 MG/DL — HIGH (ref 70–99)
GLUCOSE BLDC GLUCOMTR-MCNC: 90 MG/DL — SIGNIFICANT CHANGE UP (ref 70–99)

## 2023-01-21 PROCEDURE — 99232 SBSQ HOSP IP/OBS MODERATE 35: CPT | Mod: GC

## 2023-01-21 RX ADMIN — Medication 650 MILLIGRAM(S): at 06:41

## 2023-01-21 RX ADMIN — Medication 650 MILLIGRAM(S): at 12:49

## 2023-01-21 RX ADMIN — Medication 650 MILLIGRAM(S): at 20:09

## 2023-01-21 RX ADMIN — Medication 200 MILLIGRAM(S): at 20:09

## 2023-01-21 RX ADMIN — Medication 650 MILLIGRAM(S): at 01:13

## 2023-01-21 RX ADMIN — Medication 200 MILLIGRAM(S): at 06:13

## 2023-01-21 RX ADMIN — OXYCODONE HYDROCHLORIDE 5 MILLIGRAM(S): 5 TABLET ORAL at 16:00

## 2023-01-21 RX ADMIN — Medication 650 MILLIGRAM(S): at 05:41

## 2023-01-21 RX ADMIN — Medication 650 MILLIGRAM(S): at 11:49

## 2023-01-21 RX ADMIN — LIDOCAINE 1 PATCH: 4 CREAM TOPICAL at 11:50

## 2023-01-21 RX ADMIN — Medication 200 MILLIGRAM(S): at 07:13

## 2023-01-21 RX ADMIN — Medication 650 MILLIGRAM(S): at 00:13

## 2023-01-21 RX ADMIN — LIDOCAINE 1 PATCH: 4 CREAM TOPICAL at 20:15

## 2023-01-21 RX ADMIN — LIDOCAINE 1 PATCH: 4 CREAM TOPICAL at 00:12

## 2023-01-21 RX ADMIN — AMLODIPINE BESYLATE 5 MILLIGRAM(S): 2.5 TABLET ORAL at 05:41

## 2023-01-21 RX ADMIN — OXYCODONE HYDROCHLORIDE 5 MILLIGRAM(S): 5 TABLET ORAL at 17:00

## 2023-01-21 RX ADMIN — ENOXAPARIN SODIUM 40 MILLIGRAM(S): 100 INJECTION SUBCUTANEOUS at 06:05

## 2023-01-21 RX ADMIN — Medication 200 MILLIGRAM(S): at 19:10

## 2023-01-21 RX ADMIN — Medication 650 MILLIGRAM(S): at 19:09

## 2023-01-21 NOTE — PROGRESS NOTE ADULT - SUBJECTIVE AND OBJECTIVE BOX
----- INTERVAL HPI/OVERNIGHT EVENTS -----     Patient was seen and examined at bedside. As per nurse and patient, no o/n events, patient resting comfortably. No complaints at this time. Patient denies: fever, chills, dizziness, weakness, HA, Changes in vision, CP, palpitations, SOB, cough, N/V/D/C, dysuria, changes in bowel movements, LE edema. ROS otherwise negative.      Subjective: Patient is a 89y old  Female who presents with a chief complaint of fall (20 Jan 2023 14:32)      Vital Signs Last 24 Hrs  T(C): 36.4 (21 Jan 2023 08:35), Max: 36.7 (21 Jan 2023 04:59)  T(F): 97.5 (21 Jan 2023 08:35), Max: 98.1 (21 Jan 2023 04:59)  HR: 64 (21 Jan 2023 08:35) (64 - 79)  BP: 148/74 (21 Jan 2023 08:35) (148/74 - 165/87)  BP(mean): --  RR: 16 (21 Jan 2023 08:35) (15 - 16)  SpO2: 98% (21 Jan 2023 08:35) (95% - 98%)    Parameters below as of 21 Jan 2023 08:35  Patient On (Oxygen Delivery Method): room air            ----- Physical Exam -----       GENERAL: NAD, uncomfortable appearing in pain, unable to sit up in bed from pain  HEAD:  Atraumatic, normocephalic  EYES: EOMI, PERRLA, conjunctiva and sclera clear  ENT: Moist mucous membranes  NECK: Supple, no JVD  HEART: Regular rate and rhythm, no murmurs, rubs, or gallops  LUNGS: Unlabored respirations. Clear to auscultation anteriorly (pt refusing to sit up), no crackles, wheezing, or rhonchi  ABDOMEN: Soft, nontender, nondistended, +BS  EXTREMITIES: 2+ peripheral pulses bilaterally. Right arm immobilized in splint and wrapped in ace from hand to elbow; digits exposed and mobile. No anterior hip tenderness.  NERVOUS SYSTEM:  A&Ox3, no focal deficits  Psych: normal affect        MEDICATIONS  (STANDING):  acetaminophen     Tablet .. 650 milliGRAM(s) Oral every 6 hours  amLODIPine   Tablet 5 milliGRAM(s) Oral every 24 hours  enoxaparin Injectable 40 milliGRAM(s) SubCutaneous every 24 hours  ibuprofen  Tablet. 200 milliGRAM(s) Oral every 12 hours  lactated ringers. 1000 milliLiter(s) (60 mL/Hr) IV Continuous <Continuous>  lidocaine   4% Patch 1 Patch Transdermal daily  polyethylene glycol 3350 17 Gram(s) Oral two times a day  senna 1 Tablet(s) Oral daily    MEDICATIONS  (PRN):  oxyCODONE    IR 5 milliGRAM(s) Oral every 6 hours PRN Severe Pain (7 - 10)        Allergies    No Known Allergies    Intolerances        ----- LABS -----                 ----- RADIOLOGY & ADDITIONAL TESTS -----     Reviewed

## 2023-01-22 PROCEDURE — 99232 SBSQ HOSP IP/OBS MODERATE 35: CPT | Mod: GC

## 2023-01-22 RX ORDER — SENNA PLUS 8.6 MG/1
2 TABLET ORAL AT BEDTIME
Refills: 0 | Status: DISCONTINUED | OUTPATIENT
Start: 2023-01-22 | End: 2023-01-25

## 2023-01-22 RX ADMIN — Medication 200 MILLIGRAM(S): at 17:09

## 2023-01-22 RX ADMIN — POLYETHYLENE GLYCOL 3350 17 GRAM(S): 17 POWDER, FOR SOLUTION ORAL at 05:26

## 2023-01-22 RX ADMIN — Medication 650 MILLIGRAM(S): at 11:36

## 2023-01-22 RX ADMIN — LIDOCAINE 1 PATCH: 4 CREAM TOPICAL at 00:39

## 2023-01-22 RX ADMIN — OXYCODONE HYDROCHLORIDE 5 MILLIGRAM(S): 5 TABLET ORAL at 17:09

## 2023-01-22 RX ADMIN — LIDOCAINE 1 PATCH: 4 CREAM TOPICAL at 23:31

## 2023-01-22 RX ADMIN — LIDOCAINE 1 PATCH: 4 CREAM TOPICAL at 11:36

## 2023-01-22 RX ADMIN — OXYCODONE HYDROCHLORIDE 5 MILLIGRAM(S): 5 TABLET ORAL at 16:09

## 2023-01-22 RX ADMIN — Medication 650 MILLIGRAM(S): at 23:31

## 2023-01-22 RX ADMIN — Medication 650 MILLIGRAM(S): at 23:59

## 2023-01-22 RX ADMIN — Medication 650 MILLIGRAM(S): at 16:08

## 2023-01-22 RX ADMIN — Medication 650 MILLIGRAM(S): at 05:26

## 2023-01-22 RX ADMIN — Medication 200 MILLIGRAM(S): at 16:09

## 2023-01-22 RX ADMIN — Medication 200 MILLIGRAM(S): at 05:27

## 2023-01-22 RX ADMIN — Medication 200 MILLIGRAM(S): at 06:26

## 2023-01-22 RX ADMIN — Medication 650 MILLIGRAM(S): at 12:36

## 2023-01-22 RX ADMIN — Medication 650 MILLIGRAM(S): at 17:09

## 2023-01-22 RX ADMIN — ENOXAPARIN SODIUM 40 MILLIGRAM(S): 100 INJECTION SUBCUTANEOUS at 05:26

## 2023-01-22 RX ADMIN — LIDOCAINE 1 PATCH: 4 CREAM TOPICAL at 18:16

## 2023-01-22 RX ADMIN — Medication 650 MILLIGRAM(S): at 06:26

## 2023-01-22 RX ADMIN — AMLODIPINE BESYLATE 5 MILLIGRAM(S): 2.5 TABLET ORAL at 05:26

## 2023-01-22 RX ADMIN — Medication 650 MILLIGRAM(S): at 01:42

## 2023-01-22 RX ADMIN — Medication 650 MILLIGRAM(S): at 00:41

## 2023-01-22 NOTE — PROGRESS NOTE ADULT - SUBJECTIVE AND OBJECTIVE BOX
OVERNIGHT EVENTS:  NAEON  SUBJECTIVE / INTERVAL HPI: Patient seen and examined at bedside. Patient has no complaints this morning, she is aware she is waiting for ZAHIDA placement. Denies fever, chills, abd pain, sob, and diarrhea.    VITAL SIGNS:  Vital Signs Last 24 Hrs  T(C): 36.5 (22 Jan 2023 20:28), Max: 36.8 (22 Jan 2023 04:50)  T(F): 97.7 (22 Jan 2023 20:28), Max: 98.2 (22 Jan 2023 04:50)  HR: 85 (22 Jan 2023 20:28) (65 - 85)  BP: 160/85 (22 Jan 2023 20:28) (150/70 - 160/85)  BP(mean): 97 (22 Jan 2023 09:30) (97 - 97)  RR: 15 (22 Jan 2023 20:28) (15 - 16)  SpO2: 97% (22 Jan 2023 20:28) (95% - 97%)    Parameters below as of 22 Jan 2023 20:28  Patient On (Oxygen Delivery Method): room air        PHYSICAL EXAM:  GENERAL: NAD, comfortable  HEAD:  Atraumatic, normocephalic  EYES: EOMI, PERRLA, conjunctiva and sclera clear  ENT: Moist mucous membranes  NECK: Supple, no JVD  HEART: Regular rate and rhythm, no murmurs, rubs, or gallops  LUNGS: Unlabored respirations. Clear to auscultation anteriorly (pt refusing to sit up), no crackles, wheezing, or rhonchi  ABDOMEN: Soft, nontender, nondistended, +BS  EXTREMITIES: 2+ peripheral pulses bilaterally. Right arm immobilized in splint and wrapped in ace from hand to elbow; digits exposed and mobile. No anterior hip tenderness.  NERVOUS SYSTEM:  A&Ox3, no focal deficits    MEDICATIONS:  MEDICATIONS  (STANDING):  acetaminophen     Tablet .. 650 milliGRAM(s) Oral every 6 hours  amLODIPine   Tablet 5 milliGRAM(s) Oral every 24 hours  enoxaparin Injectable 40 milliGRAM(s) SubCutaneous every 24 hours  ibuprofen  Tablet. 200 milliGRAM(s) Oral every 12 hours  lactated ringers. 1000 milliLiter(s) (60 mL/Hr) IV Continuous <Continuous>  lidocaine   4% Patch 1 Patch Transdermal daily  polyethylene glycol 3350 17 Gram(s) Oral two times a day  senna 2 Tablet(s) Oral at bedtime  senna 1 Tablet(s) Oral daily    MEDICATIONS  (PRN):  oxyCODONE    IR 5 milliGRAM(s) Oral every 6 hours PRN Severe Pain (7 - 10)      ALLERGIES:  Allergies    No Known Allergies    Intolerances        LABS:              CAPILLARY BLOOD GLUCOSE      POCT Blood Glucose.: 108 mg/dL (21 Jan 2023 17:25)      RADIOLOGY & ADDITIONAL TESTS: Reviewed.

## 2023-01-22 NOTE — PROGRESS NOTE ADULT - ATTENDING COMMENTS
Patient was seen and examined at bedside on 1/22/2023 at 930 am. Patient has no acute complaints. Denies abdominal pain at this time, CP., SOB. ROS is otherwise negative, Vitals, labwork, and pertinent imaging reviewed. Physical exam - NAD, AAO x 4, PERRLA, EOMI, MMM, supple neck, chest - CTA b/l, CV - rrr, s1s2, no m/r/g, abd - soft, NTND, + BS, ext - wwp, R arm in cast, psych - normal affect    Plan:  -Pain is well controlled  -PT rec ZAHIDA  -Pt is medically ready for d/c

## 2023-01-23 LAB — SARS-COV-2 RNA SPEC QL NAA+PROBE: NEGATIVE — SIGNIFICANT CHANGE UP

## 2023-01-23 PROCEDURE — 99232 SBSQ HOSP IP/OBS MODERATE 35: CPT | Mod: GC

## 2023-01-23 RX ADMIN — OXYCODONE HYDROCHLORIDE 5 MILLIGRAM(S): 5 TABLET ORAL at 17:31

## 2023-01-23 RX ADMIN — Medication 650 MILLIGRAM(S): at 13:17

## 2023-01-23 RX ADMIN — Medication 650 MILLIGRAM(S): at 23:22

## 2023-01-23 RX ADMIN — LIDOCAINE 1 PATCH: 4 CREAM TOPICAL at 12:16

## 2023-01-23 RX ADMIN — LIDOCAINE 1 PATCH: 4 CREAM TOPICAL at 18:49

## 2023-01-23 RX ADMIN — Medication 650 MILLIGRAM(S): at 12:17

## 2023-01-23 RX ADMIN — OXYCODONE HYDROCHLORIDE 5 MILLIGRAM(S): 5 TABLET ORAL at 23:22

## 2023-01-23 RX ADMIN — ENOXAPARIN SODIUM 40 MILLIGRAM(S): 100 INJECTION SUBCUTANEOUS at 05:44

## 2023-01-23 RX ADMIN — Medication 650 MILLIGRAM(S): at 05:44

## 2023-01-23 RX ADMIN — Medication 200 MILLIGRAM(S): at 06:44

## 2023-01-23 RX ADMIN — AMLODIPINE BESYLATE 5 MILLIGRAM(S): 2.5 TABLET ORAL at 05:44

## 2023-01-23 RX ADMIN — Medication 650 MILLIGRAM(S): at 17:31

## 2023-01-23 RX ADMIN — OXYCODONE HYDROCHLORIDE 5 MILLIGRAM(S): 5 TABLET ORAL at 18:31

## 2023-01-23 RX ADMIN — Medication 200 MILLIGRAM(S): at 05:44

## 2023-01-23 RX ADMIN — Medication 650 MILLIGRAM(S): at 06:44

## 2023-01-23 RX ADMIN — Medication 650 MILLIGRAM(S): at 18:31

## 2023-01-23 NOTE — DIETITIAN INITIAL EVALUATION ADULT - OTHER INFO
89F with PMH of HTN, HLD, vertigo who presents after a mechanical fall at home, found to have right hip fracture and right distal radius fracture, admitted for further management. Pending ZAHIDA placement.    Pt seen in room for nutrition assessment. Pt reports good appetite PTA and during hospital stay. Reports grocery shopping at [employer] and cooking at home typically. Currently on DASH diet, tolerating well. No food preferences noted. NKFA. Denies wt changes, reports wt stability at current wt. Denies N/V/D/C, last BM 1/22. No edema. Denies pain. RD provided education on optimal PO intake for healing/recovery. Pt expressed understanding. Please see full nutrition recommendations below. Will continue to follow per RD protocol.

## 2023-01-23 NOTE — DIETITIAN INITIAL EVALUATION ADULT - PERTINENT MEDS FT
MEDICATIONS  (STANDING):  acetaminophen     Tablet .. 650 milliGRAM(s) Oral every 6 hours  amLODIPine   Tablet 5 milliGRAM(s) Oral every 24 hours  enoxaparin Injectable 40 milliGRAM(s) SubCutaneous every 24 hours  lactated ringers. 1000 milliLiter(s) (60 mL/Hr) IV Continuous <Continuous>  lidocaine   4% Patch 1 Patch Transdermal daily  polyethylene glycol 3350 17 Gram(s) Oral two times a day  senna 1 Tablet(s) Oral daily  senna 2 Tablet(s) Oral at bedtime    MEDICATIONS  (PRN):  oxyCODONE    IR 5 milliGRAM(s) Oral every 6 hours PRN Severe Pain (7 - 10)

## 2023-01-23 NOTE — PROGRESS NOTE ADULT - SUBJECTIVE AND OBJECTIVE BOX
Patient is a 89y old  Female who presents with a chief complaint of fall (23 Jan 2023 08:37)      INTERVAL HPI/OVERNIGHT EVENTS:    Pt. seen and examined earlier today  Pt. feels well, has no complaints  R wrist and R hip/groin pain controlled w/ Tylenol and ibuprofen  +BM yesterday    Review of Systems: 12 point review of systems otherwise negative    MEDICATIONS  (STANDING):  acetaminophen     Tablet .. 650 milliGRAM(s) Oral every 6 hours  amLODIPine   Tablet 5 milliGRAM(s) Oral every 24 hours  enoxaparin Injectable 40 milliGRAM(s) SubCutaneous every 24 hours  lactated ringers. 1000 milliLiter(s) (60 mL/Hr) IV Continuous <Continuous>  lidocaine   4% Patch 1 Patch Transdermal daily  polyethylene glycol 3350 17 Gram(s) Oral two times a day  senna 1 Tablet(s) Oral daily  senna 2 Tablet(s) Oral at bedtime    MEDICATIONS  (PRN):  oxyCODONE    IR 5 milliGRAM(s) Oral every 6 hours PRN Severe Pain (7 - 10)      Allergies    No Known Allergies    Intolerances          Vital Signs Last 24 Hrs  T(C): 36.6 (23 Jan 2023 09:20), Max: 36.6 (23 Jan 2023 09:20)  T(F): 97.8 (23 Jan 2023 09:20), Max: 97.8 (23 Jan 2023 09:20)  HR: 75 (23 Jan 2023 09:20) (75 - 85)  BP: 109/64 (23 Jan 2023 09:20) (109/64 - 160/85)  BP(mean): 79 (23 Jan 2023 09:20) (79 - 79)  RR: 16 (23 Jan 2023 09:20) (15 - 16)  SpO2: 98% (23 Jan 2023 09:20) (97% - 98%)    Parameters below as of 23 Jan 2023 09:20  Patient On (Oxygen Delivery Method): room air      CAPILLARY BLOOD GLUCOSE          01-22 @ 07:01  -  01-23 @ 07:00  --------------------------------------------------------  IN: 240 mL / OUT: 350 mL / NET: -110 mL    01-23 @ 07:01 - 01-23 @ 11:58  --------------------------------------------------------  IN: 360 mL / OUT: 400 mL / NET: -40 mL        Physical Exam:  (earlier today)  Daily     Daily   General:  comfortable-appearing in NAD  HEENT:  MMM  CV:  RRR  Lungs:  CTA B/L anteriorly  Abdomen:  soft NT ND  Extremities:  RUE splint  Skin:  WWP  Neuro:  AAOx3    LABS:

## 2023-01-23 NOTE — PROGRESS NOTE ADULT - SUBJECTIVE AND OBJECTIVE BOX
----- INTERVAL HPI/OVERNIGHT EVENTS -----     Patient was seen and examined at bedside. As per nurse and patient, no o/n events, patient resting comfortably. No complaints at this time. Patient denies: fever, chills, dizziness, weakness, HA, Changes in vision, CP, palpitations, SOB, cough, N/V/D/C, dysuria, changes in bowel movements, LE edema. ROS otherwise negative.      Subjective: Patient is a 89y old  Female who presents with a chief complaint of fall (22 Jan 2023 08:55)      ----- VITAL SIGNS -----  T(F): 97.6 (01-23-23 @ 05:35)  HR: 82 (01-23-23 @ 05:35)  BP: 147/82 (01-23-23 @ 05:35)  RR: 16 (01-23-23 @ 05:35)  SpO2: 97% (01-23-23 @ 05:35)  Wt(kg): --      01-22-23 @ 07:01  -  01-23-23 @ 07:00  --------------------------------------------------------  IN: 240 mL / OUT: 350 mL / NET: -110 mL        ----- Physical Exam -----     Constitutional: resting comfortably in bed; NAD  HEENT: NC/AT, PERRL, EOMI, anicteric sclera, no nasal discharge; uvula midline, no oropharyngeal erythema or exudates, MMM; no thyromegaly or anterior/posterior or submental UMU; neck supple  Respiratory: CTA B/L; no W/R/R, no retractions  Cardiac: +S1/S2; RRR; no M/R/G appreciated  Gastrointestinal: abdomen soft, NT/ND, +BS, no rebound tenderness or guarding  Back: no CVAT B/L  Extremities: legs WWP, no clubbing or cyanosis; no peripheral edema  Vascular: 2+ distal pedal pulses B/L  Dermatologic: skin warm, dry and intact; no rashes, wounds, or scars  Neurologic: AAOx3; CNII-XII grossly intact; strength 5/5 and sensation intact in all 4 extremities; no focal deficits  Psychiatric: affect and characteristics of appearance, verbalizations, behaviors are appropriate    MEDICATIONS  (STANDING):  acetaminophen     Tablet .. 650 milliGRAM(s) Oral every 6 hours  amLODIPine   Tablet 5 milliGRAM(s) Oral every 24 hours  enoxaparin Injectable 40 milliGRAM(s) SubCutaneous every 24 hours  ibuprofen  Tablet. 200 milliGRAM(s) Oral every 12 hours  lactated ringers. 1000 milliLiter(s) (60 mL/Hr) IV Continuous <Continuous>  lidocaine   4% Patch 1 Patch Transdermal daily  polyethylene glycol 3350 17 Gram(s) Oral two times a day  senna 1 Tablet(s) Oral daily  senna 2 Tablet(s) Oral at bedtime    MEDICATIONS  (PRN):  oxyCODONE    IR 5 milliGRAM(s) Oral every 6 hours PRN Severe Pain (7 - 10)      Allergies    No Known Allergies    Intolerances        ----- LABS -----                   ----- RADIOLOGY & ADDITIONAL TESTS -----     Reviewed ----- INTERVAL HPI/OVERNIGHT EVENTS -----     Patient was seen and examined at bedside. As per nurse and patient, no o/n events, patient resting comfortably. No complaints at this time. Patient denies: fever, chills, dizziness, weakness, HA, Changes in vision, CP, palpitations, SOB, cough, N/V/D/C, dysuria, changes in bowel movements, LE edema. ROS otherwise negative.  Hospital Bed Note:    Subjective: Patient is a 89y old  Female who presents with a chief complaint of fall (22 Jan 2023 08:55)      ----- VITAL SIGNS -----  T(F): 97.6 (01-23-23 @ 05:35)  HR: 82 (01-23-23 @ 05:35)  BP: 147/82 (01-23-23 @ 05:35)  RR: 16 (01-23-23 @ 05:35)  SpO2: 97% (01-23-23 @ 05:35)  Wt(kg): --      01-22-23 @ 07:01  -  01-23-23 @ 07:00  --------------------------------------------------------  IN: 240 mL / OUT: 350 mL / NET: -110 mL        ----- Physical Exam -----     Constitutional: resting comfortably in bed; NAD  HEENT: NC/AT, PERRL, EOMI, anicteric sclera, no nasal discharge; uvula midline, no oropharyngeal erythema or exudates, MMM; no thyromegaly or anterior/posterior or submental UMU; neck supple  Respiratory: CTA B/L; no W/R/R, no retractions  Cardiac: +S1/S2; RRR; no M/R/G appreciated  Gastrointestinal: abdomen soft, NT/ND, +BS, no rebound tenderness or guarding  Back: no CVAT B/L  Extremities: legs WWP, no clubbing or cyanosis; no peripheral edema  Vascular: 2+ distal pedal pulses B/L  Dermatologic: skin warm, dry and intact; no rashes, wounds, or scars  Neurologic: AAOx3; CNII-XII grossly intact; strength 5/5 and sensation intact in all 4 extremities; no focal deficits  Psychiatric: affect and characteristics of appearance, verbalizations, behaviors are appropriate    MEDICATIONS  (STANDING):  acetaminophen     Tablet .. 650 milliGRAM(s) Oral every 6 hours  amLODIPine   Tablet 5 milliGRAM(s) Oral every 24 hours  enoxaparin Injectable 40 milliGRAM(s) SubCutaneous every 24 hours  ibuprofen  Tablet. 200 milliGRAM(s) Oral every 12 hours  lactated ringers. 1000 milliLiter(s) (60 mL/Hr) IV Continuous <Continuous>  lidocaine   4% Patch 1 Patch Transdermal daily  polyethylene glycol 3350 17 Gram(s) Oral two times a day  senna 1 Tablet(s) Oral daily  senna 2 Tablet(s) Oral at bedtime    MEDICATIONS  (PRN):  oxyCODONE    IR 5 milliGRAM(s) Oral every 6 hours PRN Severe Pain (7 - 10)      Allergies    No Known Allergies    Intolerances        ----- LABS -----                   ----- RADIOLOGY & ADDITIONAL TESTS -----     Reviewed

## 2023-01-24 PROCEDURE — 99233 SBSQ HOSP IP/OBS HIGH 50: CPT | Mod: GC

## 2023-01-24 RX ORDER — POLYETHYLENE GLYCOL 3350 17 G/17G
17 POWDER, FOR SOLUTION ORAL
Qty: 0 | Refills: 0 | DISCHARGE
Start: 2023-01-24

## 2023-01-24 RX ORDER — SENNA PLUS 8.6 MG/1
2 TABLET ORAL
Qty: 0 | Refills: 0 | DISCHARGE
Start: 2023-01-24

## 2023-01-24 RX ORDER — OXYCODONE HYDROCHLORIDE 5 MG/1
1 TABLET ORAL
Qty: 0 | Refills: 0 | DISCHARGE
Start: 2023-01-24

## 2023-01-24 RX ADMIN — LIDOCAINE 1 PATCH: 4 CREAM TOPICAL at 11:39

## 2023-01-24 RX ADMIN — Medication 650 MILLIGRAM(S): at 18:46

## 2023-01-24 RX ADMIN — Medication 650 MILLIGRAM(S): at 11:39

## 2023-01-24 RX ADMIN — OXYCODONE HYDROCHLORIDE 5 MILLIGRAM(S): 5 TABLET ORAL at 22:11

## 2023-01-24 RX ADMIN — LIDOCAINE 1 PATCH: 4 CREAM TOPICAL at 17:17

## 2023-01-24 RX ADMIN — OXYCODONE HYDROCHLORIDE 5 MILLIGRAM(S): 5 TABLET ORAL at 15:36

## 2023-01-24 RX ADMIN — OXYCODONE HYDROCHLORIDE 5 MILLIGRAM(S): 5 TABLET ORAL at 14:36

## 2023-01-24 RX ADMIN — ENOXAPARIN SODIUM 40 MILLIGRAM(S): 100 INJECTION SUBCUTANEOUS at 08:21

## 2023-01-24 RX ADMIN — Medication 650 MILLIGRAM(S): at 17:46

## 2023-01-24 RX ADMIN — Medication 650 MILLIGRAM(S): at 00:22

## 2023-01-24 RX ADMIN — OXYCODONE HYDROCHLORIDE 5 MILLIGRAM(S): 5 TABLET ORAL at 23:11

## 2023-01-24 RX ADMIN — AMLODIPINE BESYLATE 5 MILLIGRAM(S): 2.5 TABLET ORAL at 06:35

## 2023-01-24 RX ADMIN — Medication 650 MILLIGRAM(S): at 12:39

## 2023-01-24 RX ADMIN — OXYCODONE HYDROCHLORIDE 5 MILLIGRAM(S): 5 TABLET ORAL at 00:22

## 2023-01-24 RX ADMIN — Medication 650 MILLIGRAM(S): at 06:32

## 2023-01-24 RX ADMIN — Medication 650 MILLIGRAM(S): at 05:32

## 2023-01-24 NOTE — PROGRESS NOTE ADULT - PROBLEM SELECTOR PLAN 6
F: LR 60cc/hr   E: replete as needed  N: DASH/TLC diet   DVT ppx: lovenox   Dispo: [facility]

## 2023-01-24 NOTE — PROGRESS NOTE ADULT - PROBLEM SELECTOR PROBLEM 5
Leukocytosis
Hypophosphatemia
Leukocytosis

## 2023-01-24 NOTE — PROGRESS NOTE ADULT - PROBLEM SELECTOR PLAN 4
History of HTN on home amlodipine 5mg qd   -monitor BPs; hypertensive on admission however likely 2/2 pain from fractures   -c/w home amlodipine   -pain control
likely reactive, due to fractures; resolved; no e/o acute bacterial infection; monitor CBC off abx
History of HTN on home amlodipine 5mg qd   -monitor BPs; hypertensive on admission however likely 2/2 pain from fractures   -c/w home amlodipine   -pain control

## 2023-01-24 NOTE — PROGRESS NOTE ADULT - PROBLEM SELECTOR PLAN 5
WBC 16.75 on admission, likely reactive in setting of fractures. Patient afebrile, no signs of infection. Monitor off antibiotics for now.   -continue to trend CBC
repleted
WBC 16.75 on admission, likely reactive in setting of fractures. Patient afebrile, no signs of infection. Monitor off antibiotics for now.   -continue to trend CBC

## 2023-01-24 NOTE — PROGRESS NOTE ADULT - SUBJECTIVE AND OBJECTIVE BOX
Patient is a 89y old  Female who presents with a chief complaint of DISTAL RADIUS FRACTURE     (23 Jan 2023 12:54)      INTERVAL HPI/OVERNIGHT EVENTS:    Pt. seen and examined earlier today  Pt. would prefer to go home than wait for [facility] placement  Pt. c/o minimal R groin and R wrist pain, well-controlled w/ Tylenol    Review of Systems: 12 point review of systems otherwise negative    MEDICATIONS  (STANDING):  acetaminophen     Tablet .. 650 milliGRAM(s) Oral every 6 hours  amLODIPine   Tablet 5 milliGRAM(s) Oral every 24 hours  enoxaparin Injectable 40 milliGRAM(s) SubCutaneous every 24 hours  lactated ringers. 1000 milliLiter(s) (60 mL/Hr) IV Continuous <Continuous>  lidocaine   4% Patch 1 Patch Transdermal daily  polyethylene glycol 3350 17 Gram(s) Oral two times a day  senna 2 Tablet(s) Oral at bedtime    MEDICATIONS  (PRN):  oxyCODONE    IR 5 milliGRAM(s) Oral every 6 hours PRN Severe Pain (7 - 10)      Allergies    No Known Allergies    Intolerances          Vital Signs Last 24 Hrs  T(C): 36.7 (24 Jan 2023 08:15), Max: 36.7 (24 Jan 2023 08:15)  T(F): 98.1 (24 Jan 2023 08:15), Max: 98.1 (24 Jan 2023 08:15)  HR: 79 (24 Jan 2023 08:15) (64 - 80)  BP: 148/85 (24 Jan 2023 08:15) (110/71 - 181/77)  BP(mean): --  RR: 17 (24 Jan 2023 08:15) (16 - 17)  SpO2: 96% (24 Jan 2023 08:15) (96% - 98%)    Parameters below as of 24 Jan 2023 08:15  Patient On (Oxygen Delivery Method): room air      CAPILLARY BLOOD GLUCOSE          01-23 @ 07:01  -  01-24 @ 07:00  --------------------------------------------------------  IN: 360 mL / OUT: 400 mL / NET: -40 mL    01-24 @ 07:01  -  01-24 @ 14:52  --------------------------------------------------------  IN: 240 mL / OUT: 300 mL / NET: -60 mL        Physical Exam:  (earlier today)  Daily     Daily   General:  comfortable-appearing in NAD  HEENT:  MMM  CV:  RRR  Lungs:  CTA B/L anteriorly  Abdomen:  soft NT ND  Extremities:  RUE splint  Skin:  WWP  Neuro:  AAOx3      LABS:

## 2023-01-24 NOTE — PROGRESS NOTE ADULT - PROBLEM SELECTOR PROBLEM 4
HTN (hypertension)
Leukocytosis
HTN (hypertension)

## 2023-01-24 NOTE — PROGRESS NOTE ADULT - SUBJECTIVE AND OBJECTIVE BOX
----- INTERVAL HPI/OVERNIGHT EVENTS -----     Patient was seen and examined at bedside. As per nurse and patient, no o/n events, patient resting comfortably. No complaints at this time. Patient denies: fever, chills, dizziness, weakness, HA, Changes in vision, CP, palpitations, SOB, cough, N/V/D/C, dysuria, changes in bowel movements, LE edema. ROS otherwise negative.      Subjective: Patient is a 89y old  Female who presents with a chief complaint of fall (24 Jan 2023 14:51)      ----- VITAL SIGNS -----  T(F): 98.1 (01-24-23 @ 08:15)  HR: 79 (01-24-23 @ 08:15)  BP: 148/85 (01-24-23 @ 08:15)  RR: 17 (01-24-23 @ 08:15)  SpO2: 96% (01-24-23 @ 08:15)  Wt(kg): --      01-23-23 @ 07:01  -  01-24-23 @ 07:00  --------------------------------------------------------  IN: 360 mL / OUT: 400 mL / NET: -40 mL    01-24-23 @ 07:01  -  01-24-23 @ 16:57  --------------------------------------------------------  IN: 240 mL / OUT: 300 mL / NET: -60 mL        ----- Physical Exam -----     Constitutional: resting comfortably in bed; NAD  HEENT:  anicteric sclera, no nasal discharge; uvula midline, no oropharyngeal erythema or exudates, MMM  Respiratory: CTA B/L; no W/R/R, no retractions  Cardiac: +S1/S2; RRR; no M/R/G appreciated  Gastrointestinal: abdomen soft, NT/ND, +BS, no rebound tenderness or guarding  Back: no CVAT B/L  Extremities: legs WWP, no clubbing or cyanosis; no peripheral edema  Vascular: 2+ distal pedal pulses B/L  Dermatologic: skin warm, dry and intact; no rashes, wounds, or scars  Neurologic: AAOx3; CNII-XII grossly intact; strength 5/5 and sensation intact in all 4 extremities; no focal deficits  Psychiatric: affect and characteristics of appearance, verbalizations, behaviors are appropriate    MEDICATIONS  (STANDING):  acetaminophen     Tablet .. 650 milliGRAM(s) Oral every 6 hours  amLODIPine   Tablet 5 milliGRAM(s) Oral every 24 hours  enoxaparin Injectable 40 milliGRAM(s) SubCutaneous every 24 hours  lactated ringers. 1000 milliLiter(s) (60 mL/Hr) IV Continuous <Continuous>  lidocaine   4% Patch 1 Patch Transdermal daily  polyethylene glycol 3350 17 Gram(s) Oral two times a day  senna 2 Tablet(s) Oral at bedtime    MEDICATIONS  (PRN):  oxyCODONE    IR 5 milliGRAM(s) Oral every 6 hours PRN Severe Pain (7 - 10)      Allergies    No Known Allergies    Intolerances        ----- LABS -----                   ----- RADIOLOGY & ADDITIONAL TESTS -----     Reviewed

## 2023-01-25 ENCOUNTER — TRANSCRIPTION ENCOUNTER (OUTPATIENT)
Age: 88
End: 2023-01-25

## 2023-01-25 VITALS
RESPIRATION RATE: 17 BRPM | OXYGEN SATURATION: 96 % | SYSTOLIC BLOOD PRESSURE: 141 MMHG | HEART RATE: 72 BPM | DIASTOLIC BLOOD PRESSURE: 72 MMHG | TEMPERATURE: 98 F

## 2023-01-25 PROBLEM — E78.5 HYPERLIPIDEMIA, UNSPECIFIED: Chronic | Status: ACTIVE | Noted: 2023-01-18

## 2023-01-25 PROBLEM — R42 DIZZINESS AND GIDDINESS: Chronic | Status: ACTIVE | Noted: 2023-01-18

## 2023-01-25 PROBLEM — I10 ESSENTIAL (PRIMARY) HYPERTENSION: Chronic | Status: ACTIVE | Noted: 2023-01-17

## 2023-01-25 PROCEDURE — 96374 THER/PROPH/DIAG INJ IV PUSH: CPT

## 2023-01-25 PROCEDURE — 84100 ASSAY OF PHOSPHORUS: CPT

## 2023-01-25 PROCEDURE — 87635 SARS-COV-2 COVID-19 AMP PRB: CPT

## 2023-01-25 PROCEDURE — 70450 CT HEAD/BRAIN W/O DYE: CPT | Mod: MA

## 2023-01-25 PROCEDURE — 97162 PT EVAL MOD COMPLEX 30 MIN: CPT

## 2023-01-25 PROCEDURE — 97535 SELF CARE MNGMENT TRAINING: CPT

## 2023-01-25 PROCEDURE — 99239 HOSP IP/OBS DSCHRG MGMT >30: CPT | Mod: GC

## 2023-01-25 PROCEDURE — 97112 NEUROMUSCULAR REEDUCATION: CPT

## 2023-01-25 PROCEDURE — 80053 COMPREHEN METABOLIC PANEL: CPT

## 2023-01-25 PROCEDURE — 96372 THER/PROPH/DIAG INJ SC/IM: CPT | Mod: XU

## 2023-01-25 PROCEDURE — 85027 COMPLETE CBC AUTOMATED: CPT

## 2023-01-25 PROCEDURE — U0003: CPT

## 2023-01-25 PROCEDURE — 72125 CT NECK SPINE W/O DYE: CPT | Mod: MA

## 2023-01-25 PROCEDURE — 97530 THERAPEUTIC ACTIVITIES: CPT

## 2023-01-25 PROCEDURE — 72192 CT PELVIS W/O DYE: CPT | Mod: MG

## 2023-01-25 PROCEDURE — 82550 ASSAY OF CK (CPK): CPT

## 2023-01-25 PROCEDURE — 82962 GLUCOSE BLOOD TEST: CPT

## 2023-01-25 PROCEDURE — 85025 COMPLETE CBC W/AUTO DIFF WBC: CPT

## 2023-01-25 PROCEDURE — 99285 EMERGENCY DEPT VISIT HI MDM: CPT

## 2023-01-25 PROCEDURE — 80048 BASIC METABOLIC PNL TOTAL CA: CPT

## 2023-01-25 PROCEDURE — 73100 X-RAY EXAM OF WRIST: CPT

## 2023-01-25 PROCEDURE — 36415 COLL VENOUS BLD VENIPUNCTURE: CPT

## 2023-01-25 PROCEDURE — 97165 OT EVAL LOW COMPLEX 30 MIN: CPT

## 2023-01-25 PROCEDURE — 73523 X-RAY EXAM HIPS BI 5/> VIEWS: CPT

## 2023-01-25 PROCEDURE — U0005: CPT

## 2023-01-25 PROCEDURE — 96375 TX/PRO/DX INJ NEW DRUG ADDON: CPT

## 2023-01-25 PROCEDURE — 83735 ASSAY OF MAGNESIUM: CPT

## 2023-01-25 PROCEDURE — 97110 THERAPEUTIC EXERCISES: CPT

## 2023-01-25 PROCEDURE — G1004: CPT

## 2023-01-25 PROCEDURE — 97116 GAIT TRAINING THERAPY: CPT

## 2023-01-25 RX ORDER — OXYCODONE HYDROCHLORIDE 5 MG/1
1 TABLET ORAL
Qty: 12 | Refills: 0
Start: 2023-01-25 | End: 2023-01-27

## 2023-01-25 RX ADMIN — Medication 650 MILLIGRAM(S): at 08:23

## 2023-01-25 RX ADMIN — Medication 650 MILLIGRAM(S): at 09:23

## 2023-01-25 RX ADMIN — Medication 650 MILLIGRAM(S): at 01:36

## 2023-01-25 RX ADMIN — AMLODIPINE BESYLATE 5 MILLIGRAM(S): 2.5 TABLET ORAL at 08:23

## 2023-01-25 RX ADMIN — Medication 650 MILLIGRAM(S): at 00:36

## 2023-01-25 RX ADMIN — LIDOCAINE 1 PATCH: 4 CREAM TOPICAL at 07:00

## 2023-01-25 RX ADMIN — ENOXAPARIN SODIUM 40 MILLIGRAM(S): 100 INJECTION SUBCUTANEOUS at 08:22

## 2023-01-25 NOTE — PROGRESS NOTE ADULT - NSPROGADDITIONALINFOA_GEN_ALL_CORE
Plan to have PT reevaluate patient, pain is well controlled and she is ambulating by herself
DVT ppx: LMWH  Dispo: medically-clear for d/c to [facility]
DVT ppx: LMWH  Dispo: medically-clear for d/c to [facility]
DVT ppx: LMWH  Dispo: d/c home today w/ home care services
DVT ppx: LMWH  Dispo: d/c home today w/ home care services, family support

## 2023-01-25 NOTE — PROGRESS NOTE ADULT - ASSESSMENT
89 y/o F w/
89 y/o F w/
89 y/o F w/
Ms Santamaria is a 89F with PMH of HTN, HLD, vertigo who presents after a mechanical fall at home, found to have right hip fracture and right distal radius fracture, admitted for further management. Pending [facility] placement.    
Ms Santamaria is a 89F with PMH of HTN, HLD, vertigo who presents after a mechanical fall at home, found to have right hip fracture and right distal radius fracture, admitted for further management.    
89 y/o F w/
Ms Santamaria is a 89F with PMH of HTN, HLD, vertigo who presents after a mechanical fall at home, found to have right hip fracture and right distal radius fracture, admitted for further management. Pending [facility] placement.    
Ms Santamaria is a 89F with PMH of HTN, HLD, vertigo who presents after a mechanical fall at home, found to have right hip fracture and right distal radius fracture, admitted for further management.    
Ms Santamaria is a 89F with PMH of HTN, HLD, vertigo who presents after a mechanical fall at home, found to have right hip fracture and right distal radius fracture, admitted for further management. Pending [facility] placement.

## 2023-01-25 NOTE — PROGRESS NOTE ADULT - PROBLEM SELECTOR PLAN 1
CT pelvis reviewed, shows "nondisplaced fracture of the right inferior pubic ramus;" s/p mechanical fall at home; cont. non-operative mgmt per Ortho; pain control w/ Tylenol; PT WBAT
Patient presenting after a FOOSH injury at home.   -Xray right wrist: Frontal, and lateral views of the right wrist demonstrate cast overlying and impacted intra-articular fracture of the distal radius with improved anatomic alignment. There is fragmentation of the ulnar styloid.  -s/p reduction of right wrist by ortho in ED; per ortho no surgical intervention at this time and admit to medicine for PT eval as pt will need rolling walker   -NWB to RUE   -c/w splint and immobilization   -pain control with standing tylenol, oxycodone PO 5mg q6 prn moderate pain, and morphine 2mg IV q6 prn severe pain  -PT eval   -D/C to [facility]
Patient presenting after a FOOSH injury at home.   -Xray right wrist: Frontal, and lateral views of the right wrist demonstrate cast overlying and impacted intra-articular fracture of the distal radius with improved anatomic alignment. There is fragmentation of the ulnar styloid.  -s/p reduction of right wrist by ortho in ED; per ortho no surgical intervention at this time and admit to medicine for PT eval as pt will need rolling walker   -NWB to RUE   -c/w splint and immobilization   -pain control with standing tylenol, oxycodone PO 5mg q6 prn moderate pain, and morphine 2mg IV q6 prn severe pain  -PT eval   -D/C to [facility]
as seen on x-ray; s/p reduction by Ortho; cont. pain control, arm elevation; OT, platform walker
Patient presenting after a FOOSH injury at home.   -Xray right wrist: Frontal, and lateral views of the right wrist demonstrate cast overlying and impacted intra-articular fracture of the distal radius with improved anatomic alignment. There is fragmentation of the ulnar styloid.  -s/p reduction of right wrist by ortho in ED; per ortho no surgical intervention at this time and admit to medicine for PT eval as pt will need rolling walker   -NWB to RUE   -c/w splint and immobilization   -pain control with standing tylenol, oxycodone PO 5mg q6 prn moderate pain, and morphine 2mg IV q6 prn severe pain  -PT eval   -D/C to [facility]
CT pelvis reviewed, shows "nondisplaced fracture of the right inferior pubic ramus;" s/p mechanical fall at home; cont. non-operative mgmt per Ortho; pain control; PT WBAT
Patient presenting after a FOOSH injury at home.   -Xray right wrist: Frontal, and lateral views of the right wrist demonstrate cast overlying and impacted intra-articular fracture of the distal radius with improved anatomic alignment. There is fragmentation of the ulnar styloid.  -s/p reduction of right wrist by ortho in ED; per ortho no surgical intervention at this time and admit to medicine for PT eval as pt will need rolling walker   -NWB to RUE   -c/w splint and immobilization   -pain control with standing tylenol, oxycodone PO 5mg q6 prn moderate pain, and morphine 2mg IV q6 prn severe pain  -PT eval   -f/u further ortho recs
Patient presenting after a FOOSH injury at home.   -Xray right wrist: Frontal, and lateral views of the right wrist demonstrate cast overlying and impacted intra-articular fracture of the distal radius with improved anatomic alignment. There is fragmentation of the ulnar styloid.  -s/p reduction of right wrist by ortho in ED; per ortho no surgical intervention at this time and admit to medicine for PT eval as pt will need rolling walker   -NWB to RUE   -c/w splint and immobilization   -pain control with standing tylenol, oxycodone PO 5mg q6 prn moderate pain, and morphine 2mg IV q6 prn severe pain  -PT eval   -f/u further ortho recs
CT pelvis reviewed, shows "nondisplaced fracture of the right inferior pubic ramus;" s/p mechanical fall at home; cont. non-operative mgmt per Ortho; pain control w/ Tylenol ATC, will d/c standing ibuprofen; PT WBAT
Patient presenting after a FOOSH injury at home.   -Xray right wrist: Frontal, and lateral views of the right wrist demonstrate cast overlying and impacted intra-articular fracture of the distal radius with improved anatomic alignment. There is fragmentation of the ulnar styloid.  -s/p reduction of right wrist by ortho in ED; per ortho no surgical intervention at this time and admit to medicine for PT eval as pt will need rolling walker   -NWB to RUE   -c/w splint and immobilization   -pain control with standing tylenol, oxycodone PO 5mg q6 prn moderate pain, and morphine 2mg IV q6 prn severe pain  -PT eval   -D/C to [facility]
Patient presenting after a FOOSH injury at home.   -Xray right wrist: Frontal, and lateral views of the right wrist demonstrate cast overlying and impacted intra-articular fracture of the distal radius with improved anatomic alignment. There is fragmentation of the ulnar styloid.  -s/p reduction of right wrist by ortho in ED; per ortho no surgical intervention at this time and admit to medicine for PT eval as pt will need rolling walker   -NWB to RUE   -c/w splint and immobilization   -pain control with standing tylenol, oxycodone PO 5mg q6 prn moderate pain, and morphine 2mg IV q6 prn severe pain  -PT eval   -D/C to [facility]

## 2023-01-25 NOTE — PROGRESS NOTE ADULT - PROBLEM SELECTOR PROBLEM 3
Fall
Essential hypertension
Essential hypertension
Fall
Essential hypertension
Fall
Essential hypertension
Fall
Fall

## 2023-01-25 NOTE — PROGRESS NOTE ADULT - PROBLEM SELECTOR PLAN 2
Patient presenting after mechanical fall at home. CT pelvis showing nondisplaced fracture of the right inferior pubic ramus.  -Xray hip/pelvis: Frontal view of the pelvis demonstrates a nondisplaced fracture right inferior pubic ramus. Narrowing of the bilateral hip joints with periarticular osteophytosis.   -ortho consulted in ED; no surgical intervention at this time; will need PT eval and rolling walker   -f/u further ortho recs   -PT eval
CT pelvis reviewed, shows "nondisplaced fracture of the right inferior pubic ramus;" s/p mechanical fall at home; cont. non-operative mgmt per Ortho; pain control w/ Tylenol; PT WBAT
Patient presenting after mechanical fall at home. CT pelvis showing nondisplaced fracture of the right inferior pubic ramus.  -Xray hip/pelvis: Frontal view of the pelvis demonstrates a nondisplaced fracture right inferior pubic ramus. Narrowing of the bilateral hip joints with periarticular osteophytosis.   -ortho consulted in ED; no surgical intervention at this time; will need PT eval and rolling walker
as seen on x-ray; s/p reduction by Ortho; cont. pain control, arm elevation; OT, platform walker
Patient presenting after mechanical fall at home. CT pelvis showing nondisplaced fracture of the right inferior pubic ramus.  -Xray hip/pelvis: Frontal view of the pelvis demonstrates a nondisplaced fracture right inferior pubic ramus. Narrowing of the bilateral hip joints with periarticular osteophytosis.   -ortho consulted in ED; no surgical intervention at this time; will need PT eval and rolling walker
as seen on x-ray; s/p reduction by Ortho; cont. pain control, arm elevation; OT, platform walker
Patient presenting after mechanical fall at home. CT pelvis showing nondisplaced fracture of the right inferior pubic ramus.  -Xray hip/pelvis: Frontal view of the pelvis demonstrates a nondisplaced fracture right inferior pubic ramus. Narrowing of the bilateral hip joints with periarticular osteophytosis.   -ortho consulted in ED; no surgical intervention at this time; will need PT eval and rolling walker   -f/u further ortho recs   -PT eval
as seen on x-ray; s/p reduction by Ortho; cont. pain control, arm elevation; OT, platform walker
Patient presenting after mechanical fall at home. CT pelvis showing nondisplaced fracture of the right inferior pubic ramus.  -Xray hip/pelvis: Frontal view of the pelvis demonstrates a nondisplaced fracture right inferior pubic ramus. Narrowing of the bilateral hip joints with periarticular osteophytosis.   -ortho consulted in ED; no surgical intervention at this time; will need PT eval and rolling walker

## 2023-01-25 NOTE — PROGRESS NOTE ADULT - TIME BILLING
coordination of care  d/c planning  d/w Medicine residents

## 2023-01-25 NOTE — PROGRESS NOTE ADULT - PROVIDER SPECIALTY LIST ADULT
Internal Medicine
Hospitalist
Hospitalist
Internal Medicine
Hospitalist
Internal Medicine
Internal Medicine

## 2023-01-25 NOTE — PROGRESS NOTE ADULT - PROBLEM SELECTOR PROBLEM 2
Inferior pubic ramus fracture
Inferior pubic ramus fracture
Closed fracture of right inferior pubic ramus, initial encounter
Fracture of right distal radius
Inferior pubic ramus fracture
Fracture of right distal radius
Inferior pubic ramus fracture
Inferior pubic ramus fracture
Fracture of right distal radius
Inferior pubic ramus fracture
Inferior pubic ramus fracture

## 2023-01-25 NOTE — DISCHARGE NOTE NURSING/CASE MANAGEMENT/SOCIAL WORK - PATIENT PORTAL LINK FT
You can access the FollowMyHealth Patient Portal offered by [facility] by registering at the following website: [email or web address]. By joining [employer]’s FollowMyHealth portal, you will also be able to view your health information using other applications (apps) compatible with our system.
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
## 2023-01-25 NOTE — PROGRESS NOTE ADULT - PROBLEM SELECTOR PROBLEM 1
Closed fracture of right inferior pubic ramus, initial encounter
Fracture of right distal radius
Right wrist fracture
Right wrist fracture
Closed fracture of right inferior pubic ramus, initial encounter
Right wrist fracture
Closed fracture of right inferior pubic ramus, initial encounter
Right wrist fracture
Right wrist fracture

## 2023-01-25 NOTE — PROGRESS NOTE ADULT - SUBJECTIVE AND OBJECTIVE BOX
Patient is a 89y old  Female who presents with a chief complaint of fall (25 Jan 2023 06:27)      INTERVAL HPI/OVERNIGHT EVENTS:    Pt. seen and examined earlier today  Pt. wants to go home  R groin and R wrist pain well-controlled w/ Tylenol  No new complaints    Review of Systems: 12 point review of systems otherwise negative    MEDICATIONS  (STANDING):  acetaminophen     Tablet .. 650 milliGRAM(s) Oral every 6 hours  amLODIPine   Tablet 5 milliGRAM(s) Oral every 24 hours  enoxaparin Injectable 40 milliGRAM(s) SubCutaneous every 24 hours  lactated ringers. 1000 milliLiter(s) (60 mL/Hr) IV Continuous <Continuous>  lidocaine   4% Patch 1 Patch Transdermal daily  polyethylene glycol 3350 17 Gram(s) Oral two times a day  senna 2 Tablet(s) Oral at bedtime    MEDICATIONS  (PRN):  oxyCODONE    IR 5 milliGRAM(s) Oral every 6 hours PRN Severe Pain (7 - 10)      Allergies    No Known Allergies    Intolerances          Vital Signs Last 24 Hrs  T(C): 36.7 (25 Jan 2023 09:30), Max: 36.7 (24 Jan 2023 17:06)  T(F): 98.1 (25 Jan 2023 09:30), Max: 98.1 (25 Jan 2023 09:30)  HR: 72 (25 Jan 2023 09:30) (68 - 78)  BP: 141/72 (25 Jan 2023 09:30) (135/82 - 177/72)  BP(mean): --  RR: 17 (25 Jan 2023 09:30) (16 - 17)  SpO2: 96% (25 Jan 2023 09:30) (95% - 96%)    Parameters below as of 25 Jan 2023 09:30  Patient On (Oxygen Delivery Method): room air      CAPILLARY BLOOD GLUCOSE          01-24 @ 07:01  -  01-25 @ 07:00  --------------------------------------------------------  IN: 240 mL / OUT: 300 mL / NET: -60 mL        Physical Exam:  (earlier today)  Daily     Daily   General:  comfortable-appearing in NAD  HEENT:  MMM  Extremities:  RUE splint  Skin:  WWP  Neuro:  AAOx3    LABS:

## 2023-01-25 NOTE — DISCHARGE NOTE NURSING/CASE MANAGEMENT/SOCIAL WORK - NSDCPEFALRISK_GEN_ALL_CORE
For information on Fall & Injury Prevention, visit: [email or web address] OR  [email or web address] OR  https://www.cdc.gov/steadi/patient.html
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
## 2023-01-25 NOTE — DISCHARGE NOTE NURSING/CASE MANAGEMENT/SOCIAL WORK - NSDCFUADDAPPT_GEN_ALL_CORE_FT
(1) Please follow up with your out patient primary care provider.    Please bring your Insurance card, Photo ID and Discharge paperwork to the following appointment:    (2) Please follow up with your Orthopaedic Surgery Provider, Dr. Tanner Fairchild at [address] [place] [address], [address] Floor, [place] [address] on 01/30/2023 at 12:45pm.    Appointment was scheduled by Ms. BRENNEN Cuenca, Referral Coordinator.

## 2023-01-25 NOTE — DISCHARGE NOTE NURSING/CASE MANAGEMENT/SOCIAL WORK - NSDCVIVACCINE_GEN_ALL_CORE_FT
Tdap; 08-Dec-2022 12:14; Marnie Brady (RN); [employer]; [id number] (Exp. Date: 08-Dec-2024); IntraMuscular; Deltoid Right.; 0.5 milliLiter(s); VIS (VIS Published: 09-May-2013, VIS Presented: 08-Dec-2022);

## 2023-01-30 ENCOUNTER — APPOINTMENT (OUTPATIENT)
Dept: ORTHOPEDIC SURGERY | Facility: CLINIC | Age: 88
End: 2023-01-30

## 2023-01-30 DIAGNOSIS — Z20.822 CONTACT WITH AND (SUSPECTED) EXPOSURE TO COVID-19: ICD-10-CM

## 2023-01-30 DIAGNOSIS — S52.591A OTHER FRACTURES OF LOWER END OF RIGHT RADIUS, INITIAL ENCOUNTER FOR CLOSED FRACTURE: ICD-10-CM
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
## 2023-01-30 DIAGNOSIS — I10 ESSENTIAL (PRIMARY) HYPERTENSION: ICD-10-CM

## 2023-01-30 DIAGNOSIS — S32.591A OTHER SPECIFIED FRACTURE OF RIGHT PUBIS, INITIAL ENCOUNTER FOR CLOSED FRACTURE: ICD-10-CM

## 2023-01-30 DIAGNOSIS — D72.829 ELEVATED WHITE BLOOD CELL COUNT, UNSPECIFIED: ICD-10-CM

## 2023-01-30 DIAGNOSIS — S52.571D OTHER INTRAARTICULAR FRACTURE OF LOWER END OF RIGHT RADIUS, SUBSEQUENT ENCOUNTER FOR CLOSED FRACTURE WITH ROUTINE HEALING: ICD-10-CM
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
## 2023-01-30 DIAGNOSIS — E83.39 OTHER DISORDERS OF PHOSPHORUS METABOLISM: ICD-10-CM

## 2023-01-30 DIAGNOSIS — S52.501A UNSPECIFIED FRACTURE OF THE LOWER END OF RIGHT RADIUS, INITIAL ENCOUNTER FOR CLOSED FRACTURE: ICD-10-CM

## 2023-10-25 NOTE — PHYSICAL THERAPY INITIAL EVALUATION ADULT - STANDING BALANCE: STATIC
The patient's goals for the shift include    Problem: Pain  Goal: My pain/discomfort is manageable  Outcome: Met     Problem: Safety  Goal: I will remain free of falls  10/25/2023 1735 by Avril Whitley RN  Outcome: Met  10/25/2023 1400 by Avril Whitley RN  Flowsheets (Taken 10/25/2023 1359)  Resident will remain free of falls: Apply bed/chair alarms as appropriate     Problem: Daily Care  Goal: Daily care needs are met  10/25/2023 1735 by Avril Whitley RN  Outcome: Met  10/25/2023 1400 by Avril Whitley RN  Flowsheets (Taken 10/25/2023 1400)  Daily care needs are met: Encourage independent activity per ability     Problem: Skin  Goal: Participates in plan/prevention/treatment measures  Outcome: Met  Goal: Prevent/manage excess moisture  Outcome: Met       The clinical goals for the shift include patient will partipate in care this shift    Over the shift, the patient did make progress toward the following goals.    fair minus

## 2023-11-29 DIAGNOSIS — Z91.81 HISTORY OF RECENT FALL: Primary | ICD-10-CM

## 2023-11-29 DIAGNOSIS — R26.81 GAIT INSTABILITY: ICD-10-CM

## 2023-11-29 DIAGNOSIS — Z91.81 RISK FOR FALLS: ICD-10-CM

## 2023-12-06 ENCOUNTER — CLINICAL SUPPORT (OUTPATIENT)
Dept: REHABILITATION | Facility: OTHER | Age: 88
End: 2023-12-06
Payer: MEDICARE

## 2023-12-06 DIAGNOSIS — Z91.81 HISTORY OF RECENT FALL: ICD-10-CM

## 2023-12-06 DIAGNOSIS — Z74.09 IMPAIRED FUNCTIONAL MOBILITY, BALANCE, GAIT, AND ENDURANCE: Primary | ICD-10-CM

## 2023-12-06 DIAGNOSIS — M25.551 CHRONIC RIGHT HIP PAIN: ICD-10-CM

## 2023-12-06 DIAGNOSIS — R26.81 GAIT INSTABILITY: ICD-10-CM

## 2023-12-06 DIAGNOSIS — G89.29 CHRONIC RIGHT HIP PAIN: ICD-10-CM

## 2023-12-06 DIAGNOSIS — Z91.81 RISK FOR FALLS: ICD-10-CM

## 2023-12-06 PROCEDURE — 97163 PT EVAL HIGH COMPLEX 45 MIN: CPT | Mod: PN

## 2023-12-06 PROCEDURE — 97112 NEUROMUSCULAR REEDUCATION: CPT | Mod: PN

## 2023-12-06 PROCEDURE — 97530 THERAPEUTIC ACTIVITIES: CPT | Mod: PN

## 2023-12-06 NOTE — PLAN OF CARE
[facility]   Physical Therapy Initial Evaluation      Name: Eden Cardenas  Clinic Number: [id number]    Therapy Diagnosis:   Encounter Diagnoses   Name Primary?    History of recent fall     Gait instability     Risk for falls     Impaired functional mobility, balance, gait, and endurance Yes    Chronic right hip pain        Physician: Yonathan Rea NP    Physician Orders: PT Eval and Treat -   Medical Diagnosis from Referral: Z91.81 (ICD-10-CM) - History of recent fall R26.81 (ICD-10-CM) - Gait instability Z91.81 (ICD-10-CM) - Risk for falls  Evaluation Date: 12/6/2023  Authorization Period Expiration: 12/03/2024  Plan of Care Expiration: 3/6/2024  Progress Note Due: 1/6/2024  Date of Surgery: n/a  Visit # / Visits authorized: 1/ 1   FOTO: 1/ 3    Precautions: Standard, HTN, glaucoma/visual deficits, FALL risk    Time In: 10:45 am  Time Out: 11:30 am  Total Billable Time: 45 minutes    Subjective     Date of onset: chronic    History of current condition - Eden reports: Recently moved to [place] from [facility] several weeks ago after  passed away. Currently living with granddaughter and her family. Notes new onset of R leg pain and falls that started about a year ago, but has gradually worsened with time. Notes she has a fall at least 1x per month; her most recent fall was 2 weeks ago, and she had 3 falls in one day. Has occasional feelings of leg buckling before a fall, but is unsure if this is always a precursor. Recently got a rollaid walker which she has found helps stabilize her with walking but she continues to have weakness and feelings of imbalance with prolonged walking as well as difficulty with stair climbing.       Falls: 3 in the past week    Imaging: none in EPIC    Prior Therapy: none for c/c  Social History: stairs to front door, 2nd story home, bedoom on basement level, lives with their family  Occupation: retired  Recreation: walking  Prior Level of Function: indep but  "with falls  Current Level of Function: mod I with RW, frequent falls, pain and difficulty with all functional activities    Pain:  Current 8/10, worst 10/10, best 8/10   Location: R hip and leg [indicates ITB, anterior hip, occas the R lat knee]  Description: Aching, Dull, and "arthritic pain"  Aggravating Factors: walking, transition movements, stairs (ascend > descend, uses handrails)  Easing Factors: tramadole, heating pad, activity modification    Patients goals: to improve balance to reduce falls     Medical History:   No past medical history on file.    Surgical History:   Eden Cardenas  has no past surgical history on file.    Medications:   Eden currently has no medications in their medication list.    Allergies:   Review of patient's allergies indicates:  Not on File     Objective      Postural examination/scapula alignment: Increased kyphosis and Decreased lordosis  Joint integrity: Firm end feeling on R hip with pain prior to endrange during attempts at joint mobs  Skin integrity: Intact  Edema: none      Range of Motion    *indicates pain Right Left    AROM/PROM AROM/PROM   Hip flexion 80*/90 deg 110/120   Extension Lacks 10*/ lacks 10* deg 0 deg   Abduction Poor* fair   Adduction UA to midline* To midline   ER 5*/15* 20/30 deg   IR 0*/10* 20/30 deg   Knee ext WNL WNL   Knee flexion WNL WNL   DF Min delarosa, tight mm EF Min delarosa, tight mm EF   PF WNL WNL   INV WNL WNL   EV WNL WNL     Lower Extremity Strength  Right LE  Left LE    Hip flexion: 4-/5 Hip flexion: 4/5   Hip extension:  3+/5 Hip extension: 4-/5   Hip abduction: 3+/5 Hip abduction: 4-/5   Hip adduction:  4/5 Hip adduction:  4+/5   Hip Internal rotation   2/5 Hip Internal rotation 3+/5   Knee Flexion 4/5 Knee Flexion 4+/5   Knee Extension 4+/5 Knee Extension 4+/5   Ankle dorsiflexion: 5/5 Ankle dorsiflexion: 5/5   Ankle plantarflexion: 4/5 Ankle plantarflexion: 4+/5       GAIT:  Assistive Device used: rollaider  Level of Assistance: mod " "I  Patient displays the following gait deviations:  unsteady gait, decreased step length, decreased base of support, antalgic gait, and decreased step height.     Bed Mobility:Independent  Transfers: Assistance - HHAx2     Evaluation   Timed Up and Go  >15 sec   Self Selected Walking Speed Bradykinetic, antalgic   Fast Walking Speed Min change   30 second Chair Rise 7 attempts in 30" but incomplete hip/leg ext and unable to perform full, upright posture/trunk ext, increased use of momentum into standing, poor control with descent  1 rep with correct form, full trunk/hip ext   2 Minute Walk Test 160 ft     Coordination: WFL  UE screen: NT 2* to time constraints    ORTA Assessment::: Total: 26  Maximum: 56  1. Sitting to Standing   2 - able to stand using hands after several tries  2. Standing Unsupported   3 - able to stand 2 minutes with supervision  3. Sitting Unsupported   4 - able to sit safely and securely 2 minutes  4. Standing to Sitting   1 - sit independently but has uncontrolled descent  5. Pivot Transfer   3 - able to transfer safely with definite use of hands  6. Standing with Eyes Closed   3 - able to stand 10 seconds with supervision  7. Standing with Feet Together   2 - able to place feet together independently but unable to hold for 30 seconds  8. Reaching Forward with Outstretched Arm   2 - can reach forward 5 cm/2 inches safely  9. Retrieving Object from Floor   1 - unable to  and needs supervision while trying  10. Turning to Look Behind   2 - turns sideways only but maintains balance  11. Turning 360 Degrees   1 - needs close supervision or verbal cueing -- 8 to 10 sec ea direction  12. Placing Alternate Foot on Step   0 - needs assist to keep from falling/unable to try using 8 inch step (modified using 6 inch step, with intermittent HHAx1-2, LOB and freq falls after 2 steps)  13. Standing with One Foot in Front   1 - need help to step but can hold 15 seconds  14. Standing on One Foot   1 - " "tries to lift leg and unable to hold 3 seconds but remains standing independently        Intake Outcome Measure for FOTO balance Survey    Therapist reviewed FOTO scores for Eden Cardenas on 12/6/2023.   FOTO report - see Media section or FOTO account episode details.             Treatment     Total Treatment time (time-based codes) separate from Evaluation: 23 minutes     Eden received the treatments listed below:      therapeutic exercises to develop strength, endurance, ROM, flexibility, posture, and core stabilization for 00 minutes including:  None today    manual therapy techniques: Joint mobilizations, Myofacial release, and Soft tissue Mobilization were applied to the: R leg/hip for 00 minutes, including:  None  Consider long axis distraction for RLE next visit    neuromuscular re-education activities to improve: Balance, Coordination, Kinesthetic, Sense, Proprioception, and Posture for 8 minutes. The following activities were included:  HL bridge x 5  SL clam x 5  SL reverse clam x 5     therapeutic activities to improve functional performance for 15  minutes, including:  Pt edu on dx, pathogenesis, prognosis, PT POC  Pt edu on importance of compliance and consistency with HEP to promote R hip mobility, strength, and overall tolerance with balance   SLS x 3 x 30" - encouraged HHAx2 on kitchen counter   Sit to stands x 10   HL bridge x 10  R SL clam x 10  R SL reverse clam x 10        Patient Education and Home Exercises     Education provided:   - Patient educated regarding pathogenesis, diagnosis, protocol, prognosis, POC, and HEP, including use of visual assistance for understanding of anatomy and dysfunction. Written Home Exercises Provided with written and verbal instructions for frequency and duration of the following exercises: see list. Pt educated on HEP and activity modifications to reduce c/o pain and improve overall function.   - Pt was educated in posture and body mechanics.  Use of a lumbar " roll was recommended and demonstrated here today.  Purchase information provided.   - Pt also educated on use of modalities prn to reduce c/o pain and dysfunction.         Written Home Exercises Provided: Patient instructed to cont prior HEP. Exercises were reviewed and Eden was able to demonstrate them prior to the end of the session.  Eden demonstrated good  understanding of the education provided. See EMR under Patient Instructions for exercises provided during therapy sessions.    Assessment     Eden is a 89 y.o. female referred to outpatient Physical Therapy with a medical diagnosis of Z91.81 (ICD-10-CM) - History of recent fall R26.81 (ICD-10-CM) - Gait instability Z91.81 (ICD-10-CM) - Risk for falls. Patient presents with marked limitations in ROM, joint and myofascial mobility, flexibility, strength, postural awareness/endurance, motor control and coordination. S/s associated with referring diagnosis, with marked limitations in R hip mobility, strength, stability due to Hx of OA and chronic pain. Impairments limit pt with all functional activities including safety with walking, stair climbing to bedroom, transfers.      Patient prognosis is Good.   Patient will benefit from skilled outpatient Physical Therapy to address the deficits stated above and in the chart below, provide patient /family education, and to maximize patientt's level of independence.     Plan of care discussed with patient: Yes  Patient's spiritual, cultural and educational needs considered and patient is agreeable to the plan of care and goals as stated below:     Anticipated Barriers for therapy: standard, age    Medical Necessity is demonstrated by the following  History  Co-morbidities and personal factors that may impact the plan of care [] LOW: no personal factors / co-morbidities  [] MODERATE: 1-2 personal factors / co-morbidities  [x] HIGH: 3+ personal factors / co-morbidities    Moderate / High Support Documentation:  "  Co-morbidities affecting plan of care: fall risk, hx R hip OA    Personal Factors:   age  social background  lifestyle     Examination  Body Structures and Functions, activity limitations and participation restrictions that may impact the plan of care [] LOW: addressing 1-2 elements  [] MODERATE: 3+ elements  [x] HIGH: 4+ elements (please support below)    Moderate / High Support Documentation: R hip ROM, flexibility, global strength, balance, motor control/coordination, postural awareness/endurance, ADLs, HHCs, self care, amb I around house and in community     Clinical Presentation [] LOW: stable  [] MODERATE: Evolving  [x] HIGH: Unstable     Decision Making/ Complexity Score: high       Goals:  Short Term Goals: 6 weeks   Pt to improve LE strength by a half grade to allow for increased ease with stair climbing.  Pt able to perform 5x sit to stands in 30" with appropriate form/technique to indicate improving functional strength and endurance.  Pt to improve right hip IR/ER ROM by 5 degrees to increased ease with ambulation.  Pt to improve static balance with ORTA balance score to >/=36/56 to reduce fall risk with ADLs.    Long Term Goals: 12 weeks   Pt to improve LE strength to >/=4+/5 to allow for increased ease with stair climbing.  Pt able to perform 8x sit to stands in 30" with appropriate form/technique to indicate improving functional strength and endurance.  Pt to improve right hip IR/ER ROM by 10 degrees to increased ease with ambulation.  Pt to improve static balance with ORTA balance score to >/=46/56 to reduce fall risk with ADLs.    Plan     Plan of care Certification: 12/6/2023 to 3/6/2024.    Outpatient Physical Therapy 2 times weekly for 12 weeks to include the following interventions: Aquatic Therapy, Cervical/Lumbar Traction, Electrical Stimulation prn, Gait Training, Iontophoresis (with iontophoresis PRN), Manual Therapy, Moist Heat/ Ice, Neuromuscular Re-ed, Patient Education, Self Care, " Therapeutic Activities, and Therapeutic Exercise. Progress HEP towards D/C. Recommend F/U with MD if symptoms worsen or do not resolve. Patient may be seen by a PTA for treatment to carry out their plan of care.  Face-to-face conferences will be held.      Valorie Merchant, PT        Physician's Signature: _________________________________________ Date: ________________

## 2023-12-09 ENCOUNTER — HOSPITAL ENCOUNTER (INPATIENT)
Facility: HOSPITAL | Age: 88
LOS: 10 days | Discharge: NURSING, REHAB OR LONG-TERM CARE | DRG: 085 | End: 2023-12-19
Attending: EMERGENCY MEDICINE | Admitting: PSYCHIATRY & NEUROLOGY
Payer: MEDICARE

## 2023-12-09 DIAGNOSIS — R94.31 QT PROLONGATION: ICD-10-CM

## 2023-12-09 DIAGNOSIS — R41.0 DELIRIUM: ICD-10-CM

## 2023-12-09 DIAGNOSIS — S02.91XA CLOSED FRACTURE OF SKULL, UNSPECIFIED BONE, INITIAL ENCOUNTER: ICD-10-CM

## 2023-12-09 DIAGNOSIS — I60.9 SAH (SUBARACHNOID HEMORRHAGE): Primary | ICD-10-CM
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
## 2023-12-09 DIAGNOSIS — R41.82 ALTERED MENTAL STATUS: ICD-10-CM

## 2023-12-09 DIAGNOSIS — Z91.89 AT RISK FOR LONG QT SYNDROME: ICD-10-CM

## 2023-12-09 PROBLEM — G47.00 INSOMNIA: Status: ACTIVE | Noted: 2023-12-09

## 2023-12-09 PROBLEM — I10 ESSENTIAL (PRIMARY) HYPERTENSION: Status: ACTIVE | Noted: 2023-12-09

## 2023-12-09 PROBLEM — H40.9 GLAUCOMA: Status: ACTIVE | Noted: 2023-12-09

## 2023-12-09 PROBLEM — E78.2 MIXED HYPERLIPIDEMIA: Status: ACTIVE | Noted: 2023-12-09

## 2023-12-09 LAB
ABO GROUP BLD: NORMAL
ALBUMIN SERPL BCP-MCNC: 3.6 G/DL (ref 3.5–5.2)
ALP SERPL-CCNC: 103 U/L (ref 55–135)
ALT SERPL W/O P-5'-P-CCNC: 9 U/L (ref 10–44)
ANION GAP SERPL CALC-SCNC: 15 MMOL/L (ref 8–16)
APTT PPP: <21 SEC (ref 21–32)
AST SERPL-CCNC: 22 U/L (ref 10–40)
BACTERIA #/AREA URNS AUTO: NORMAL /HPF
BASOPHILS # BLD AUTO: 0.06 K/UL (ref 0–0.2)
BASOPHILS NFR BLD: 0.4 % (ref 0–1.9)
BILIRUB SERPL-MCNC: 0.3 MG/DL (ref 0.1–1)
BILIRUB UR QL STRIP: NEGATIVE
BLD GP AB SCN CELLS X3 SERPL QL: NORMAL
BUN SERPL-MCNC: 43 MG/DL (ref 10–30)
CALCIUM SERPL-MCNC: 9.2 MG/DL (ref 8.7–10.5)
CHLORIDE SERPL-SCNC: 103 MMOL/L (ref 95–110)
CHOLEST SERPL-MCNC: 218 MG/DL (ref 120–199)
CHOLEST/HDLC SERPL: 2.8 {RATIO} (ref 2–5)
CLARITY UR REFRACT.AUTO: CLEAR
CO2 SERPL-SCNC: 19 MMOL/L (ref 23–29)
COLOR UR AUTO: YELLOW
CREAT SERPL-MCNC: 1 MG/DL (ref 0.5–1.4)
DIFFERENTIAL METHOD: ABNORMAL
EOSINOPHIL # BLD AUTO: 0.2 K/UL (ref 0–0.5)
EOSINOPHIL NFR BLD: 1.1 % (ref 0–8)
ERYTHROCYTE [DISTWIDTH] IN BLOOD BY AUTOMATED COUNT: 12.7 % (ref 11.5–14.5)
EST. GFR  (NO RACE VARIABLE): 52.2 ML/MIN/1.73 M^2
ESTIMATED AVG GLUCOSE: 103 MG/DL (ref 68–131)
GLUCOSE SERPL-MCNC: 135 MG/DL (ref 70–110)
GLUCOSE UR QL STRIP: ABNORMAL
HBA1C MFR BLD: 5.2 % (ref 4–5.6)
HCT VFR BLD AUTO: 33.9 % (ref 37–48.5)
HCV AB SERPL QL IA: NORMAL
HDLC SERPL-MCNC: 77 MG/DL (ref 40–75)
HDLC SERPL: 35.3 % (ref 20–50)
HGB BLD-MCNC: 11.7 G/DL (ref 12–16)
HGB UR QL STRIP: NEGATIVE
HIV 1+2 AB+HIV1 P24 AG SERPL QL IA: NORMAL
IMM GRANULOCYTES # BLD AUTO: 0.06 K/UL (ref 0–0.04)
IMM GRANULOCYTES NFR BLD AUTO: 0.4 % (ref 0–0.5)
INR PPP: 1 (ref 0.8–1.2)
INR PPP: 1 (ref 0.8–1.2)
KETONES UR QL STRIP: ABNORMAL
LDLC SERPL CALC-MCNC: 122.4 MG/DL (ref 63–159)
LEUKOCYTE ESTERASE UR QL STRIP: NEGATIVE
LYMPHOCYTES # BLD AUTO: 2.4 K/UL (ref 1–4.8)
LYMPHOCYTES NFR BLD: 17.1 % (ref 18–48)
MAGNESIUM SERPL-MCNC: 2 MG/DL (ref 1.6–2.6)
MCH RBC QN AUTO: 32.4 PG (ref 27–31)
MCHC RBC AUTO-ENTMCNC: 34.5 G/DL (ref 32–36)
MCV RBC AUTO: 94 FL (ref 82–98)
MICROSCOPIC COMMENT: NORMAL
MONOCYTES # BLD AUTO: 1.1 K/UL (ref 0.3–1)
MONOCYTES NFR BLD: 7.5 % (ref 4–15)
NEUTROPHILS # BLD AUTO: 10.5 K/UL (ref 1.8–7.7)
NEUTROPHILS NFR BLD: 73.5 % (ref 38–73)
NITRITE UR QL STRIP: POSITIVE
NONHDLC SERPL-MCNC: 141 MG/DL
NRBC BLD-RTO: 0 /100 WBC
PH UR STRIP: 5 [PH] (ref 5–8)
PLATELET # BLD AUTO: 267 K/UL (ref 150–450)
PMV BLD AUTO: 10.2 FL (ref 9.2–12.9)
POCT GLUCOSE: 155 MG/DL (ref 70–110)
POTASSIUM SERPL-SCNC: 3 MMOL/L (ref 3.5–5.1)
PROT SERPL-MCNC: 7.1 G/DL (ref 6–8.4)
PROT UR QL STRIP: NEGATIVE
PROTHROMBIN TIME: 10.7 SEC (ref 9–12.5)
PROTHROMBIN TIME: 10.7 SEC (ref 9–12.5)
RBC # BLD AUTO: 3.61 M/UL (ref 4–5.4)
RBC #/AREA URNS AUTO: 1 /HPF (ref 0–4)
RH BLD: NORMAL
SODIUM SERPL-SCNC: 137 MMOL/L (ref 136–145)
SP GR UR STRIP: 1.02 (ref 1–1.03)
SPECIMEN OUTDATE: NORMAL
SQUAMOUS #/AREA URNS AUTO: 0 /HPF
T4 FREE SERPL-MCNC: 1.3 NG/DL (ref 0.71–1.51)
TRIGL SERPL-MCNC: 93 MG/DL (ref 30–150)
TROPONIN I SERPL DL<=0.01 NG/ML-MCNC: 0.01 NG/ML (ref 0–0.03)
TSH SERPL DL<=0.005 MIU/L-ACNC: 7.61 UIU/ML (ref 0.4–4)
URN SPEC COLLECT METH UR: ABNORMAL
WBC # BLD AUTO: 14.25 K/UL (ref 3.9–12.7)
WBC #/AREA URNS AUTO: 2 /HPF (ref 0–5)

## 2023-12-09 PROCEDURE — 81001 URINALYSIS AUTO W/SCOPE: CPT | Performed by: EMERGENCY MEDICINE

## 2023-12-09 PROCEDURE — 93005 ELECTROCARDIOGRAM TRACING: CPT

## 2023-12-09 PROCEDURE — 20000000 HC ICU ROOM

## 2023-12-09 PROCEDURE — 99223 1ST HOSP IP/OBS HIGH 75: CPT | Mod: ,,, | Performed by: NEUROLOGICAL SURGERY

## 2023-12-09 PROCEDURE — 25000003 PHARM REV CODE 250: Performed by: EMERGENCY MEDICINE

## 2023-12-09 PROCEDURE — 99223 PR INITIAL HOSPITAL CARE,LEVL III: ICD-10-PCS | Mod: ,,, | Performed by: NEUROLOGICAL SURGERY

## 2023-12-09 PROCEDURE — 95718 PR EEG, W/VIDEO, CONT RECORD, I&R, 2-12 HRS: ICD-10-PCS | Mod: ,,, | Performed by: PSYCHIATRY & NEUROLOGY

## 2023-12-09 PROCEDURE — 99285 EMERGENCY DEPT VISIT HI MDM: CPT | Mod: 25

## 2023-12-09 PROCEDURE — 86900 BLOOD TYPING SEROLOGIC ABO: CPT

## 2023-12-09 PROCEDURE — 84484 ASSAY OF TROPONIN QUANT: CPT | Performed by: EMERGENCY MEDICINE

## 2023-12-09 PROCEDURE — 63600175 PHARM REV CODE 636 W HCPCS: Performed by: EMERGENCY MEDICINE

## 2023-12-09 PROCEDURE — 80061 LIPID PANEL: CPT

## 2023-12-09 PROCEDURE — 87389 HIV-1 AG W/HIV-1&-2 AB AG IA: CPT | Performed by: EMERGENCY MEDICINE

## 2023-12-09 PROCEDURE — 99499 UNLISTED E&M SERVICE: CPT | Mod: ,,,

## 2023-12-09 PROCEDURE — 85025 COMPLETE CBC W/AUTO DIFF WBC: CPT | Performed by: EMERGENCY MEDICINE

## 2023-12-09 PROCEDURE — 85610 PROTHROMBIN TIME: CPT | Mod: 91 | Performed by: EMERGENCY MEDICINE

## 2023-12-09 PROCEDURE — 96374 THER/PROPH/DIAG INJ IV PUSH: CPT

## 2023-12-09 PROCEDURE — 93010 EKG 12-LEAD: ICD-10-PCS | Mod: ,,, | Performed by: INTERNAL MEDICINE

## 2023-12-09 PROCEDURE — 80053 COMPREHEN METABOLIC PANEL: CPT | Performed by: EMERGENCY MEDICINE

## 2023-12-09 PROCEDURE — 85730 THROMBOPLASTIN TIME PARTIAL: CPT

## 2023-12-09 PROCEDURE — 12002 RPR S/N/AX/GEN/TRNK2.6-7.5CM: CPT

## 2023-12-09 PROCEDURE — 99291 PR CRITICAL CARE, E/M 30-74 MINUTES: ICD-10-PCS | Mod: FS,,, | Performed by: PSYCHIATRY & NEUROLOGY

## 2023-12-09 PROCEDURE — 85610 PROTHROMBIN TIME: CPT

## 2023-12-09 PROCEDURE — 99499 NO LOS: ICD-10-PCS | Mod: ,,,

## 2023-12-09 PROCEDURE — 84439 ASSAY OF FREE THYROXINE: CPT | Performed by: EMERGENCY MEDICINE

## 2023-12-09 PROCEDURE — 93010 ELECTROCARDIOGRAM REPORT: CPT | Mod: 76,,, | Performed by: INTERNAL MEDICINE

## 2023-12-09 PROCEDURE — 86901 BLOOD TYPING SEROLOGIC RH(D): CPT

## 2023-12-09 PROCEDURE — 83036 HEMOGLOBIN GLYCOSYLATED A1C: CPT

## 2023-12-09 PROCEDURE — 63600175 PHARM REV CODE 636 W HCPCS

## 2023-12-09 PROCEDURE — 86850 RBC ANTIBODY SCREEN: CPT

## 2023-12-09 PROCEDURE — 84443 ASSAY THYROID STIM HORMONE: CPT | Performed by: EMERGENCY MEDICINE

## 2023-12-09 PROCEDURE — 93010 ELECTROCARDIOGRAM REPORT: CPT | Mod: ,,, | Performed by: INTERNAL MEDICINE

## 2023-12-09 PROCEDURE — 99291 CRITICAL CARE FIRST HOUR: CPT | Mod: FS,,, | Performed by: PSYCHIATRY & NEUROLOGY

## 2023-12-09 PROCEDURE — 86803 HEPATITIS C AB TEST: CPT | Performed by: EMERGENCY MEDICINE

## 2023-12-09 PROCEDURE — 83735 ASSAY OF MAGNESIUM: CPT | Performed by: EMERGENCY MEDICINE

## 2023-12-09 PROCEDURE — 95718 EEG PHYS/QHP 2-12 HR W/VEEG: CPT | Mod: ,,, | Performed by: PSYCHIATRY & NEUROLOGY

## 2023-12-09 RX ORDER — DESMOPRESSIN ACETATE 0.2 MG/1
0.2 TABLET ORAL DAILY
Status: ON HOLD | COMMUNITY
Start: 2023-10-25 | End: 2023-12-19 | Stop reason: HOSPADM

## 2023-12-09 RX ORDER — POTASSIUM CHLORIDE 7.45 MG/ML
40 INJECTION INTRAVENOUS
Status: DISCONTINUED | OUTPATIENT
Start: 2023-12-09 | End: 2023-12-12

## 2023-12-09 RX ORDER — MIRABEGRON 25 MG/1
25 TABLET, FILM COATED, EXTENDED RELEASE ORAL DAILY
Status: ON HOLD | COMMUNITY
Start: 2023-07-06 | End: 2023-12-19 | Stop reason: HOSPADM

## 2023-12-09 RX ORDER — LIDOCAINE HYDROCHLORIDE 10 MG/ML
10 INJECTION, SOLUTION EPIDURAL; INFILTRATION; INTRACAUDAL; PERINEURAL
Status: COMPLETED | OUTPATIENT
Start: 2023-12-09 | End: 2023-12-09

## 2023-12-09 RX ORDER — MAGNESIUM SULFATE HEPTAHYDRATE 40 MG/ML
4 INJECTION, SOLUTION INTRAVENOUS
Status: DISCONTINUED | OUTPATIENT
Start: 2023-12-09 | End: 2023-12-13

## 2023-12-09 RX ORDER — POTASSIUM CHLORIDE 7.45 MG/ML
80 INJECTION INTRAVENOUS
Status: DISCONTINUED | OUTPATIENT
Start: 2023-12-09 | End: 2023-12-12

## 2023-12-09 RX ORDER — ATORVASTATIN CALCIUM 40 MG/1
40 TABLET, FILM COATED ORAL DAILY
Status: DISCONTINUED | OUTPATIENT
Start: 2023-12-10 | End: 2023-12-13

## 2023-12-09 RX ORDER — LEVETIRACETAM 500 MG/5ML
500 INJECTION, SOLUTION, CONCENTRATE INTRAVENOUS EVERY 12 HOURS
Status: DISCONTINUED | OUTPATIENT
Start: 2023-12-10 | End: 2023-12-09

## 2023-12-09 RX ORDER — PROCHLORPERAZINE EDISYLATE 5 MG/ML
5 INJECTION INTRAMUSCULAR; INTRAVENOUS EVERY 6 HOURS PRN
Status: DISCONTINUED | OUTPATIENT
Start: 2023-12-09 | End: 2023-12-19 | Stop reason: HOSPADM

## 2023-12-09 RX ORDER — IMIPRAMINE HYDROCHLORIDE 25 MG/1
25 TABLET, FILM COATED ORAL DAILY
Status: ON HOLD | COMMUNITY
Start: 2023-11-17 | End: 2023-12-19 | Stop reason: HOSPADM

## 2023-12-09 RX ORDER — TRAMADOL HYDROCHLORIDE 50 MG/1
50 TABLET ORAL DAILY PRN
Status: ON HOLD | COMMUNITY
Start: 2023-12-02 | End: 2023-12-19 | Stop reason: HOSPADM

## 2023-12-09 RX ORDER — AMLODIPINE BESYLATE 5 MG/1
5 TABLET ORAL DAILY
Status: ON HOLD | COMMUNITY
Start: 2023-11-21 | End: 2023-12-19

## 2023-12-09 RX ORDER — FENTANYL CITRATE 50 UG/ML
12.5 INJECTION, SOLUTION INTRAMUSCULAR; INTRAVENOUS ONCE AS NEEDED
Status: COMPLETED | OUTPATIENT
Start: 2023-12-09 | End: 2023-12-09

## 2023-12-09 RX ORDER — LEVETIRACETAM 500 MG/5ML
500 INJECTION, SOLUTION, CONCENTRATE INTRAVENOUS EVERY 12 HOURS
Status: DISCONTINUED | OUTPATIENT
Start: 2023-12-09 | End: 2023-12-09

## 2023-12-09 RX ORDER — MELOXICAM 7.5 MG/1
7.5 TABLET ORAL DAILY
Status: ON HOLD | COMMUNITY
Start: 2023-11-21 | End: 2023-12-19 | Stop reason: HOSPADM

## 2023-12-09 RX ORDER — NICARDIPINE HYDROCHLORIDE 0.2 MG/ML
0-15 INJECTION INTRAVENOUS CONTINUOUS
Status: DISCONTINUED | OUTPATIENT
Start: 2023-12-09 | End: 2023-12-12

## 2023-12-09 RX ORDER — LABETALOL HCL 20 MG/4 ML
10 SYRINGE (ML) INTRAVENOUS
Status: DISCONTINUED | OUTPATIENT
Start: 2023-12-09 | End: 2023-12-11

## 2023-12-09 RX ORDER — AMOXICILLIN 250 MG
1 CAPSULE ORAL 2 TIMES DAILY
Status: DISCONTINUED | OUTPATIENT
Start: 2023-12-09 | End: 2023-12-13

## 2023-12-09 RX ORDER — POTASSIUM CHLORIDE 7.45 MG/ML
60 INJECTION INTRAVENOUS
Status: DISCONTINUED | OUTPATIENT
Start: 2023-12-09 | End: 2023-12-12

## 2023-12-09 RX ORDER — ACETAMINOPHEN 325 MG/1
650 TABLET ORAL EVERY 6 HOURS PRN
Status: DISCONTINUED | OUTPATIENT
Start: 2023-12-09 | End: 2023-12-19 | Stop reason: HOSPADM

## 2023-12-09 RX ORDER — LEVETIRACETAM 500 MG/5ML
500 INJECTION, SOLUTION, CONCENTRATE INTRAVENOUS
Status: COMPLETED | OUTPATIENT
Start: 2023-12-09 | End: 2023-12-09

## 2023-12-09 RX ORDER — ATORVASTATIN CALCIUM 10 MG/1
10 TABLET, FILM COATED ORAL DAILY
COMMUNITY
Start: 2023-11-21

## 2023-12-09 RX ORDER — HYDRALAZINE HYDROCHLORIDE 20 MG/ML
10 INJECTION INTRAMUSCULAR; INTRAVENOUS EVERY 4 HOURS PRN
Status: DISCONTINUED | OUTPATIENT
Start: 2023-12-09 | End: 2023-12-11

## 2023-12-09 RX ORDER — LATANOPROST 50 UG/ML
1 SOLUTION/ DROPS OPHTHALMIC NIGHTLY
COMMUNITY
Start: 2023-11-26 | End: 2024-05-24

## 2023-12-09 RX ORDER — LEVETIRACETAM 500 MG/5ML
1000 INJECTION, SOLUTION, CONCENTRATE INTRAVENOUS ONCE
Status: DISCONTINUED | OUTPATIENT
Start: 2023-12-09 | End: 2023-12-09

## 2023-12-09 RX ORDER — ONDANSETRON 2 MG/ML
4 INJECTION INTRAMUSCULAR; INTRAVENOUS EVERY 8 HOURS PRN
Status: DISCONTINUED | OUTPATIENT
Start: 2023-12-09 | End: 2023-12-19 | Stop reason: HOSPADM

## 2023-12-09 RX ORDER — SODIUM CHLORIDE 0.9 % (FLUSH) 0.9 %
10 SYRINGE (ML) INJECTION
Status: DISCONTINUED | OUTPATIENT
Start: 2023-12-09 | End: 2023-12-19 | Stop reason: HOSPADM

## 2023-12-09 RX ORDER — LEVETIRACETAM 500 MG/5ML
1000 INJECTION, SOLUTION, CONCENTRATE INTRAVENOUS EVERY 12 HOURS
Status: DISCONTINUED | OUTPATIENT
Start: 2023-12-09 | End: 2023-12-09

## 2023-12-09 RX ORDER — MIDAZOLAM HYDROCHLORIDE 1 MG/ML
2 INJECTION INTRAMUSCULAR; INTRAVENOUS ONCE AS NEEDED
Status: COMPLETED | OUTPATIENT
Start: 2023-12-09 | End: 2023-12-09

## 2023-12-09 RX ORDER — MAGNESIUM SULFATE HEPTAHYDRATE 40 MG/ML
2 INJECTION, SOLUTION INTRAVENOUS
Status: DISCONTINUED | OUTPATIENT
Start: 2023-12-09 | End: 2023-12-13

## 2023-12-09 RX ADMIN — LIDOCAINE HYDROCHLORIDE 100 MG: 10 SOLUTION INTRAVENOUS at 06:12

## 2023-12-09 RX ADMIN — LABETALOL HYDROCHLORIDE 10 MG: 5 INJECTION, SOLUTION INTRAVENOUS at 11:12

## 2023-12-09 RX ADMIN — MIDAZOLAM 2 MG: 1 INJECTION INTRAMUSCULAR; INTRAVENOUS at 11:12

## 2023-12-09 RX ADMIN — LEVETIRACETAM 500 MG: 500 INJECTION, SOLUTION INTRAVENOUS at 07:12

## 2023-12-09 RX ADMIN — HYDRALAZINE HYDROCHLORIDE 10 MG: 20 INJECTION, SOLUTION INTRAMUSCULAR; INTRAVENOUS at 11:12

## 2023-12-09 RX ADMIN — FENTANYL CITRATE 12.5 MCG: 50 INJECTION INTRAMUSCULAR; INTRAVENOUS at 11:12

## 2023-12-09 NOTE — EKG INTERPRETATIONS - EMERGENCY DEPT.
Independently interpreted by MD:  Rate 122, NSR, no stemi, no ectopy, no hypertrophy, sinus tachycardia

## 2023-12-09 NOTE — ED PROVIDER NOTES
"Encounter Date: 12/9/2023       History     Chief Complaint   Patient presents with    Fall     Found on ground by family ,family heard pt scream and a "thud". Pt disoriented x4 . Lac noted to back of head     89 yo F presenting via EMS due to an unwitnessed ground level fall. Per pt's grandson, pt recently moved into their home and lives downstairs. They heard a thud and screaming and found pt on the floor on her back. She was altered and not at her baseline (AOX4) but no loss of consciousness. Pt was at her baseline up until fall. Family denies hx of seizures and blood thinner use. Of note, family reports pt has had falls with most recent being a few weeks ago and was associated with back pain.     In ED, hypertensive (171/92), tachycardic (121), satting 100% on RA. Afebrile.       Review of patient's allergies indicates:  No Known Allergies  No past medical history on file.  No past surgical history on file.  No family history on file.     Review of Systems   Unable to perform ROS: Mental status change       Physical Exam     Initial Vitals   BP Pulse Resp Temp SpO2   12/09/23 1709 12/09/23 1709 12/09/23 1709 12/09/23 1749 12/09/23 1709   (!) 171/92 (!) 121 (!) 26 97.5 °F (36.4 °C) 100 %      MAP       --                Physical Exam    Constitutional: She appears distressed.   HENT:   Head: Head is with laceration.   Nose: Nose normal.   Mouth/Throat: Mucous membranes are normal.   Eyes: EOM are normal. Pupils are equal, round, and reactive to light.   Neck:   In C collar   Cardiovascular:  Regular rhythm.   Tachycardia present.         Pulmonary/Chest: Effort normal and breath sounds normal. No respiratory distress.   Abdominal: Abdomen is soft. She exhibits no distension. There is no abdominal tenderness.   Musculoskeletal:      Right lower leg: No edema.      Left lower leg: No edema.     Neurological: She is alert. She is disoriented.   Moves all extremities spontaneously   Repetitive speech    Skin: Skin is " warm and dry.         ED Course   Procedures  Labs Reviewed   URINALYSIS, REFLEX TO URINE CULTURE - Abnormal; Notable for the following components:       Result Value    Glucose, UA 1+ (*)     Ketones, UA 2+ (*)     Nitrite, UA Positive (*)     All other components within normal limits    Narrative:     Specimen Source->Urine   CBC W/ AUTO DIFFERENTIAL - Abnormal; Notable for the following components:    WBC 14.25 (*)     RBC 3.61 (*)     Hemoglobin 11.7 (*)     Hematocrit 33.9 (*)     MCH 32.4 (*)     Gran # (ANC) 10.5 (*)     Immature Grans (Abs) 0.06 (*)     Mono # 1.1 (*)     Gran % 73.5 (*)     Lymph % 17.1 (*)     All other components within normal limits   COMPREHENSIVE METABOLIC PANEL - Abnormal; Notable for the following components:    Potassium 3.0 (*)     CO2 19 (*)     Glucose 135 (*)     BUN 43 (*)     ALT 9 (*)     eGFR 52.2 (*)     All other components within normal limits   TSH - Abnormal; Notable for the following components:    TSH 7.611 (*)     All other components within normal limits   POCT GLUCOSE - Abnormal; Notable for the following components:    POCT Glucose 155 (*)     All other components within normal limits   PROTIME-INR   HIV 1 / 2 ANTIBODY    Narrative:     Release to patient->Immediate   HEPATITIS C ANTIBODY    Narrative:     Release to patient->Immediate   MAGNESIUM   TROPONIN I   TSH   MAGNESIUM   TROPONIN I   T4, FREE   URINALYSIS MICROSCOPIC    Narrative:     Specimen Source->Urine   HEMOGLOBIN A1C   APTT   PROTIME-INR   POCT GLUCOSE MONITORING CONTINUOUS          Imaging Results              X-Ray Chest AP Single View (Final result)  Result time 12/09/23 21:34:44      Final result by Chung Ingram MD (12/09/23 21:34:44)                   Impression:      Radiographic findings as above.      Electronically signed by: Chung Ingram  Date:    12/09/2023  Time:    21:34               Narrative:    EXAMINATION:  XR CHEST 1 VIEW    CLINICAL HISTORY:  eval cardiopulmonary  status;    TECHNIQUE:  Single frontal view of the chest was performed.    COMPARISON:  None    FINDINGS:  Single chest view is submitted.  The cardiomediastinal silhouette appears appropriate.    Mild accentuation of the interstitial markings particularly at the lung bases may relate to a pattern of mild interstitial infiltrate/edema.  There is no focal consolidation.  There is no pleural effusion and there is no pneumothorax.    The osseous structures demonstrate chronic change.                                       CT Cervical Spine Without Contrast (Final result)  Result time 12/09/23 18:18:30      Final result by Jason Shine MD (12/09/23 18:18:30)                   Impression:      1. No acute abnormality of the cervical spine.  2. Multilevel chronic degenerative changes.  3. See above comments.  See CT brain report same date also      Electronically signed by: Jason Shine  Date:    12/09/2023  Time:    18:18               Narrative:    EXAMINATION:  CT CERVICAL SPINE WITHOUT CONTRAST    CLINICAL HISTORY:  Neck trauma (Age >= 65y);    TECHNIQUE:  Low dose axial CT images through the cervical spine, with sagittal and coronal reformations.  Contrast was not administered.    COMPARISON:  None    FINDINGS:  No acute fractures of the cervical spine.  Alignment is satisfactory.    Moderate disc space narrowing at C5-6.  Moderate facet arthropathy at C4-5, C5-6 and C6-7 right greater than left.  Facet arthropathy at C2-3 on the left also.    Severe foraminal narrowing at C4-5 on the right.    Central canal appears adequately maintained.    Limited evaluation of the intraspinal contents demonstrates no hematoma or mass.Paraspinal soft tissues exhibit no acute abnormalities.    Mild mastoid air cell disease on the left.  Mild sphenoid sinus disease.    Calvarial fracture on the left.  See CT brain report.    Endplate degenerative changes and minimal loss of height of the anterior superior T1 vertebral body  appears chronic.                                        CT Head Without Contrast (Final result)  Result time 12/09/23 18:03:56      Final result by Jason Shine MD (12/09/23 18:03:56)                   Impression:      Acute calvarial fracture on the left with associated pneumocephalus and minimal subdural hematoma adjacent to the fracture.  There is acute subarachnoid hemorrhage in the peripheral sulci of the bilateral frontal lobes also.  See above comments.  CTA may better characterize also.  Recommend neurosurgical consultation and follow-up.    This report was flagged in Epic as abnormal.      Electronically signed by: Jason Shine  Date:    12/09/2023  Time:    18:03               Narrative:    EXAMINATION:  CT HEAD WITHOUT CONTRAST    CLINICAL HISTORY:  Head trauma, minor (Age >= 65y);    TECHNIQUE:  Low dose axial CT images obtained throughout the head without intravenous contrast. Sagittal and coronal reconstructions were performed.    COMPARISON:  None.    FINDINGS:  Intracranial compartment:    No midline shift or acute hydrocephalus.    Acute peripheral subarachnoid hemorrhage in the bilateral frontal sulci may be posttraumatic.  Follow-up recommended.    Moderate involutional changes and chronic microvascular ischemic changes in the periventricular white matter.    5 mm hyperdense nodular focus near the skull base slightly left of midline axial 13 series 5 could represent vascular ectasia, infundibulum, hemorrhage or possible small aneurysm.  Similar 3-4 mm nodular focus near the midline on axial 17 of series 5 also.  CTA may better characterize.    Acute calvarial fracture involving posterior left parietal/occipital calvarium extending anteriorly to involve the temporal calvarium, ending near the left TMJ.  No significant displacement.    There is pneumocephalus posteriorly on the left which may be associated with fracture at the roof of the mastoid air cells on the left or penetrating injury.   Minimal subdural blood along the left posterior parietal/occipital calvarium adjacent to the fracture.    Recommend neurosurgical consultation and follow-up.                                       Medications   sodium chloride 0.9% flush 10 mL (has no administration in time range)   labetalol 20 mg/4 mL (5 mg/mL) IV syring (has no administration in time range)   senna-docusate 8.6-50 mg per tablet 1 tablet (1 tablet Oral Not Given 12/9/23 2200)   hydrALAZINE injection 10 mg (has no administration in time range)   ondansetron injection 4 mg (has no administration in time range)   prochlorperazine injection Soln 5 mg (has no administration in time range)   atorvastatin tablet 40 mg (has no administration in time range)   acetaminophen tablet 650 mg (has no administration in time range)   lacosamide (VIMPAT) 50 mg in sodium chloride 0.9% 100 mL IVPB (has no administration in time range)   potassium chloride 10 mEq in 100 mL IVPB (has no administration in time range)     And   potassium chloride 10 mEq in 100 mL IVPB (has no administration in time range)     And   potassium chloride 10 mEq in 100 mL IVPB (has no administration in time range)   magnesium sulfate 2g in water 50mL IVPB (premix) (has no administration in time range)   magnesium sulfate 2g in water 50mL IVPB (premix) (has no administration in time range)   sodium phosphate 15 mmol in dextrose 5 % (D5W) 250 mL IVPB (has no administration in time range)   sodium phosphate 20.01 mmol in dextrose 5 % (D5W) 250 mL IVPB (has no administration in time range)   sodium phosphate 30 mmol in dextrose 5 % (D5W) 250 mL IVPB (has no administration in time range)   calcium gluconate 1 g in dextrose 5 % in water (D5W) 50 mL IVPB (MB+) (has no administration in time range)   calcium gluconate 1 g in dextrose 5 % in water (D5W) 50 mL IVPB (MB+) (has no administration in time range)   calcium gluconate 1 g in dextrose 5 % in water (D5W) 50 mL IVPB (MB+) (has no administration  in time range)   midazolam (VERSED) 1 mg/mL injection 2 mg (has no administration in time range)   niCARdipine 40 mg/200 mL (0.2 mg/mL) infusion (has no administration in time range)   LIDOcaine (PF) 10 mg/ml (1%) injection 100 mg (100 mg Infiltration Given 12/9/23 1800)   levETIRAcetam injection 500 mg (500 mg Intravenous Given 12/9/23 1933)   fentaNYL 50 mcg/mL injection 12.5 mcg (12.5 mcg Intravenous Given 12/9/23 2311)     Medical Decision Making  89 yo F presenting via EMS following fall at home. Altered and lac to back of head. Presentation concerning for subdural hematoma, subarachnoid hemorrhage, stroke, UTI. Labs and imaging ordered.     CBC w/ leukocytosis. K 3.0 Trop wnl. TSH 7.6. T4 wnl. UA noninfectious.     CT head w/ acute calvarial fracture w/ subdural hematoma and bilateral subarachnoid hemorrhage. Nsgy consulted, also recommended [facility] consult d/t concerns of progression. N[facility] consulted.     CT cervical spine w/ no acute fracture.      Given keppra. Will be admitted to [facility].     Amount and/or Complexity of Data Reviewed  Labs: ordered.  Radiology: ordered.    Risk  Prescription drug management.  Decision regarding hospitalization.                                      Clinical Impression:  Final diagnoses:  [R41.82] Altered mental status  [W19.XXXA] Fall, initial encounter  [S02.91XA] Closed fracture of skull, unspecified bone, initial encounter  [I60.9] SAH (subarachnoid hemorrhage) (Primary)          ED Disposition Condition    Admit Stable                Adelina Rodriguez MD  Resident  12/09/23 [id number]

## 2023-12-09 NOTE — ED TRIAGE NOTES
Pt arrives to ED via EMS post fall at home. Pt is aaox0 upon arrival and attempting to remove c-collar. Pt has a lac to the posterior of her head. Bleeding controlled. -blood thinners.

## 2023-12-10 PROBLEM — S02.19XA TEMPORAL BONE FRACTURE: Status: ACTIVE | Noted: 2023-12-10

## 2023-12-10 PROBLEM — I10 ESSENTIAL (PRIMARY) HYPERTENSION: Chronic | Status: ACTIVE | Noted: 2023-12-09

## 2023-12-10 PROBLEM — R41.0 DELIRIUM: Status: ACTIVE | Noted: 2023-12-10

## 2023-12-10 PROBLEM — E78.2 MIXED HYPERLIPIDEMIA: Chronic | Status: ACTIVE | Noted: 2023-12-09

## 2023-12-10 PROBLEM — S06.6X0A SUBARACHNOID HEMORRHAGE FOLLOWING INJURY, NO LOSS OF CONSCIOUSNESS: Status: ACTIVE | Noted: 2023-12-09

## 2023-12-10 LAB
ALBUMIN SERPL BCP-MCNC: 3.7 G/DL (ref 3.5–5.2)
ALP SERPL-CCNC: 102 U/L (ref 55–135)
ALT SERPL W/O P-5'-P-CCNC: 16 U/L (ref 10–44)
ANION GAP SERPL CALC-SCNC: 16 MMOL/L (ref 8–16)
AST SERPL-CCNC: 27 U/L (ref 10–40)
BASOPHILS # BLD AUTO: 0.01 K/UL (ref 0–0.2)
BASOPHILS NFR BLD: 0.1 % (ref 0–1.9)
BILIRUB SERPL-MCNC: 0.8 MG/DL (ref 0.1–1)
BUN SERPL-MCNC: 31 MG/DL (ref 10–30)
CALCIUM SERPL-MCNC: 9.3 MG/DL (ref 8.7–10.5)
CHLORIDE SERPL-SCNC: 102 MMOL/L (ref 95–110)
CO2 SERPL-SCNC: 19 MMOL/L (ref 23–29)
CREAT SERPL-MCNC: 0.9 MG/DL (ref 0.5–1.4)
DIFFERENTIAL METHOD: ABNORMAL
EOSINOPHIL # BLD AUTO: 0 K/UL (ref 0–0.5)
EOSINOPHIL NFR BLD: 0.1 % (ref 0–8)
ERYTHROCYTE [DISTWIDTH] IN BLOOD BY AUTOMATED COUNT: 12.8 % (ref 11.5–14.5)
EST. GFR  (NO RACE VARIABLE): 59.2 ML/MIN/1.73 M^2
GLUCOSE SERPL-MCNC: 186 MG/DL (ref 70–110)
HCT VFR BLD AUTO: 34.7 % (ref 37–48.5)
HGB BLD-MCNC: 11.7 G/DL (ref 12–16)
IMM GRANULOCYTES # BLD AUTO: 0.05 K/UL (ref 0–0.04)
IMM GRANULOCYTES NFR BLD AUTO: 0.3 % (ref 0–0.5)
LYMPHOCYTES # BLD AUTO: 0.5 K/UL (ref 1–4.8)
LYMPHOCYTES NFR BLD: 3.4 % (ref 18–48)
MAGNESIUM SERPL-MCNC: 1.8 MG/DL (ref 1.6–2.6)
MCH RBC QN AUTO: 31.5 PG (ref 27–31)
MCHC RBC AUTO-ENTMCNC: 33.7 G/DL (ref 32–36)
MCV RBC AUTO: 93 FL (ref 82–98)
MONOCYTES # BLD AUTO: 0.6 K/UL (ref 0.3–1)
MONOCYTES NFR BLD: 4.2 % (ref 4–15)
NEUTROPHILS # BLD AUTO: 13.4 K/UL (ref 1.8–7.7)
NEUTROPHILS NFR BLD: 91.9 % (ref 38–73)
NRBC BLD-RTO: 0 /100 WBC
PHOSPHATE SERPL-MCNC: 2.1 MG/DL (ref 2.7–4.5)
PLATELET # BLD AUTO: 276 K/UL (ref 150–450)
PMV BLD AUTO: 9.9 FL (ref 9.2–12.9)
POCT GLUCOSE: 160 MG/DL (ref 70–110)
POCT GLUCOSE: 168 MG/DL (ref 70–110)
POCT GLUCOSE: 172 MG/DL (ref 70–110)
POTASSIUM SERPL-SCNC: 3.3 MMOL/L (ref 3.5–5.1)
PROT SERPL-MCNC: 7.6 G/DL (ref 6–8.4)
RBC # BLD AUTO: 3.72 M/UL (ref 4–5.4)
SODIUM SERPL-SCNC: 137 MMOL/L (ref 136–145)
WBC # BLD AUTO: 14.58 K/UL (ref 3.9–12.7)

## 2023-12-10 PROCEDURE — 93010 ELECTROCARDIOGRAM REPORT: CPT | Mod: ,,, | Performed by: INTERNAL MEDICINE

## 2023-12-10 PROCEDURE — 25000003 PHARM REV CODE 250: Performed by: PSYCHIATRY & NEUROLOGY

## 2023-12-10 PROCEDURE — 84100 ASSAY OF PHOSPHORUS: CPT

## 2023-12-10 PROCEDURE — 85025 COMPLETE CBC W/AUTO DIFF WBC: CPT

## 2023-12-10 PROCEDURE — 93005 ELECTROCARDIOGRAM TRACING: CPT

## 2023-12-10 PROCEDURE — 80053 COMPREHEN METABOLIC PANEL: CPT

## 2023-12-10 PROCEDURE — 63600175 PHARM REV CODE 636 W HCPCS: Performed by: PHYSICIAN ASSISTANT

## 2023-12-10 PROCEDURE — 25500020 PHARM REV CODE 255: Performed by: PSYCHIATRY & NEUROLOGY

## 2023-12-10 PROCEDURE — 25000003 PHARM REV CODE 250: Performed by: PHYSICIAN ASSISTANT

## 2023-12-10 PROCEDURE — 20000000 HC ICU ROOM

## 2023-12-10 PROCEDURE — 97165 OT EVAL LOW COMPLEX 30 MIN: CPT

## 2023-12-10 PROCEDURE — 63600175 PHARM REV CODE 636 W HCPCS: Performed by: PSYCHIATRY & NEUROLOGY

## 2023-12-10 PROCEDURE — 99499 UNLISTED E&M SERVICE: CPT | Mod: ,,, | Performed by: PHYSICIAN ASSISTANT

## 2023-12-10 PROCEDURE — 94761 N-INVAS EAR/PLS OXIMETRY MLT: CPT

## 2023-12-10 PROCEDURE — 97162 PT EVAL MOD COMPLEX 30 MIN: CPT

## 2023-12-10 PROCEDURE — 93010 EKG 12-LEAD: ICD-10-PCS | Mod: ,,, | Performed by: INTERNAL MEDICINE

## 2023-12-10 PROCEDURE — 99291 PR CRITICAL CARE, E/M 30-74 MINUTES: ICD-10-PCS | Mod: FS,,, | Performed by: PSYCHIATRY & NEUROLOGY

## 2023-12-10 PROCEDURE — 99233 SBSQ HOSP IP/OBS HIGH 50: CPT | Mod: ,,, | Performed by: NEUROLOGICAL SURGERY

## 2023-12-10 PROCEDURE — 63600175 PHARM REV CODE 636 W HCPCS

## 2023-12-10 PROCEDURE — 99499 NO LOS: ICD-10-PCS | Mod: ,,, | Performed by: PHYSICIAN ASSISTANT

## 2023-12-10 PROCEDURE — 99291 CRITICAL CARE FIRST HOUR: CPT | Mod: FS,,, | Performed by: PSYCHIATRY & NEUROLOGY

## 2023-12-10 PROCEDURE — C1751 CATH, INF, PER/CENT/MIDLINE: HCPCS

## 2023-12-10 PROCEDURE — 99233 PR SUBSEQUENT HOSPITAL CARE,LEVL III: ICD-10-PCS | Mod: ,,, | Performed by: NEUROLOGICAL SURGERY

## 2023-12-10 PROCEDURE — 97535 SELF CARE MNGMENT TRAINING: CPT

## 2023-12-10 PROCEDURE — 92526 ORAL FUNCTION THERAPY: CPT

## 2023-12-10 PROCEDURE — 76937 US GUIDE VASCULAR ACCESS: CPT

## 2023-12-10 PROCEDURE — 83735 ASSAY OF MAGNESIUM: CPT

## 2023-12-10 PROCEDURE — 97530 THERAPEUTIC ACTIVITIES: CPT

## 2023-12-10 PROCEDURE — 36410 VNPNXR 3YR/> PHY/QHP DX/THER: CPT

## 2023-12-10 PROCEDURE — 25000003 PHARM REV CODE 250

## 2023-12-10 PROCEDURE — C9254 INJECTION, LACOSAMIDE: HCPCS

## 2023-12-10 PROCEDURE — 92610 EVALUATE SWALLOWING FUNCTION: CPT

## 2023-12-10 RX ORDER — INSULIN ASPART 100 [IU]/ML
0-10 INJECTION, SOLUTION INTRAVENOUS; SUBCUTANEOUS EVERY 6 HOURS PRN
Status: DISCONTINUED | OUTPATIENT
Start: 2023-12-10 | End: 2023-12-11

## 2023-12-10 RX ORDER — DEXMEDETOMIDINE HYDROCHLORIDE 4 UG/ML
0-1.4 INJECTION, SOLUTION INTRAVENOUS CONTINUOUS
Status: DISCONTINUED | OUTPATIENT
Start: 2023-12-10 | End: 2023-12-10

## 2023-12-10 RX ORDER — MIDAZOLAM HYDROCHLORIDE 1 MG/ML
2 INJECTION INTRAMUSCULAR; INTRAVENOUS ONCE AS NEEDED
Status: DISCONTINUED | OUTPATIENT
Start: 2023-12-11 | End: 2023-12-13

## 2023-12-10 RX ORDER — MIDAZOLAM HYDROCHLORIDE 1 MG/ML
2 INJECTION INTRAMUSCULAR; INTRAVENOUS ONCE AS NEEDED
Status: COMPLETED | OUTPATIENT
Start: 2023-12-10 | End: 2023-12-10

## 2023-12-10 RX ORDER — LATANOPROST 50 UG/ML
1 SOLUTION/ DROPS OPHTHALMIC NIGHTLY
Status: DISCONTINUED | OUTPATIENT
Start: 2023-12-10 | End: 2023-12-19 | Stop reason: HOSPADM

## 2023-12-10 RX ORDER — QUETIAPINE FUMARATE 25 MG/1
25 TABLET, FILM COATED ORAL 2 TIMES DAILY
Status: DISCONTINUED | OUTPATIENT
Start: 2023-12-10 | End: 2023-12-11

## 2023-12-10 RX ORDER — AMLODIPINE BESYLATE 5 MG/1
5 TABLET ORAL DAILY
Status: DISCONTINUED | OUTPATIENT
Start: 2023-12-10 | End: 2023-12-12

## 2023-12-10 RX ORDER — DEXMEDETOMIDINE HYDROCHLORIDE 4 UG/ML
0-1.4 INJECTION, SOLUTION INTRAVENOUS CONTINUOUS
Status: DISCONTINUED | OUTPATIENT
Start: 2023-12-10 | End: 2023-12-11

## 2023-12-10 RX ORDER — MAGNESIUM SULFATE 1 G/100ML
1 INJECTION INTRAVENOUS ONCE
Status: COMPLETED | OUTPATIENT
Start: 2023-12-10 | End: 2023-12-10

## 2023-12-10 RX ORDER — GLUCAGON 1 MG
1 KIT INJECTION
Status: DISCONTINUED | OUTPATIENT
Start: 2023-12-10 | End: 2023-12-11

## 2023-12-10 RX ADMIN — IOHEXOL 75 ML: 350 INJECTION, SOLUTION INTRAVENOUS at 12:12

## 2023-12-10 RX ADMIN — AMPICILLIN AND SULBACTAM 1.5 G: 1; .5 INJECTION, POWDER, FOR SOLUTION INTRAVENOUS at 10:12

## 2023-12-10 RX ADMIN — LACOSAMIDE 50 MG: 10 INJECTION INTRAVENOUS at 09:12

## 2023-12-10 RX ADMIN — AMLODIPINE BESYLATE 5 MG: 5 TABLET ORAL at 10:12

## 2023-12-10 RX ADMIN — DEXMEDETOMIDINE HYDROCHLORIDE 0.2 MCG/KG/HR: 4 INJECTION, SOLUTION INTRAVENOUS at 02:12

## 2023-12-10 RX ADMIN — QUETIAPINE FUMARATE 25 MG: 25 TABLET ORAL at 12:12

## 2023-12-10 RX ADMIN — LACOSAMIDE 50 MG: 10 INJECTION INTRAVENOUS at 10:12

## 2023-12-10 RX ADMIN — POTASSIUM CHLORIDE 10 MEQ: 7.46 INJECTION, SOLUTION INTRAVENOUS at 02:12

## 2023-12-10 RX ADMIN — LATANOPROST 1 DROP: 50 SOLUTION OPHTHALMIC at 09:12

## 2023-12-10 RX ADMIN — NICARDIPINE HYDROCHLORIDE 7.5 MG/HR: 0.2 INJECTION, SOLUTION INTRAVENOUS at 12:12

## 2023-12-10 RX ADMIN — POTASSIUM CHLORIDE 10 MEQ: 7.46 INJECTION, SOLUTION INTRAVENOUS at 04:12

## 2023-12-10 RX ADMIN — ONDANSETRON 4 MG: 2 INJECTION INTRAMUSCULAR; INTRAVENOUS at 01:12

## 2023-12-10 RX ADMIN — DEXMEDETOMIDINE HYDROCHLORIDE 0.5 MCG/KG/HR: 4 INJECTION, SOLUTION INTRAVENOUS at 07:12

## 2023-12-10 RX ADMIN — LABETALOL HYDROCHLORIDE 10 MG: 5 INJECTION, SOLUTION INTRAVENOUS at 11:12

## 2023-12-10 RX ADMIN — ATORVASTATIN CALCIUM 40 MG: 40 TABLET, FILM COATED ORAL at 10:12

## 2023-12-10 RX ADMIN — QUETIAPINE FUMARATE 25 MG: 25 TABLET ORAL at 09:12

## 2023-12-10 RX ADMIN — AMPICILLIN AND SULBACTAM 1.5 G: 1; .5 INJECTION, POWDER, FOR SOLUTION INTRAVENOUS at 05:12

## 2023-12-10 RX ADMIN — POTASSIUM CHLORIDE 40 MEQ: 7.46 INJECTION, SOLUTION INTRAVENOUS at 07:12

## 2023-12-10 RX ADMIN — MIDAZOLAM HYDROCHLORIDE 2 MG: 2 INJECTION, SOLUTION INTRAMUSCULAR; INTRAVENOUS at 05:12

## 2023-12-10 RX ADMIN — MAGNESIUM SULFATE HEPTAHYDRATE 1 G: 500 INJECTION, SOLUTION INTRAMUSCULAR; INTRAVENOUS at 10:12

## 2023-12-10 RX ADMIN — SENNOSIDES AND DOCUSATE SODIUM 1 TABLET: 8.6; 5 TABLET ORAL at 09:12

## 2023-12-10 RX ADMIN — NICARDIPINE HYDROCHLORIDE 2.5 MG/HR: 0.2 INJECTION, SOLUTION INTRAVENOUS at 12:12

## 2023-12-10 NOTE — ASSESSMENT & PLAN NOTE
History of,  - EKG, Echo  - SBP < 160  - Cardene gtt, wean as able  - PRN labetalol, hydralazine  - Continue home amlodipine

## 2023-12-10 NOTE — PROCEDURES - EMERGENCY DEPT.
Lac Repair    Date/Time: 12/9/2023 7:50 PM    Performed by: Carlin Recio MD  Authorized by: Carlin Recio MD    Consent:     Consent obtained:  Emergent situation  Anesthesia:     Anesthesia method:  Local infiltration    Local anesthetic:  Lidocaine 1% w/o epi  Laceration details:     Location:  Scalp    Scalp location:  Occipital    Length (cm):  3  Pre-procedure details:     Preparation:  Imaging obtained to evaluate for foreign bodies  Exploration:     Limited defect created (wound extended): no      Hemostasis achieved with:  Direct pressure    Imaging outcome: foreign body not noted      Wound exploration: wound explored through full range of motion and entire depth of wound visualized      Wound extent: underlying fracture      Contaminated: no    Treatment:     Area cleansed with:  Saline    Amount of cleaning:  Standard    Irrigation solution:  Sterile saline    Irrigation volume:  20    Irrigation method:  Syringe    Visualized foreign bodies/material removed: no      Debridement:  None    Undermining:  None    Scar revision: no    Skin repair:     Repair method:  Staples    Number of staples:  5  Approximation:     Approximation:  Close  Repair type:     Repair type:  Simple  Post-procedure details:     Dressing:  Open (no dressing)    Procedure completion:  Tolerated well, no immediate complications

## 2023-12-10 NOTE — ASSESSMENT & PLAN NOTE
89 yo female who sustained ground level fall now with tSAH, occipital bone, and longitudinal optic capsule sparing temporal bone fracture. Facial nerve function intact. Unable to assess hearing loss due to mental status.     - No acute surgical interventions.   - Can follow up with ENT with audiogram after discharge  - Rest of care per primary  - Please call ENT with any questions

## 2023-12-10 NOTE — SUBJECTIVE & OBJECTIVE
(Not in a hospital admission)      Review of patient's allergies indicates:  No Known Allergies    No past medical history on file.  No past surgical history on file.  Family History    None       Tobacco Use    Smoking status: Not on file    Smokeless tobacco: Not on file   Substance and Sexual Activity    Alcohol use: Not on file    Drug use: Not on file    Sexual activity: Not on file     Review of Systems   Unable to perform ROS: Mental status change     Objective:        There is no height or weight on file to calculate BMI.  Vital Signs (Most Recent):  Temp: 97.5 °F (36.4 °C) (12/09/23 1749)  Pulse: (!) 123 (12/09/23 1830)  Resp: 20 (12/09/23 1830)  BP: (!) 161/81 (12/09/23 1830)  SpO2: 98 % (12/09/23 1830) Vital Signs (24h Range):  Temp:  [97.5 °F (36.4 °C)] 97.5 °F (36.4 °C)  Pulse:  [121-123] 123  Resp:  [19-26] 20  SpO2:  [98 %-100 %] 98 %  BP: (161-179)/(81-92) 161/81                                 Physical Exam  Vitals and nursing note reviewed.   Constitutional:       Appearance: She is ill-appearing.   HENT:      Head:      Comments: R frontal scalp ecchymosis     Mouth/Throat:      Mouth: Mucous membranes are dry.   Eyes:      Extraocular Movements: Extraocular movements intact.   Cardiovascular:      Rate and Rhythm: Tachycardia present.   Pulmonary:      Effort: Pulmonary effort is normal.   Abdominal:      Palpations: Abdomen is soft.            E4V3M5  Bsi, PERRL  NIKI  Inappropriate / repetitive speech      Significant Labs:  Recent Labs   Lab 12/09/23 1741   *      K 3.0*      CO2 19*   BUN 43*   CREATININE 1.0   CALCIUM 9.2     Recent Labs   Lab 12/09/23 1741   WBC 14.25*   HGB 11.7*   HCT 33.9*        Recent Labs   Lab 12/09/23 1741   INR 1.0     Microbiology Results (last 7 days)       ** No results found for the last 168 hours. **          All pertinent labs from the last 24 hours have been reviewed.    Significant Diagnostics:  I have reviewed all pertinent  imaging results/findings within the past 24 hours.  I have reviewed and interpreted all pertinent imaging results/findings within the past 24 hours.  CT Cervical Spine Without Contrast    Result Date: 12/9/2023  1. No acute abnormality of the cervical spine. 2. Multilevel chronic degenerative changes. 3. See above comments.  See CT brain report same date also Electronically signed by: Jason Nicolas Date:    12/09/2023 Time:    18:18    CT Head Without Contrast    Result Date: 12/9/2023  Acute calvarial fracture on the left with associated pneumocephalus and minimal subdural hematoma adjacent to the fracture.  There is acute subarachnoid hemorrhage in the peripheral sulci of the bilateral frontal lobes also.  See above comments.  CTA may better characterize also.  Recommend neurosurgical consultation and follow-up. This report was flagged in Epic as abnormal. Electronically signed by: Jason Nicolas Date:    12/09/2023 Time:    18:03

## 2023-12-10 NOTE — PROGRESS NOTES
[facility]  Neurosurgery  Progress Note    Subjective:     History of Present Illness: 89F presenting after ground level fall without LoC and CTH demonstrating bifrontal tSAH, L parieto-occipital skull fracture. No AC/AP. On neurosurgical evaluation patient displays altered mental status with inappropriate speech but moving all extremities spontaneously. Fall was unwitnessed but family states they heard a yell and then sound of patient hitting floor    Post-Op Info:  * No surgery found *       Interval History: 12/10: Evolving hemorrhages on CTH this AM. CT temporal bone without fracture of inner ear structures. No aneurysm identified on CTA. CTH for tomorrow ordered for surveillance    Medications:  Continuous Infusions:   dexmedeTOMIDine (Precedex) infusion (titrating) 0.3 mcg/kg/hr (12/10/23 0901)    nicardipine 2.5 mg/hr (12/10/23 0901)     Scheduled Meds:   amLODIPine  5 mg Oral Daily    ampicillin-sulbactim (UNASYN) IVPB  1.5 g Intravenous Q6H    atorvastatin  40 mg Oral Daily    lacosamide (VIMPAT) IVPB  50 mg Intravenous Q12H    magnesium sulfate IVPB  1 g Intravenous Once    senna-docusate 8.6-50 mg  1 tablet Oral BID     PRN Meds:acetaminophen, calcium gluconate IVPB, calcium gluconate IVPB, calcium gluconate IVPB, hydrALAZINE, labetalol, magnesium sulfate IVPB, magnesium sulfate IVPB, ondansetron, potassium chloride **AND** potassium chloride **AND** potassium chloride, prochlorperazine, sodium chloride 0.9%, sodium phosphate 15 mmol in dextrose 5 % (D5W) 250 mL IVPB, sodium phosphate 20.01 mmol in dextrose 5 % (D5W) 250 mL IVPB, sodium phosphate 30 mmol in dextrose 5 % (D5W) 250 mL IVPB     Review of Systems  Objective:     Weight: 52 kg (114 lb 10.2 oz)  Body mass index is 20.31 kg/m².  Vital Signs (Most Recent):  Temp: 98.5 °F (36.9 °C) (12/10/23 0701)  Pulse: 104 (12/10/23 1001)  Resp: (!) 28 (12/10/23 1001)  BP: (!) 150/79 (cardene restarted) (12/10/23 1001)  SpO2: (!) 94 %  (12/10/23 1001) Vital Signs (24h Range):  Temp:  [97.4 °F (36.3 °C)-98.5 °F (36.9 °C)] 98.5 °F (36.9 °C)  Pulse:  [] 104  Resp:  [16-54] 28  SpO2:  [94 %-100 %] 94 %  BP: (115-187)/() 150/79     Date 12/10/23 0700 - 12/11/23 0659   Shift 3635-4300 4630-2513 0500-2137 24 Hour Total   INTAKE   I.V.(mL/kg) 23.5(0.5)   23.5(0.5)   Shift Total(mL/kg) 23.5(0.5)   23.5(0.5)   OUTPUT   Shift Total(mL/kg)       Weight (kg) 52 52 52 52                       Female External Urinary Catheter 12/09/23 2300 (Active)   Skin no redness;no breakdown 12/10/23 0901   Tolerance no signs/symptoms of discomfort 12/10/23 0901   Suction Continuous suction at 40 mmHg 12/10/23 0701   Date of last wick change 12/09/23 12/09/23 2301   Time of last wick change 2300 12/09/23 2301   Output (mL) 200 mL 12/10/23 0601          Physical Exam     Vitals and nursing note reviewed.   Constitutional:       Appearance: She is ill-appearing.   HENT:      Head:      Comments: R frontal scalp ecchymosis     Mouth/Throat:      Mouth: Mucous membranes are dry.   Eyes:      Extraocular Movements: Extraocular movements intact.   Cardiovascular:      Rate and Rhythm: Tachycardia present.   Pulmonary:      Effort: Pulmonary effort is normal.   Abdominal:      Palpations: Abdomen is soft.               E4V3M5  Bsi, PERRL  NIKI  Inappropriate / repetitive speech    Neurosurgery Physical Exam    Significant Labs:  Recent Labs   Lab 12/09/23  1741 12/10/23  0321   * 186*    137   K 3.0* 3.3*    102   CO2 19* 19*   BUN 43* 31*   CREATININE 1.0 0.9   CALCIUM 9.2 9.3   MG 2.0 1.8     Recent Labs   Lab 12/09/23  1741 12/10/23  0321   WBC 14.25* 14.58*   HGB 11.7* 11.7*   HCT 33.9* 34.7*    276     Recent Labs   Lab 12/09/23  1741 12/09/23  2120   INR 1.0 1.0   APTT  --  <21.0     Microbiology Results (last 7 days)       ** No results found for the last 168 hours. **          All pertinent labs from the last 24 hours have been  reviewed.    Significant Diagnostics:  I have reviewed all pertinent imaging results/findings within the past 24 hours.  I have reviewed and interpreted all pertinent imaging results/findings within the past 24 hours.  CT Temporal Bone without contrast    Result Date: 12/10/2023  Nondisplaced fracture involving the occipital and left temporal bones with diastasis of the adjacent sutures.  Fluid within the middle ear and mastoid air cells presumably due to fractures through the tegmen mastoideum with  adjacent pneumocephalus.  No ossicular chain disruption or otic capsule involvement. Limited intracranial evaluation demonstrates an increase in size of the intraparenchymal hemorrhagic contusions involving the left temporal and bilateral frontal lobes when compared to the admission CT. Electronically signed by resident: Rob Summers Date:    12/10/2023 Time:    00:56 Electronically signed by: Jc Miller Date:    12/10/2023 Time:    08:26    CT Head Without Contrast    Result Date: 12/10/2023  Stable multi compartment intracranial hemorrhage and trace intraventricular hemorrhage, as above.  No significant midline shift. Electronically signed by resident: Rob Summers Date:    12/10/2023 Time:    06:17 Electronically signed by: Chance Valdez Date:    12/10/2023 Time:    06:39    CTA Head and Neck (xpd)    Result Date: 12/10/2023  Multi compartment intracranial hemorrhage involving the bilateral frontal lobes and left lateral temporal lobe with worsening parenchymal contusions and subarachnoid hemorrhage when compared with prior exam.  Adjacent mass effect and worsening edema adjacent to the larger parenchymal contusions, with no significant midline shift.  Additional mild subarachnoid hemorrhage along the right frontoparietal lobe, new from prior.  Similar small left posterior convexity extra-axial hemorrhage and trace right frontal subdural hemorrhage.  New trace intraventricular hemorrhage.  Continued follow-up  recommended. Redemonstrated acute calvarial fracture involving the left calvarium with mildly worse overlying soft tissue hematoma.  Questionable nonocclusive thrombus in the left transverse sinus, noting significant artifact in this region on delayed phase images. No evidence of acute large vessel occlusion or flow-limiting stenosis.  Mild asymmetric narrowing of the right proximal middle cerebral artery. Solid 4 mm nodule at the right lung apex.  In a low risk patient, no further follow-up is recommended.  In a high-risk patient, 12 month follow-up CT could be considered. Other findings discussed in the body of the report. This report was flagged in Epic as abnormal. Findings were relayed by Rob Summers MD to Kiki ALEX via telephone on 12/10/2023 at 00:55. Electronically signed by resident: Rob Summers Date:    12/10/2023 Time:    00:30 Electronically signed by: Jaspreet Mtz MD Date:    12/10/2023 Time:    01:36    X-Ray Chest AP Single View    Result Date: 12/9/2023  Radiographic findings as above. Electronically signed by: Chung Ingram Date:    12/09/2023 Time:    21:34    CT Cervical Spine Without Contrast    Result Date: 12/9/2023  1. No acute abnormality of the cervical spine. 2. Multilevel chronic degenerative changes. 3. See above comments.  See CT brain report same date also Electronically signed by: Jason Nicolas Date:    12/09/2023 Time:    18:18    CT Head Without Contrast    Result Date: 12/9/2023  Acute calvarial fracture on the left with associated pneumocephalus and minimal subdural hematoma adjacent to the fracture.  There is acute subarachnoid hemorrhage in the peripheral sulci of the bilateral frontal lobes also.  See above comments.  CTA may better characterize also.  Recommend neurosurgical consultation and follow-up. This report was flagged in Epic as abnormal. Electronically signed by: Jason Nicolas Date:    12/09/2023 Time:    18:03     Assessment/Plan:     * SAH (subarachnoid  hemorrhage)  89F presenting after ground level fall without LoC and CTH demonstrating bifrontal traumatic SAH, L parieto-occipital skull fracture. No AC/AP.    Imaging:  CT C Spine 12/9: No acute fractures   CTH 12/9: SAH b/l frontal lobe, L parietal/occipital fracture w/o displacement w minimal adjacent SDH, hyperdensity near L ICA possible aneurysm   CTA 12/9: worsening contusions and new L temporal, SAH, edema, no significant MLS. New IVH. No aneurysms.   CT Temporal bone 12/10: Nondisplaced fracture occipital and left temporal bones w diastasis of the adjacent sutures.  Fluid within the middle ear and mastoid air cells   CTH 12/10 repeat: evolving hemorrhages     Plan:  --admit NCC; q1h nc/vs  --all labs and significant diagnostics reviewed  --CTH tomorrow ordered for surveillance  --SBP < 160  --Na > 135  --HOB > 30  --Maximum medical management per NCC  --NPO   --Keppra 500mg BID x7 days  --please notify nsgy of any acute neurological decline          Marcus Timmons MD  Neurosurgery  [facility]

## 2023-12-10 NOTE — PLAN OF CARE
Problem: Physical Therapy  Goal: Physical Therapy Goal  Description: Goals to met by 12/24/2023    1. Supine to sit with MInimal Assistance  2. Sit to supine with MInimal Assistance  3. Rolling to Left and Right with Minimal Assistance.  4. Sit to stand transfer with Contact Guard Assistance  5. Bed to chair transfer with Contact Guard Assistance using LRAD  6. Gait  x 25 feet with Contact Guard Assistance using LRAD   7. Sitting at edge of bed x10 minutes with Stand-by Assistance  8. Stand for 5 minutes with Stand-by Assistance using LRAD  9. Lower extremity exercise program x15 reps per Instruction, with assistance as needed in order to facilitate improved strength, improved postural control, and improvement in functional independence    PT Eval: 12/10/2023

## 2023-12-10 NOTE — SUBJECTIVE & OBJECTIVE
Medications:  Continuous Infusions:   dexmedeTOMIDine (Precedex) infusion (titrating) 0.3 mcg/kg/hr (12/10/23 1501)    nicardipine 2.5 mg/hr (12/10/23 1501)     Scheduled Meds:   amLODIPine  5 mg Oral Daily    ampicillin-sulbactim (UNASYN) IVPB  1.5 g Intravenous Q8H    atorvastatin  40 mg Oral Daily    lacosamide (VIMPAT) IVPB  50 mg Intravenous Q12H    QUEtiapine  25 mg Oral BID    senna-docusate 8.6-50 mg  1 tablet Oral BID     PRN Meds:acetaminophen, calcium gluconate IVPB, calcium gluconate IVPB, calcium gluconate IVPB, hydrALAZINE, labetalol, magnesium sulfate IVPB, magnesium sulfate IVPB, ondansetron, potassium chloride **AND** potassium chloride **AND** potassium chloride, prochlorperazine, sodium chloride 0.9%, sodium phosphate 15 mmol in dextrose 5 % (D5W) 250 mL IVPB, sodium phosphate 20.01 mmol in dextrose 5 % (D5W) 250 mL IVPB, sodium phosphate 30 mmol in dextrose 5 % (D5W) 250 mL IVPB     No current facility-administered medications on file prior to encounter.     Current Outpatient Medications on File Prior to Encounter   Medication Sig    amLODIPine (NORVASC) 5 MG tablet Take 5 mg by mouth once daily.    atorvastatin (LIPITOR) 10 MG tablet Take 10 mg by mouth once daily.    desmopressin (DDAVP) 0.2 MG tablet Take 0.2 mg by mouth once daily.    imipramine (TOFRANIL) 25 MG tablet Take 25 mg by mouth once daily.    latanoprost 0.005 % ophthalmic solution Place 1 drop into both eyes every evening.    meloxicam (MOBIC) 7.5 MG tablet Take 7.5 mg by mouth once daily.    MYRBETRIQ 25 mg Tb24 ER tablet Take 25 mg by mouth once daily.    traMADoL (ULTRAM) 50 mg tablet Take 50 mg by mouth daily as needed for Pain (Half or full tablet as needed for pain Orally Once a day for 30 days).       Review of patient's allergies indicates:  No Known Allergies    History reviewed. No pertinent past medical history.  History reviewed. No pertinent surgical history.  Family History    None       Tobacco Use    Smoking  status: Not on file    Smokeless tobacco: Not on file   Substance and Sexual Activity    Alcohol use: Not on file    Drug use: Not on file    Sexual activity: Not on file     Review of Systems  Objective:     Vital Signs (Most Recent):  Temp: 97.9 °F (36.6 °C) (12/10/23 1501)  Pulse: 86 (12/10/23 1501)  Resp: (!) 23 (12/10/23 1501)  BP: 124/63 (12/10/23 1501)  SpO2: 96 % (12/10/23 1501) Vital Signs (24h Range):  Temp:  [97.4 °F (36.3 °C)-98.5 °F (36.9 °C)] 97.9 °F (36.6 °C)  Pulse:  [] 86  Resp:  [16-54] 23  SpO2:  [93 %-100 %] 96 %  BP: (109-187)/() 124/63     Weight: 51.7 kg (114 lb)  Body mass index is 20.19 kg/m².    Date 12/10/23 0700 - 12/11/23 0659   Shift 4357-9525 6928-9572 5579-4764 24 Hour Total   INTAKE   I.V.(mL/kg) 237(4.6) 16.3(0.3)  253.3(4.9)   IV Piggyback 198.4 5.9  204.3   Shift Total(mL/kg) 435.4(8.4) 22.2(0.4)  457.6(8.8)   OUTPUT   Shift Total(mL/kg)       Weight (kg) 51.7 51.7 51.7 51.7        Physical Exam  NAD  Sitting up in bed  Confused  Nasal cannula in place  Occiput lac with staples  AD: TM intact and translucent, EAC clear  AS: Hemotympanum, TM intact, EAC without lacerations, no otorrhea   House Brakeman I bilaterally  Unable to assess hearing loss  Neck soft       Significant Labs:  CBC:   Recent Labs   Lab 12/10/23  0321   WBC 14.58*   RBC 3.72*   HGB 11.7*   HCT 34.7*      MCV 93   MCH 31.5*   MCHC 33.7     CMP:   Recent Labs   Lab 12/10/23  0321   *   CALCIUM 9.3   ALBUMIN 3.7   PROT 7.6      K 3.3*   CO2 19*      BUN 31*   CREATININE 0.9   ALKPHOS 102   ALT 16   AST 27   BILITOT 0.8       Significant Diagnostics:  I have reviewed all pertinent imaging results/findings within the past 24 hours.  CT Temporal bone 12/9    Nondisplaced fracture involving the occipital and left temporal bones with diastasis of the adjacent sutures.  Fluid within the middle ear and mastoid air cells presumably due to fractures through the tegmen mastoideum with   adjacent pneumocephalus.  No ossicular chain disruption or otic capsule involvement.     Limited intracranial evaluation demonstrates an increase in size of the intraparenchymal hemorrhagic contusions involving the left temporal and bilateral frontal lobes when compared to the admission CT.

## 2023-12-10 NOTE — ASSESSMENT & PLAN NOTE
Patient is a 89 y.o. female with history of HTN, HLD, and glaucoma who presented to the ED with AMS s/p mechanical fall from ground level. No LOC per family. No AC/AP.    - Admit to [facility]  - q1h neuro checks, vitals, I/Os  - NSGY  consulted  - Echo, EKG, CXR  - CTH (12/9/23): SAH in the peripheral sulci in the bilateral frontal lobes; 5mm nodular hyperdensity near the left ICA; non-displaced left calvarial fracture involving the posterior parietoccipital calvarium and extending anteriorly to involve the temporal calvarium with associated pneumocephalus and minimal adjacent SDH  - CTA Head and Neck ordered for 2330 to evaluate stability of SAH and rule out aneurysms or vascular malformations   - CT Temporal bone ordered and pending to further evaluate fracture and rule out injury to inner ear structures  - vEEG ordered and pending to rule out seizures  - A1c, TSH, lipid panel  - aPTT, PT/INR   - Daily CBC, CMP, mag, phos  - Goal EuNa, strict avoidance of hyponatremia  - SBP < 160  - Cardene gtt, wean as able   - PRN labetalol, hydralazine  - Vimpat 50mg BID x 7 days for seizure prophylaxis  - Seizure precautions  - HOB 30°   - Diet NPO  - Rehab efforts: The patient has been evaluated by a stroke team provider and the therapy needs have been fully considered based off the presenting complaints and exam findings. The following therapy evaluations are needed: PT evaluate and treat, OT evaluate and treat, SLP evaluate and treat, PM&R evaluate for appropriate placement  - VTE Prophylaxis: mechanical, hold chemical in setting of acute bleed

## 2023-12-10 NOTE — PLAN OF CARE
Problem: SLP  Goal: SLP Goal  Description: Speech Language Pathology Goals  Goals expected to be met by 12/24    1. Pt will participate in ongoing swallow assessment to determine least restrictive PO diet when appropriate.  2. Pt will complete cognitive-linguistic assessment when feasible to determine additional therapeutic needs.     12/10/2023 1005 by Kiki Morgan CCC-SLP  Outcome: Ongoing, Progressing     Bedside swallow eval completed. Pt confused and not following commands, swatting clinician's hand away. Recommend she remain NPO at this time. SLP will continue to follow.

## 2023-12-10 NOTE — PLAN OF CARE
Problem: Occupational Therapy  Goal: Occupational Therapy Goal  Description: Goals to be met by: 12/24/2023     Patient will increase functional independence with ADLs by performing:    UE Dressing with Minimal Assistance.  LE Dressing with Moderate Assistance.  Grooming while EOB with Minimal Assistance.  Toileting from bedside commode with Maximum Assistance for hygiene and clothing management.   Supine to sit with Minimal Assistance.  Stand pivot transfer with Minimal Assistance using LRAD as needed.  Toilet transfer to toilet with Minimal Assistance using LRAD as needed.    Outcome: Ongoing, Progressing     Pt evaluated and OT goals established.

## 2023-12-10 NOTE — HPI
Eden Cardenas is a 89 y.o. female with history of HTN, HLD, glaucoma that presents with tSAH s/p mechanical fall from ground level. Per chart review, although the fall was unwitnessed, family heard a yell and the sound of the patient hitting the floor. She was brought to the ED, where she was found to be confused with inappropriate speech. She sustained a laceration to the back of her head, which was repaired in the ED. CTH showed bifrontal tSAH as well as a left parietoccipital calvarial fracture with minimal associated pneumocephalus and adjacent SDH. CT T-Spine negative for acute fractures or dislocations. The patient will be admitted to [facility] for close monitoring and a higher level of care. ENT consulted for left temporal bone fracture. Grandson at bedside on my evaluation. Patient is not oriented.

## 2023-12-10 NOTE — NURSING
Pause in potassium replacement due to IV infiltration. Will continue once IV access available.  NCC team notified. WCTM.

## 2023-12-10 NOTE — HOSPITAL COURSE
12/10: Evolving hemorrhages on CTH this AM. CT temporal bone without fracture of inner ear structures. No aneurysm identified on CTA. CTH for tomorrow ordered for surveillance  12/11/2023 NAEON. Neuro exam stable. Surveilance CTH stable.   12/12/2023: NAEON. AFVSS. Exam stable. cvEEG in place. Patient DNR/DNI per family with wishes for no operative intervention should the need arise.

## 2023-12-10 NOTE — CONSULTS
"[facility]  Adult Nutrition  Consult Note    SUMMARY     Recommendations    1) Continue IDDSI Level 5 diet per SLP, ADAT per MD/SLP   2) If intake <50% add boost TID   3) RD following    Goals: meet % een/epn by next RD following  Nutrition Goal Status: new  Communication of RD Recs: other (comment) (POC)    Assessment and Plan    Nutrition Problem  Inadequate energy intake    Related to (etiology):   Physiological condition     Signs and Symptoms (as evidenced by):   Diet advanced for lunch today form NPO  Interventions/Recommendations (treatment strategy):  Collaboration with other providers    Nutrition Diagnosis Status:   New      Reason for Assessment    Reason For Assessment: consult  Diagnosis: hemorrhage  Relevant Medical History: HTN, HLD  Interdisciplinary Rounds: did not attend  General Information Comments: RD consulted to assess dietary need. Unable to see pt d/t other healthcare professional present x2. Spoke to RN, states diet just advanced to IDDSI level 5. States pt with some vomiting last night. Limited wt history present. RD to follow up and obtain nutrition history and assess nutrition status.     Nutrition Discharge Planning: adequate intake    Nutrition Risk Screen    Nutrition Risk Screen: no indicators present    Nutrition/Diet History    Spiritual, Cultural Beliefs, Jewish Practices, Values that Affect Care: no  Food Allergies: NKFA    Anthropometrics    Temp: 98 °F (36.7 °C)  Height: 5' 3" (160 cm)  Height (inches): 63 in  Weight Method: Bed Scale  Weight: 51.7 kg (114 lb)  Weight (lb): 114 lb  Ideal Body Weight (IBW), Female: 115 lb  % Ideal Body Weight, Female (lb): 99.13 %  BMI (Calculated): 20.2  BMI Grade: 18.5-24.9 - normal       Lab/Procedures/Meds    Pertinent Labs Reviewed: reviewed  Pertinent Labs Comments: H/h: 11.7/34.7, mch: 31.5, potassium: 3.3, bun: 31, gfr: 59.2, glu: 186, phos: 2.1  Pertinent Medications Reviewed: reviewed  Pertinent " Medications Comments: Abx, magnesium sulfate, senna-docusate- PRN precedex, atorvastatin      Estimated/Assessed Needs    Weight Used For Calorie Calculations: 51.7 kg (114 lb)  Energy Calorie Requirements (kcal): 5746-1348 (20-25 kcal/kg)  Energy Need Method: Kcal/kg  Protein Requirements: 51 (1g/kg)  Weight Used For Protein Calculations: 51.7 kg (114 lb)     Estimated Fluid Requirement Method: RDA Method  RDA Method (mL): 1032         Nutrition Prescription Ordered    Current Diet Order: IDDSI Level 5    Evaluation of Received Nutrient/Fluid Intake    I/O: -66.2 ml since admit  Comments: LBM 12/10  % Intake of Estimated Energy Needs: 0 - 25 %  % Meal Intake: 0 - 25 %    Nutrition Risk    Level of Risk/Frequency of Follow-up: low (f/u 1-2 x/week)       Monitor and Evaluation    Physical Activity and Function: nutrition-related ADLs and IADLs  Anthropometric Measurements: height/length, weight, weight change, body mass index  Biochemical Data, Medical Tests and Procedures: electrolyte and renal panel, gastrointestinal profile, glucose/endocrine profile, lipid profile, inflammatory profile       Nutrition Follow-Up    RD Follow-up?: Yes    Muna Mason RD, LDN

## 2023-12-10 NOTE — PROCEDURES
ICU EEG/VIDEO MONITORING REPORT    Eden Cardenas  [id number]  8/16/1929    DATE OF SERVICE: 12/9-10/2023    DATE OF ADMISSION: 12/9/2023  5:11 PM    ADMITTING PROVIDER: Rahat Alba DO    METHODOLOGY-cap   Electroencephalographic (EEG) recording is with electrodes placed according to the International 10-20 placement system.  Thirty two (32) channels of digital signal are simultaneously recorded from the scalp and may include EKG, EMG, and/or eye monitors.   Recording band pass was 0.1 to 512 hz.  Digital video recording of the patient is simultaneously recorded with the EEG.  The nursing staff report clinical symptoms and may press an event button when the patient has symptoms of clinical interest to the treating physicians.  EEG and video recording is stored and archived in digital format.  The entire recording is visually reviewed and the times identified by computer analysis as being spikes or seizures are reviewed again.  Activation procedures which include photic stimulation, hyperventilation and instructing patients to perform simple task are done in selected patients.   Compresses spectral analysis (CSA) is also performed on the activity recorded from each individual channel.  This is displayed as a power display of frequencies from 0 to 30 Hz over time.   The CSA analysis is done and displayed continuously.  This is reviewed for asymmetries in power between homologous areas of the scalp and for presence of changes in power which canbe seen when seizures occur.  Sections of suspected abnormalities on the CSA is then compared with the original EEG recording.     [employer] software was also utilized in the review of this study.  This software suite analyzes the EEG recording in multiple domains.  Coherence and rhythmicity is computed to identify EEG sections which may contain organized seizures.  Each channel undergoes analysis to detect presence of spike and sharp waves which have special and morphological  characteristic of epileptic activity.  The routine EEG recording is converted from spacial into frequency domain.  This is then displayed comparing homologous areas to identify areas of significant asymmetry.  Algorithm to identify non-cortically generated artifact is used to separate eye movement, EMG and other artifact from the EEG.      Recording Times  Start on 12/9/2023, 22:49  Stop on 12/10/2021, 10:48    A total of 11 hours and 59 minutes of EEG was recorded.    EEG FINDINGS - the study is done without sedation  Interpretation of cap-EEG is limited due to artifacts. Interpretable portions suggest:  Background activity:   The background rhythm was characterized by delta-theta (4-6 Hz) activity   No posterior dominant rhythm seen.   Symmetry and continuity: the background was continuous; unable to assess symmetry     Sleep:   Not seen.    Activation procedures:   Not done    Abnormal activity:   No epileptiform discharges, periodic discharges, lateralized rhythmic delta activity or electrographic seizures were seen.    EKG:   - unable to assess    IMPRESSION: Interpretation is limited due to significant artifacts; interpretable portions suggest:  This cap-EEG is abnormal due to the severe generalized slowing seen, suggestive of severe diffuse cerebral dysfunction.  No epileptiform activity or electrographic seizures seen.    A repeat study with proper electrode placement is recommended if clinically indicated.    CLINICAL CORRELATION IS RECOMMENDED.    Clarisa Ramos MD, LESTER(), MAYA, ELIO.  Neurology-Epilepsy.  [facility].

## 2023-12-10 NOTE — PLAN OF CARE
[facility] Care Plan    POC reviewed with Eden Cardenas at 0300. Pt unable to verbalize understanding. No acute events overnight. Pt progressing toward goals. Will continue to monitor. See below and flowsheets for full assessment and VS info.     CT x2  Cardene initiated, titrated appropriately  Precedex initiated, titrated as needed  Versed given x2 for scans  EEG cap in place  Afebrile  Nonstop vocalizing, repetition of words/phrases, incoherent    Is this a stroke patient? tSAH    Neuro:  Donell Coma Scale  Best Eye Response: 4-->(E4) spontaneous  Best Motor Response: 5-->(M5) localizes pain  Best Verbal Response: 2-->(V2) incomprehensible speech  Donell Coma Scale Score: 11  Assessment Qualifiers: patient not sedated/intubated  Pupil PERRLA: yes     24hr Temp:  [97.4 °F (36.3 °C)-98 °F (36.7 °C)]     CV:   Rhythm: sinus tachycardia  BP goals:   SBP < 140  MAP > 65    Resp:           Plan: N/A    GI/:     Diet/Nutrition Received: NPO  Last Bowel Movement: 12/10/23  Voiding Characteristics: external catheter, incontinence    Intake/Output Summary (Last 24 hours) at 12/10/2023 0719  Last data filed at 12/10/2023 0601  Gross per 24 hour   Intake 133.84 ml   Output 200 ml   Net -66.16 ml     Unmeasured Output  Urine Occurrence: 3  Stool Occurrence: 1  Pad Count: 3    Labs/Accuchecks:  Recent Labs   Lab 12/10/23  0321   WBC 14.58*   RBC 3.72*   HGB 11.7*   HCT 34.7*         Recent Labs   Lab 12/10/23  0321      K 3.3*   CO2 19*      BUN 31*   CREATININE 0.9   ALKPHOS 102   ALT 16   AST 27   BILITOT 0.8      Recent Labs   Lab 12/09/23  2120   INR 1.0   APTT <21.0      Recent Labs   Lab 12/09/23  1741   TROPONINI 0.006       Electrolytes: Electrolytes replaced  Accuchecks: Q6H    Gtts:   dexmedeTOMIDine (Precedex) infusion (titrating) Stopped (12/10/23 0556)    nicardipine 2.5 mg/hr (12/10/23 0601)       LDA/Wounds:  Lines/Drains/Airways       Drain  Duration             Female External Urinary Catheter  12/09/23 2300 <1 day              Peripheral Intravenous Line  Duration                  Peripheral IV - Single Lumen 12/09/23 2229 18 G Anterior;Left;Proximal Forearm <1 day         Peripheral IV - Single Lumen 12/09/23 2300 20 G Anterior;Proximal;Right Forearm <1 day                  Wounds: Yes; posterior head lac repaired, staples in place    Wound care consulted: No

## 2023-12-10 NOTE — PROGRESS NOTES
[facility]  Neurocritical Care  Progress Note    Admit Date: 12/9/2023  Service Date: 12/10/2023  Length of Stay: 1    Subjective:     Chief Complaint: SAH (subarachnoid hemorrhage)    History of Present Illness: Eden Cardenas is a 89 y.o. female with history of HTN, HLD, glaucoma that presents with tSAH s/p mechanical fall from ground level. Per chart review, although the fall was unwitnessed, family heard a yell and the sound of the patient hitting the floor. No AC/AP. No LOC. She was brought to the ED, where she was found to be confused with inappropriate speech. She sustained a laceration to the back of her head, which was repaired in the ED. CTH showed bifrontal tSAH as well as a left parietoccipital calvarial fracture with minimal associated pneumocephalus and adjacent SDH. CT T-Spine negative for acute fractures or dislocations. The patient will be admitted to [facility] for close monitoring and a higher level of care.     Hospital Course: 12/10/2023 d/c EEG, delirium and agitation     Interval History: Patient with increased delirium and agitation.  Begin low dose seroquel and monitor QTC.  ENT consulted for temporal bone fracture.  Unasyn ppx.     Review of Systems: Unable to obtain a complete ROS due to level of consciousness.     Vitals:   Temp: 98 °F (36.7 °C)  Pulse: 89  Rhythm: normal sinus rhythm  BP: (!) 152/68  MAP (mmHg): 98  Resp: 20  SpO2: 95 %    Temp  Min: 97.4 °F (36.3 °C)  Max: 98.5 °F (36.9 °C)  Pulse  Min: 89  Max: 123  BP  Min: 115/58  Max: 187/92  MAP (mmHg)  Min: 70  Max: 140  Resp  Min: 16  Max: 54  SpO2  Min: 94 %  Max: 100 %    12/09 0701 - 12/10 0700  In: 133.8 [I.V.:133.8]  Out: 200 [Urine:200]   Unmeasured Output  Urine Occurrence: 3  Stool Occurrence: 1  Pad Count: 3     Examination:   Constitutional: Well-nourished and -developed. No apparent distress.   Eyes: Conjunctiva clear, anicteric. Lids no lesions.  Head/Ears/Nose/Mouth/Throat/Neck: Moist mucous membranes.  "External ears, nose atraumatic. Stapled occipital laceration   Cardiovascular: Regular rhythm. No murmurs. No leg edema.  Respiratory: Comfortable respirations. Clear to auscultation.  Gastrointestinal: No hernia. Soft, nondistended, nontender. + bowel sounds.    Neurologic:  -GCS E3V3M5  -Disoriented x3.    -Cranial nerves grossly intact, particularly   -Motor VALLADARES  -Sensation intact      Medications:   ContinuousdexmedeTOMIDine (Precedex) infusion (titrating), Last Rate: 0.3 mcg/kg/hr (12/10/23 0901)  nicardipine, Last Rate: 2.5 mg/hr (12/10/23 0901)    ScheduledamLODIPine, 5 mg, Daily  ampicillin-sulbactim (UNASYN) IVPB, 1.5 g, Q6H  atorvastatin, 40 mg, Daily  lacosamide (VIMPAT) IVPB, 50 mg, Q12H  QUEtiapine, 25 mg, BID  senna-docusate 8.6-50 mg, 1 tablet, BID    PRNacetaminophen, 650 mg, Q6H PRN  calcium gluconate IVPB, 1 g, PRN  calcium gluconate IVPB, 1 g, PRN  calcium gluconate IVPB, 1 g, PRN  hydrALAZINE, 10 mg, Q4H PRN  labetalol, 10 mg, Q15 Min PRN  magnesium sulfate IVPB, 2 g, PRN  magnesium sulfate IVPB, 4 g, PRN  ondansetron, 4 mg, Q8H PRN  potassium chloride, 40 mEq, PRN   And  potassium chloride, 60 mEq, PRN   And  potassium chloride, 80 mEq, PRN  prochlorperazine, 5 mg, Q6H PRN  sodium chloride 0.9%, 10 mL, PRN  sodium phosphate 15 mmol in dextrose 5 % (D5W) 250 mL IVPB, 15 mmol, PRN  sodium phosphate 20.01 mmol in dextrose 5 % (D5W) 250 mL IVPB, 20.01 mmol, PRN  sodium phosphate 30 mmol in dextrose 5 % (D5W) 250 mL IVPB, 30 mmol, PRN       Today I independently reviewed pertinent medications, lines/drains/airways, imaging, cardiology results, laboratory results, microbiology results,     ISTAT: No results for input(s): "PH", "PCO2", "PO2", "POCSATURATED", "HCO3", "BE", "POCNA", "POCK", "POCTCO2", "POCGLU", "POCICA", "POCLAC", "SAMPLE" in the last 24 hours.   Chem:   Recent Labs   Lab 12/09/23  1741 12/10/23  0321    137   K 3.0* 3.3*    102   CO2 19* 19*   * 186*   BUN 43* 31* "   CREATININE 1.0 0.9   CALCIUM 9.2 9.3   MG 2.0 1.8   PHOS  --  2.1*   ANIONGAP 15 16   PROT 7.1 7.6   ALBUMIN 3.6 3.7   BILITOT 0.3 0.8   ALKPHOS 103 102   AST 22 27   ALT 9* 16   TROPONINI 0.006  --      Heme:   Recent Labs   Lab 12/09/23  2120 12/10/23  0321   WBC  --  14.58*   HGB  --  11.7*   HCT  --  34.7*   PLT  --  276   INR 1.0  --      Endo:   Recent Labs   Lab 12/09/23  2131 12/10/23  0639   POCTGLUCOSE 155* 160*          Assessment/Plan:     Neuro  * SAH (subarachnoid hemorrhage)  Patient is a 89 y.o. female with history of HTN, HLD, and glaucoma who presented to the ED with AMS s/p mechanical fall from ground level. No LOC per family. No AC/AP.    - Admit to [facility]  - q1h neuro checks, vitals, I/Os  - NSGY  consulted  - Echo, EKG, CXR  - CTH (12/9/23): SAH in the peripheral sulci in the bilateral frontal lobes; 5mm nodular hyperdensity near the left ICA; non-displaced left calvarial fracture involving the posterior parietoccipital calvarium and extending anteriorly to involve the temporal calvarium with associated pneumocephalus and minimal adjacent SDH  - CTA Head and Neck ordered for 2330 to evaluate stability of SAH and rule out aneurysms or vascular malformations   - CT Temporal bone ordered and pending to further evaluate fracture and rule out injury to inner ear structures  - vEEG ordered and pending to rule out seizures  - A1c, TSH, lipid panel  - aPTT, PT/INR   - Daily CBC, CMP, mag, phos  - Goal EuNa, strict avoidance of hyponatremia  - SBP < 160  - Cardene gtt, wean as able   - PRN labetalol, hydralazine  - Vimpat 50mg BID x 7 days for seizure prophylaxis  - Seizure precautions  - HOB 30°   - Diet NPO  - Rehab efforts: The patient has been evaluated by a stroke team provider and the therapy needs have been fully considered based off the presenting complaints and exam findings. The following therapy evaluations are needed: PT evaluate and treat, OT evaluate and treat, SLP evaluate and treat,  PM&R evaluate for appropriate placement  - VTE Prophylaxis: mechanical, hold chemical in setting of acute bleed       Temporal bone fracture  ENT consulted  Begin unasyn ppx    Delirium  Requiring precedex currently  Begin seroquel 25 mg bid  Daily EKG  Qtc currently 503    Cardiac/Vascular  Essential (primary) hypertension  History of,  - EKG, Echo  - SBP < 160  - PRN labetalol, hydralazine  - Continue home amlodipine     Mixed hyperlipidemia  History of,  - Lipid panel  - Statin           The patient is being Prophylaxed for:  Venous Thromboembolism with: Mechanical  Stress Ulcer with: Not Applicable   Ventilator Pneumonia with: not applicable    Activity Orders            Diet Dysphagia Mechanical Soft (IDDSI Level 5) Thin: Dysphagia 2 (Mechanical Soft Ground) starting at 12/10 1145    Turn patient starting at 12/09 2200    Elevate HOB starting at 12/09 2100          Full Code    Critical care time: 30 minutes     MALIK PotterC  Neurocritical Care  [facility]

## 2023-12-10 NOTE — PT/OT/SLP EVAL
"Occupational Therapy   Evaluation and Treatment    Co-eval with PT to have 2 skilled therapists present to safely assess pt's functional mobility.     Name: Eden Cardenas  MRN: [id number]  Admitting Diagnosis: SAH (subarachnoid hemorrhage)  Recent Surgery: * No surgery found *      Recommendations:     Discharge Recommendations: Moderate Intensity Therapy  Discharge Equipment Recommendations:  to be determined by next level of care  Barriers to discharge:  Other (Comment) (increased assistance required with ADLs and mobility)    Assessment:     Eden Cardenas is a 89 y.o. female with a medical diagnosis of SAH (subarachnoid hemorrhage).  Pt had limited tolerance of session due to confusion and requires significant assistance with ADLs and mobility.  Due to her current level of function, moderate intensity therapy recommended at d/c for maximal pt gains in functional independence and to ensure her safety.  She presents with the following. Performance deficits affecting function: weakness, impaired endurance, impaired self care skills, impaired functional mobility, gait instability, impaired balance, impaired cognition, decreased safety awareness, decreased coordination, decreased upper extremity function, decreased lower extremity function.      Rehab Prognosis: Fair; patient would benefit from acute skilled OT services to address these deficits and reach maximum level of function.       Plan:     Patient to be seen 3 x/week to address the above listed problems via self-care/home management, therapeutic exercises, therapeutic activities, neuromuscular re-education, cognitive retraining  Plan of Care Expires: 01/08/24  Plan of Care Reviewed with: patient    Subjective     Chief Complaint: none stated  Patient/Family Comments/goals: "What did you do to my sister?"    Occupational Profile: (per chart due to pt being disoriented x 4)  Living Environment: Per chart, pt lives downstairs at her grandson's house.  Previous " level of function: Unknown but per chart, pt was ambulating while she fell at her grandson's house.   Roles and Routines: grandmother  Equipment Used at Home: other (see comments) (unknown)  Assistance upon Discharge: Her grandson?    Pain/Comfort:  Pain Rating 1: other (see comments) (none verbalized)  Pain Rating Post-Intervention 1: other (see comments) (unchanged)    Patients cultural, spiritual, Mandaeism conflicts given the current situation: no    Objective:     Communicated with: nurse and PT prior to session.  Patient found with HOB elevated with PureWick, bed alarm, restraints, Other (comments), telemetry, blood pressure cuff, pulse ox (continuous), peripheral IV (safety mitts) upon OT entry to room.    General Precautions: Standard, fall, aspiration  Orthopedic Precautions: N/A  Braces: N/A  Respiratory Status: Room air    Occupational Performance:    Bed Mobility:    Patient completed Rolling/Turning to Right and Left with maximal assistance  Patient completed Scooting/Bridging to HOB with total assistance and 2 persons while supine  Patient completed Supine to Sit to the R with maximal assistance of 2 persons   Patient completed Sit to Supine with total assistance and 2 persons    Functional Mobility/Transfers:  Patient completed Sit <> Stand Transfer from EOB x 1 trial with moderate assistance  with  hand-held assist and writing therapist behind her for safety  Functional Mobility: Pt took a couple of steps forward and backward with Min A with HHA.      Activities of Daily Living:  Lower Body Dressing: total assistance to bethel non-skid socks while supine  Toileting: total assistance to doff soiled brief and PureWick and perform hygiene at bed level.  Her ted pads were changed.  Pt had urinated on herself and the floor while standing EOB.     Cognitive/Visual Perceptual:  Cognitive/Psychosocial Skills:     -       Pt disoriented x 4.   -       Follows Commands/attention:Inattentive and Easily  distracted  -       Communication: mostly unintelligible speech    Physical Exam:  Upper Extremity Range of Motion:  Difficult to assess due to her inability to follow commands.  Spontaneous movement of BUE.  Upper Extremity Strength: Difficult to assess due to her inability to follow commands.   Strength:    -       Right Upper Extremity:  strong grasp but not functional  -       Left Upper Extremity:  strong grasp but not functional     AMPAC 6 Click ADL:  AMPAC Total Score: 7    Treatment & Education:  - Pt required Max-Total A of 2 for sitting balance EOB due to posterior lean and intermittent hallucinations and flailing.      Pt edu on role of OT, POC, safety when performing self care tasks, benefit of performing EOB/OOB activity, and safety when performing functional transfers and mobility.  - No evidence of learning  - Self care tasks completed-- as noted above      Patient left HOB elevated with all lines intact, call button in reach, bed alarm on, restraints reapplied at end of session, nurse notified, and nurse present    GOALS:   Multidisciplinary Problems       Occupational Therapy Goals          Problem: Occupational Therapy    Goal Priority Disciplines Outcome Interventions   Occupational Therapy Goal     OT, PT/OT Ongoing, Progressing    Description: Goals to be met by: 12/24/2023     Patient will increase functional independence with ADLs by performing:    UE Dressing with Minimal Assistance.  LE Dressing with Moderate Assistance.  Grooming while EOB with Minimal Assistance.  Toileting from bedside commode with Maximum Assistance for hygiene and clothing management.   Supine to sit with Minimal Assistance.  Stand pivot transfer with Minimal Assistance using LRAD as needed.  Toilet transfer to toilet with Minimal Assistance using LRAD as needed.                         History:     History reviewed. No pertinent past medical history.    History reviewed. No pertinent surgical history.    Time  Tracking:     OT Date of Treatment: 12/10/23  OT Start Time: 1335  OT Stop Time: 1357  OT Total Time (min): 22 min    Billable Minutes:Evaluation 10 min  Self Care/Home Management 12 min    12/10/2023

## 2023-12-10 NOTE — PROCEDURES
RAPID RESPONSE VASCULAR ACCESS NOTE       Single lumen 18G, 10CM midline placed in the left cephalic vein. Needle advanced into the vessel under real time ultrasound guidance.    Max dwell date: 1/8/24   Lot number: [id number]

## 2023-12-10 NOTE — NURSING
Bleeding noticed on pillowcase from lac on back head. ISAI Talamantes notified and to bedside to exam. No new orders, WCTM.

## 2023-12-10 NOTE — HPI
Eden Cardenas is a 89 y.o. female with history of HTN, HLD, glaucoma that presents with tSAH s/p mechanical fall from ground level. Per chart review, although the fall was unwitnessed, family heard a yell and the sound of the patient hitting the floor. No AC/AP. No LOC. She was brought to the ED, where she was found to be confused with inappropriate speech. She sustained a laceration to the back of her head, which was repaired in the ED. CTH showed bifrontal tSAH as well as a left parietoccipital calvarial fracture with minimal associated pneumocephalus and adjacent SDH. CT T-Spine negative for acute fractures or dislocations. The patient will be admitted to [facility] for close monitoring and a higher level of care.

## 2023-12-10 NOTE — HOSPITAL COURSE
12/10/2023 d/c EEG, delirium and agitation   12/11/2023 Follow-up CTH this AM stable. Seroquel started last night d/t increased agitation - increased drowsiness today. Will discontinue.  12/12/2023 No issues overnight. No epileptiform activity per Epilepsy, EEG discontinued. More alert for family at bedside. CTH tonight, possible stepdown tomorrow pending results.   12/13/2023 precedex off, resume seroquel with daily EKGs, transfer to

## 2023-12-10 NOTE — PLAN OF CARE
Recommendations    1) Continue IDDSI Level 5 diet per SLP, ADAT per MD/SLP   2) If intake <50% add boost TID   3) RD following    Goals: meet % een/epn by next RD following  Nutrition Goal Status: new  Communication of RD Recs: other (comment) (POC)

## 2023-12-10 NOTE — PT/OT/SLP EVAL
Physical Therapy Co-Evaluation and Co-Treatment  Co-evaluation with OT for maximal pt participation, safety, and activity tolerance    Patient Name:  Eden Cardenas   MRN:  [id number]  Admit Date: 12/9/2023  Admitting Diagnosis:  SAH (subarachnoid hemorrhage)   Length of Stay: 1 days  Recent Surgery: * No surgery found *      Recommendations:     Discharge Recommendations:  Moderate Intensity Therapy  Discharge Equipment Recommendations: to be determined by next level of care   Justification for Equipment: N/A  Barriers to discharge: Evolving Clinical Presentation    Assessment:     Eden Cardenas is a 89 y.o. female admitted with a medical diagnosis of SAH (subarachnoid hemorrhage).  She presents with the following impairments/functional limitations: weakness, gait instability, decreased upper extremity function, decreased lower extremity function, impaired balance, impaired endurance, decreased safety awareness, impaired cognition, impaired self care skills, impaired functional mobility.     Pt very confused, intermittently agitated and frightful, unable to verbalize meaningfully and disoriented. Pt minimally follows commands but is very active and needs to be watched closely to prevent line pulling. Pt stands at EOB and takes a few steps but begins to void around [place] and is returned to bed for clean-up    Rehab Prognosis: Fair; patient would benefit from acute skilled PT services to address these deficits and reach maximum level of function.      Treatment Tolerated: Fairly Poorly    Highest level of mobility achieved this visit: few small steps at EOB w/ min A    Activity with RN/PCT: transfer with 1 person assist    Plan:     During this hospitalization, patient to be seen 3 x/week to address the identified rehab impairments via gait training, therapeutic activities, therapeutic exercises, neuromuscular re-education and progress towards the established goals.    Plan of Care Expires:  01/10/24    Subjective  "    RN Caitie Espinal notified prior to session. No family present upon PT entrance into room.    Chief Complaint: none  Patient/Family Comments/goals: "What did you do with my sister?"  Pain/Comfort:  Pain Rating 1:  (unable to rate)    Social History: Unable to obtain due to confusion    Objective:     Additional staff present: OT Parminder    Patient found left sidelying with: PureWick, peripheral IV, restraints, telemetry, blood pressure cuff (safety mitts)     General Precautions: Standard, fall, aspiration   Orthopedic Precautions:N/A   Braces: N/A   Body mass index is 20.19 kg/m².  Oxygen Device: Room Air    Vitals: /63 (BP Location: Right arm, Patient Position: Lying)   Pulse 86   Temp 97.9 °F (36.6 °C) (Axillary)   Resp (!) 23   Ht 5' 3" (1.6 m)   Wt 51.7 kg (114 lb)   SpO2 96%   Breastfeeding No   BMI 20.19 kg/m²     Exams:  Cognition:   Oriented X 0, Uncooperative, Confused, and Labile  Command following: Not following commands  Fluency: dysarthria  Hearing: Intact  Vision:  Intact  Skin Integrity: Visible skin intact    Physical Exam:   Edema - None noted  ROM - WENDI LEs WFL  Strength - untestable due to confusion     Outcome Measures:    AM-PAC 6 CLICK MOBILITY  Turning over in bed (including adjusting bedclothes, sheets and blankets)?: 2  Sitting down on and standing up from a chair with arms (e.g., wheelchair, bedside commode, etc.): 2  Moving from lying on back to sitting on the side of the bed?: 2  Moving to and from a bed to a chair (including a wheelchair)?: 2  Need to walk in hospital room?: 2  Climbing 3-5 steps with a railing?: 1  Basic Mobility Total Score: 11     Functional Mobility:    Bed Mobility:   Rolling/Turning to Left: moderate assistance  Rolling/Turning to Right: moderate assistance  Scooting to HOB via supine bridge: maximal assistance of 2 persons  Supine to Sit: maximal assistance; from R side of bed  Scooting anteriorly to EOB to have both feet planted on floor: moderate " assistance  Sit to Supine: total assistance of 2 persons; to L side of bed    Sitting Balance at Edge of Bed:  Assistance Level Required: Contact Guard Assistance and Minimal Assistance  Time: 12 min  Postural deviations noted: posterior lean  Comments: pt lashes out and needs constant contact to remain on bed    Transfers:   Sit <> Stand Transfer: moderate assistance with no assistive device  Stand <> Sit Transfer: moderate assistance with no assistive device  [id number] from EOB    Standing Balance:  Assistance Level Required: Minimal Assistance  Patient used: no assistive device  Time: 3 min  Postural deviations noted: no deviations noted      Gait:   Patient ambulated: 2 steps fwd and back   Patient required: minimal assist  Patient used:  no assistive device  Gait Pattern observed: swing-to gait  Gait Deviation(s): shuffle gait and ataxic and uncontrolled movements  Impairments due to:  decreased cognition and neurological deficits    Education:  Time provided for education, counseling and discussion of health disposition in regards to patient's current status  All questions answered within PT scope of practice and to patient's satisfaction  PT role in POC to address current functional deficits  Pt educated on proper body mechanics, safety techniques, and energy conservation with PT facilitation and cueing throughout session    Patient left supine with bed alarm on, restraints reapplied at end of session, and RN present.    GOALS:   Multidisciplinary Problems       Physical Therapy Goals          Problem: Physical Therapy    Goal Priority Disciplines Outcome Goal Variances Interventions   Physical Therapy Goal     PT, PT/OT Ongoing, Progressing     Description: Goals to met by 12/24/2023    1. Supine to sit with MInimal Assistance  2. Sit to supine with MInimal Assistance  3. Rolling to Left and Right with Minimal Assistance.  4. Sit to stand transfer with Contact Guard Assistance  5. Bed to chair transfer with  Contact Guard Assistance using LRAD  6. Gait  x 25 feet with Contact Guard Assistance using LRAD   7. Sitting at edge of bed x10 minutes with Stand-by Assistance  8. Stand for 5 minutes with Stand-by Assistance using LRAD  9. Lower extremity exercise program x15 reps per Instruction, with assistance as needed in order to facilitate improved strength, improved postural control, and improvement in functional independence                     Time Tracking:     PT Received On: 12/10/23  PT Start Time: 1334     PT Stop Time: 1356  PT Total Time (min): 22 min     Billable Minutes: Evaluation 1 procedure and Therapeutic Activity 12 min    Storm Marie, PT, DPT  12/10/2023

## 2023-12-10 NOTE — CONSULTS
[facility] - [facility] Dept  Neurosurgery  Consult Note    Inpatient consult to Neurosurgery  Consult performed by: Marcus Timmons MD  Consult ordered by: Carlin Recio MD        Subjective:     Chief Complaint/Reason for Admission: fall    History of Present Illness: 89F presenting after ground level fall without LoC and CTH demonstrating bifrontal tSAH, L parieto-occipital skull fracture. No AC/AP. On neurosurgical evaluation patient displays altered mental status with inappropriate speech but moving all extremities spontaneously. Fall was unwitnessed but family states they heard a yell and then sound of patient hitting floor    (Not in a hospital admission)      Review of patient's allergies indicates:  No Known Allergies    No past medical history on file.  No past surgical history on file.  Family History    None       Tobacco Use    Smoking status: Not on file    Smokeless tobacco: Not on file   Substance and Sexual Activity    Alcohol use: Not on file    Drug use: Not on file    Sexual activity: Not on file     Review of Systems   Unable to perform ROS: Mental status change     Objective:        There is no height or weight on file to calculate BMI.  Vital Signs (Most Recent):  Temp: 97.5 °F (36.4 °C) (12/09/23 1749)  Pulse: (!) 123 (12/09/23 1830)  Resp: 20 (12/09/23 1830)  BP: (!) 161/81 (12/09/23 1830)  SpO2: 98 % (12/09/23 1830) Vital Signs (24h Range):  Temp:  [97.5 °F (36.4 °C)] 97.5 °F (36.4 °C)  Pulse:  [121-123] 123  Resp:  [19-26] 20  SpO2:  [98 %-100 %] 98 %  BP: (161-179)/(81-92) 161/81                                 Physical Exam  Vitals and nursing note reviewed.   Constitutional:       Appearance: She is ill-appearing.   HENT:      Head:      Comments: R frontal scalp ecchymosis     Mouth/Throat:      Mouth: Mucous membranes are dry.   Eyes:      Extraocular Movements: Extraocular movements intact.   Cardiovascular:      Rate and Rhythm: Tachycardia present.   Pulmonary:      Effort:  Pulmonary effort is normal.   Abdominal:      Palpations: Abdomen is soft.            E4V3M5  Bsi, PERRL  NIKI  Inappropriate / repetitive speech      Significant Labs:  Recent Labs   Lab 12/09/23  1741   *      K 3.0*      CO2 19*   BUN 43*   CREATININE 1.0   CALCIUM 9.2     Recent Labs   Lab 12/09/23  1741   WBC 14.25*   HGB 11.7*   HCT 33.9*        Recent Labs   Lab 12/09/23  1741   INR 1.0     Microbiology Results (last 7 days)       ** No results found for the last 168 hours. **          All pertinent labs from the last 24 hours have been reviewed.    Significant Diagnostics:  I have reviewed all pertinent imaging results/findings within the past 24 hours.  I have reviewed and interpreted all pertinent imaging results/findings within the past 24 hours.  CT Cervical Spine Without Contrast    Result Date: 12/9/2023  1. No acute abnormality of the cervical spine. 2. Multilevel chronic degenerative changes. 3. See above comments.  See CT brain report same date also Electronically signed by: Jason Nicolas Date:    12/09/2023 Time:    18:18    CT Head Without Contrast    Result Date: 12/9/2023  Acute calvarial fracture on the left with associated pneumocephalus and minimal subdural hematoma adjacent to the fracture.  There is acute subarachnoid hemorrhage in the peripheral sulci of the bilateral frontal lobes also.  See above comments.  CTA may better characterize also.  Recommend neurosurgical consultation and follow-up. This report was flagged in Epic as abnormal. Electronically signed by: Jason Nicolas Date:    12/09/2023 Time:    18:03     Assessment/Plan:     SAH (subarachnoid hemorrhage)  89F presenting after ground level fall without LoC and CTH demonstrating bifrontal traumatic SAH, L parieto-occipital skull fracture. No AC/AP.    Imaging:  CT C Spine 12/9: No acute fractures   CTH 12/9: SAH b/l frontal lobe, L parietal/occipital fracture w/o displacement w minimal adjacent SDH,  hyperdensity near L ICA possible aneurysm     Plan:  --admit NCC; q1h nc/vs  --all labs and significant diagnostics reviewed  --CTH repeat 6 hours  --CTA for suspicious hyperdensity near L ICA c/f aneurysm  --CT temporal bone to r/o injury to inner ear structures  --SBP < 160  --Na > 135  --HOB > 30  --Coags/T&S  --NPO   --Keppra 500mg BID x7 days  --please notify nsgy of any acute neurological decline          Thank you for your consult. I will follow-up with patient. Please contact us if you have any additional questions.    Marcus Timmons MD  Neurosurgery  [facility] - Emergency Dept

## 2023-12-10 NOTE — ASSESSMENT & PLAN NOTE
89F presenting after ground level fall without LoC and CTH demonstrating bifrontal traumatic SAH, L parieto-occipital skull fracture. No AC/AP.    Imaging:  CT C Spine 12/9: No acute fractures   CTH 12/9: SAH b/l frontal lobe, L parietal/occipital fracture w/o displacement w minimal adjacent SDH, hyperdensity near L ICA possible aneurysm   CTA 12/9: worsening contusions and new L temporal, SAH, edema, no significant MLS. New IVH. No aneurysms.   CT Temporal bone 12/10: Nondisplaced fracture occipital and left temporal bones w diastasis of the adjacent sutures.  Fluid within the middle ear and mastoid air cells   CTH 12/10 repeat: evolving hemorrhages     Plan:  --admit NCC; q1h nc/vs  --all labs and significant diagnostics reviewed  --CTH tomorrow ordered for surveillance  --SBP < 160  --Na > 135  --HOB > 30  --Maximum medical management per NCC  --NPO   --Keppra 500mg BID x7 days  --please notify nsgy of any acute neurological decline

## 2023-12-10 NOTE — ASSESSMENT & PLAN NOTE
89F presenting after ground level fall without LoC and CTH demonstrating bifrontal traumatic SAH, L parieto-occipital skull fracture. No AC/AP.    Imaging:  CT C Spine 12/9: No acute fractures   CTH 12/9: SAH b/l frontal lobe, L parietal/occipital fracture w/o displacement w minimal adjacent SDH, hyperdensity near L ICA possible aneurysm     Plan:  --admit NCC; q1h nc/vs  --all labs and significant diagnostics reviewed  --CTH repeat 6 hours  --CTA for suspicious hyperdensity near L ICA c/f aneurysm  --CT temporal bone to r/o injury to inner ear structures  --SBP < 160  --Na > 135  --HOB > 30  --Coags/T&S  --NPO   --Keppra 500mg BID x7 days  --please notify nsgy of any acute neurological decline

## 2023-12-10 NOTE — SUBJECTIVE & OBJECTIVE
Interval History: 12/10: Evolving hemorrhages on CTH this AM. CT temporal bone without fracture of inner ear structures. No aneurysm identified on CTA. CTH for tomorrow ordered for surveillance    Medications:  Continuous Infusions:   dexmedeTOMIDine (Precedex) infusion (titrating) 0.3 mcg/kg/hr (12/10/23 0901)    nicardipine 2.5 mg/hr (12/10/23 0901)     Scheduled Meds:   amLODIPine  5 mg Oral Daily    ampicillin-sulbactim (UNASYN) IVPB  1.5 g Intravenous Q6H    atorvastatin  40 mg Oral Daily    lacosamide (VIMPAT) IVPB  50 mg Intravenous Q12H    magnesium sulfate IVPB  1 g Intravenous Once    senna-docusate 8.6-50 mg  1 tablet Oral BID     PRN Meds:acetaminophen, calcium gluconate IVPB, calcium gluconate IVPB, calcium gluconate IVPB, hydrALAZINE, labetalol, magnesium sulfate IVPB, magnesium sulfate IVPB, ondansetron, potassium chloride **AND** potassium chloride **AND** potassium chloride, prochlorperazine, sodium chloride 0.9%, sodium phosphate 15 mmol in dextrose 5 % (D5W) 250 mL IVPB, sodium phosphate 20.01 mmol in dextrose 5 % (D5W) 250 mL IVPB, sodium phosphate 30 mmol in dextrose 5 % (D5W) 250 mL IVPB     Review of Systems  Objective:     Weight: 52 kg (114 lb 10.2 oz)  Body mass index is 20.31 kg/m².  Vital Signs (Most Recent):  Temp: 98.5 °F (36.9 °C) (12/10/23 0701)  Pulse: 104 (12/10/23 1001)  Resp: (!) 28 (12/10/23 1001)  BP: (!) 150/79 (cardene restarted) (12/10/23 1001)  SpO2: (!) 94 % (12/10/23 1001) Vital Signs (24h Range):  Temp:  [97.4 °F (36.3 °C)-98.5 °F (36.9 °C)] 98.5 °F (36.9 °C)  Pulse:  [] 104  Resp:  [16-54] 28  SpO2:  [94 %-100 %] 94 %  BP: (115-187)/() 150/79     Date 12/10/23 0700 - 12/11/23 0659   Shift 2671-4063 2282-3150 0526-2789 24 Hour Total   INTAKE   I.V.(mL/kg) 23.5(0.5)   23.5(0.5)   Shift Total(mL/kg) 23.5(0.5)   23.5(0.5)   OUTPUT   Shift Total(mL/kg)       Weight (kg) 52 52 52 52                       Female External Urinary Catheter 12/09/23 2300 (Active)    Skin no redness;no breakdown 12/10/23 0901   Tolerance no signs/symptoms of discomfort 12/10/23 0901   Suction Continuous suction at 40 mmHg 12/10/23 0701   Date of last wick change 12/09/23 12/09/23 2301   Time of last wick change 2300 12/09/23 2301   Output (mL) 200 mL 12/10/23 0601          Physical Exam     Vitals and nursing note reviewed.   Constitutional:       Appearance: She is ill-appearing.   HENT:      Head:      Comments: R frontal scalp ecchymosis     Mouth/Throat:      Mouth: Mucous membranes are dry.   Eyes:      Extraocular Movements: Extraocular movements intact.   Cardiovascular:      Rate and Rhythm: Tachycardia present.   Pulmonary:      Effort: Pulmonary effort is normal.   Abdominal:      Palpations: Abdomen is soft.               E4V3M5  Bsi, PERRL  NIKI  Inappropriate / repetitive speech    Neurosurgery Physical Exam    Significant Labs:  Recent Labs   Lab 12/09/23  1741 12/10/23  0321   * 186*    137   K 3.0* 3.3*    102   CO2 19* 19*   BUN 43* 31*   CREATININE 1.0 0.9   CALCIUM 9.2 9.3   MG 2.0 1.8     Recent Labs   Lab 12/09/23  1741 12/10/23  0321   WBC 14.25* 14.58*   HGB 11.7* 11.7*   HCT 33.9* 34.7*    276     Recent Labs   Lab 12/09/23  1741 12/09/23  2120   INR 1.0 1.0   APTT  --  <21.0     Microbiology Results (last 7 days)       ** No results found for the last 168 hours. **          All pertinent labs from the last 24 hours have been reviewed.    Significant Diagnostics:  I have reviewed all pertinent imaging results/findings within the past 24 hours.  I have reviewed and interpreted all pertinent imaging results/findings within the past 24 hours.  CT Temporal Bone without contrast    Result Date: 12/10/2023  Nondisplaced fracture involving the occipital and left temporal bones with diastasis of the adjacent sutures.  Fluid within the middle ear and mastoid air cells presumably due to fractures through the tegmen mastoideum with  adjacent  pneumocephalus.  No ossicular chain disruption or otic capsule involvement. Limited intracranial evaluation demonstrates an increase in size of the intraparenchymal hemorrhagic contusions involving the left temporal and bilateral frontal lobes when compared to the admission CT. Electronically signed by resident: Rob Summers Date:    12/10/2023 Time:    00:56 Electronically signed by: Jc Miller Date:    12/10/2023 Time:    08:26    CT Head Without Contrast    Result Date: 12/10/2023  Stable multi compartment intracranial hemorrhage and trace intraventricular hemorrhage, as above.  No significant midline shift. Electronically signed by resident: Rob Summers Date:    12/10/2023 Time:    06:17 Electronically signed by: Chance Valdez Date:    12/10/2023 Time:    06:39    CTA Head and Neck (xpd)    Result Date: 12/10/2023  Multi compartment intracranial hemorrhage involving the bilateral frontal lobes and left lateral temporal lobe with worsening parenchymal contusions and subarachnoid hemorrhage when compared with prior exam.  Adjacent mass effect and worsening edema adjacent to the larger parenchymal contusions, with no significant midline shift.  Additional mild subarachnoid hemorrhage along the right frontoparietal lobe, new from prior.  Similar small left posterior convexity extra-axial hemorrhage and trace right frontal subdural hemorrhage.  New trace intraventricular hemorrhage.  Continued follow-up recommended. Redemonstrated acute calvarial fracture involving the left calvarium with mildly worse overlying soft tissue hematoma.  Questionable nonocclusive thrombus in the left transverse sinus, noting significant artifact in this region on delayed phase images. No evidence of acute large vessel occlusion or flow-limiting stenosis.  Mild asymmetric narrowing of the right proximal middle cerebral artery. Solid 4 mm nodule at the right lung apex.  In a low risk patient, no further follow-up is recommended.  In a  high-risk patient, 12 month follow-up CT could be considered. Other findings discussed in the body of the report. This report was flagged in Epic as abnormal. Findings were relayed by Rob Summers MD to Kiki ALEX via telephone on 12/10/2023 at 00:55. Electronically signed by resident: Rob Summers Date:    12/10/2023 Time:    00:30 Electronically signed by: Jaspreet Mtz MD Date:    12/10/2023 Time:    01:36    X-Ray Chest AP Single View    Result Date: 12/9/2023  Radiographic findings as above. Electronically signed by: Chung Ingram Date:    12/09/2023 Time:    21:34    CT Cervical Spine Without Contrast    Result Date: 12/9/2023  1. No acute abnormality of the cervical spine. 2. Multilevel chronic degenerative changes. 3. See above comments.  See CT brain report same date also Electronically signed by: Jason Nicolas Date:    12/09/2023 Time:    18:18    CT Head Without Contrast    Result Date: 12/9/2023  Acute calvarial fracture on the left with associated pneumocephalus and minimal subdural hematoma adjacent to the fracture.  There is acute subarachnoid hemorrhage in the peripheral sulci of the bilateral frontal lobes also.  See above comments.  CTA may better characterize also.  Recommend neurosurgical consultation and follow-up. This report was flagged in Epic as abnormal. Electronically signed by: Jason Nicolas Date:    12/09/2023 Time:    18:03

## 2023-12-10 NOTE — PLAN OF CARE
[facility] Care Plan    POC reviewed with Eden Cardenas and family at 1400. Pt unable to verbalize understanding. Questions and concerns addressed. No acute events today. Pt progressing toward goals. Will continue to monitor. See below and flowsheets for full assessment and VS info.     - EEG removed/discontinued  - EKG completed  - Bach completed and diet ordered   - Bath given and linens changed   - 1g mag given   - K replacement in progress (see previous note)  - Precedex gtt continued   - Cardene gtt on/off as needed   - CT tonight.           Is this a stroke patient? no    Neuro:  [place] Coma Scale  Best Eye Response: 4-->(E4) spontaneous  Best Motor Response: 5-->(M5) localizes pain  Best Verbal Response: 2-->(V2) incomprehensible speech  [place] Coma Scale Score: 11  Assessment Qualifiers: patient not sedated/intubated  Pupil PERRLA: yes     24 hr Temp:  [97.4 °F (36.3 °C)-98.5 °F (36.9 °C)]     CV:   Rhythm: normal sinus rhythm  BP goals:   SBP < 140  MAP > 65    Resp:           Plan: N/A    GI/:     Diet/Nutrition Received: NPO  Last Bowel Movement: 12/10/23  Voiding Characteristics: external catheter    Intake/Output Summary (Last 24 hours) at 12/10/2023 1718  Last data filed at 12/10/2023 1701  Gross per 24 hour   Intake 768.9 ml   Output 200 ml   Net 568.9 ml     Unmeasured Output  Urine Occurrence: 1  Stool Occurrence: 1  Pad Count: 2    Labs/Accuchecks:  Recent Labs   Lab 12/10/23  0321   WBC 14.58*   RBC 3.72*   HGB 11.7*   HCT 34.7*         Recent Labs   Lab 12/10/23  0321      K 3.3*   CO2 19*      BUN 31*   CREATININE 0.9   ALKPHOS 102   ALT 16   AST 27   BILITOT 0.8      Recent Labs   Lab 12/09/23  2120   INR 1.0   APTT <21.0      Recent Labs   Lab 12/09/23  1741   TROPONINI 0.006       Electrolytes: Electrolytes being replaced  Accuchecks: Q6H    Gtts:   dexmedeTOMIDine (Precedex) infusion (titrating) 0.3 mcg/kg/hr (12/10/23 1701)    nicardipine Stopped (12/10/23 1623)        LDA/Wounds:  Lines/Drains/Airways       Drain  Duration             Female External Urinary Catheter 12/09/23 2300 <1 day              Peripheral Intravenous Line  Duration                  Peripheral IV - Single Lumen 12/09/23 2229 18 G Anterior;Left;Proximal Forearm <1 day         Peripheral IV - Single Lumen 12/10/23 1021 22 G Anterior;Left Ankle <1 day         Peripheral IV - Single Lumen 12/10/23 1022 Anterior;Distal;Right Forearm <1 day                  Wounds: Yes (lac on posterior head)  Wound care consulted: No

## 2023-12-10 NOTE — PT/OT/SLP EVAL
"Speech Language Pathology Evaluation  Bedside Swallow    Patient Name:  Eden Cardenas   MRN:  [id number]  Admitting Diagnosis: SAH (subarachnoid hemorrhage)    Recommendations:                 General Recommendations:  Cognitive-linguistic evaluation and Ongoing swallow assessment   Diet recommendations:  Minced & Moist Diet - IDDSI Level 5, Thin liquids - IDDSI Level 0   Aspiration Precautions: Ice chips sparingly and Strict aspiration precautions   General Precautions: Standard, aspiration, fall  Communication strategies:  none    Assessment:     Eden Cardenas is a 89 y.o. female with an SLP diagnosis of Dysphagia.  She presents with confusion and restlessness. SLP will continue to follow.     History:     History reviewed. No pertinent past medical history.    History reviewed. No pertinent surgical history.    Chief Complaint: SAH (subarachnoid hemorrhage)     History of Present Illness: "Eden Cardenas is a 89 y.o. female with history of HTN, HLD, glaucoma that presents with tSAH s/p mechanical fall from ground level. Per chart review, although the fall was unwitnessed, family heard a yell and the sound of the patient hitting the floor. No AC/AP. No LOC. She was brought to the ED, where she was found to be confused with inappropriate speech. She sustained a laceration to the back of her head, which was repaired in the ED. CTH showed bifrontal tSAH as well as a left parietoccipital calvarial fracture with minimal associated pneumocephalus and adjacent SDH. CT T-Spine negative for acute fractures or dislocations. The patient will be admitted to [facility] for close monitoring and a higher level of care. "    Prior Intubation HX:  none    Modified Barium Swallow: none    Chest X-Rays: 10/9:"Mild accentuation of the interstitial markings particularly at the lung bases may relate to a pattern of mild interstitial infiltrate/edema.  There is no focal consolidation.  There is no pleural effusion and there is no pneumothorax. " ""    Prior diet: david    Subjective     Spoke with RN prior to session. Pt awake and restless in bed. EEG monitoring, bilateral wrist restraints and mitts in place.     "Please"    Pain/Comfort:  Pain Rating 1:  (did not indicate)    Respiratory Status: Room air    Objective:     Oral Musculature Evaluation  Oral Musculature: unable to assess due to poor participation/comprehension  Dentition: present and adequate  Volitional Cough: david  Volitional Swallow: david  Voice Prior to PO Intake: clear    Bedside Swallow Eval:   Consistencies Assessed:  Thin liquids tsp water x1      Oral Phase:   Decreased closure around utensil  Poor oral acceptance    Pharyngeal Phase:   no overt clinical signs/symptoms of aspiration    Compensatory Strategies  None    Treatment: HOB raised prior to PO trials. Pt awake, though restless and confused. Pt did not follow commands and was swatting clinician's hand away when attempting to present PO trials. Recommend NPO. SLP will follow up.    RN reached out later this morning reporting pt more calm and willing to accept PO and tolerating sips of thin and medication pass in pudding. SLP at bedside for ongoing swallow assessment. Grandson at bedside. Pt remains confused with eyes closed and restraints in place, though more calm and demonstrating improved oral acceptance of PO trials.     Bedside Swallow Re-eval/ treatment:   Consistencies Assessed:  Ice chip x1  Thin liquids tsp water x x1, straw sips x4, cup sip x2  Puree x5 tsp  Cracker x1 bite    Oral Phase:   Improved oral acceptance  Anterior spillage from spoon and cup rim.  Required prompting and multiple attempts to siphon liquids from straw  Decreased closure around utensil  Orally expelled cracker x3 prior to accepting it.   Prolonged mastication of cracker    Pharyngeal Phase:   no overt clinical signs/symptoms of aspiration    Compensatory Strategies  Feed assist  Small bites/sips    Treatment: Pt repositioned to be boosted upright, " midline in bed with assist from RN. HOB raised to 90 degrees prior to PO trials. Pt required fed assist and prompting to orally accept PO trials. She toe.rated all consistencies though benefit from softer solids. Recommend level 5 minced and moist solids and thin liquids. Education provided re: role of SLP, diet recs, swallow precs, s/s aspiration and POC.  Pt requires ongoing reinforcement. Grandson in agreement.     Goals:   Multidisciplinary Problems       SLP Goals          Problem: SLP    Goal Priority Disciplines Outcome   SLP Goal     SLP Ongoing, Progressing   Description: Speech Language Pathology Goals  Goals expected to be met by 12/24    1. Pt will participate in ongoing swallow assessment to determine least restrictive PO diet when appropriate.  2. Pt will complete cognitive-linguistic assessment when feasible to determine additional therapeutic needs.                              Plan:     Patient to be seen:  4 x/week   Plan of Care expires:  01/09/24  Plan of Care reviewed with:  patient, grandchild(miguel)   SLP Follow-Up:  Yes       Discharge recommendations:    tbd  Barriers to Discharge:  Level of Skilled Assistance Needed      Time Tracking:     SLP Treatment Date:   12/10/23  Speech Session 1:  [id number]  Speech Session 2:   [id number]  Speech Total Time (min):  8 min + 8 = 15 min    Billable Minutes: Treatment Swallowing Dysfunction 8 and Eval Swallow and Oral Function 6    12/10/2023

## 2023-12-10 NOTE — CONSULTS
Inpatient consult to Physical Medicine Rehab  Consult performed by: Elizabeth Salcido NP  Consult ordered by: Kiki Abrams, ADRY  Reason for consult: Assess rehab needs      Reviewed patient history and current admission.  Rehab team following.  Full consult to follow.    CARLENE Mondragon, FNP-C  Physical Medicine & Rehabilitation   12/10/2023

## 2023-12-10 NOTE — H&P
[facility]  Neurocritical Care  History & Physical    Admit Date: 12/9/2023  Service Date: 12/9/2023  Length of Stay: 0    Subjective:     Chief Complaint: SAH (subarachnoid hemorrhage)    History of Present Illness: Eden Cardenas is a 89 y.o. female with history of HTN, HLD, glaucoma that presents with tSAH s/p mechanical fall from ground level. Per chart review, although the fall was unwitnessed, family heard a yell and the sound of the patient hitting the floor. No AC/AP. No LOC. She was brought to the ED, where she was found to be confused with inappropriate speech. She sustained a laceration to the back of her head, which was repaired in the ED. CTH showed bifrontal tSAH as well as a left parietoccipital calvarial fracture with minimal associated pneumocephalus and adjacent SDH. CT T-Spine negative for acute fractures or dislocations. The patient will be admitted to [facility] for close monitoring and a higher level of care.       History reviewed. No pertinent past medical history.  History reviewed. No pertinent surgical history.   No current facility-administered medications on file prior to encounter.     Current Outpatient Medications on File Prior to Encounter   Medication Sig Dispense Refill    amLODIPine (NORVASC) 5 MG tablet Take 5 mg by mouth once daily.      atorvastatin (LIPITOR) 10 MG tablet Take 10 mg by mouth once daily.      desmopressin (DDAVP) 0.2 MG tablet Take 0.2 mg by mouth once daily.      imipramine (TOFRANIL) 25 MG tablet Take 25 mg by mouth once daily.      latanoprost 0.005 % ophthalmic solution Place 1 drop into both eyes every evening.      meloxicam (MOBIC) 7.5 MG tablet Take 7.5 mg by mouth once daily.      MYRBETRIQ 25 mg Tb24 ER tablet Take 25 mg by mouth once daily.      traMADoL (ULTRAM) 50 mg tablet Take 50 mg by mouth daily as needed for Pain (Half or full tablet as needed for pain Orally Once a day for 30 days).        Allergies: Patient has no known  allergies.    Family history non-contributory to current problem      Review of Systems   Unable to perform ROS: Other (level of consciousness)     Objective:     Vitals:    Temp: 98 °F (36.7 °C)  Pulse: 104  Rhythm: sinus tachycardia  BP: 132/63  MAP (mmHg): 90  Resp: (!) 54  SpO2: 96 %    Temp  Min: 97.4 °F (36.3 °C)  Max: 98 °F (36.7 °C)  Pulse  Min: 95  Max: 123  BP  Min: 132/63  Max: 187/92  MAP (mmHg)  Min: 88  Max: 125  Resp  Min: 16  Max: 54  SpO2  Min: 95 %  Max: 100 %    12/09 0701 - 12/10 0700  In: 70.2 [I.V.:70.2]  Out: -    Unmeasured Output  Stool Occurrence: 1        Physical Exam  Vitals and nursing note reviewed.   Constitutional:       Comments: Elderly female. Well developed. Well nourished. Agitated and distressed-appearing.   HENT:      Head: Normocephalic.      Comments: 3cm laceration at the occiput. Well approximated with staples in place.      Right Ear: External ear normal.      Left Ear: External ear normal.      Nose: Nose normal.      Mouth/Throat:      Mouth: Mucous membranes are moist.      Pharynx: Oropharynx is clear.   Eyes:      General: No scleral icterus.     Extraocular Movements: Extraocular movements intact.      Pupils: Pupils are equal, round, and reactive to light.   Cardiovascular:      Rate and Rhythm: Regular rhythm. Tachycardia present.   Pulmonary:      Effort: No respiratory distress.   Abdominal:      General: Abdomen is flat. There is no distension.   Musculoskeletal:      Right lower leg: No edema.      Left lower leg: No edema.   Skin:     General: Skin is warm and dry.   Neurological:      Comments: E4V3M5  Opens eyes spontaneously. Disoriented x 4. Tracks appropriately. Aphasic. No dysarthria. Does not answer questions or follow commands.   PERRL. Gaze midline, no nystagmus. Extraocular movements grossly intact. No gross facial asymmetry.   Moves all extremities spontaneously, symmetrically, and AG. SILT to noxious stimuli. No involuntary movements noted.         Unable to test orientation, language, memory, judgment, insight, fund of knowledge, hearing, shoulder shrug, tongue protrusion, coordination, gait due to level of consciousness.       Today I personally reviewed pertinent medications, lines/drains/airways, imaging, cardiology results, laboratory results, notably:    Laboratory:  CBC:  Recent Labs   Lab 12/10/23  0321   WBC 14.58*   RBC 3.72*   HGB 11.7*   HCT 34.7*      MCV 93   MCH 31.5*   MCHC 33.7       CMP:  Recent Labs   Lab 12/10/23  0321   CALCIUM 9.3   ALBUMIN 3.7   PROT 7.6      K 3.3*   CO2 19*      BUN 31*   CREATININE 0.9   ALKPHOS 102   ALT 16   AST 27   BILITOT 0.8     Recent Labs   Lab 12/10/23  0321   MG 1.8   PHOS 2.1*       Coagulation:  Recent Labs   Lab 12/09/23 2120   LABPROT 10.7   INR 1.0   APTT <21.0       Lipid Panel:  Recent Labs   Lab 12/09/23 2120   CHOL 218*   HDL 77*   LDLCALC 122.4   TRIG 93       Endocrine:  Recent Labs     12/09/23  1741 12/09/23 2120   HGBA1C  --  5.2   TSH 7.611*  --        Urinalysis:  Recent Labs   Lab 12/09/23 2014   COLORU Yellow   SPECGRAV 1.020   PHUR 5.0   OCCULTUA Negative   RBCUA 1   WBCUA 2   BACTERIA None   GLUCUA 1+*   KETONESU 2+*   PROTEINUA Negative   BILIRUBINUA Negative       Imaging:  CT Head Without Contrast:  Impression:  Acute calvarial fracture on the left with associated pneumocephalus and minimal subdural hematoma adjacent to the fracture.  There is acute subarachnoid hemorrhage in the peripheral sulci of the bilateral frontal lobes also.  See above comments.  CTA may better characterize also.  Recommend neurosurgical consultation and follow-up.  This report was flagged in Epic as abnormal.  Electronically signed by: Jason Nicolas  Date:                                            12/09/2023  Time:                                           18:03         Assessment/Plan:     Neuro  * SAH (subarachnoid hemorrhage)  Patient is a 89 y.o. female with history of HTN,  HLD, and glaucoma who presented to the ED with AMS s/p mechanical fall from ground level. No LOC per family. No AC/AP.    - Admit to [facility]  - q1h neuro checks, vitals, I/Os  - NSGY  consulted  - Echo, EKG, CXR  - CTH (12/9/23): SAH in the peripheral sulci in the bilateral frontal lobes; 5mm nodular hyperdensity near the left ICA; non-displaced left calvarial fracture involving the posterior parietoccipital calvarium and extending anteriorly to involve the temporal calvarium with associated pneumocephalus and minimal adjacent SDH  - CTA Head and Neck ordered for 2330 to evaluate stability of SAH and rule out aneurysms or vascular malformations   - CT Temporal bone ordered and pending to further evaluate fracture and rule out injury to inner ear structures  - vEEG ordered and pending to rule out seizures  - A1c, TSH, lipid panel  - aPTT, PT/INR   - Daily CBC, CMP, mag, phos  - Goal EuNa, strict avoidance of hyponatremia  - SBP < 160  - Cardene gtt, wean as able   - PRN labetalol, hydralazine  - Vimpat 50mg BID x 7 days for seizure prophylaxis  - Seizure precautions  - HOB 30°   - Diet NPO  - Rehab efforts: The patient has been evaluated by a stroke team provider and the therapy needs have been fully considered based off the presenting complaints and exam findings. The following therapy evaluations are needed: PT evaluate and treat, OT evaluate and treat, SLP evaluate and treat, PM&R evaluate for appropriate placement  - VTE Prophylaxis: mechanical, hold chemical in setting of acute bleed       Cardiac/Vascular  Essential (primary) hypertension  History of,  - EKG, Echo  - SBP < 160  - Cardene gtt, wean as able  - PRN labetalol, hydralazine  - Continue home amlodipine     Mixed hyperlipidemia  History of,  - Lipid panel  - Statin           The patient is being Prophylaxed for:  Venous Thromboembolism with: Mechanical  Stress Ulcer with: Not Applicable   Ventilator Pneumonia with: not applicable          Activity Orders             Turn patient starting at 12/09 2200    Elevate HOB starting at 12/09 2100    Diet NPO: NPO starting at 12/09 2100          Full Code    Critical condition in that Patient has a condition that poses threat to life and bodily function: see problem list above     40 minutes of critical care time was spent personally by me on the following activities: development of treatment plan with patient or surrogate and bedside caregivers, discussions with consultants, evaluation of patient's response to treatment, examination of patient, ordering and performing treatments and interventions, ordering and review of laboratory studies, ordering and review of radiographic studies, pulse oximetry, re-evaluation of patient's condition. This critical care time did not overlap with that of any other provider or involve time for any procedures. There is high probability for acute neurological change leading to clinical and possibly life-threatening deterioration requiring highest level of provider preparedness for urgent intervention.     Kiki Abrams, PA-C  Neurocritical Care  [facility]

## 2023-12-10 NOTE — SUBJECTIVE & OBJECTIVE
History reviewed. No pertinent past medical history.  History reviewed. No pertinent surgical history.   No current facility-administered medications on file prior to encounter.     Current Outpatient Medications on File Prior to Encounter   Medication Sig Dispense Refill    amLODIPine (NORVASC) 5 MG tablet Take 5 mg by mouth once daily.      atorvastatin (LIPITOR) 10 MG tablet Take 10 mg by mouth once daily.      desmopressin (DDAVP) 0.2 MG tablet Take 0.2 mg by mouth once daily.      imipramine (TOFRANIL) 25 MG tablet Take 25 mg by mouth once daily.      latanoprost 0.005 % ophthalmic solution Place 1 drop into both eyes every evening.      meloxicam (MOBIC) 7.5 MG tablet Take 7.5 mg by mouth once daily.      MYRBETRIQ 25 mg Tb24 ER tablet Take 25 mg by mouth once daily.      traMADoL (ULTRAM) 50 mg tablet Take 50 mg by mouth daily as needed for Pain (Half or full tablet as needed for pain Orally Once a day for 30 days).        Allergies: Patient has no known allergies.    Family history non-contributory to current problem      Review of Systems   Unable to perform ROS: Other (level of consciousness)     Objective:     Vitals:    Temp: 98 °F (36.7 °C)  Pulse: 104  Rhythm: sinus tachycardia  BP: 132/63  MAP (mmHg): 90  Resp: (!) 54  SpO2: 96 %    Temp  Min: 97.4 °F (36.3 °C)  Max: 98 °F (36.7 °C)  Pulse  Min: 95  Max: 123  BP  Min: 132/63  Max: 187/92  MAP (mmHg)  Min: 88  Max: 125  Resp  Min: 16  Max: 54  SpO2  Min: 95 %  Max: 100 %    12/09 0701 - 12/10 0700  In: 70.2 [I.V.:70.2]  Out: -    Unmeasured Output  Stool Occurrence: 1        Physical Exam  Vitals and nursing note reviewed.   Constitutional:       Comments: Elderly female. Well developed. Well nourished. Agitated and distressed-appearing.   HENT:      Head: Normocephalic.      Comments: 3cm laceration at the occiput. Well approximated with staples in place.      Right Ear: External ear normal.      Left Ear: External ear normal.      Nose: Nose  normal.      Mouth/Throat:      Mouth: Mucous membranes are moist.      Pharynx: Oropharynx is clear.   Eyes:      General: No scleral icterus.     Extraocular Movements: Extraocular movements intact.      Pupils: Pupils are equal, round, and reactive to light.   Cardiovascular:      Rate and Rhythm: Regular rhythm. Tachycardia present.   Pulmonary:      Effort: No respiratory distress.   Abdominal:      General: Abdomen is flat. There is no distension.   Musculoskeletal:      Right lower leg: No edema.      Left lower leg: No edema.   Skin:     General: Skin is warm and dry.   Neurological:      Comments: E4V3M5  Opens eyes spontaneously. Disoriented x 4. Tracks appropriately. Aphasic. No dysarthria. Does not answer questions or follow commands.   PERRL. Gaze midline, no nystagmus. Extraocular movements grossly intact. No gross facial asymmetry.   Moves all extremities spontaneously, symmetrically, and AG. SILT to noxious stimuli. No involuntary movements noted.        Unable to test orientation, language, memory, judgment, insight, fund of knowledge, hearing, shoulder shrug, tongue protrusion, coordination, gait due to level of consciousness.       Today I personally reviewed pertinent medications, lines/drains/airways, imaging, cardiology results, laboratory results, notably:    Laboratory:  CBC:  Recent Labs   Lab 12/10/23  0321   WBC 14.58*   RBC 3.72*   HGB 11.7*   HCT 34.7*      MCV 93   MCH 31.5*   MCHC 33.7       CMP:  Recent Labs   Lab 12/10/23  0321   CALCIUM 9.3   ALBUMIN 3.7   PROT 7.6      K 3.3*   CO2 19*      BUN 31*   CREATININE 0.9   ALKPHOS 102   ALT 16   AST 27   BILITOT 0.8     Recent Labs   Lab 12/10/23  0321   MG 1.8   PHOS 2.1*       Coagulation:  Recent Labs   Lab 12/09/23 2120   LABPROT 10.7   INR 1.0   APTT <21.0       Lipid Panel:  Recent Labs   Lab 12/09/23 2120   CHOL 218*   HDL 77*   LDLCALC 122.4   TRIG 93       Endocrine:  Recent Labs     12/09/23  1741  12/09/23 2120   HGBA1C  --  5.2   TSH 7.611*  --        Urinalysis:  Recent Labs   Lab 12/09/23 2014   COLORU Yellow   SPECGRAV 1.020   PHUR 5.0   OCCULTUA Negative   RBCUA 1   WBCUA 2   BACTERIA None   GLUCUA 1+*   KETONESU 2+*   PROTEINUA Negative   BILIRUBINUA Negative       Imaging:  CT Head Without Contrast:  Impression:  Acute calvarial fracture on the left with associated pneumocephalus and minimal subdural hematoma adjacent to the fracture.  There is acute subarachnoid hemorrhage in the peripheral sulci of the bilateral frontal lobes also.  See above comments.  CTA may better characterize also.  Recommend neurosurgical consultation and follow-up.  This report was flagged in Epic as abnormal.  Electronically signed by: Jason Nicolas  Date:                                            12/09/2023  Time:                                           18:03

## 2023-12-10 NOTE — NURSING
Patient arrived to [facility] from ED 12    Type of stroke/diagnosis:  SAH    Current symptoms: Disoriented x 4, moving everything spontaneously. Not following commands.     Skin Assessment done: Yes, bruising to right eye  Wounds noted: laceration on back of head (stapled down in ED)   *If wounds noted, was Wound Care consulted? no  *If wounds noted, LDA placed? no  Skin Assessment Verified by: Freda Paul Completed? No pending     Patient Belongings on Admit: bra, turtleneck shirt, white tshirt, blue jeans, two gold rings,      NCC notified: Kiki ALEX

## 2023-12-10 NOTE — NURSING
Pt's rings (2 silver rings) placed in ziplock bag and put in pt room safe.  Pt sticker attached on bag.

## 2023-12-10 NOTE — HPI
89F presenting after ground level fall without LoC and CTH demonstrating bifrontal tSAH, L parieto-occipital skull fracture. No AC/AP. On neurosurgical evaluation patient displays altered mental status with inappropriate speech but moving all extremities spontaneously. Fall was unwitnessed but family states they heard a yell and then sound of patient hitting floor

## 2023-12-10 NOTE — CONSULTS
[facility]  Otorhinolaryngology-Head & Neck Surgery  Consult Note    Patient Name: Eden Cardenas  MRN: [id number]  Code Status: Full Code  Admission Date: 12/9/2023  Hospital Length of Stay: 1 days  Attending Physician: Sunil Hinojosa MD  Primary Care Provider: Valorie Del Real PT    Patient information was obtained from patient, relative(s), and ER records.     Inpatient consult to ENT  Consult performed by: Alison Luna MD  Consult ordered by: Vinita Villagran PA-C        Subjective:     Chief Complaint/Reason for Admission: fall    History of Present Illness: Eden Cardenas is a 89 y.o. female with history of HTN, HLD, glaucoma that presents with tSAH s/p mechanical fall from ground level. Per chart review, although the fall was unwitnessed, family heard a yell and the sound of the patient hitting the floor. She was brought to the ED, where she was found to be confused with inappropriate speech. She sustained a laceration to the back of her head, which was repaired in the ED. CTH showed bifrontal tSAH as well as a left parietoccipital calvarial fracture with minimal associated pneumocephalus and adjacent SDH. CT T-Spine negative for acute fractures or dislocations. The patient will be admitted to [facility] for close monitoring and a higher level of care. ENT consulted for left temporal bone fracture. Grandson at bedside on my evaluation. Patient is not oriented.         Medications:  Continuous Infusions:   dexmedeTOMIDine (Precedex) infusion (titrating) 0.3 mcg/kg/hr (12/10/23 1501)    nicardipine 2.5 mg/hr (12/10/23 1501)     Scheduled Meds:   amLODIPine  5 mg Oral Daily    ampicillin-sulbactim (UNASYN) IVPB  1.5 g Intravenous Q8H    atorvastatin  40 mg Oral Daily    lacosamide (VIMPAT) IVPB  50 mg Intravenous Q12H    QUEtiapine  25 mg Oral BID    senna-docusate 8.6-50 mg  1 tablet Oral BID     PRN Meds:acetaminophen, calcium gluconate IVPB, calcium gluconate IVPB, calcium gluconate  IVPB, hydrALAZINE, labetalol, magnesium sulfate IVPB, magnesium sulfate IVPB, ondansetron, potassium chloride **AND** potassium chloride **AND** potassium chloride, prochlorperazine, sodium chloride 0.9%, sodium phosphate 15 mmol in dextrose 5 % (D5W) 250 mL IVPB, sodium phosphate 20.01 mmol in dextrose 5 % (D5W) 250 mL IVPB, sodium phosphate 30 mmol in dextrose 5 % (D5W) 250 mL IVPB     No current facility-administered medications on file prior to encounter.     Current Outpatient Medications on File Prior to Encounter   Medication Sig    amLODIPine (NORVASC) 5 MG tablet Take 5 mg by mouth once daily.    atorvastatin (LIPITOR) 10 MG tablet Take 10 mg by mouth once daily.    desmopressin (DDAVP) 0.2 MG tablet Take 0.2 mg by mouth once daily.    imipramine (TOFRANIL) 25 MG tablet Take 25 mg by mouth once daily.    latanoprost 0.005 % ophthalmic solution Place 1 drop into both eyes every evening.    meloxicam (MOBIC) 7.5 MG tablet Take 7.5 mg by mouth once daily.    MYRBETRIQ 25 mg Tb24 ER tablet Take 25 mg by mouth once daily.    traMADoL (ULTRAM) 50 mg tablet Take 50 mg by mouth daily as needed for Pain (Half or full tablet as needed for pain Orally Once a day for 30 days).       Review of patient's allergies indicates:  No Known Allergies    History reviewed. No pertinent past medical history.  History reviewed. No pertinent surgical history.  Family History    None       Tobacco Use    Smoking status: Not on file    Smokeless tobacco: Not on file   Substance and Sexual Activity    Alcohol use: Not on file    Drug use: Not on file    Sexual activity: Not on file     Review of Systems  Objective:     Vital Signs (Most Recent):  Temp: 97.9 °F (36.6 °C) (12/10/23 1501)  Pulse: 86 (12/10/23 1501)  Resp: (!) 23 (12/10/23 1501)  BP: 124/63 (12/10/23 1501)  SpO2: 96 % (12/10/23 1501) Vital Signs (24h Range):  Temp:  [97.4 °F (36.3 °C)-98.5 °F (36.9 °C)] 97.9 °F (36.6 °C)  Pulse:  [] 86  Resp:  [16-54] 23  SpO2:  [93  %-100 %] 96 %  BP: (109-187)/() 124/63     Weight: 51.7 kg (114 lb)  Body mass index is 20.19 kg/m².    Date 12/10/23 0700 - 12/11/23 0659   Shift 9589-6195 2258-5702 2703-7273 24 Hour Total   INTAKE   I.V.(mL/kg) 237(4.6) 16.3(0.3)  253.3(4.9)   IV Piggyback 198.4 5.9  204.3   Shift Total(mL/kg) 435.4(8.4) 22.2(0.4)  457.6(8.8)   OUTPUT   Shift Total(mL/kg)       Weight (kg) 51.7 51.7 51.7 51.7        Physical Exam  NAD  Sitting up in bed  Confused  Nasal cannula in place  Occiput lac with staples  AD: TM intact and translucent, EAC clear  AS: Hemotympanum, TM intact, EAC without lacerations, no otorrhea   House Brakeman I bilaterally  Unable to assess hearing loss  Neck soft       Significant Labs:  CBC:   Recent Labs   Lab 12/10/23  0321   WBC 14.58*   RBC 3.72*   HGB 11.7*   HCT 34.7*      MCV 93   MCH 31.5*   MCHC 33.7     CMP:   Recent Labs   Lab 12/10/23  0321   *   CALCIUM 9.3   ALBUMIN 3.7   PROT 7.6      K 3.3*   CO2 19*      BUN 31*   CREATININE 0.9   ALKPHOS 102   ALT 16   AST 27   BILITOT 0.8       Significant Diagnostics:  I have reviewed all pertinent imaging results/findings within the past 24 hours.  CT Temporal bone 12/9    Nondisplaced fracture involving the occipital and left temporal bones with diastasis of the adjacent sutures.  Fluid within the middle ear and mastoid air cells presumably due to fractures through the tegmen mastoideum with  adjacent pneumocephalus.  No ossicular chain disruption or otic capsule involvement.     Limited intracranial evaluation demonstrates an increase in size of the intraparenchymal hemorrhagic contusions involving the left temporal and bilateral frontal lobes when compared to the admission CT.    Assessment/Plan:     Temporal bone fracture  89 yo female who sustained ground level fall now with tSAH, occipital bone, and longitudinal optic capsule sparing temporal bone fracture. Facial nerve function intact. Unable to assess  hearing loss due to mental status.     - No acute surgical interventions.   - Can follow up with ENT with audiogram after discharge  - Rest of care per primary  - Please call ENT with any questions         VTE Risk Mitigation (From admission, onward)           Ordered     Reason for No Pharmacological VTE Prophylaxis  Once        Question:  Reasons:  Answer:  Active Bleeding    12/09/23 2108     IP VTE HIGH RISK PATIENT  Once         12/09/23 2108     Place sequential compression device  Until discontinued         12/09/23 2108                    Thank you for your consult. I will sign off. Please contact us if you have any additional questions.    Alison Luna MD  Otorhinolaryngology-Head & Neck Surgery  [facility]

## 2023-12-11 PROBLEM — D72.829 LEUKOCYTOSIS: Status: ACTIVE | Noted: 2023-12-11

## 2023-12-11 LAB
ALBUMIN SERPL BCP-MCNC: 3.2 G/DL (ref 3.5–5.2)
ALP SERPL-CCNC: 93 U/L (ref 55–135)
ALT SERPL W/O P-5'-P-CCNC: 15 U/L (ref 10–44)
ANION GAP SERPL CALC-SCNC: 9 MMOL/L (ref 8–16)
ASCENDING AORTA: 2.43 CM
AST SERPL-CCNC: 24 U/L (ref 10–40)
AV INDEX (PROSTH): 0.91
AV MEAN GRADIENT: 5 MMHG
AV PEAK GRADIENT: 8 MMHG
AV VALVE AREA BY VELOCITY RATIO: 2.53 CM²
AV VALVE AREA: 2.84 CM²
AV VELOCITY RATIO: 0.81
BASOPHILS # BLD AUTO: 0.02 K/UL (ref 0–0.2)
BASOPHILS NFR BLD: 0.1 % (ref 0–1.9)
BILIRUB SERPL-MCNC: 0.6 MG/DL (ref 0.1–1)
BSA FOR ECHO PROCEDURE: 1.52 M2
BUN SERPL-MCNC: 21 MG/DL (ref 10–30)
CALCIUM SERPL-MCNC: 9.2 MG/DL (ref 8.7–10.5)
CHLORIDE SERPL-SCNC: 106 MMOL/L (ref 95–110)
CO2 SERPL-SCNC: 22 MMOL/L (ref 23–29)
CREAT SERPL-MCNC: 0.7 MG/DL (ref 0.5–1.4)
CV ECHO LV RWT: 0.34 CM
DIFFERENTIAL METHOD: ABNORMAL
DOP CALC AO PEAK VEL: 1.39 M/S
DOP CALC AO VTI: 22.55 CM
DOP CALC LVOT AREA: 3.1 CM2
DOP CALC LVOT DIAMETER: 1.99 CM
DOP CALC LVOT PEAK VEL: 1.13 M/S
DOP CALC LVOT STROKE VOLUME: 64.13 CM3
DOP CALCLVOT PEAK VEL VTI: 20.63 CM
E WAVE DECELERATION TIME: 130.71 MSEC
E/A RATIO: 0.72
E/E' RATIO: 11.5 M/S
ECHO LV POSTERIOR WALL: 0.66 CM (ref 0.6–1.1)
EJECTION FRACTION: 60 %
EOSINOPHIL # BLD AUTO: 0 K/UL (ref 0–0.5)
EOSINOPHIL NFR BLD: 0 % (ref 0–8)
ERYTHROCYTE [DISTWIDTH] IN BLOOD BY AUTOMATED COUNT: 12.6 % (ref 11.5–14.5)
EST. GFR  (NO RACE VARIABLE): >60 ML/MIN/1.73 M^2
FRACTIONAL SHORTENING: 39 % (ref 28–44)
GLUCOSE SERPL-MCNC: 145 MG/DL (ref 70–110)
HCT VFR BLD AUTO: 29.4 % (ref 37–48.5)
HGB BLD-MCNC: 10.2 G/DL (ref 12–16)
IMM GRANULOCYTES # BLD AUTO: 0.09 K/UL (ref 0–0.04)
IMM GRANULOCYTES NFR BLD AUTO: 0.5 % (ref 0–0.5)
INTERVENTRICULAR SEPTUM: 0.73 CM (ref 0.6–1.1)
IVRT: 74.22 MSEC
LA MAJOR: 4.71 CM
LA MINOR: 4.74 CM
LA WIDTH: 3.52 CM
LEFT ATRIUM SIZE: 2.93 CM
LEFT ATRIUM VOLUME INDEX MOD: 30.2 ML/M2
LEFT ATRIUM VOLUME INDEX: 27.3 ML/M2
LEFT ATRIUM VOLUME MOD: 45.96 CM3
LEFT ATRIUM VOLUME: 41.42 CM3
LEFT INTERNAL DIMENSION IN SYSTOLE: 2.37 CM (ref 2.1–4)
LEFT VENTRICLE DIASTOLIC VOLUME INDEX: 42.2 ML/M2
LEFT VENTRICLE DIASTOLIC VOLUME: 64.14 ML
LEFT VENTRICLE MASS INDEX: 48 G/M2
LEFT VENTRICLE SYSTOLIC VOLUME INDEX: 12.9 ML/M2
LEFT VENTRICLE SYSTOLIC VOLUME: 19.64 ML
LEFT VENTRICULAR INTERNAL DIMENSION IN DIASTOLE: 3.86 CM (ref 3.5–6)
LEFT VENTRICULAR MASS: 73.14 G
LV LATERAL E/E' RATIO: 11.5 M/S
LV SEPTAL E/E' RATIO: 11.5 M/S
LYMPHOCYTES # BLD AUTO: 0.8 K/UL (ref 1–4.8)
LYMPHOCYTES NFR BLD: 4 % (ref 18–48)
MAGNESIUM SERPL-MCNC: 2.1 MG/DL (ref 1.6–2.6)
MCH RBC QN AUTO: 32.2 PG (ref 27–31)
MCHC RBC AUTO-ENTMCNC: 34.7 G/DL (ref 32–36)
MCV RBC AUTO: 93 FL (ref 82–98)
MONOCYTES # BLD AUTO: 1.5 K/UL (ref 0.3–1)
MONOCYTES NFR BLD: 7.4 % (ref 4–15)
MV PEAK A VEL: 1.28 M/S
MV PEAK E VEL: 0.92 M/S
MV STENOSIS PRESSURE HALF TIME: 37.9 MS
MV VALVE AREA P 1/2 METHOD: 5.8 CM2
NEUTROPHILS # BLD AUTO: 17.4 K/UL (ref 1.8–7.7)
NEUTROPHILS NFR BLD: 88 % (ref 38–73)
NRBC BLD-RTO: 0 /100 WBC
PHOSPHATE SERPL-MCNC: 2.5 MG/DL (ref 2.7–4.5)
PISA TR MAX VEL: 2.68 M/S
PLATELET # BLD AUTO: 224 K/UL (ref 150–450)
PMV BLD AUTO: 10.2 FL (ref 9.2–12.9)
POCT GLUCOSE: 117 MG/DL (ref 70–110)
POCT GLUCOSE: 119 MG/DL (ref 70–110)
POCT GLUCOSE: 121 MG/DL (ref 70–110)
POCT GLUCOSE: 124 MG/DL (ref 70–110)
POCT GLUCOSE: 144 MG/DL (ref 70–110)
POTASSIUM SERPL-SCNC: 4 MMOL/L (ref 3.5–5.1)
PROT SERPL-MCNC: 7 G/DL (ref 6–8.4)
RA MAJOR: 4.4 CM
RA PRESSURE ESTIMATED: 3 MMHG
RA WIDTH: 3.14 CM
RBC # BLD AUTO: 3.17 M/UL (ref 4–5.4)
RIGHT VENTRICULAR END-DIASTOLIC DIMENSION: 2.47 CM
RV TB RVSP: 6 MMHG
SINUS: 2.82 CM
SODIUM SERPL-SCNC: 137 MMOL/L (ref 136–145)
STJ: 2.22 CM
TDI LATERAL: 0.08 M/S
TDI SEPTAL: 0.08 M/S
TDI: 0.08 M/S
TR MAX PG: 29 MMHG
TRICUSPID ANNULAR PLANE SYSTOLIC EXCURSION: 1.83 CM
TV REST PULMONARY ARTERY PRESSURE: 32 MMHG
WBC # BLD AUTO: 19.71 K/UL (ref 3.9–12.7)
Z-SCORE OF LEFT VENTRICULAR DIMENSION IN END DIASTOLE: -1.43
Z-SCORE OF LEFT VENTRICULAR DIMENSION IN END SYSTOLE: -1.21

## 2023-12-11 PROCEDURE — 87077 CULTURE AEROBIC IDENTIFY: CPT

## 2023-12-11 PROCEDURE — 51701 INSERT BLADDER CATHETER: CPT

## 2023-12-11 PROCEDURE — 84100 ASSAY OF PHOSPHORUS: CPT

## 2023-12-11 PROCEDURE — 87088 URINE BACTERIA CULTURE: CPT

## 2023-12-11 PROCEDURE — 97116 GAIT TRAINING THERAPY: CPT | Mod: CQ

## 2023-12-11 PROCEDURE — 63600175 PHARM REV CODE 636 W HCPCS

## 2023-12-11 PROCEDURE — 99291 CRITICAL CARE FIRST HOUR: CPT | Mod: FS,,, | Performed by: PSYCHIATRY & NEUROLOGY

## 2023-12-11 PROCEDURE — C9254 INJECTION, LACOSAMIDE: HCPCS

## 2023-12-11 PROCEDURE — 83735 ASSAY OF MAGNESIUM: CPT

## 2023-12-11 PROCEDURE — 87186 SC STD MICRODIL/AGAR DIL: CPT

## 2023-12-11 PROCEDURE — 92523 SPEECH SOUND LANG COMPREHEN: CPT

## 2023-12-11 PROCEDURE — 25000003 PHARM REV CODE 250

## 2023-12-11 PROCEDURE — 97535 SELF CARE MNGMENT TRAINING: CPT

## 2023-12-11 PROCEDURE — 95720 EEG PHY/QHP EA INCR W/VEEG: CPT | Mod: ,,, | Performed by: STUDENT IN AN ORGANIZED HEALTH CARE EDUCATION/TRAINING PROGRAM

## 2023-12-11 PROCEDURE — 85025 COMPLETE CBC W/AUTO DIFF WBC: CPT

## 2023-12-11 PROCEDURE — 99233 PR SUBSEQUENT HOSPITAL CARE,LEVL III: ICD-10-PCS | Mod: ,,, | Performed by: NEUROLOGICAL SURGERY

## 2023-12-11 PROCEDURE — 95720 PR EEG, W/VIDEO, CONT RECORD, I&R, >12<26 HRS: ICD-10-PCS | Mod: ,,, | Performed by: STUDENT IN AN ORGANIZED HEALTH CARE EDUCATION/TRAINING PROGRAM

## 2023-12-11 PROCEDURE — 20000000 HC ICU ROOM

## 2023-12-11 PROCEDURE — 63600175 PHARM REV CODE 636 W HCPCS: Performed by: REGISTERED NURSE

## 2023-12-11 PROCEDURE — 94761 N-INVAS EAR/PLS OXIMETRY MLT: CPT

## 2023-12-11 PROCEDURE — 63600175 PHARM REV CODE 636 W HCPCS: Performed by: PSYCHIATRY & NEUROLOGY

## 2023-12-11 PROCEDURE — 95700 EEG CONT REC W/VID EEG TECH: CPT

## 2023-12-11 PROCEDURE — 93005 ELECTROCARDIOGRAM TRACING: CPT

## 2023-12-11 PROCEDURE — 25000003 PHARM REV CODE 250: Performed by: PSYCHIATRY & NEUROLOGY

## 2023-12-11 PROCEDURE — 97530 THERAPEUTIC ACTIVITIES: CPT | Mod: CQ

## 2023-12-11 PROCEDURE — 99291 PR CRITICAL CARE, E/M 30-74 MINUTES: ICD-10-PCS | Mod: FS,,, | Performed by: PSYCHIATRY & NEUROLOGY

## 2023-12-11 PROCEDURE — 87086 URINE CULTURE/COLONY COUNT: CPT

## 2023-12-11 PROCEDURE — 92507 TX SP LANG VOICE COMM INDIV: CPT

## 2023-12-11 PROCEDURE — 80053 COMPREHEN METABOLIC PANEL: CPT

## 2023-12-11 PROCEDURE — 95714 VEEG EA 12-26 HR UNMNTR: CPT

## 2023-12-11 PROCEDURE — 99233 SBSQ HOSP IP/OBS HIGH 50: CPT | Mod: ,,, | Performed by: NEUROLOGICAL SURGERY

## 2023-12-11 PROCEDURE — 97530 THERAPEUTIC ACTIVITIES: CPT

## 2023-12-11 PROCEDURE — 25000003 PHARM REV CODE 250: Performed by: PHYSICIAN ASSISTANT

## 2023-12-11 PROCEDURE — 93010 EKG 12-LEAD: ICD-10-PCS | Mod: ,,, | Performed by: INTERNAL MEDICINE

## 2023-12-11 PROCEDURE — 93010 ELECTROCARDIOGRAM REPORT: CPT | Mod: ,,, | Performed by: INTERNAL MEDICINE

## 2023-12-11 RX ORDER — IBUPROFEN 200 MG
24 TABLET ORAL
Status: DISCONTINUED | OUTPATIENT
Start: 2023-12-11 | End: 2023-12-19 | Stop reason: HOSPADM

## 2023-12-11 RX ORDER — GLUCAGON 1 MG
1 KIT INJECTION
Status: DISCONTINUED | OUTPATIENT
Start: 2023-12-11 | End: 2023-12-19 | Stop reason: HOSPADM

## 2023-12-11 RX ORDER — AMOXICILLIN AND CLAVULANATE POTASSIUM 875; 125 MG/1; MG/1
1 TABLET, FILM COATED ORAL EVERY 12 HOURS
Status: DISCONTINUED | OUTPATIENT
Start: 2023-12-11 | End: 2023-12-11

## 2023-12-11 RX ORDER — LABETALOL HCL 20 MG/4 ML
10 SYRINGE (ML) INTRAVENOUS EVERY 4 HOURS PRN
Status: DISCONTINUED | OUTPATIENT
Start: 2023-12-11 | End: 2023-12-19

## 2023-12-11 RX ORDER — INSULIN ASPART 100 [IU]/ML
0-10 INJECTION, SOLUTION INTRAVENOUS; SUBCUTANEOUS
Status: DISCONTINUED | OUTPATIENT
Start: 2023-12-11 | End: 2023-12-19 | Stop reason: HOSPADM

## 2023-12-11 RX ORDER — IBUPROFEN 200 MG
16 TABLET ORAL
Status: DISCONTINUED | OUTPATIENT
Start: 2023-12-11 | End: 2023-12-19 | Stop reason: HOSPADM

## 2023-12-11 RX ORDER — HYDRALAZINE HYDROCHLORIDE 20 MG/ML
10 INJECTION INTRAMUSCULAR; INTRAVENOUS EVERY 4 HOURS PRN
Status: DISCONTINUED | OUTPATIENT
Start: 2023-12-11 | End: 2023-12-19 | Stop reason: HOSPADM

## 2023-12-11 RX ORDER — ENOXAPARIN SODIUM 100 MG/ML
40 INJECTION SUBCUTANEOUS EVERY 24 HOURS
Status: DISCONTINUED | OUTPATIENT
Start: 2023-12-12 | End: 2023-12-19 | Stop reason: HOSPADM

## 2023-12-11 RX ADMIN — LACOSAMIDE 50 MG: 10 INJECTION INTRAVENOUS at 09:12

## 2023-12-11 RX ADMIN — LABETALOL HYDROCHLORIDE 10 MG: 5 INJECTION, SOLUTION INTRAVENOUS at 06:12

## 2023-12-11 RX ADMIN — AMPICILLIN AND SULBACTAM 1.5 G: 1; .5 INJECTION, POWDER, FOR SOLUTION INTRAVENOUS at 10:12

## 2023-12-11 RX ADMIN — AMPICILLIN AND SULBACTAM 1.5 G: 1; .5 INJECTION, POWDER, FOR SOLUTION INTRAVENOUS at 05:12

## 2023-12-11 RX ADMIN — LATANOPROST 1 DROP: 50 SOLUTION OPHTHALMIC at 09:12

## 2023-12-11 RX ADMIN — AMPICILLIN AND SULBACTAM 1.5 G: 1; .5 INJECTION, POWDER, FOR SOLUTION INTRAVENOUS at 02:12

## 2023-12-11 RX ADMIN — HYDRALAZINE HYDROCHLORIDE 10 MG: 20 INJECTION, SOLUTION INTRAMUSCULAR; INTRAVENOUS at 03:12

## 2023-12-11 RX ADMIN — HYDRALAZINE HYDROCHLORIDE 10 MG: 20 INJECTION, SOLUTION INTRAMUSCULAR; INTRAVENOUS at 10:12

## 2023-12-11 RX ADMIN — AMLODIPINE BESYLATE 5 MG: 5 TABLET ORAL at 03:12

## 2023-12-11 RX ADMIN — LACOSAMIDE 50 MG: 10 INJECTION INTRAVENOUS at 10:12

## 2023-12-11 RX ADMIN — LABETALOL HYDROCHLORIDE 10 MG: 5 INJECTION, SOLUTION INTRAVENOUS at 08:12

## 2023-12-11 RX ADMIN — SENNOSIDES AND DOCUSATE SODIUM 1 TABLET: 8.6; 5 TABLET ORAL at 09:12

## 2023-12-11 RX ADMIN — SODIUM PHOSPHATE, MONOBASIC, MONOHYDRATE AND SODIUM PHOSPHATE, DIBASIC, ANHYDROUS 15 MMOL: 142; 276 INJECTION, SOLUTION INTRAVENOUS at 07:12

## 2023-12-11 NOTE — ASSESSMENT & PLAN NOTE
Patient is a 89 y.o. female with history of HTN, HLD, and glaucoma who presented to the ED with AMS s/p mechanical fall from ground level. No LOC per family. No AC/AP.    -CTH (12/9/23): SAH in the peripheral sulci in the bilateral frontal lobes; 5mm nodular hyperdensity near the left ICA; non-displaced left calvarial fracture involving the posterior parietoccipital calvarium and extending anteriorly to involve the temporal calvarium with associated pneumocephalus and minimal adjacent SDH  -CTA head/neck 12/10 - Multi compartment intracranial hemorrhage involving the bilateral frontal lobes and left lateral temporal lobe with worsening parenchymal contusions and subarachnoid hemorrhage when compared with prior exam.  Adjacent mass effect and worsening edema adjacent to the larger parenchymal contusions, with no significant midline shift.  Additional mild subarachnoid hemorrhage along the right frontoparietal lobe, new from prior.  Similar small left posterior convexity extra-axial hemorrhage and trace right frontal subdural hemorrhage.  New trace intraventricular hemorrhage.    -CT Temporal bone - Nondisplaced fracture involving the occipital and left temporal bones with diastasis of the adjacent sutures.  Fluid within the middle ear and mastoid air cells presumably due to fractures through the tegmen mastoideum with  adjacent pneumocephalus     -Admitted to [facility]  -VS/neuro checks q1h  -NSGY following  -CBC, CMP, Mag, Phos daily  -Continuous EEG  -Goal eunatremia  -SBP goal < 160  -PRN labetalol/hydralazine/cardene  -Amlodipine 5mg daily  -Precedex PRN agitation  -Vimpat 50mg BID x 7 days   -Seizure precautions  -HOB 30°   -Diet NPO - previously cleared for minced moist/thin liquids with medications crushed, but currently NPO d/t increased drowsiness  -NG for meds  -PT/OT/SLP

## 2023-12-11 NOTE — SUBJECTIVE & OBJECTIVE
Interval History: 12/11/2023 NAEON. Neuro exam stable. Surveilance CTH stable.     Medications:  Continuous Infusions:   dexmedeTOMIDine (Precedex) infusion (titrating) Stopped (12/10/23 2323)    nicardipine Stopped (12/10/23 1623)     Scheduled Meds:   amLODIPine  5 mg Oral Daily    ampicillin-sulbactim (UNASYN) IVPB  1.5 g Intravenous Q8H    atorvastatin  40 mg Oral Daily    lacosamide (VIMPAT) IVPB  50 mg Intravenous Q12H    latanoprost  1 drop Both Eyes QHS    senna-docusate 8.6-50 mg  1 tablet Oral BID     PRN Meds:acetaminophen, calcium gluconate IVPB, calcium gluconate IVPB, calcium gluconate IVPB, dextrose 10%, dextrose 10%, glucagon (human recombinant), glucose, glucose, hydrALAZINE, insulin aspart U-100, labetalol, magnesium sulfate IVPB, magnesium sulfate IVPB, midazolam, ondansetron, potassium chloride **AND** potassium chloride **AND** potassium chloride, prochlorperazine, sodium chloride 0.9%, sodium phosphate 15 mmol in dextrose 5 % (D5W) 250 mL IVPB, sodium phosphate 20.01 mmol in dextrose 5 % (D5W) 250 mL IVPB, sodium phosphate 30 mmol in dextrose 5 % (D5W) 250 mL IVPB     Review of Systems  Objective:     Weight: 51.7 kg (114 lb)  Body mass index is 20.19 kg/m².  Vital Signs (Most Recent):  Temp: 98.8 °F (37.1 °C) (12/11/23 1101)  Pulse: 87 (12/11/23 1101)  Resp: 19 (12/11/23 1101)  BP: (!) 146/75 (12/11/23 1101)  SpO2: 98 % (12/11/23 1101) Vital Signs (24h Range):  Temp:  [97 °F (36.1 °C)-98.8 °F (37.1 °C)] 98.8 °F (37.1 °C)  Pulse:  [] 87  Resp:  [17-63] 19  SpO2:  [93 %-98 %] 98 %  BP: ()/(58-99) 146/75     Date 12/11/23 0700 - 12/12/23 0659   Shift 9587-6137 8822-0933 7029-7883 24 Hour Total   INTAKE   IV Piggyback 428   428   Shift Total(mL/kg) 428(8.3)   428(8.3)   OUTPUT   Shift Total(mL/kg)       Weight (kg) 51.7 51.7 51.7 51.7                       Female External Urinary Catheter 12/09/23 2300 (Active)   Skin no redness;no breakdown 12/11/23 1101   Tolerance no signs/symptoms  of discomfort 12/11/23 1101   Suction Continuous suction at 40 mmHg 12/11/23 0701   Date of last wick change 12/09/23 12/09/23 2301   Time of last wick change 2300 12/09/23 2301   Output (mL) 200 mL 12/10/23 0601          Physical Exam   E4V2M5. Agitated, inappropriate speech.   +c/c/g  PERRL  NIKI +AG      Neurosurgery Physical Exam    Significant Labs:  Recent Labs   Lab 12/09/23  1741 12/10/23  0321 12/11/23  0319   * 186* 145*    137 137   K 3.0* 3.3* 4.0    102 106   CO2 19* 19* 22*   BUN 43* 31* 21   CREATININE 1.0 0.9 0.7   CALCIUM 9.2 9.3 9.2   MG 2.0 1.8 2.1     Recent Labs   Lab 12/09/23  1741 12/10/23  0321 12/11/23  0319   WBC 14.25* 14.58* 19.71*   HGB 11.7* 11.7* 10.2*   HCT 33.9* 34.7* 29.4*    276 224     Recent Labs   Lab 12/09/23 1741 12/09/23  2120   INR 1.0 1.0   APTT  --  <21.0     Microbiology Results (last 7 days)       Procedure Component Value Units Date/Time    Urine culture [[id number]] Collected: 12/11/23 1145    Order Status: Sent Specimen: Urine, Clean Catch Updated: 12/11/23 1233          All pertinent labs from the last 24 hours have been reviewed.    Significant Diagnostics:  I have reviewed and interpreted all pertinent imaging results/findings within the past 24 hours.

## 2023-12-11 NOTE — PLAN OF CARE
[facility] Care Plan    POC reviewed with Eden Cardenas at 0300. No acute events overnight. Will continue to monitor. See below and flowsheets for full assessment and VS info.     CTH completed.  Precedex off  Seroquel controlled agitation; more lethargic. Provider aware  HTN treated with PRN medication      Neuro:  Donell Coma Scale  Best Eye Response: 4-->(E4) spontaneous  Best Motor Response: 5-->(M5) localizes pain  Best Verbal Response: 2-->(V2) incomprehensible speech  [place] Coma Scale Score: 11  Assessment Qualifiers: patient not sedated/intubated  Pupil PERRLA: yes     24hr Temp:  [97.7 °F (36.5 °C)-98.5 °F (36.9 °C)]     CV:   Rhythm: sinus tachycardia  BP goals:   SBP < 140  MAP > 65    Resp:           Plan: N/A    GI/:     Diet/Nutrition Received: mechanical/dental soft  Last Bowel Movement: 12/10/23  Voiding Characteristics: external catheter, incontinence    Intake/Output Summary (Last 24 hours) at 12/11/2023 0641  Last data filed at 12/11/2023 0601  Gross per 24 hour   Intake 1123.4 ml   Output 300 ml   Net 823.4 ml     Unmeasured Output  Urine Occurrence: 1  Stool Occurrence: 1  Pad Count: 2    Labs/Accuchecks:  Recent Labs   Lab 12/11/23  0319   WBC 19.71*   RBC 3.17*   HGB 10.2*   HCT 29.4*         Recent Labs   Lab 12/11/23  0319      K 4.0   CO2 22*      BUN 21   CREATININE 0.7   ALKPHOS 93   ALT 15   AST 24   BILITOT 0.6      Recent Labs   Lab 12/09/23  2120   INR 1.0   APTT <21.0      Recent Labs   Lab 12/09/23  1741   TROPONINI 0.006       Electrolytes: Electrolytes replaced  Accuchecks: Q6H    Gtts:   dexmedeTOMIDine (Precedex) infusion (titrating) Stopped (12/10/23 2323)    nicardipine Stopped (12/10/23 1623)       LDA/Wounds:  Lines/Drains/Airways       Drain  Duration             Female External Urinary Catheter 12/09/23 2300 1 day              Peripheral Intravenous Line  Duration                  Midline Catheter Insertion/Assessment  - Single Lumen 12/10/23 1745 Left  cephalic vein (lateral side of arm) 18g x 10cm <1 day         Peripheral IV - Single Lumen 12/10/23 1021 22 G Anterior;Left Ankle <1 day                  Wounds: Yes  Wound care consulted: No

## 2023-12-11 NOTE — PLAN OF CARE
Pt seen for ongoing swallow and speech-language assessment/follow up. Pt significantly more lethargic compared to initial assessment recommending minced moist/thin liquids with medications crushed. Suspect swallow function remains intact from initial assessment; however,  given her lethargy this date and inability to functionally orally manipulate PO trials she does not appear safe to eat by mouth.  Should she become more alert pt may be able to continue with medications crushed in puree should alternate means of nutrition/hydration/medication not be an option. Speech to continue to follow and advance as appropriate.     Rajani Colon MS, CCC-SLP  Speech Language Pathologist  Pager: [phone]  Date 12/11/2023

## 2023-12-11 NOTE — ASSESSMENT & PLAN NOTE
Lab Results   Component Value Date    CHOL 218 (H) 12/09/2023    HDL 77 (H) 12/09/2023    LDLCALC 122.4 12/09/2023    TRIG 93 12/09/2023     -Atorvastatin 40mg daily

## 2023-12-11 NOTE — PLAN OF CARE
NSCCU Attending       Discussion with grandson at bedside and his wife on the phone. Imaging reviewed and all questions answered. Family states that she was extremely independent and very socially active in the community. They know that quality of life for her would mean a continued level of independence without depending on others for her ADLs. Decision made for now to place NGT for nutrition and medications, to continue care, but to make patient DNR/DNI.    Sunil Hinojosa MD MPH  NeuroCritical Care & Vascular Neurology

## 2023-12-11 NOTE — PT/OT/SLP PROGRESS
"Occupational Therapy   Treatment    Name: Eden Cardenas  MRN: [id number]  Admitting Diagnosis:  SAH (subarachnoid hemorrhage)       Recommendations:     Discharge Recommendations: Moderate Intensity Therapy  Discharge Equipment Recommendations:  to be determined by next level of care  Barriers to discharge:   (increased skilled A required)    Assessment:     Eden Cardenas is a 89 y.o. female with a medical diagnosis of SAH (subarachnoid hemorrhage).  She presents with performance deficits affecting function are weakness, impaired endurance, impaired functional mobility, gait instability, impaired balance, impaired self care skills, impaired cognition, decreased coordination, decreased upper extremity function, decreased lower extremity function, decreased safety awareness, pain, impaired coordination. Pt engaged fairly in therapy this date, presented with confusion and anxiety but willingness to sit edge of bed. Once pt sitting EOB she needed assistance with balance, and she became increasingly anxious and attempted to stand on her own with writing therapist supporting with moderate amount of assistance. Pt returned to bed due to confusion and anxiety, RN entered to assist with positioning. Pt is on pathway for moderately intense therapy.     Rehab Prognosis:  Fair; patient would benefit from acute skilled OT services to address these deficits and reach maximum level of function.       Plan:     Patient to be seen 3 x/week to address the above listed problems via self-care/home management, therapeutic activities, therapeutic exercises, neuromuscular re-education, cognitive retraining  Plan of Care Expires: 01/08/24  Plan of Care Reviewed with: patient    Subjective     Chief Complaint: Pt repeated "I will play" at beginning of session  Patient/Family Comments/goals: Not reported  Pain/Comfort:  Pain Rating 1:  unable to report  Pain Rating Post-Intervention 1:  unable to report    Objective:     Communicated with: RN " prior to session.  Patient found HOB elevated with telemetry, pulse ox (continuous), blood pressure cuff, restraints, peripheral IV, PureWick upon OT entry to room.    General Precautions: Standard, fall, aspiration    Orthopedic Precautions:N/A  Braces: N/A  Respiratory Status: Room air     Occupational Performance:     Bed Mobility:    Patient completed Rolling/Turning to Left with  moderate assistance with verbal cues  Patient completed Rolling/Turning to Right with moderate assistance with verbal cues  Patient completed Scooting/Bridging with moderate assistance with verbal cues  Patient completed Supine to Sit with moderate assistance and verbal cues  Patient completed Sit to Supine with maximal assistance and verbal cues  Pt sat EOB with fair balance and impulsivity, requiring max A     Functional Mobility/Transfers:  Patient completed Sit <> Stand Transfer with moderate assistance  with  hand-held assist - pt stood up impulsively and turned to R side but was becoming increasingly confused    Activities of Daily Living:  Grooming: stand by assistance washing face with warm washcloth  Upper Body Dressing: maximal assistance managing hospital gown to maximize coverage      [facility] 6 Click ADL: 7    Treatment & Education:  Pt educated on role of OT, POC, and goals for therapy.    POC was dicussed with patient/caregiver, who was included in its development and is in agreement with the identified goals and treatment plan.   Patient and family aware of patient's deficits and therapy progression.   Time provided for therapeutic counseling and discussion of health disposition.   Educated on importance of EOB/OOB mobility, maintaining routine, sitting up in chair, and maximizing independence with ADLs during admission   Pt completed ADLs and functional mobility for treatment session as noted above   Pt/caregiver verbalized understanding and expressed no further concerns/questions.  Updated communication board with level  of assist required       Patient left HOB elevated with all lines intact, call button in reach, bed alarm on, and RN present    GOALS:   Multidisciplinary Problems       Occupational Therapy Goals          Problem: Occupational Therapy    Goal Priority Disciplines Outcome Interventions   Occupational Therapy Goal     OT, PT/OT Ongoing, Progressing    Description: Goals to be met by: 12/24/2023     Patient will increase functional independence with ADLs by performing:    UE Dressing with Minimal Assistance.  LE Dressing with Moderate Assistance.  Grooming while EOB with Minimal Assistance.  Toileting from bedside commode with Maximum Assistance for hygiene and clothing management.   Supine to sit with Minimal Assistance.  Stand pivot transfer with Minimal Assistance using LRAD as needed.  Toilet transfer to toilet with Minimal Assistance using LRAD as needed.                         Time Tracking:     OT Date of Treatment: 12/11/23  OT Start Time: 0931  OT Stop Time: 0957  OT Total Time (min): 26 min    Billable Minutes:Self Care/Home Management 8  Therapeutic Activity 18    OT/FRANCY: OT          12/11/2023

## 2023-12-11 NOTE — PLAN OF CARE
[facility] Care Plan    POC reviewed with Eden Cardenas and family at 1400. Pt unable to verbalize understanding. Questions and concerns addressed. No acute events today. Pt progressing toward goals. Will continue to monitor. See below and flowsheets for full assessment and VS info.     - Code status changed (DNR).   - Neuro exam unchanged. Pt lethargic.   - EEG placed   - NGT placed and verified.   - EKG completed (QTC: 507)  - Hydralazine given x 1  - Urine culture completed; straight cath x 1   - Bath given and linen changed.   - ACHS blood glucose checks.         Is this a stroke patient? no    Neuro:  Donell Coma Scale  Best Eye Response: 4-->(E4) spontaneous  Best Motor Response: 5-->(M5) localizes pain  Best Verbal Response: 2-->(V2) incomprehensible speech  [place] Coma Scale Score: 11  Assessment Qualifiers: patient not sedated/intubated  Pupil PERRLA: yes     24 hr Temp:  [97 °F (36.1 °C)-98.8 °F (37.1 °C)]     CV:   Rhythm: sinus tachycardia  BP goals:   SBP < 160  MAP > 65    Resp:           Plan: N/A    GI/:     Diet/Nutrition Received: mechanical/dental soft  Last Bowel Movement: 12/10/23  Voiding Characteristics: external catheter    Intake/Output Summary (Last 24 hours) at 12/11/2023 1725  Last data filed at 12/11/2023 1601  Gross per 24 hour   Intake 1016.14 ml   Output 1050 ml   Net -33.86 ml     Unmeasured Output  Urine Occurrence: 1  Stool Occurrence: 1  Pad Count: 2    Labs/Accuchecks:  Recent Labs   Lab 12/11/23  0319   WBC 19.71*   RBC 3.17*   HGB 10.2*   HCT 29.4*         Recent Labs   Lab 12/11/23  0319      K 4.0   CO2 22*      BUN 21   CREATININE 0.7   ALKPHOS 93   ALT 15   AST 24   BILITOT 0.6      Recent Labs   Lab 12/09/23  2120   INR 1.0   APTT <21.0      Recent Labs   Lab 12/09/23  1741   TROPONINI 0.006       Electrolytes: Electrolytes replaced  Accuchecks: ACHS    Gtts:   dexmedeTOMIDine (Precedex) infusion (titrating) Stopped (12/10/23 9833)    nicardipine Stopped  (12/10/23 2883)       LDA/Wounds:  Lines/Drains/Airways       Drain  Duration             Female External Urinary Catheter 12/09/23 2300 1 day         NG/OG Tube 12/11/23 1252 Right nostril <1 day              Peripheral Intravenous Line  Duration                  Peripheral IV - Single Lumen 12/10/23 1021 22 G Anterior;Left Ankle 1 day         Midline Catheter Insertion/Assessment  - Single Lumen 12/10/23 1745 Left cephalic vein (lateral side of arm) 18g x 10cm <1 day         Peripheral IV - Single Lumen 12/11/23 0722 22 G Anterior;Right Foot <1 day                  Wounds: No  Wound care consulted: No

## 2023-12-11 NOTE — PROCEDURES
Preliminary EEG Read  EEG reviewed 15:27-16:49    Background:   The background consists of theta and delta range slowing (ranges 4-6 hz).  There is a subtle asymmetry with an overall lack of faster frequencies in the left hemisphere compared to the right.     Impression:   - Severe encephalopathy  - More focal slowing over the left hemisphere is consistent with focal cortical dysfunction on the left  - No epileptiform activity noted    Please hit the EEG button with any clinical activity concerning for seizures and describe what you see.    Detailed report to follow.     Mckenzie Carbone MD  [facility]   Department of Neurology

## 2023-12-11 NOTE — PROGRESS NOTES
[facility]  Neurosurgery  Progress Note    Subjective:     History of Present Illness: 89F presenting after ground level fall without LoC and CTH demonstrating bifrontal tSAH, L parieto-occipital skull fracture. No AC/AP. On neurosurgical evaluation patient displays altered mental status with inappropriate speech but moving all extremities spontaneously. Fall was unwitnessed but family states they heard a yell and then sound of patient hitting floor    Post-Op Info:  * No surgery found *       Interval History: 12/11/2023 NAEON. Neuro exam stable. Surveilance CTH stable.     Medications:  Continuous Infusions:   dexmedeTOMIDine (Precedex) infusion (titrating) Stopped (12/10/23 2323)    nicardipine Stopped (12/10/23 1623)     Scheduled Meds:   amLODIPine  5 mg Oral Daily    ampicillin-sulbactim (UNASYN) IVPB  1.5 g Intravenous Q8H    atorvastatin  40 mg Oral Daily    lacosamide (VIMPAT) IVPB  50 mg Intravenous Q12H    latanoprost  1 drop Both Eyes QHS    senna-docusate 8.6-50 mg  1 tablet Oral BID     PRN Meds:acetaminophen, calcium gluconate IVPB, calcium gluconate IVPB, calcium gluconate IVPB, dextrose 10%, dextrose 10%, glucagon (human recombinant), glucose, glucose, hydrALAZINE, insulin aspart U-100, labetalol, magnesium sulfate IVPB, magnesium sulfate IVPB, midazolam, ondansetron, potassium chloride **AND** potassium chloride **AND** potassium chloride, prochlorperazine, sodium chloride 0.9%, sodium phosphate 15 mmol in dextrose 5 % (D5W) 250 mL IVPB, sodium phosphate 20.01 mmol in dextrose 5 % (D5W) 250 mL IVPB, sodium phosphate 30 mmol in dextrose 5 % (D5W) 250 mL IVPB     Review of Systems  Objective:     Weight: 51.7 kg (114 lb)  Body mass index is 20.19 kg/m².  Vital Signs (Most Recent):  Temp: 98.8 °F (37.1 °C) (12/11/23 1101)  Pulse: 87 (12/11/23 1101)  Resp: 19 (12/11/23 1101)  BP: (!) 146/75 (12/11/23 1101)  SpO2: 98 % (12/11/23 1101) Vital Signs (24h Range):  Temp:  [97 °F (36.1  °C)-98.8 °F (37.1 °C)] 98.8 °F (37.1 °C)  Pulse:  [] 87  Resp:  [17-63] 19  SpO2:  [93 %-98 %] 98 %  BP: ()/(58-99) 146/75     Date 12/11/23 0700 - 12/12/23 0659   Shift 0377-6777 7300-5037 1940-8403 24 Hour Total   INTAKE   IV Piggyback 428   428   Shift Total(mL/kg) 428(8.3)   428(8.3)   OUTPUT   Shift Total(mL/kg)       Weight (kg) 51.7 51.7 51.7 51.7                       Female External Urinary Catheter 12/09/23 2300 (Active)   Skin no redness;no breakdown 12/11/23 1101   Tolerance no signs/symptoms of discomfort 12/11/23 1101   Suction Continuous suction at 40 mmHg 12/11/23 0701   Date of last wick change 12/09/23 12/09/23 2301   Time of last wick change 2300 12/09/23 2301   Output (mL) 200 mL 12/10/23 0601          Physical Exam   E4V2M5. Agitated, inappropriate speech.   +c/c/g  PERRL  NIKI +AG      Neurosurgery Physical Exam    Significant Labs:  Recent Labs   Lab 12/09/23  1741 12/10/23  0321 12/11/23  0319   * 186* 145*    137 137   K 3.0* 3.3* 4.0    102 106   CO2 19* 19* 22*   BUN 43* 31* 21   CREATININE 1.0 0.9 0.7   CALCIUM 9.2 9.3 9.2   MG 2.0 1.8 2.1     Recent Labs   Lab 12/09/23  1741 12/10/23  0321 12/11/23  0319   WBC 14.25* 14.58* 19.71*   HGB 11.7* 11.7* 10.2*   HCT 33.9* 34.7* 29.4*    276 224     Recent Labs   Lab 12/09/23 1741 12/09/23 2120   INR 1.0 1.0   APTT  --  <21.0     Microbiology Results (last 7 days)       Procedure Component Value Units Date/Time    Urine culture [[id number]] Collected: 12/11/23 1145    Order Status: Sent Specimen: Urine, Clean Catch Updated: 12/11/23 1233          All pertinent labs from the last 24 hours have been reviewed.    Significant Diagnostics:  I have reviewed and interpreted all pertinent imaging results/findings within the past 24 hours.  Assessment/Plan:     * SAH (subarachnoid hemorrhage)  89F presenting after ground level fall without LoC and CTH demonstrating bifrontal traumatic SAH, L parieto-occipital  skull fracture. No AC/AP.    Imaging:  CT C Spine 12/9: No acute fractures   CTH 12/9: SAH b/l frontal lobe, L parietal/occipital fracture w/o displacement w minimal adjacent SDH, hyperdensity near L ICA possible aneurysm   CTA 12/9: worsening contusions and new L temporal, SAH, edema, no significant MLS. New IVH. No aneurysms.   CT Temporal bone 12/10: Nondisplaced fracture occipital and left temporal bones w diastasis of the adjacent sutures.  Fluid within the middle ear and mastoid air cells   CTH 12/10 repeat: evolving hemorrhages   CTH 12/11: stable    Plan:  --admit NCC; q1h nc/vs  --all labs and significant diagnostics reviewed  --SBP < 160  --Na > 135  --HOB > 30  --Maximum medical management per NCC  --delirium precaustions  --Keppra 500mg BID x7 days  --please notify nsgy of any acute neurological decline          Yonathan Lion MD  Neurosurgery  [facility]

## 2023-12-11 NOTE — ASSESSMENT & PLAN NOTE
89F presenting after ground level fall without LoC and CTH demonstrating bifrontal traumatic SAH, L parieto-occipital skull fracture. No AC/AP.    Imaging:  CT C Spine 12/9: No acute fractures   CTH 12/9: SAH b/l frontal lobe, L parietal/occipital fracture w/o displacement w minimal adjacent SDH, hyperdensity near L ICA possible aneurysm   CTA 12/9: worsening contusions and new L temporal, SAH, edema, no significant MLS. New IVH. No aneurysms.   CT Temporal bone 12/10: Nondisplaced fracture occipital and left temporal bones w diastasis of the adjacent sutures.  Fluid within the middle ear and mastoid air cells   CTH 12/10 repeat: evolving hemorrhages   CTH 12/11: stable    Plan:  --admit NCC; q1h nc/vs  --all labs and significant diagnostics reviewed  --SBP < 160  --Na > 135  --HOB > 30  --Maximum medical management per NCC  --delirium precaustions  --Keppra 500mg BID x7 days  --please notify nsgy of any acute neurological decline

## 2023-12-11 NOTE — PT/OT/SLP PROGRESS
Physical Therapy Treatment    Patient Name:  Eden Cardenas   MRN:  [id number]    Recommendations:     Discharge Recommendations: Moderate Intensity Therapy  Discharge Equipment Recommendations: to be determined by next level of care  Barriers to discharge:  impaired functional mobility requiring     Assessment:     Eden Cardenas is a 89 y.o. female admitted with a medical diagnosis of SAH (subarachnoid hemorrhage).  She presents with the following impairments/functional limitations: weakness, impaired endurance, impaired self care skills, impaired functional mobility, gait instability, impaired balance, decreased safety awareness, impaired cognition.    Pt tolerates session poorly with focus on bed mobility, transfers, and gait training. Pt lethargic/somnolent on presentation and restrained d/t pulling at lines and batting at staff. Pt does not initiate mobility to command and upon dependent assist to sitting she is more alert, severe trunk extension, and BUE pushing therapist away. Pt calmed with prolonged time at EOB. She is able to stand from EOB x 2 trials and take side steps along EOB x 1 trial with verbal/tactile cues to initiate and assistance to facilitate. Pt quickly returns to sleep once back in supine. Pt will continue to benefit from therapy services to address impairments listed above.     Rehab Prognosis: Fair; patient would benefit from acute skilled PT services to address these deficits and reach maximum level of function.    Recent Surgery: * No surgery found *      Plan:     During this hospitalization, patient to be seen 3 x/week to address the identified rehab impairments via gait training, therapeutic activities, therapeutic exercises, neuromuscular re-education and progress toward the following goals:    Plan of Care Expires:  01/10/24    Subjective     Chief Complaint: no c/o verbalized  Patient/Family Comments/goals: incoherent mumbling, all verbalizations unintelligible   Pain/Comfort:  Pain  Rating 1: other (see comments) (pt unable to report d/t somnolence/confusion)  Pain Rating Post-Intervention 1: other (see comments) (pt unable to report; quickly asleep at rest once returned to supine with HOB elevated)      Objective:     Communicated with RN prior to session.  Patient found HOB elevated with telemetry, blood pressure cuff, restraints, peripheral IV, PureWick upon PTA entry to room.     General Precautions: Standard, fall, aspiration  Orthopedic Precautions: N/A  Braces: N/A  Respiratory Status: Room air     Functional Mobility:  Bed Mobility:     Supine to Sit: dependence  Sit to Supine: total assistance  Transfers:     Sit to Stand:  moderate assistance with no AD  Gait: Pt takes 3 steps to her R along EOB with Mod A and no AD. Pt advancing her own legs but balance and weight shifting are impaired. Attempted gait fwd/bwd but pt not initiating and resists physical cues encourage forward momentum with heavy posterior lean.       AM-PAC 6 CLICK MOBILITY  Turning over in bed (including adjusting bedclothes, sheets and blankets)?: 2  Sitting down on and standing up from a chair with arms (e.g., wheelchair, bedside commode, etc.): 2  Moving from lying on back to sitting on the side of the bed?: 2  Moving to and from a bed to a chair (including a wheelchair)?: 2  Need to walk in hospital room?: 2  Climbing 3-5 steps with a railing?: 1  Basic Mobility Total Score: 11       Treatment & Education:  Pt at EOB for ~10 minutes with Total A advancing to Min A for static sitting balance. Pt calms with gentle redirection and prolonged time in upright position.     Patient left HOB elevated with all lines intact, call button in reach, bed alarm on, restraints reapplied at end of session, and RN notified..    GOALS:   Multidisciplinary Problems       Physical Therapy Goals          Problem: Physical Therapy    Goal Priority Disciplines Outcome Goal Variances Interventions   Physical Therapy Goal     PT, PT/OT  Ongoing, Progressing     Description: Goals to met by 12/24/2023    1. Supine to sit with MInimal Assistance  2. Sit to supine with MInimal Assistance  3. Rolling to Left and Right with Minimal Assistance.  4. Sit to stand transfer with Contact Guard Assistance  5. Bed to chair transfer with Contact Guard Assistance using LRAD  6. Gait  x 25 feet with Contact Guard Assistance using LRAD   7. Sitting at edge of bed x10 minutes with Stand-by Assistance  8. Stand for 5 minutes with Stand-by Assistance using LRAD  9. Lower extremity exercise program x15 reps per Instruction, with assistance as needed in order to facilitate improved strength, improved postural control, and improvement in functional independence                       Time Tracking:     PT Received On: 12/11/23  PT Start Time: 0735     PT Stop Time: 0758  PT Total Time (min): 23 min     Billable Minutes: Gait Training 8 and Therapeutic Activity 15    Treatment Type: Treatment  PT/PTA: PTA     Number of PTA visits since last PT visit: 1     12/11/2023

## 2023-12-11 NOTE — PT/OT/SLP EVAL
Speech Language Pathology Evaluation  Cognitive/Bedside Swallow    Patient Name:  Eden Cardenas   MRN:  [id number]  Admitting Diagnosis: SAH (subarachnoid hemorrhage)    Recommendations:                  General Recommendations:  Dysphagia therapy, Speech/language therapy, and Cognitive-linguistic therapy  Diet recommendations:  NPO, NPO   Aspiration Precautions: Alternate means of nutrition/hydration   General Precautions: Standard, aspiration, fall  Communication strategies:  none    Assessment:     Eden Cardenas is a 89 y.o. female with an SLP diagnosis of Dysphagia and Cognitive-Linguistic Impairment.  Largely impacted by fluctuating levels of alertness.     History:     History reviewed. No pertinent past medical history.    Past Surgical History:   Procedure Laterality Date    MIDLINE CATHETER PLACEMENT  12/10/2023       Please refer to initial assessment for complete background information     Subjective     Pt significantly drowsy  RN concerns for inability to administer meds given worsening lethargy   RN in agreement with SLP seeing at this time     Pain/Comfort:  Pain Rating 1: 0/10 (pt unable to report)  Pain Rating Post-Intervention 1: 0/10 (pt unable to report)    Respiratory Status: Room air    Objective:     Cognitive Status:    Unable to assess  Arousal/Alertness Delayed responses         Receptive Language:   Comprehension:      Commands  One step no true command following despite SLP max cues and stimulation lethargic state largely impacting pt ability to participate in assessment   Pt did not reliably respond to simple yes/no questions   Pragmatics:    Unabel to assess     Expressive Language:  Verbal:    Ongoing assessment warranted       Motor Speech:  Ongoing assessment warranted     Voice:   Spontaneous vocalizations   appearing adequate     Visual-Spatial:  Unable to assess     Reading:   Unable to assess       Written Expression:   Unable to assess     Oral Musculature Evaluation  Oral  Musculature: unable to assess due to poor participation/comprehension  Dentition: present and adequate  Volitional Cough: david  Volitional Swallow: david  Voice Prior to PO Intake: clear    Bedside Swallow Treatment:   Consistencies Assessed:  Puree x2      Oral Phase:   Inability to open lips  Oral residue  Poor oral acceptance    Pharyngeal Phase:   Unable to assess    Pt seen for ongoing swallow and speech-language assessment/follow up. Pt significantly more lethargic compared to initial assessment recommending minced moist/thin liquids with medications crushed. Suspect swallow function remains intact from initial assessment; however,  given her lethargy this date and inability to functionally orally manipulate PO trials she does not appear safe to eat by mouth.  Should she become more alert pt may be able to continue with medications crushed in puree should alternate means of nutrition/hydration/medication not be an option. Speech to continue to follow and advance as appropriate        Goals:   Multidisciplinary Problems       SLP Goals          Problem: SLP    Goal Priority Disciplines Outcome   SLP Goal     SLP Ongoing, Progressing   Description: Speech Language Pathology Goals  Goals expected to be met by 12/24    1. Pt will participate in ongoing swallow assessment to determine least restrictive PO diet when appropriate.  2. Pt will demonstrate orientation to self, place, situation , family members, date w/ min cues   3.  Pt will follow single step directions w/ 75% acc w/ min cues to enhance comprehension skills   4 Pt will complete yes/no questions w/ 75% acc w/ min cues to enhance comprehension skills   5.  Pt will participate in automatic speech tasks w/ 75% acc w/ min cues to enhance verbal expression skills   6.  Pt will participate in ongoing assessment of speech language and cognitive linguistic skills to help rule out deficits and determine therapeutic plan of care                                   Plan:     Patient to be seen:  4 x/week   Plan of Care expires:  01/09/24  Plan of Care reviewed with:  patient   SLP Follow-Up:  Yes       Discharge recommendations:      Barriers to Discharge:  None    Time Tracking:     SLP Treatment Date:   12/11/23  Speech Start Time:  0851  Speech Stop Time:  0906     Speech Total Time (min):  15 min    Billable Minutes: Eval 7  and Eval Swallow and Oral Function 8    12/11/2023

## 2023-12-11 NOTE — ASSESSMENT & PLAN NOTE
-Precedex PRN agitation  -Seroquel started 12/10, but decreased d/t significant increase in drowsiness  -Delirium precautions

## 2023-12-11 NOTE — PLAN OF CARE
Pt anxious this date and unable to engage in therapy after attempting sit to stand from EOB.     Problem: Occupational Therapy  Goal: Occupational Therapy Goal  Description: Goals to be met by: 12/24/2023     Patient will increase functional independence with ADLs by performing:    UE Dressing with Minimal Assistance.  LE Dressing with Moderate Assistance.  Grooming while EOB with Minimal Assistance.  Toileting from bedside commode with Maximum Assistance for hygiene and clothing management.   Supine to sit with Minimal Assistance.  Stand pivot transfer with Minimal Assistance using LRAD as needed.  Toilet transfer to toilet with Minimal Assistance using LRAD as needed.    Outcome: Ongoing, Progressing

## 2023-12-11 NOTE — PLAN OF CARE
[facility]  Initial Discharge Assessment       Primary Care Provider: Valorie Del Real, PT    Admission Diagnosis: Altered mental status [R41.82]  SAH (subarachnoid hemorrhage) [I60.9]  Fall, initial encounter [W19.XXXA]  Closed fracture of skull, unspecified bone, initial encounter [S02.91XA]  At risk for long QT syndrome [Z91.89]    Admission Date: 12/9/2023  Expected Discharge Date: 12/20/2023    Transition of Care Barriers: None    Payor: MEDICARE / Plan: MEDICARE PART A & B / Product Type: Government /     No emergency contact information on file.    Discharge Plan A: Home Health, Home with family  Discharge Plan B: Skilled Nursing Facility      [id number] - [place] - [address]  [address]  [place] [address]  Phone: [phone] Fax: [phone]          Transferred from:  Home     History reviewed. No pertinent past medical history.      CM met with patient and Isaias Cardenas (grandson) [phone]  in room for Discharge Planning Assessment.  Patient is unable to answer questions.  Per grandson, the patrient lives with her grandson and his wife in a 2 story house with step(s) to enter, but patient can use the courtyard with no stairs to enter.  The bed and bath are on the first floor.   Per grandson, the patient was independent with ADLS and recently started using a rollator for ambulation.  Patient will have assistance from her grandson upon discharge.   Discharge Planning Booklet given to patient/family and discussed.  All questions addressed.  CM will follow for needs.    Alisha requested a SNF list.  List printed from Medicare.gov and provided to alisha.     Discharge Plan A and Plan B have been determined by review of patient's clinical status, future medical and therapeutic needs, and coverage/benefits for post-acute care in coordination with multidisciplinary team members.      Initial Assessment (most recent)       Adult Discharge  Assessment - 12/11/23 1528          Discharge Assessment    Assessment Type Discharge Planning Assessment     Confirmed/corrected address, phone number and insurance Yes     Confirmed Demographics Correct on Facesheet     Source of Information unable to respond     If unable to respond/provide information was family/caregiver contacted? Yes     Contact Name/Number Isaias Ronald sheyla) [phone]     Communicated ESTEFANY with patient/caregiver Date not available/Unable to determine     Reason For Admission SAH     People in Home grandchild(miguel)     Facility Arrived From: home     Do you expect to return to your current living situation? Yes     Do you have help at home or someone to help you manage your care at home? Yes     Who are your caregiver(s) and their phone number(s)? Isaias Cardenas (alisha) [phone]     Prior to hospitilization cognitive status: Alert/Oriented     Current cognitive status: Unable to Assess     Walking or Climbing Stairs Difficulty yes     Walking or Climbing Stairs ambulation difficulty, requires equipment     Mobility Management rollator     Dressing/Bathing Difficulty no     Home Accessibility stairs to enter home   an use courtyard entrance wtih no stairs    Stairs Comment, Main Entrance Can use courtyard entrance wtih no stairs     Home Layout Able to live on 1st floor     Equipment Currently Used at Home rollator     Readmission within 30 days? No     Patient currently being followed by outpatient case management? No     Do you currently have service(s) that help you manage your care at home? No     Do you take prescription medications? Yes     Do you have prescription coverage? Yes     Coverage Medicare/[employer]     Do you have any problems affording any of your prescribed medications? No     Is the patient taking medications as prescribed? yes     Who is going to help you get home at discharge? Isaias Cardenas sheyla) [phone]     How do you get to doctors appointments?  family or friend will provide     Are you on dialysis? No     Do you take coumadin? No     Discharge Plan A Home Health;Home with family     Discharge Plan B Skilled Nursing Facility     DME Needed Upon Discharge  none     Discharge Plan discussed with: Caregiver     Name(s) and Number(s) Isaias Cardenas (grandson) [phone]     Transition of Care Barriers None        Physical Activity    On average, how many days per week do you engage in moderate to strenuous exercise (like a brisk walk)? 1 day     On average, how many minutes do you engage in exercise at this level? 60 min        Financial Resource Strain    How hard is it for you to pay for the very basics like food, housing, medical care, and heating? Not hard at all        Housing Stability    In the last 12 months, was there a time when you were not able to pay the mortgage or rent on time? No     In the last 12 months, how many places have you lived? 4     In the last 12 months, was there a time when you did not have a steady place to sleep or slept in a shelter (including now)? No        Transportation Needs    In the past 12 months, has lack of transportation kept you from medical appointments or from getting medications? No     In the past 12 months, has lack of transportation kept you from meetings, work, or from getting things needed for daily living? No        Food Insecurity    Within the past 12 months, you worried that your food would run out before you got the money to buy more. Never true     Within the past 12 months, the food you bought just didn't last and you didn't have money to get more. Never true        Stress    Do you feel stress - tense, restless, nervous, or anxious, or unable to sleep at night because your mind is troubled all the time - these days? Not at all        Social Connections    In a typical week, how many times do you talk on the phone with family, friends, or neighbors? More than three times a week     How often do you get  together with friends or relatives? More than three times a week     How often do you attend Confucianism or Latter-day services? More than 4 times per year     How often do you attend meetings of the clubs or organizations you belong to? More than 4 times per year     Are you , , , , never , or living with a partner?         Alcohol Use    Q1: How often do you have a drink containing alcohol? 4 or more times a week     Q2: How many drinks containing alcohol do you have on a typical day when you are drinking? 1 or 2     Q3: How often do you have six or more drinks on one occasion? Never        OTHER    Name(s) of People in Home Isaias Cardenas ([employer]) [phone]                      Rossana Jackson RN, CCRN-K, [facility]  Neuro-Critical Care   [phone]

## 2023-12-11 NOTE — SUBJECTIVE & OBJECTIVE
Interval History:  Seroquel started last night d/t increased agitation - increased drowsiness today, unsafe for anything by mouth at this time per SLP. Will discontinue. NG inserted for med admin. Follow-up CTH this AM stable. WBC uptrending, although afebrile. Initial U/A positive for nitrites, will send culture.      Review of Systems   Unable to perform ROS: Dementia       Objective:     Vitals:  Temp: 98.4 °F (36.9 °C)  Pulse: 92  Rhythm: sinus tachycardia  BP: (!) 144/67  MAP (mmHg): 95  Resp: 16  SpO2: 98 %    Temp  Min: 97 °F (36.1 °C)  Max: 98.8 °F (37.1 °C)  Pulse  Min: 70  Max: 102  BP  Min: 87/63  Max: 194/79  MAP (mmHg)  Min: 71  Max: 133  Resp  Min: 16  Max: 47  SpO2  Min: 95 %  Max: 99 %    12/10 0701 - 12/11 0700  In: 1123.4 [I.V.:313.8]  Out: 300 [Urine:300]   Unmeasured Output  Urine Occurrence: 1  Stool Occurrence: 1  Pad Count: 2        Physical Exam  Eyes:      Pupils: Pupils are equal, round, and reactive to light.   Cardiovascular:      Rate and Rhythm: Regular rhythm. Tachycardia present.      Pulses: Normal pulses.      Heart sounds: Normal heart sounds.   Pulmonary:      Effort: Pulmonary effort is normal.      Breath sounds: Normal breath sounds.   Abdominal:      General: Bowel sounds are normal.      Palpations: Abdomen is soft.   Musculoskeletal:         General: Normal range of motion.   Skin:     General: Skin is warm and dry.      Capillary Refill: Capillary refill takes less than 2 seconds.   Neurological:      Mental Status: She is lethargic.      GCS: GCS eye subscore is 1. GCS verbal subscore is 2. GCS motor subscore is 5.      Comments:   -E1 V2 M5  -PERRL  -Squeezes eyes shut when attempting to assess pupils  -Incomprehensible speech in response to noxious stimuli  -Localizes in all four extremities  -VALLADARES spontaneously/purposefully               Medications:  ContinuousdexmedeTOMIDine (Precedex) infusion (titrating), Last Rate: Stopped (12/10/23 2173)  nicardipine, Last Rate:  Stopped (12/10/23 1623)    ScheduledamLODIPine, 5 mg, Daily  ampicillin-sulbactim (UNASYN) IVPB, 1.5 g, Q8H  atorvastatin, 40 mg, Daily  [START ON 12/12/2023] enoxparin, 40 mg, Q24H (prophylaxis, 1700)  lacosamide (VIMPAT) IVPB, 50 mg, Q12H  latanoprost, 1 drop, QHS  senna-docusate 8.6-50 mg, 1 tablet, BID    PRNacetaminophen, 650 mg, Q6H PRN  calcium gluconate IVPB, 1 g, PRN  calcium gluconate IVPB, 1 g, PRN  calcium gluconate IVPB, 1 g, PRN  dextrose 10%, 12.5 g, PRN  dextrose 10%, 25 g, PRN  glucagon (human recombinant), 1 mg, PRN  glucose, 16 g, PRN  glucose, 24 g, PRN  hydrALAZINE, 10 mg, Q4H PRN  insulin aspart U-100, 0-10 Units, QID (AC + HS) PRN  labetalol, 10 mg, Q4H PRN  magnesium sulfate IVPB, 2 g, PRN  magnesium sulfate IVPB, 4 g, PRN  midazolam, 2 mg, Once PRN  ondansetron, 4 mg, Q8H PRN  potassium chloride, 40 mEq, PRN   And  potassium chloride, 60 mEq, PRN   And  potassium chloride, 80 mEq, PRN  prochlorperazine, 5 mg, Q6H PRN  sodium chloride 0.9%, 10 mL, PRN  sodium phosphate 15 mmol in dextrose 5 % (D5W) 250 mL IVPB, 15 mmol, PRN  sodium phosphate 20.01 mmol in dextrose 5 % (D5W) 250 mL IVPB, 20.01 mmol, PRN  sodium phosphate 30 mmol in dextrose 5 % (D5W) 250 mL IVPB, 30 mmol, PRN      Today I personally reviewed pertinent medications, lines/drains/airways, imaging, cardiology results, laboratory results,    Diet  No diet orders on file  No diet orders on file

## 2023-12-12 ENCOUNTER — DOCUMENTATION ONLY (OUTPATIENT)
Dept: NEUROLOGY | Facility: CLINIC | Age: 88
End: 2023-12-12

## 2023-12-12 LAB
ALBUMIN SERPL BCP-MCNC: 2.9 G/DL (ref 3.5–5.2)
ALP SERPL-CCNC: 96 U/L (ref 55–135)
ALT SERPL W/O P-5'-P-CCNC: 16 U/L (ref 10–44)
ANION GAP SERPL CALC-SCNC: 11 MMOL/L (ref 8–16)
AST SERPL-CCNC: 19 U/L (ref 10–40)
BASOPHILS # BLD AUTO: 0.02 K/UL (ref 0–0.2)
BASOPHILS NFR BLD: 0.1 % (ref 0–1.9)
BILIRUB SERPL-MCNC: 0.4 MG/DL (ref 0.1–1)
BUN SERPL-MCNC: 20 MG/DL (ref 10–30)
CALCIUM SERPL-MCNC: 9.3 MG/DL (ref 8.7–10.5)
CHLORIDE SERPL-SCNC: 107 MMOL/L (ref 95–110)
CO2 SERPL-SCNC: 22 MMOL/L (ref 23–29)
CREAT SERPL-MCNC: 0.7 MG/DL (ref 0.5–1.4)
DIFFERENTIAL METHOD: ABNORMAL
EOSINOPHIL # BLD AUTO: 0 K/UL (ref 0–0.5)
EOSINOPHIL NFR BLD: 0 % (ref 0–8)
ERYTHROCYTE [DISTWIDTH] IN BLOOD BY AUTOMATED COUNT: 12.9 % (ref 11.5–14.5)
EST. GFR  (NO RACE VARIABLE): >60 ML/MIN/1.73 M^2
GLUCOSE SERPL-MCNC: 129 MG/DL (ref 70–110)
HCT VFR BLD AUTO: 30.5 % (ref 37–48.5)
HGB BLD-MCNC: 10.5 G/DL (ref 12–16)
IMM GRANULOCYTES # BLD AUTO: 0.12 K/UL (ref 0–0.04)
IMM GRANULOCYTES NFR BLD AUTO: 0.7 % (ref 0–0.5)
LYMPHOCYTES # BLD AUTO: 0.8 K/UL (ref 1–4.8)
LYMPHOCYTES NFR BLD: 4.7 % (ref 18–48)
MAGNESIUM SERPL-MCNC: 2.1 MG/DL (ref 1.6–2.6)
MCH RBC QN AUTO: 32.1 PG (ref 27–31)
MCHC RBC AUTO-ENTMCNC: 34.4 G/DL (ref 32–36)
MCV RBC AUTO: 93 FL (ref 82–98)
MONOCYTES # BLD AUTO: 1.3 K/UL (ref 0.3–1)
MONOCYTES NFR BLD: 7.7 % (ref 4–15)
NEUTROPHILS # BLD AUTO: 15 K/UL (ref 1.8–7.7)
NEUTROPHILS NFR BLD: 86.8 % (ref 38–73)
NRBC BLD-RTO: 0 /100 WBC
PHOSPHATE SERPL-MCNC: 2.7 MG/DL (ref 2.7–4.5)
PLATELET # BLD AUTO: 235 K/UL (ref 150–450)
PMV BLD AUTO: 10.2 FL (ref 9.2–12.9)
POCT GLUCOSE: 118 MG/DL (ref 70–110)
POCT GLUCOSE: 148 MG/DL (ref 70–110)
POCT GLUCOSE: 97 MG/DL (ref 70–110)
POTASSIUM SERPL-SCNC: 3.5 MMOL/L (ref 3.5–5.1)
PROT SERPL-MCNC: 7.1 G/DL (ref 6–8.4)
RBC # BLD AUTO: 3.27 M/UL (ref 4–5.4)
SODIUM SERPL-SCNC: 140 MMOL/L (ref 136–145)
WBC # BLD AUTO: 17.3 K/UL (ref 3.9–12.7)

## 2023-12-12 PROCEDURE — 63600175 PHARM REV CODE 636 W HCPCS

## 2023-12-12 PROCEDURE — 20000000 HC ICU ROOM

## 2023-12-12 PROCEDURE — 93005 ELECTROCARDIOGRAM TRACING: CPT

## 2023-12-12 PROCEDURE — 99223 PR INITIAL HOSPITAL CARE,LEVL III: ICD-10-PCS | Mod: FS,,, | Performed by: PHYSICIAN ASSISTANT

## 2023-12-12 PROCEDURE — 25000003 PHARM REV CODE 250: Performed by: PHYSICIAN ASSISTANT

## 2023-12-12 PROCEDURE — 99291 CRITICAL CARE FIRST HOUR: CPT | Mod: GC,,, | Performed by: PSYCHIATRY & NEUROLOGY

## 2023-12-12 PROCEDURE — 25000003 PHARM REV CODE 250

## 2023-12-12 PROCEDURE — 25000003 PHARM REV CODE 250: Performed by: PSYCHIATRY & NEUROLOGY

## 2023-12-12 PROCEDURE — 93010 ELECTROCARDIOGRAM REPORT: CPT | Mod: ,,, | Performed by: INTERNAL MEDICINE

## 2023-12-12 PROCEDURE — 92507 TX SP LANG VOICE COMM INDIV: CPT

## 2023-12-12 PROCEDURE — 80053 COMPREHEN METABOLIC PANEL: CPT

## 2023-12-12 PROCEDURE — 99223 1ST HOSP IP/OBS HIGH 75: CPT | Mod: FS,,, | Performed by: PHYSICIAN ASSISTANT

## 2023-12-12 PROCEDURE — 51701 INSERT BLADDER CATHETER: CPT

## 2023-12-12 PROCEDURE — 97110 THERAPEUTIC EXERCISES: CPT | Mod: CQ

## 2023-12-12 PROCEDURE — 25000003 PHARM REV CODE 250: Performed by: REGISTERED NURSE

## 2023-12-12 PROCEDURE — C9254 INJECTION, LACOSAMIDE: HCPCS

## 2023-12-12 PROCEDURE — 99233 PR SUBSEQUENT HOSPITAL CARE,LEVL III: ICD-10-PCS | Mod: ,,, | Performed by: NEUROLOGICAL SURGERY

## 2023-12-12 PROCEDURE — 85025 COMPLETE CBC W/AUTO DIFF WBC: CPT

## 2023-12-12 PROCEDURE — 51700 IRRIGATION OF BLADDER: CPT

## 2023-12-12 PROCEDURE — 63600175 PHARM REV CODE 636 W HCPCS: Performed by: REGISTERED NURSE

## 2023-12-12 PROCEDURE — 83735 ASSAY OF MAGNESIUM: CPT

## 2023-12-12 PROCEDURE — 63600175 PHARM REV CODE 636 W HCPCS: Performed by: STUDENT IN AN ORGANIZED HEALTH CARE EDUCATION/TRAINING PROGRAM

## 2023-12-12 PROCEDURE — 84100 ASSAY OF PHOSPHORUS: CPT

## 2023-12-12 PROCEDURE — 99233 SBSQ HOSP IP/OBS HIGH 50: CPT | Mod: ,,, | Performed by: NEUROLOGICAL SURGERY

## 2023-12-12 PROCEDURE — 94761 N-INVAS EAR/PLS OXIMETRY MLT: CPT

## 2023-12-12 PROCEDURE — 93010 EKG 12-LEAD: ICD-10-PCS | Mod: ,,, | Performed by: INTERNAL MEDICINE

## 2023-12-12 PROCEDURE — 51798 US URINE CAPACITY MEASURE: CPT

## 2023-12-12 PROCEDURE — 99291 PR CRITICAL CARE, E/M 30-74 MINUTES: ICD-10-PCS | Mod: GC,,, | Performed by: PSYCHIATRY & NEUROLOGY

## 2023-12-12 RX ORDER — SODIUM,POTASSIUM PHOSPHATES 280-250MG
2 POWDER IN PACKET (EA) ORAL
Status: DISCONTINUED | OUTPATIENT
Start: 2023-12-12 | End: 2023-12-13

## 2023-12-12 RX ORDER — BISACODYL 10 MG
10 SUPPOSITORY, RECTAL RECTAL DAILY PRN
Status: DISCONTINUED | OUTPATIENT
Start: 2023-12-12 | End: 2023-12-19 | Stop reason: HOSPADM

## 2023-12-12 RX ORDER — BISACODYL 10 MG
10 SUPPOSITORY, RECTAL RECTAL DAILY
Status: DISCONTINUED | OUTPATIENT
Start: 2023-12-12 | End: 2023-12-19 | Stop reason: HOSPADM

## 2023-12-12 RX ORDER — TAMSULOSIN HYDROCHLORIDE 0.4 MG/1
0.4 CAPSULE ORAL DAILY
Status: DISCONTINUED | OUTPATIENT
Start: 2023-12-12 | End: 2023-12-13

## 2023-12-12 RX ORDER — AMLODIPINE BESYLATE 10 MG/1
10 TABLET ORAL DAILY
Status: DISCONTINUED | OUTPATIENT
Start: 2023-12-13 | End: 2023-12-13

## 2023-12-12 RX ORDER — AMLODIPINE BESYLATE 5 MG/1
5 TABLET ORAL ONCE
Status: COMPLETED | OUTPATIENT
Start: 2023-12-12 | End: 2023-12-12

## 2023-12-12 RX ORDER — LANOLIN ALCOHOL/MO/W.PET/CERES
800 CREAM (GRAM) TOPICAL
Status: DISCONTINUED | OUTPATIENT
Start: 2023-12-12 | End: 2023-12-13

## 2023-12-12 RX ORDER — DEXMEDETOMIDINE HYDROCHLORIDE 4 UG/ML
0-1.4 INJECTION, SOLUTION INTRAVENOUS CONTINUOUS
Status: DISCONTINUED | OUTPATIENT
Start: 2023-12-12 | End: 2023-12-13

## 2023-12-12 RX ORDER — DEXMEDETOMIDINE HYDROCHLORIDE 4 UG/ML
INJECTION, SOLUTION INTRAVENOUS
Status: DISPENSED
Start: 2023-12-12 | End: 2023-12-13

## 2023-12-12 RX ADMIN — SENNOSIDES AND DOCUSATE SODIUM 1 TABLET: 8.6; 5 TABLET ORAL at 09:12

## 2023-12-12 RX ADMIN — AMPICILLIN AND SULBACTAM 1.5 G: 1; .5 INJECTION, POWDER, FOR SOLUTION INTRAVENOUS at 01:12

## 2023-12-12 RX ADMIN — AMLODIPINE BESYLATE 5 MG: 5 TABLET ORAL at 08:12

## 2023-12-12 RX ADMIN — TAMSULOSIN HYDROCHLORIDE 0.4 MG: 0.4 CAPSULE ORAL at 10:12

## 2023-12-12 RX ADMIN — SODIUM PHOSPHATE, MONOBASIC, MONOHYDRATE AND SODIUM PHOSPHATE, DIBASIC, ANHYDROUS 15 MMOL: 142; 276 INJECTION, SOLUTION INTRAVENOUS at 06:12

## 2023-12-12 RX ADMIN — AMPICILLIN AND SULBACTAM 1.5 G: 1; .5 INJECTION, POWDER, FOR SOLUTION INTRAVENOUS at 09:12

## 2023-12-12 RX ADMIN — POTASSIUM CHLORIDE 10 MEQ: 7.46 INJECTION, SOLUTION INTRAVENOUS at 08:12

## 2023-12-12 RX ADMIN — BISACODYL 10 MG: 10 SUPPOSITORY RECTAL at 09:12

## 2023-12-12 RX ADMIN — DEXMEDETOMIDINE HYDROCHLORIDE 0.2 MCG/KG/HR: 4 INJECTION INTRAVENOUS at 10:12

## 2023-12-12 RX ADMIN — SENNOSIDES AND DOCUSATE SODIUM 1 TABLET: 8.6; 5 TABLET ORAL at 08:12

## 2023-12-12 RX ADMIN — ATORVASTATIN CALCIUM 40 MG: 40 TABLET, FILM COATED ORAL at 08:12

## 2023-12-12 RX ADMIN — LABETALOL HYDROCHLORIDE 10 MG: 5 INJECTION, SOLUTION INTRAVENOUS at 01:12

## 2023-12-12 RX ADMIN — HYDRALAZINE HYDROCHLORIDE 10 MG: 20 INJECTION, SOLUTION INTRAMUSCULAR; INTRAVENOUS at 03:12

## 2023-12-12 RX ADMIN — LATANOPROST 1 DROP: 50 SOLUTION OPHTHALMIC at 09:12

## 2023-12-12 RX ADMIN — HYDRALAZINE HYDROCHLORIDE 10 MG: 20 INJECTION, SOLUTION INTRAMUSCULAR; INTRAVENOUS at 09:12

## 2023-12-12 RX ADMIN — LACOSAMIDE 50 MG: 10 INJECTION INTRAVENOUS at 09:12

## 2023-12-12 RX ADMIN — AMLODIPINE BESYLATE 5 MG: 5 TABLET ORAL at 10:12

## 2023-12-12 RX ADMIN — POTASSIUM BICARBONATE 35 MEQ: 391 TABLET, EFFERVESCENT ORAL at 10:12

## 2023-12-12 RX ADMIN — AMPICILLIN AND SULBACTAM 1.5 G: 1; .5 INJECTION, POWDER, FOR SOLUTION INTRAVENOUS at 05:12

## 2023-12-12 RX ADMIN — ENOXAPARIN SODIUM 40 MG: 40 INJECTION SUBCUTANEOUS at 05:12

## 2023-12-12 NOTE — PROGRESS NOTES
EEG Disconnect   Electrodes had to be fixed.No  Disconnect Per Team/  Redness at [id number]    No broken skin    Skin Integrity: Mild     Wilson Nicholas   12/12/2023 10:13 AM

## 2023-12-12 NOTE — CONSULTS
Nutrition consult received regarding TF recommendations.  RD following, please see note from 12/10 for full assessment.  Noted pt now NPO, per SLP.    Recommendations:  1) When able, initiate TFs. Rec'd Isosource 1.5 @ 40 mL/hr to provide 1440 kcals, 65 g of protein, 733 mL fluid.  2) RD following.    Thanks!  Stephenie MS, RD, LDN

## 2023-12-12 NOTE — CONSULTS
Epilepsy Team    CC: EEG placed for concern for epileptiform activity/seizures    HPI: 89 year old woman with hx of HTN, HLD, glaucoma admitted for management of traumatic SAH after mechanical fall from ground level. CTH showed bifrontal SAH as well as left parietooccipital calvarial fracture with minimal associated pneumocephalus and adjactent SDH. Patient admitted to [facility] for management, EEG initiated 12/11 for further evaluation.     Unable to obtain patient's family and social history due to patient altered mental status.    Patient is currently maintained on Lacosamide 50 mg BID      Review of systems:  Not performed secondary to patient altered mental status  Physical Exam  General: Afebrile, lethargic, no acute distress.   HEENT: Normocephalic. No scleral icterus.   Pulmonary: Effort normal, no respiratory distress.  Skin: Warm and dry.  Psych: Unable to assess.   Neuro:  Lethargic  CN:   Resists manual eye opening  Spontaneous movement of extremities  Not following commands    Exam findings to suggest seizures:  Myoclonus - no   eye twitching - no   Nystagmus - no   gaze deviation - no   waxy rigidity - no      EEG reviewed by Epileptologist, findings include diffuse slowing and disorganization with more focal slowing in the left hemisphere. No epileptiform activity, no electrographic seizures; see EEG report for details.     Recommendations: Encephalopathy is nonspecific in regards to etiology. No indication for escalation of anti-seizure drug at this time. Recommend discontinuation of EEG. Findings and recommendations discussed with primary team.      Subarachnoid hemorrhage  - traumatic SAH after mechanical fall without LOC per family  - NSGY consulted, maximal medical management  - Patient DNR/DNI per family, with wishes for no operative intervention if the need were to arise  - LCM 50 mg BID x7 days per NSGY/NCC  - EEG 12/11>12/12 with diffuse slowing, more focal slowing in the left hemisphere, no  seizures  - EEG discontinued 12/12    Temporal bone fracture  - ENT consulted, recommend no acute surgical interventions    Delirium  - Management per NCC, delirium precautions, Precedex PRN    Essential hypertension  - Vitals reviewed, management per NCC  - On Amlodipine 10 mg daily    Mixed hyperlipidemia  - Atorvastatin 40 mg daily    We will remove EEG today and sign off. Please do not hesitate to call us back with any questions or concerns.      Kiki Keating PATateC  Neurology Epilepsy  Staff: Dr. Carbone

## 2023-12-12 NOTE — PLAN OF CARE
[facility] Care Plan    POC reviewed with Eden Cardenas. No acute events overnight. Will continue to monitor. See below and flowsheets for full assessment and VS info.     Somnolence overnight        Neuro:  [place] Coma Scale  Best Eye Response: 4-->(E4) spontaneous  Best Motor Response: 5-->(M5) localizes pain  Best Verbal Response: 2-->(V2) incomprehensible speech  [place] Coma Scale Score: 11  Assessment Qualifiers: patient not sedated/intubated  Pupil PERRLA: yes     24hr Temp:  [98.1 °F (36.7 °C)-98.8 °F (37.1 °C)]     CV:   Rhythm: sinus tachycardia  BP goals:   SBP < 160  MAP > 65    Resp:           Plan: N/A    GI/:     Diet/Nutrition Received: NPO  Last Bowel Movement: 12/10/23  Voiding Characteristics: external catheter, incontinence    Intake/Output Summary (Last 24 hours) at 12/12/2023 0715  Last data filed at 12/11/2023 2301  Gross per 24 hour   Intake 623.11 ml   Output 950 ml   Net -326.89 ml     Unmeasured Output  Urine Occurrence: 1  Stool Occurrence: 1  Pad Count: 2    Labs/Accuchecks:  Recent Labs   Lab 12/12/23  0319   WBC 17.30*   RBC 3.27*   HGB 10.5*   HCT 30.5*         Recent Labs   Lab 12/12/23  0319      K 3.5   CO2 22*      BUN 20   CREATININE 0.7   ALKPHOS 96   ALT 16   AST 19   BILITOT 0.4      Recent Labs   Lab 12/09/23  2120   INR 1.0   APTT <21.0      Recent Labs   Lab 12/09/23  1741   TROPONINI 0.006       Electrolytes: Electrolytes replaced  Accuchecks: ACHS    Gtts:   nicardipine Stopped (12/10/23 1623)       LDA/Wounds:  Lines/Drains/Airways       Drain  Duration             Female External Urinary Catheter 12/09/23 2300 2 days         NG/OG Tube 12/11/23 1252 Right nostril <1 day              Peripheral Intravenous Line  Duration                  Midline Catheter Insertion/Assessment  - Single Lumen 12/10/23 1745 Left cephalic vein (lateral side of arm) 18g x 10cm 1 day         Peripheral IV - Single Lumen 12/11/23 0722 22 G Anterior;Right Foot <1 day          Peripheral IV - Single Lumen 12/12/23 0642 20 G Anterior;Distal;Left Lower leg <1 day                  Wounds: Yes  Wound care consulted: No

## 2023-12-12 NOTE — PROGRESS NOTES
[facility]  Neurocritical Care  Progress Note    Admit Date: 12/9/2023  Service Date: 12/11/2023  Length of Stay: 2    Subjective:     Chief Complaint: SAH (subarachnoid hemorrhage)    History of Present Illness: Eden Cardenas is a 89 y.o. female with history of HTN, HLD, glaucoma that presents with tSAH s/p mechanical fall from ground level. Per chart review, although the fall was unwitnessed, family heard a yell and the sound of the patient hitting the floor. No AC/AP. No LOC. She was brought to the ED, where she was found to be confused with inappropriate speech. She sustained a laceration to the back of her head, which was repaired in the ED. CTH showed bifrontal tSAH as well as a left parietoccipital calvarial fracture with minimal associated pneumocephalus and adjacent SDH. CT T-Spine negative for acute fractures or dislocations. The patient will be admitted to [facility] for close monitoring and a higher level of care.     Hospital Course: 12/10/2023 d/c EEG, delirium and agitation   12/11/2023 Follow-up CTH this AM stable. Seroquel started last night d/t increased agitation - increased drowsiness today. Will discontinue.    Interval History:  Seroquel started last night d/t increased agitation - increased drowsiness today, unsafe for anything by mouth at this time per SLP. Will discontinue. NG inserted for med admin. Follow-up CTH this AM stable. WBC uptrending, although afebrile. Initial U/A positive for nitrites, will send culture.      Review of Systems   Unable to perform ROS: Dementia       Objective:     Vitals:  Temp: 98.4 °F (36.9 °C)  Pulse: 92  Rhythm: sinus tachycardia  BP: (!) 144/67  MAP (mmHg): 95  Resp: 16  SpO2: 98 %    Temp  Min: 97 °F (36.1 °C)  Max: 98.8 °F (37.1 °C)  Pulse  Min: 70  Max: 102  BP  Min: 87/63  Max: 194/79  MAP (mmHg)  Min: 71  Max: 133  Resp  Min: 16  Max: 47  SpO2  Min: 95 %  Max: 99 %    12/10 0701 - 12/11 0700  In: 1123.4 [I.V.:313.8]  Out: 300 [Urine:300]    Unmeasured Output  Urine Occurrence: 1  Stool Occurrence: 1  Pad Count: 2        Physical Exam  Eyes:      Pupils: Pupils are equal, round, and reactive to light.   Cardiovascular:      Rate and Rhythm: Regular rhythm. Tachycardia present.      Pulses: Normal pulses.      Heart sounds: Normal heart sounds.   Pulmonary:      Effort: Pulmonary effort is normal.      Breath sounds: Normal breath sounds.   Abdominal:      General: Bowel sounds are normal.      Palpations: Abdomen is soft.   Musculoskeletal:         General: Normal range of motion.   Skin:     General: Skin is warm and dry.      Capillary Refill: Capillary refill takes less than 2 seconds.   Neurological:      Mental Status: She is lethargic.      GCS: GCS eye subscore is 1. GCS verbal subscore is 2. GCS motor subscore is 5.      Comments:   -E1 V2 M5  -PERRL  -Squeezes eyes shut when attempting to assess pupils  -Incomprehensible speech in response to noxious stimuli  -Localizes in all four extremities  -VALLADARES spontaneously/purposefully               Medications:  ContinuousdexmedeTOMIDine (Precedex) infusion (titrating), Last Rate: Stopped (12/10/23 3003)  nicardipine, Last Rate: Stopped (12/10/23 1623)    ScheduledamLODIPine, 5 mg, Daily  ampicillin-sulbactim (UNASYN) IVPB, 1.5 g, Q8H  atorvastatin, 40 mg, Daily  [START ON 12/12/2023] enoxparin, 40 mg, Q24H (prophylaxis, 1700)  lacosamide (VIMPAT) IVPB, 50 mg, Q12H  latanoprost, 1 drop, QHS  senna-docusate 8.6-50 mg, 1 tablet, BID    PRNacetaminophen, 650 mg, Q6H PRN  calcium gluconate IVPB, 1 g, PRN  calcium gluconate IVPB, 1 g, PRN  calcium gluconate IVPB, 1 g, PRN  dextrose 10%, 12.5 g, PRN  dextrose 10%, 25 g, PRN  glucagon (human recombinant), 1 mg, PRN  glucose, 16 g, PRN  glucose, 24 g, PRN  hydrALAZINE, 10 mg, Q4H PRN  insulin aspart U-100, 0-10 Units, QID (AC + HS) PRN  labetalol, 10 mg, Q4H PRN  magnesium sulfate IVPB, 2 g, PRN  magnesium sulfate IVPB, 4 g, PRN  midazolam, 2 mg, Once  PRN  ondansetron, 4 mg, Q8H PRN  potassium chloride, 40 mEq, PRN   And  potassium chloride, 60 mEq, PRN   And  potassium chloride, 80 mEq, PRN  prochlorperazine, 5 mg, Q6H PRN  sodium chloride 0.9%, 10 mL, PRN  sodium phosphate 15 mmol in dextrose 5 % (D5W) 250 mL IVPB, 15 mmol, PRN  sodium phosphate 20.01 mmol in dextrose 5 % (D5W) 250 mL IVPB, 20.01 mmol, PRN  sodium phosphate 30 mmol in dextrose 5 % (D5W) 250 mL IVPB, 30 mmol, PRN      Today I personally reviewed pertinent medications, lines/drains/airways, imaging, cardiology results, laboratory results,    Diet  No diet orders on file  No diet orders on file      Assessment/Plan:     Neuro  * SAH (subarachnoid hemorrhage)  Patient is a 89 y.o. female with history of HTN, HLD, and glaucoma who presented to the ED with AMS s/p mechanical fall from ground level. No LOC per family. No AC/AP.    -CTH (12/9/23): SAH in the peripheral sulci in the bilateral frontal lobes; 5mm nodular hyperdensity near the left ICA; non-displaced left calvarial fracture involving the posterior parietoccipital calvarium and extending anteriorly to involve the temporal calvarium with associated pneumocephalus and minimal adjacent SDH  -CTA head/neck 12/10 - Multi compartment intracranial hemorrhage involving the bilateral frontal lobes and left lateral temporal lobe with worsening parenchymal contusions and subarachnoid hemorrhage when compared with prior exam.  Adjacent mass effect and worsening edema adjacent to the larger parenchymal contusions, with no significant midline shift.  Additional mild subarachnoid hemorrhage along the right frontoparietal lobe, new from prior.  Similar small left posterior convexity extra-axial hemorrhage and trace right frontal subdural hemorrhage.  New trace intraventricular hemorrhage.    -CT Temporal bone - Nondisplaced fracture involving the occipital and left temporal bones with diastasis of the adjacent sutures.  Fluid within the middle ear and  mastoid air cells presumably due to fractures through the tegmen mastoideum with  adjacent pneumocephalus     -Admitted to [facility]  -VS/neuro checks q1h  -NSGY following  -CBC, CMP, Mag, Phos daily  -Continuous EEG  -Goal eunatremia  -SBP goal < 160  -PRN labetalol/hydralazine/cardene  -Amlodipine 5mg daily  -Precedex PRN agitation  -Vimpat 50mg BID x 7 days   -Seizure precautions  -HOB 30°   -Diet NPO - previously cleared for minced moist/thin liquids with medications crushed, but currently NPO d/t increased drowsiness  -NG for meds  -PT/OT/SLP    Temporal bone fracture  -ENT consulted  -Continue Unasyn 1.5g q8h for prophylaxis    Delirium  -Precedex PRN agitation  -Seroquel started 12/10, but decreased d/t significant increase in drowsiness  -Delirium precautions    Cardiac/Vascular  Essential (primary) hypertension  -SBP goal < 160  -PRN labetalol/hydralazine  -Continue home amlodipine 5mg daily    Mixed hyperlipidemia  Lab Results   Component Value Date    CHOL 218 (H) 12/09/2023    HDL 77 (H) 12/09/2023    LDLCALC 122.4 12/09/2023    TRIG 93 12/09/2023     -Atorvastatin 40mg daily    Oncology  Leukocytosis  -U/A nitrite positive on admission  -Afebrile  -WBC uptrending since admission  Lab Results   Component Value Date    WBC 19.71 (H) 12/11/2023    WBC 14.58 (H) 12/10/2023    WBC 14.25 (H) 12/09/2023      -CBC daily  -Urine culture  -Continue Unasyn 1.5g q8h          The patient is being Prophylaxed for:  Venous Thromboembolism with: Mechanical  Stress Ulcer with: Not Applicable   Ventilator Pneumonia with: not applicable    Activity Orders            Turn patient starting at 12/09 2200    Elevate HOB starting at 12/09 2100          DNR    Amirah Banks NP  Neurocritical Care  [facility] Critical [facility]

## 2023-12-12 NOTE — PROCEDURES
ICU EEG/VIDEO MONITORING REPORT    DATE OF SERVICE: 12/11/23-12/12/23  EEG NUMBER: FH -1  LOCATION OF SERVICE: ICU ([facility])    METHODOLOGY   Electroencephalographic (EEG) recording is with electrodes placed according to the International 10-20 placement system.  Thirty two (32) channels of digital signal are simultaneously recorded from the scalp and may include EKG, EMG, and/or eye monitors.   Recording band pass was 0.1 to 512 hz.  Digital video recording of the patient is simultaneously recorded with the EEG.  The nursing staff report clinical symptoms and may press an event button when the patient has symptoms of clinical interest to the treating physicians.  EEG and video recording is stored and archived in digital format.  The entire recording is visually reviewed and the times identified by computer analysis as being spikes or seizures are reviewed again.  Activation procedures which include photic stimulation, hyperventilation and instructing patients to perform simple task are done in selected patients.   Compresses spectral analysis (CSA) is also performed on the activity recorded from each individual channel.  This is displayed as a power display of frequencies from 0 to 30 Hz over time.   The CSA analysis is done and displayed continuously.  This is reviewed for asymmetries in power between homologous areas of the scalp and for presence of changes in power which canbe seen when seizures occur.  Sections of suspected abnormalities on the CSA is then compared with the original EEG recording.     [employer] software was also utilized in the review of this study.  This software suite analyzes the EEG recording in multiple domains.  Coherence and rhythmicity is computed to identify EEG sections which may contain organized seizures.  Each channel undergoes analysis to detect presence of spike and sharp waves which have special and morphological characteristic of epileptic activity.  The routine EEG  recording is converted from spacial into frequency domain.  This is then displayed comparing homologous areas to identify areas of significant asymmetry.  Algorithm to identify non-cortically generated artifact is used to separate eye movement, EMG and other artifact from the EEG.      Recording Times  Start on 12/11/23 at 15:27  Stop on 12/12/23 at 07:00  Start 12/12/23 at 10:15 - 3 hours 15  A total of 18 hours and 41 minutes of EEG was recorded.    This EEG study is performed in the ICU with the patient on the following sedative medications: None    Indication:  89-year-old female presenting after a fall and found to have bifrontal subarachnoid hemorrhage as well as left parieto-occipital skull fracture and hemorrhage.    State of Consciousness:   Lethargy    Background:   The background consists of theta and delta range slowing (ranges 4-6 hz).  There is a subtle asymmetry with an overall lack of faster frequencies in the left hemisphere compared to the right and more focal delta range slowing noted.         Sleep:   Transition to quiet state is noted but no normal stage 2 sleep architecture is seen.    Epileptiform Abnormalities  None    EKG:   At time irregular     Events:   None    Impression:   Normal continuous EEG not done under sedation showing diffuse slowing and disorganization as well as more focal slowing in the left hemisphere.  This is consistent with a severe diffuse encephalopathy and focal cortical dysfunction in the left hemisphere (most prominent in the left temporal region).  No epileptiform discharges are observed.      Mckenzie Carbone MD  [facility]   Department of Neurology/Epilepsy

## 2023-12-12 NOTE — SUBJECTIVE & OBJECTIVE
Interval History: 12/12/2023: NAEON. AFVSS. Exam stable. cvEEG in place. Patient DNR/DNI per family with wishes for no operative intervention should the need arise.    Medications:  Continuous Infusions:   nicardipine Stopped (12/10/23 1623)     Scheduled Meds:   amLODIPine  5 mg Oral Daily    ampicillin-sulbactim (UNASYN) IVPB  1.5 g Intravenous Q8H    atorvastatin  40 mg Oral Daily    enoxparin  40 mg Subcutaneous Q24H (prophylaxis, 1700)    lacosamide (VIMPAT) IVPB  50 mg Intravenous Q12H    latanoprost  1 drop Both Eyes QHS    senna-docusate 8.6-50 mg  1 tablet Oral BID     PRN Meds:acetaminophen, calcium gluconate IVPB, calcium gluconate IVPB, calcium gluconate IVPB, dextrose 10%, dextrose 10%, glucagon (human recombinant), glucose, glucose, hydrALAZINE, insulin aspart U-100, labetalol, magnesium sulfate IVPB, magnesium sulfate IVPB, midazolam, ondansetron, potassium chloride **AND** potassium chloride **AND** potassium chloride, prochlorperazine, sodium chloride 0.9%, sodium phosphate 15 mmol in dextrose 5 % (D5W) 250 mL IVPB, sodium phosphate 20.01 mmol in dextrose 5 % (D5W) 250 mL IVPB, sodium phosphate 30 mmol in dextrose 5 % (D5W) 250 mL IVPB     Review of Systems  Objective:     Weight: 51.7 kg (114 lb)  Body mass index is 20.19 kg/m².  Vital Signs (Most Recent):  Temp: 98.3 °F (36.8 °C) (12/12/23 0705)  Pulse: 83 (12/12/23 0758)  Resp: 20 (12/12/23 0758)  BP: (!) 145/62 (12/12/23 0705)  SpO2: 97 % (12/12/23 0758) Vital Signs (24h Range):  Temp:  [98.1 °F (36.7 °C)-98.8 °F (37.1 °C)] 98.3 °F (36.8 °C)  Pulse:  [] 83  Resp:  [14-32] 20  SpO2:  [96 %-100 %] 97 %  BP: (135-194)/() 145/62                           Female External Urinary Catheter 12/09/23 2300 (Active)   Skin no redness;no breakdown 12/11/23 1101   Tolerance no signs/symptoms of discomfort 12/11/23 1101   Suction Continuous suction at 40 mmHg 12/11/23 0701   Date of last wick change 12/09/23 12/09/23 2301   Time of last wick  "change 2300 12/09/23 2301   Output (mL) 200 mL 12/10/23 0601          Physical Exam   E4V2M5. Agitated, inappropriate speech.   +c/c/g  PERRL  NIKI +AG    cvEEG in place      Neurosurgery Physical Exam    Significant Labs:  Recent Labs   Lab 12/11/23 0319 12/12/23 0319   * 129*    140   K 4.0 3.5    107   CO2 22* 22*   BUN 21 20   CREATININE 0.7 0.7   CALCIUM 9.2 9.3   MG 2.1 2.1       Recent Labs   Lab 12/11/23 0319 12/12/23 0319   WBC 19.71* 17.30*   HGB 10.2* 10.5*   HCT 29.4* 30.5*    235       No results for input(s): "LABPT", "INR", "APTT" in the last 48 hours.    Microbiology Results (last 7 days)       Procedure Component Value Units Date/Time    Urine culture [[id number]] Collected: 12/11/23 1145    Order Status: Sent Specimen: Urine, Clean Catch Updated: 12/11/23 1324          All pertinent labs from the last 24 hours have been reviewed.    Significant Diagnostics:  I have reviewed and interpreted all pertinent imaging results/findings within the past 24 hours.  X-Ray Abdomen AP 1 View    Result Date: 12/11/2023  See above Electronically signed by: Scott Maldonado MD Date:    12/11/2023 Time:    15:07    X-Ray Chest AP Portable    Result Date: 12/11/2023  See above Electronically signed by: Isaias Pelaez MD Date:    12/11/2023 Time:    10:53   "

## 2023-12-12 NOTE — ASSESSMENT & PLAN NOTE
Patient is a 89 y.o. female with history of HTN, HLD, and glaucoma who presented to the ED with AMS s/p mechanical fall from ground level. No LOC per family. No AC/AP.    -CTH (12/9/23): bilateral frontal lobe SAH; 5mm nodular hyperdensity near the left ICA; non-displaced left parietal/occipital fracture with associated pneumocephalus and minimal adjacent SDH  -CTA head/neck 12/10: worsening parenchymal contusions and SAH.  Adjacent mass effect and worsening edema adjacent to larger parenchymal contusions, no significant MLS.  New mild SAH along the right frontoparietal lobe.  Similar small left posterior convexity extra-axial hemorrhage and trace right frontal subdural hemorrhage.  New trace IVH.    -CT Temporal bone - Nondisplaced fracture of occipital and left temporal bones with diastasis of the adjacent sutures.  Fluid within the middle ear and mastoid air cells presumably due to fractures through the tegmen mastoideum with  adjacent pneumocephalus     -Admitted to NCC  -VS/neuro checks q1h  -NSGY following  -CBC, CMP, Mag, Phos daily  -Goal eunatremia  -SBP goal < 160  -PRN labetalol/hydralazine/cardene  -Amlodipine daily  -Precedex PRN agitation  -Vimpat BID x 7 days  -Seizure precautions  -HOB 30°   -PT/OT/SLP  -No epileptiform activity per Epilepsy - continuous EEG discontinued  -CTH tonight, possible stepdown tomorrow pending results

## 2023-12-12 NOTE — PROGRESS NOTES
EEG Disconnect   Electrodes had to be fixed.No   Disconnect Per    Skin Integrity: Normal     Wilson Nicholas   12/12/2023 9:55 AM

## 2023-12-12 NOTE — PLAN OF CARE
NSCCU Attending       Called Belinda, daughter POA, on phone. Discussed current condition. Unable to give accurate prognosis on neurological recovery as very early in clinical course. Decision to continue care without heroic measures and assess for independence in a week.    Sunil Hinojosa MD MPH  NeuroCritical Care & Vascular Neurology

## 2023-12-12 NOTE — PLAN OF CARE
[facility] Care Plan    POC reviewed with Eden Cardenas and family at 1400. Pt verbalized understanding. Questions and concerns addressed. No acute events today. Pt progressing toward goals. Will continue to monitor. See below and flowsheets for full assessment and VS info.     -TF started and currently administering at 20ml/hr (40ml/hr goal)  -In/out cath x1 urine output 400 ml  -Potassium/phosphorus replacement completed  -Linen change and bed bath completed   -PRN hydralazine administered once for SBP >160        Is this a stroke patient? yes- Stroke booklet reviewed with patient and family, risk factors identified for patient and stroke booklet remains at bedside for ongoing education.     Neuro:  [place] Coma Scale  Best Eye Response: 3-->(E3) to speech  Best Motor Response: 5-->(M5) localizes pain  Best Verbal Response: 2-->(V2) incomprehensible speech  [place] Coma Scale Score: 10  Assessment Qualifiers: patient not sedated/intubated  Pupil PERRLA: yes     24 hr Temp:  [97.8 °F (36.6 °C)-98.4 °F (36.9 °C)]     CV:   Rhythm: sinus tachycardia  BP goals:   SBP < 160  MAP > 65    Resp:           Plan: N/A    GI/:     Diet/Nutrition Received: NPO  Last Bowel Movement: 12/10/23  Voiding Characteristics: external catheter    Intake/Output Summary (Last 24 hours) at 12/12/2023 1731  Last data filed at 12/12/2023 1705  Gross per 24 hour   Intake 527.15 ml   Output 1050 ml   Net -522.85 ml     Unmeasured Output  Urine Occurrence: 1  Stool Occurrence: 1  Pad Count: 2    Labs/Accuchecks:  Recent Labs   Lab 12/12/23  0319   WBC 17.30*   RBC 3.27*   HGB 10.5*   HCT 30.5*         Recent Labs   Lab 12/12/23  0319      K 3.5   CO2 22*      BUN 20   CREATININE 0.7   ALKPHOS 96   ALT 16   AST 19   BILITOT 0.4      Recent Labs   Lab 12/09/23  2120   INR 1.0   APTT <21.0      Recent Labs   Lab 12/09/23  1741   TROPONINI 0.006       Electrolytes: Electrolytes replaced  Accuchecks: Q6H    Gtts:   dexmedeTOMIDine  (Precedex) infusion (titrating) Stopped (12/12/23 1430)       LDA/Wounds:  Lines/Drains/Airways       Drain  Duration             Female External Urinary Catheter 12/09/23 2300 2 days         NG/OG Tube 12/11/23 1252 Right nostril 1 day              Peripheral Intravenous Line  Duration                  Midline Catheter Insertion/Assessment  - Single Lumen 12/10/23 1745 Left cephalic vein (lateral side of arm) 18g x 10cm 1 day         Peripheral IV - Single Lumen 12/12/23 0642 20 G Anterior;Distal;Left Lower leg <1 day                  Wounds: Yes  Wound care consulted: No

## 2023-12-12 NOTE — PT/OT/SLP PROGRESS
"Speech Language Pathology Treatment    Patient Name:  Eden Cardenas   MRN:  [id number]  Admitting Diagnosis: SAH (subarachnoid hemorrhage)    Recommendations:                 General Recommendations:  Dysphagia therapy and Cognitive-linguistic therapy  Diet recommendations:  NPO, Liquid Diet Level: NPO   Aspiration Precautions: Continue alternate means of nutrition   General Precautions: Standard, aspiration, fall  Communication strategies:  none    Assessment:     Eden Cardenas is a 89 y.o. female with an SLP diagnosis of Dysphagia and Cognitive-Linguistic Impairment.      Subjective     Pt somnolent   RN in agreement with SLP attempting at this time     Pain/Comfort:   Pt unable to report appearing comfortable     Respiratory Status: Room air    Objective:     Has the patient been evaluated by SLP for swallowing?   Yes  Keep patient NPO? Yes     Pt essentially somnolent with open mouth breathing upon arrival. Pt now with NG tube and EEG monitoring in place. Given steady decline in arousal since initial assessment Brian 12/10 NPO status continues to be recommended. PO trials deferred with inability to achieve and maintain and alert state. With SLP max cues and noxious tactile stimuli pt achieving brief periods of eye opening and spontaneously vocalizing "okay okay okay."  Pt disoriented x3 and difficult ot reorient given severity of lethargy. No true command following appreciated. Speech to continue to follow and monitor daily, as alertness improves will plan to resume PO trials and cognitive-linguistic tasks.      Goals:   Multidisciplinary Problems       SLP Goals          Problem: SLP    Goal Priority Disciplines Outcome   SLP Goal     SLP Ongoing, Progressing   Description: Speech Language Pathology Goals  Goals expected to be met by 12/24    1. Pt will participate in ongoing swallow assessment to determine least restrictive PO diet when appropriate.  2. Pt will demonstrate orientation to self, place, situation " , family members, date w/ min cues   3.  Pt will follow single step directions w/ 75% acc w/ min cues to enhance comprehension skills   4 Pt will complete yes/no questions w/ 75% acc w/ min cues to enhance comprehension skills   5.  Pt will participate in automatic speech tasks w/ 75% acc w/ min cues to enhance verbal expression skills   6.  Pt will participate in ongoing assessment of speech language and cognitive linguistic skills to help rule out deficits and determine therapeutic plan of care                                  Plan:     Patient to be seen:  4 x/week   Plan of Care expires:  01/09/24  Plan of Care reviewed with:  patient   SLP Follow-Up:  Yes       Discharge recommendations:      Barriers to Discharge:  Level of Skilled Assistance Needed      Time Tracking:     SLP Treatment Date:   12/12/23  Speech Start Time:  0833  Speech Stop Time:  0839     Speech Total Time (min):  6 min    Billable Minutes: Speech Therapy Individual 6    12/12/2023

## 2023-12-12 NOTE — ASSESSMENT & PLAN NOTE
-U/A nitrite positive on admission  -Afebrile  -WBC uptrending since admission  Lab Results   Component Value Date    WBC 19.71 (H) 12/11/2023    WBC 14.58 (H) 12/10/2023    WBC 14.25 (H) 12/09/2023      -CBC daily  -Urine culture  -Continue Unasyn 1.5g q8h

## 2023-12-12 NOTE — ASSESSMENT & PLAN NOTE
-U/A nitrite positive on admission  -Afebrile  -WBC still elevated, but decreased since yesterday  -CBC daily  -Urine culture + for gram negative rods  -Continue Unasyn

## 2023-12-12 NOTE — PT/OT/SLP PROGRESS
"Physical Therapy Treatment    Patient Name:  Eden Cardenas   MRN:  [id number]    Recommendations:     Discharge Recommendations: Moderate Intensity Therapy  Discharge Equipment Recommendations: to be determined by next level of care  Barriers to discharge:  impaired functional mobility requiring increased assistance    Assessment:     Eden Cardenas is a 89 y.o. female admitted with a medical diagnosis of SAH (subarachnoid hemorrhage).  She presents with the following impairments/functional limitations: weakness, impaired endurance, impaired self care skills, impaired functional mobility, gait instability, impaired balance, impaired cognition, decreased coordination, decreased upper extremity function, decreased lower extremity function, decreased safety awareness, impaired coordination, pain, decreased ROM.    Poor tolerance. Pt somnolent and not following commands. Mobility deferred. PROM performed, pt resistive and rigid. Irritable with ROM especially at hip. Pt left in no distress, quickly back to somnolent state with no stimuli. Pt will continue to benefit from therapy services to address impairments listed above.     Rehab Prognosis: Fair; patient would benefit from acute skilled PT services to address these deficits and reach maximum level of function.    Recent Surgery: * No surgery found *      Plan:     During this hospitalization, patient to be seen 3 x/week to address the identified rehab impairments via gait training, therapeutic activities, therapeutic exercises, neuromuscular re-education and progress toward the following goals:    Plan of Care Expires:  01/10/24    Subjective     Chief Complaint: pt somnolent; squints eyes shut when providing stimulus to assess wakefulness/alertness  Patient/Family Comments/goals: mostly unintelligible speech with "okay" and "stop" intermittently understood with PROM  Pain/Comfort:  Pain Rating 1: other (see comments) (pt unable to report; resistive with expected normal " PROM; unclear if due to pain or confusion)  Pain Addressed 1: Cessation of Activity  Pain Rating Post-Intervention 1: other (see comments) (pt more irritable yelling with BLE ROM at hip; all activity terminated as pt not following commands and only responsive to noxious stimuli/PROM)      Objective:     Communicated with RN prior to session.  Patient found supine, HOB minimally elevated with telemetry, blood pressure cuff, restraints (mittens, wrist restraints not tied), peripheral IV, PureWick, EEG upon PTA entry to room.     General Precautions: Standard, fall, aspiration  Orthopedic Precautions: N/A  Braces: N/A  Respiratory Status: Room air     Functional Mobility:  Deferred d/t somnolence      AM-PAC 6 CLICK MOBILITY  Turning over in bed (including adjusting bedclothes, sheets and blankets)?: 1  Sitting down on and standing up from a chair with arms (e.g., wheelchair, bedside commode, etc.): 1  Moving from lying on back to sitting on the side of the bed?: 1  Moving to and from a bed to a chair (including a wheelchair)?: 1  Need to walk in hospital room?: 1  Climbing 3-5 steps with a railing?: 1  Basic Mobility Total Score: 6       Treatment & Education:  PROM to BUE and BLE in tolerated range, pt largely resistive and rigid in response to any stimuli. Irritable with PROM at hip.     Patient left supine, HOB minimally elevated with all lines intact and call button in reach..    GOALS:   Multidisciplinary Problems       Physical Therapy Goals          Problem: Physical Therapy    Goal Priority Disciplines Outcome Goal Variances Interventions   Physical Therapy Goal     PT, PT/OT Ongoing, Progressing     Description: Goals to met by 12/24/2023    1. Supine to sit with MInimal Assistance  2. Sit to supine with MInimal Assistance  3. Rolling to Left and Right with Minimal Assistance.  4. Sit to stand transfer with Contact Guard Assistance  5. Bed to chair transfer with Contact Guard Assistance using LRAD  6. Gait  x  25 feet with Contact Guard Assistance using LRAD   7. Sitting at edge of bed x10 minutes with Stand-by Assistance  8. Stand for 5 minutes with Stand-by Assistance using LRAD  9. Lower extremity exercise program x15 reps per Instruction, with assistance as needed in order to facilitate improved strength, improved postural control, and improvement in functional independence                       Time Tracking:     PT Received On: 12/12/23  PT Start Time: 0714     PT Stop Time: 0725  PT Total Time (min): 11 min     Billable Minutes: Therapeutic Exercise 11    Treatment Type: Treatment  PT/PTA: PTA     Number of PTA visits since last PT visit: 2     12/12/2023

## 2023-12-12 NOTE — SUBJECTIVE & OBJECTIVE
Interval History:  No issues overnight. No epileptiform activity per Epilepsy, EEG discontinued. More alert for family at bedside. CTH tonight, possible stepdown tomorrow pending results. Still w/ intermittent agitation, Precedex PRN. Start TF today. Last BM 12/10, will check KUB. Flomax added for urinary retention.    Review of Systems   Unable to perform ROS: Dementia     Objective:     Vitals:  Temp: 97.8 °F (36.6 °C)  Pulse: 108  Rhythm: sinus tachycardia  BP: (!) 152/68  MAP (mmHg): 89  Resp: 19  SpO2: 96 %    Temp  Min: 97.8 °F (36.6 °C)  Max: 98.4 °F (36.9 °C)  Pulse  Min: 78  Max: 110  BP  Min: 128/88  Max: 181/70  MAP (mmHg)  Min: 89  Max: 144  Resp  Min: 14  Max: 27  SpO2  Min: 96 %  Max: 100 %    12/11 0701 - 12/12 0700  In: 703.3   Out: 1400 [Urine:1400]   Unmeasured Output  Urine Occurrence: 1  Stool Occurrence: 1  Pad Count: 2        Physical Exam  Eyes:      Pupils: Pupils are equal, round, and reactive to light.   Cardiovascular:      Rate and Rhythm: Regular rhythm. Tachycardia present.      Pulses: Normal pulses.      Heart sounds: Normal heart sounds.   Pulmonary:      Effort: Pulmonary effort is normal.      Breath sounds: Normal breath sounds.   Abdominal:      General: Bowel sounds are normal.      Palpations: Abdomen is soft.   Musculoskeletal:         General: Normal range of motion.   Skin:     General: Skin is warm and dry.      Capillary Refill: Capillary refill takes less than 2 seconds.   Neurological:      Mental Status: She is lethargic.      GCS: GCS eye subscore is 3. GCS verbal subscore is 2. GCS motor subscore is 5.      Comments:   -E3 V2 M5  -PERRL  -Incomprehensible speech in response to noxious stimuli  -Localizes in all four extremities  -VALLADARES spontaneously/purposefully  -RUE 4/5  -LUE 4/5  -RLE 4/5  -LLE 4/5          Medications:  ContinuousdexmedeTOMIDine (Precedex) infusion (titrating), Last Rate: Stopped (12/12/23 1430)    Scheduled[START ON 12/13/2023] amLODIPine, 10 mg,  Daily  ampicillin-sulbactim (UNASYN) IVPB, 1.5 g, Q8H  atorvastatin, 40 mg, Daily  enoxparin, 40 mg, Q24H (prophylaxis, 1700)  lacosamide (VIMPAT) IVPB, 50 mg, Q12H  latanoprost, 1 drop, QHS  senna-docusate 8.6-50 mg, 1 tablet, BID  tamsulosin, 0.4 mg, Daily    PRNacetaminophen, 650 mg, Q6H PRN  bisacodyL, 10 mg, Daily PRN  dextrose 10%, 12.5 g, PRN  dextrose 10%, 25 g, PRN  glucagon (human recombinant), 1 mg, PRN  glucose, 16 g, PRN  glucose, 24 g, PRN  hydrALAZINE, 10 mg, Q4H PRN  insulin aspart U-100, 0-10 Units, QID (AC + HS) PRN  labetalol, 10 mg, Q4H PRN  magnesium oxide, 800 mg, PRN  magnesium oxide, 800 mg, PRN  magnesium sulfate IVPB, 2 g, PRN  magnesium sulfate IVPB, 4 g, PRN  midazolam, 2 mg, Once PRN  ondansetron, 4 mg, Q8H PRN  potassium bicarbonate, 35 mEq, PRN  potassium bicarbonate, 50 mEq, PRN  potassium bicarbonate, 60 mEq, PRN  potassium, sodium phosphates, 2 packet, PRN  potassium, sodium phosphates, 2 packet, PRN  potassium, sodium phosphates, 2 packet, PRN  prochlorperazine, 5 mg, Q6H PRN  sodium chloride 0.9%, 10 mL, PRN      Today I personally reviewed pertinent medications, lines/drains/airways, imaging, cardiology results, laboratory results,    Diet  No diet orders on file  No diet orders on file

## 2023-12-12 NOTE — PROGRESS NOTES
[facility]  Neurosurgery  Progress Note    Subjective:     History of Present Illness: 89F presenting after ground level fall without LoC and CTH demonstrating bifrontal tSAH, L parieto-occipital skull fracture. No AC/AP. On neurosurgical evaluation patient displays altered mental status with inappropriate speech but moving all extremities spontaneously. Fall was unwitnessed but family states they heard a yell and then sound of patient hitting floor    Post-Op Info:  * No surgery found *       Interval History: 12/12/2023: NAEON. AFVSS. Exam stable. cvEEG in place. Patient DNR/DNI per family with wishes for no operative intervention should the need arise.    Medications:  Continuous Infusions:   nicardipine Stopped (12/10/23 1623)     Scheduled Meds:   amLODIPine  5 mg Oral Daily    ampicillin-sulbactim (UNASYN) IVPB  1.5 g Intravenous Q8H    atorvastatin  40 mg Oral Daily    enoxparin  40 mg Subcutaneous Q24H (prophylaxis, 1700)    lacosamide (VIMPAT) IVPB  50 mg Intravenous Q12H    latanoprost  1 drop Both Eyes QHS    senna-docusate 8.6-50 mg  1 tablet Oral BID     PRN Meds:acetaminophen, calcium gluconate IVPB, calcium gluconate IVPB, calcium gluconate IVPB, dextrose 10%, dextrose 10%, glucagon (human recombinant), glucose, glucose, hydrALAZINE, insulin aspart U-100, labetalol, magnesium sulfate IVPB, magnesium sulfate IVPB, midazolam, ondansetron, potassium chloride **AND** potassium chloride **AND** potassium chloride, prochlorperazine, sodium chloride 0.9%, sodium phosphate 15 mmol in dextrose 5 % (D5W) 250 mL IVPB, sodium phosphate 20.01 mmol in dextrose 5 % (D5W) 250 mL IVPB, sodium phosphate 30 mmol in dextrose 5 % (D5W) 250 mL IVPB     Review of Systems  Objective:     Weight: 51.7 kg (114 lb)  Body mass index is 20.19 kg/m².  Vital Signs (Most Recent):  Temp: 98.3 °F (36.8 °C) (12/12/23 0705)  Pulse: 83 (12/12/23 0758)  Resp: 20 (12/12/23 0758)  BP: (!) 145/62 (12/12/23 0705)  SpO2: 97  "% (12/12/23 0758) Vital Signs (24h Range):  Temp:  [98.1 °F (36.7 °C)-98.8 °F (37.1 °C)] 98.3 °F (36.8 °C)  Pulse:  [] 83  Resp:  [14-32] 20  SpO2:  [96 %-100 %] 97 %  BP: (135-194)/() 145/62                           Female External Urinary Catheter 12/09/23 2300 (Active)   Skin no redness;no breakdown 12/11/23 1101   Tolerance no signs/symptoms of discomfort 12/11/23 1101   Suction Continuous suction at 40 mmHg 12/11/23 0701   Date of last wick change 12/09/23 12/09/23 2301   Time of last wick change 2300 12/09/23 2301   Output (mL) 200 mL 12/10/23 0601         Physical Exam   E4V2M5. Agitated, inappropriate speech.   +c/c/g  PERRL  NIKI +AG    cvEEG in place      Neurosurgery Physical Exam    Significant Labs:  Recent Labs   Lab 12/11/23 0319 12/12/23 0319   * 129*    140   K 4.0 3.5    107   CO2 22* 22*   BUN 21 20   CREATININE 0.7 0.7   CALCIUM 9.2 9.3   MG 2.1 2.1       Recent Labs   Lab 12/11/23 0319 12/12/23 0319   WBC 19.71* 17.30*   HGB 10.2* 10.5*   HCT 29.4* 30.5*    235       No results for input(s): "LABPT", "INR", "APTT" in the last 48 hours.    Microbiology Results (last 7 days)       Procedure Component Value Units Date/Time    Urine culture [[id number]] Collected: 12/11/23 1145    Order Status: Sent Specimen: Urine, Clean Catch Updated: 12/11/23 1324          All pertinent labs from the last 24 hours have been reviewed.    Significant Diagnostics:  I have reviewed and interpreted all pertinent imaging results/findings within the past 24 hours.  X-Ray Abdomen AP 1 View    Result Date: 12/11/2023  See above Electronically signed by: Scott Maldonado MD Date:    12/11/2023 Time:    15:07    X-Ray Chest AP Portable    Result Date: 12/11/2023  See above Electronically signed by: Isaias Pelaez MD Date:    12/11/2023 Time:    10:53   Assessment/Plan:     * SAH (subarachnoid hemorrhage)  89F presenting after ground level fall without LoC and CTH demonstrating " bifrontal traumatic SAH, L parieto-occipital skull fracture. No AC/AP.    Imaging:  CT C Spine 12/9: No acute fractures   CTH 12/9: SAH b/l frontal lobe, L parietal/occipital fracture w/o displacement w minimal adjacent SDH, hyperdensity near L ICA possible aneurysm   CTA 12/9: worsening contusions and new L temporal, SAH, edema, no significant MLS. New IVH. No aneurysms.   CT Temporal bone 12/10: Nondisplaced fracture occipital and left temporal bones w diastasis of the adjacent sutures.  Fluid within the middle ear and mastoid air cells   CTH 12/10 repeat: evolving hemorrhages   CTH 12/11: stable    Plan:  --admit NCC; q1h nc/vs  --all labs and significant diagnostics reviewed  --SBP < 160  --Na > 135  --HOB > 30  --Maximum medical management per NCC  --delirium precaustions  --Keppra 500mg BID x7 days  --Pt DNR/DNI; family wishes no intervention, even if the need arises. NSY will sign off. Please page with questions.    Dispo: per primary.    Plan d/w Dr. Eckert.        Isael Frankel MD  Neurosurgery  [facility]

## 2023-12-12 NOTE — ASSESSMENT & PLAN NOTE
89F presenting after ground level fall without LoC and CTH demonstrating bifrontal traumatic SAH, L parieto-occipital skull fracture. No AC/AP.    Imaging:  CT C Spine 12/9: No acute fractures   CTH 12/9: SAH b/l frontal lobe, L parietal/occipital fracture w/o displacement w minimal adjacent SDH, hyperdensity near L ICA possible aneurysm   CTA 12/9: worsening contusions and new L temporal, SAH, edema, no significant MLS. New IVH. No aneurysms.   CT Temporal bone 12/10: Nondisplaced fracture occipital and left temporal bones w diastasis of the adjacent sutures.  Fluid within the middle ear and mastoid air cells   CTH 12/10 repeat: evolving hemorrhages   CTH 12/11: stable    Plan:  --admit NCC; q1h nc/vs  --all labs and significant diagnostics reviewed  --SBP < 160  --Na > 135  --HOB > 30  --Maximum medical management per NCC  --delirium precaustions  --Keppra 500mg BID x7 days  --Pt DNR/DNI; family wishes no intervention, even if the need arises. NSY will sign off. Please page with questions.    Dispo: per primary.    Plan d/w Dr. Eckert.

## 2023-12-12 NOTE — PROGRESS NOTES
[facility]  Neurocritical Care  Progress Note    Admit Date: 12/9/2023  Service Date: 12/12/2023  Length of Stay: 3    Subjective:     Chief Complaint: SAH (subarachnoid hemorrhage)    History of Present Illness: Eden Cardenas is a 89 y.o. female with history of HTN, HLD, glaucoma that presents with tSAH s/p mechanical fall from ground level. Per chart review, although the fall was unwitnessed, family heard a yell and the sound of the patient hitting the floor. No AC/AP. No LOC. She was brought to the ED, where she was found to be confused with inappropriate speech. She sustained a laceration to the back of her head, which was repaired in the ED. CTH showed bifrontal tSAH as well as a left parietoccipital calvarial fracture with minimal associated pneumocephalus and adjacent SDH. CT T-Spine negative for acute fractures or dislocations. The patient will be admitted to [facility] for close monitoring and a higher level of care.     Hospital Course: 12/10/2023 d/c EEG, delirium and agitation   12/11/2023 Follow-up CTH this AM stable. Seroquel started last night d/t increased agitation - increased drowsiness today. Will discontinue.  12/12/2023 No issues overnight. No epileptiform activity per Epilepsy, EEG discontinued. More alert for family at bedside. CTH tonight, possible stepdown tomorrow pending results.     Interval History:  No issues overnight. No epileptiform activity per Epilepsy, EEG discontinued. More alert for family at bedside. CTH tonight, possible stepdown tomorrow pending results. Still w/ intermittent agitation, Precedex PRN. Start TF today. Last BM 12/10, will check KUB. Flomax added for urinary retention.    Review of Systems   Unable to perform ROS: Dementia     Objective:     Vitals:  Temp: 97.8 °F (36.6 °C)  Pulse: 108  Rhythm: sinus tachycardia  BP: (!) 152/68  MAP (mmHg): 89  Resp: 19  SpO2: 96 %    Temp  Min: 97.8 °F (36.6 °C)  Max: 98.4 °F (36.9 °C)  Pulse  Min: 78  Max:  110  BP  Min: 128/88  Max: 181/70  MAP (mmHg)  Min: 89  Max: 144  Resp  Min: 14  Max: 27  SpO2  Min: 96 %  Max: 100 %    12/11 0701 - 12/12 0700  In: 703.3   Out: 1400 [Urine:1400]   Unmeasured Output  Urine Occurrence: 1  Stool Occurrence: 1  Pad Count: 2        Physical Exam  Eyes:      Pupils: Pupils are equal, round, and reactive to light.   Cardiovascular:      Rate and Rhythm: Regular rhythm. Tachycardia present.      Pulses: Normal pulses.      Heart sounds: Normal heart sounds.   Pulmonary:      Effort: Pulmonary effort is normal.      Breath sounds: Normal breath sounds.   Abdominal:      General: Bowel sounds are normal.      Palpations: Abdomen is soft.   Musculoskeletal:         General: Normal range of motion.   Skin:     General: Skin is warm and dry.      Capillary Refill: Capillary refill takes less than 2 seconds.   Neurological:      Mental Status: She is lethargic.      GCS: GCS eye subscore is 3. GCS verbal subscore is 2. GCS motor subscore is 5.      Comments:   -E3 V2 M5  -PERRL  -Incomprehensible speech in response to noxious stimuli  -Localizes in all four extremities  -VLALADARES spontaneously/purposefully  -RUE 4/5  -LUE 4/5  -RLE 4/5  -LLE 4/5          Medications:  ContinuousdexmedeTOMIDine (Precedex) infusion (titrating), Last Rate: Stopped (12/12/23 1430)    Scheduled[START ON 12/13/2023] amLODIPine, 10 mg, Daily  ampicillin-sulbactim (UNASYN) IVPB, 1.5 g, Q8H  atorvastatin, 40 mg, Daily  enoxparin, 40 mg, Q24H (prophylaxis, 1700)  lacosamide (VIMPAT) IVPB, 50 mg, Q12H  latanoprost, 1 drop, QHS  senna-docusate 8.6-50 mg, 1 tablet, BID  tamsulosin, 0.4 mg, Daily    PRNacetaminophen, 650 mg, Q6H PRN  bisacodyL, 10 mg, Daily PRN  dextrose 10%, 12.5 g, PRN  dextrose 10%, 25 g, PRN  glucagon (human recombinant), 1 mg, PRN  glucose, 16 g, PRN  glucose, 24 g, PRN  hydrALAZINE, 10 mg, Q4H PRN  insulin aspart U-100, 0-10 Units, QID (AC + HS) PRN  labetalol, 10 mg, Q4H PRN  magnesium oxide, 800 mg,  PRN  magnesium oxide, 800 mg, PRN  magnesium sulfate IVPB, 2 g, PRN  magnesium sulfate IVPB, 4 g, PRN  midazolam, 2 mg, Once PRN  ondansetron, 4 mg, Q8H PRN  potassium bicarbonate, 35 mEq, PRN  potassium bicarbonate, 50 mEq, PRN  potassium bicarbonate, 60 mEq, PRN  potassium, sodium phosphates, 2 packet, PRN  potassium, sodium phosphates, 2 packet, PRN  potassium, sodium phosphates, 2 packet, PRN  prochlorperazine, 5 mg, Q6H PRN  sodium chloride 0.9%, 10 mL, PRN      Today I personally reviewed pertinent medications, lines/drains/airways, imaging, cardiology results, laboratory results,    Diet  No diet orders on file  No diet orders on file      Assessment/Plan:     Neuro  * SAH (subarachnoid hemorrhage)  Patient is a 89 y.o. female with history of HTN, HLD, and glaucoma who presented to the ED with AMS s/p mechanical fall from ground level. No LOC per family. No AC/AP.    -CTH (12/9/23): bilateral frontal lobe SAH; 5mm nodular hyperdensity near the left ICA; non-displaced left parietal/occipital fracture with associated pneumocephalus and minimal adjacent SDH  -CTA head/neck 12/10: worsening parenchymal contusions and SAH.  Adjacent mass effect and worsening edema adjacent to larger parenchymal contusions, no significant MLS.  New mild SAH along the right frontoparietal lobe.  Similar small left posterior convexity extra-axial hemorrhage and trace right frontal subdural hemorrhage.  New trace IVH.    -CT Temporal bone - Nondisplaced fracture of occipital and left temporal bones with diastasis of the adjacent sutures.  Fluid within the middle ear and mastoid air cells presumably due to fractures through the tegmen mastoideum with  adjacent pneumocephalus     -Admitted to [facility]  -VS/neuro checks q1h  -NSGY following  -CBC, CMP, Mag, Phos daily  -Goal eunatremia  -SBP goal < 160  -PRN labetalol/hydralazine/cardene  -Amlodipine daily  -Precedex PRN agitation  -Vimpat BID x 7 days  -Seizure precautions  -HOB 30°    -PT/OT/SLP  -No epileptiform activity per Epilepsy - continuous EEG discontinued  -CTH tonight, possible stepdown tomorrow pending results    Temporal bone fracture  -ENT consulted  -Continue Unasyn for prophylaxis    Delirium  -Precedex PRN agitation  -Delirium precautions    Cardiac/Vascular  Essential (primary) hypertension  -SBP goal < 160  -PRN labetalol/hydralazine  -Increased amlodipine     Mixed hyperlipidemia  -Atorvastatin daily    Oncology  Leukocytosis  -U/A nitrite positive on admission  -Afebrile  -WBC still elevated, but decreased since yesterday  -CBC daily  -Urine culture + for gram negative rods  -Continue Unasyn          The patient is being Prophylaxed for:  Venous Thromboembolism with: Mechanical or Chemical  Stress Ulcer with: Not Applicable   Ventilator Pneumonia with: not applicable    Activity Orders            Elevate HOB Elevate (30-45 degrees) Elevate HOB to 30 - 45 degrees during feeding unless otherwise stated starting at 12/12 1236    Turn patient starting at 12/09 2200    Elevate HOB starting at 12/09 2100          DNR    Amirah Banks NP  Neurocritical Care  [facility]

## 2023-12-12 NOTE — PLAN OF CARE
PABLO spoke with alisha Richardson [phone] concerning the list of providers for SNF and is still reviewing with the Aunt/daughter- Belinda (POA) base on prognosis for her discharge. PABLO will follow.      Jessy Black LMSW  PRN - [facility]  [phone]

## 2023-12-13 LAB
ALBUMIN SERPL BCP-MCNC: 2.6 G/DL (ref 3.5–5.2)
ALP SERPL-CCNC: 89 U/L (ref 55–135)
ALT SERPL W/O P-5'-P-CCNC: 12 U/L (ref 10–44)
ANION GAP SERPL CALC-SCNC: 10 MMOL/L (ref 8–16)
AST SERPL-CCNC: 15 U/L (ref 10–40)
BACTERIA UR CULT: ABNORMAL
BASOPHILS # BLD AUTO: 0.01 K/UL (ref 0–0.2)
BASOPHILS NFR BLD: 0.1 % (ref 0–1.9)
BILIRUB SERPL-MCNC: 0.4 MG/DL (ref 0.1–1)
BUN SERPL-MCNC: 30 MG/DL (ref 10–30)
CALCIUM SERPL-MCNC: 9.1 MG/DL (ref 8.7–10.5)
CHLORIDE SERPL-SCNC: 109 MMOL/L (ref 95–110)
CO2 SERPL-SCNC: 23 MMOL/L (ref 23–29)
CREAT SERPL-MCNC: 0.7 MG/DL (ref 0.5–1.4)
DIFFERENTIAL METHOD: ABNORMAL
EOSINOPHIL # BLD AUTO: 0 K/UL (ref 0–0.5)
EOSINOPHIL NFR BLD: 0 % (ref 0–8)
ERYTHROCYTE [DISTWIDTH] IN BLOOD BY AUTOMATED COUNT: 13.6 % (ref 11.5–14.5)
EST. GFR  (NO RACE VARIABLE): >60 ML/MIN/1.73 M^2
GLUCOSE SERPL-MCNC: 164 MG/DL (ref 70–110)
HCT VFR BLD AUTO: 28.8 % (ref 37–48.5)
HGB BLD-MCNC: 9.9 G/DL (ref 12–16)
IMM GRANULOCYTES # BLD AUTO: 0.05 K/UL (ref 0–0.04)
IMM GRANULOCYTES NFR BLD AUTO: 0.4 % (ref 0–0.5)
LYMPHOCYTES # BLD AUTO: 0.8 K/UL (ref 1–4.8)
LYMPHOCYTES NFR BLD: 6.4 % (ref 18–48)
MAGNESIUM SERPL-MCNC: 2.4 MG/DL (ref 1.6–2.6)
MCH RBC QN AUTO: 32.5 PG (ref 27–31)
MCHC RBC AUTO-ENTMCNC: 34.4 G/DL (ref 32–36)
MCV RBC AUTO: 94 FL (ref 82–98)
MONOCYTES # BLD AUTO: 1.1 K/UL (ref 0.3–1)
MONOCYTES NFR BLD: 8.4 % (ref 4–15)
NEUTROPHILS # BLD AUTO: 10.6 K/UL (ref 1.8–7.7)
NEUTROPHILS NFR BLD: 84.7 % (ref 38–73)
NRBC BLD-RTO: 0 /100 WBC
PHOSPHATE SERPL-MCNC: 2.8 MG/DL (ref 2.7–4.5)
PLATELET # BLD AUTO: 239 K/UL (ref 150–450)
PMV BLD AUTO: 10 FL (ref 9.2–12.9)
POCT GLUCOSE: 120 MG/DL (ref 70–110)
POCT GLUCOSE: 166 MG/DL (ref 70–110)
POTASSIUM SERPL-SCNC: 3.7 MMOL/L (ref 3.5–5.1)
PROT SERPL-MCNC: 6.6 G/DL (ref 6–8.4)
RBC # BLD AUTO: 3.05 M/UL (ref 4–5.4)
SODIUM SERPL-SCNC: 142 MMOL/L (ref 136–145)
WBC # BLD AUTO: 12.55 K/UL (ref 3.9–12.7)

## 2023-12-13 PROCEDURE — 83735 ASSAY OF MAGNESIUM: CPT

## 2023-12-13 PROCEDURE — 25000003 PHARM REV CODE 250

## 2023-12-13 PROCEDURE — 99233 SBSQ HOSP IP/OBS HIGH 50: CPT | Mod: ,,, | Performed by: PHYSICIAN ASSISTANT

## 2023-12-13 PROCEDURE — 63600175 PHARM REV CODE 636 W HCPCS

## 2023-12-13 PROCEDURE — 93010 EKG 12-LEAD: ICD-10-PCS | Mod: ,,, | Performed by: INTERNAL MEDICINE

## 2023-12-13 PROCEDURE — 80053 COMPREHEN METABOLIC PANEL: CPT

## 2023-12-13 PROCEDURE — 93005 ELECTROCARDIOGRAM TRACING: CPT

## 2023-12-13 PROCEDURE — 25000003 PHARM REV CODE 250: Performed by: PHYSICIAN ASSISTANT

## 2023-12-13 PROCEDURE — 97112 NEUROMUSCULAR REEDUCATION: CPT

## 2023-12-13 PROCEDURE — 63600175 PHARM REV CODE 636 W HCPCS: Performed by: REGISTERED NURSE

## 2023-12-13 PROCEDURE — 63600175 PHARM REV CODE 636 W HCPCS: Performed by: PSYCHIATRY & NEUROLOGY

## 2023-12-13 PROCEDURE — 20600001 HC STEP DOWN PRIVATE ROOM

## 2023-12-13 PROCEDURE — C9254 INJECTION, LACOSAMIDE: HCPCS

## 2023-12-13 PROCEDURE — 84100 ASSAY OF PHOSPHORUS: CPT

## 2023-12-13 PROCEDURE — 85025 COMPLETE CBC W/AUTO DIFF WBC: CPT

## 2023-12-13 PROCEDURE — 97530 THERAPEUTIC ACTIVITIES: CPT

## 2023-12-13 PROCEDURE — 63600175 PHARM REV CODE 636 W HCPCS: Performed by: STUDENT IN AN ORGANIZED HEALTH CARE EDUCATION/TRAINING PROGRAM

## 2023-12-13 PROCEDURE — 99233 PR SUBSEQUENT HOSPITAL CARE,LEVL III: ICD-10-PCS | Mod: ,,, | Performed by: PHYSICIAN ASSISTANT

## 2023-12-13 PROCEDURE — 25000003 PHARM REV CODE 250: Performed by: PSYCHIATRY & NEUROLOGY

## 2023-12-13 PROCEDURE — 25000003 PHARM REV CODE 250: Performed by: REGISTERED NURSE

## 2023-12-13 PROCEDURE — 51701 INSERT BLADDER CATHETER: CPT

## 2023-12-13 PROCEDURE — 51798 US URINE CAPACITY MEASURE: CPT

## 2023-12-13 PROCEDURE — 93010 ELECTROCARDIOGRAM REPORT: CPT | Mod: ,,, | Performed by: INTERNAL MEDICINE

## 2023-12-13 RX ORDER — CARVEDILOL 3.12 MG/1
3.12 TABLET ORAL 2 TIMES DAILY
Status: DISCONTINUED | OUTPATIENT
Start: 2023-12-13 | End: 2023-12-19

## 2023-12-13 RX ORDER — ATORVASTATIN CALCIUM 40 MG/1
40 TABLET, FILM COATED ORAL DAILY
Status: DISCONTINUED | OUTPATIENT
Start: 2023-12-13 | End: 2023-12-19 | Stop reason: HOSPADM

## 2023-12-13 RX ORDER — AMLODIPINE BESYLATE 10 MG/1
10 TABLET ORAL DAILY
Status: DISCONTINUED | OUTPATIENT
Start: 2023-12-13 | End: 2023-12-19 | Stop reason: HOSPADM

## 2023-12-13 RX ORDER — QUETIAPINE FUMARATE 25 MG/1
25 TABLET, FILM COATED ORAL ONCE
Status: COMPLETED | OUTPATIENT
Start: 2023-12-13 | End: 2023-12-13

## 2023-12-13 RX ORDER — SILODOSIN 4 MG/1
4 CAPSULE ORAL DAILY
Status: DISCONTINUED | OUTPATIENT
Start: 2023-12-13 | End: 2023-12-19 | Stop reason: HOSPADM

## 2023-12-13 RX ORDER — AMOXICILLIN 250 MG
1 CAPSULE ORAL 2 TIMES DAILY
Status: DISCONTINUED | OUTPATIENT
Start: 2023-12-13 | End: 2023-12-19 | Stop reason: HOSPADM

## 2023-12-13 RX ADMIN — POTASSIUM BICARBONATE 50 MEQ: 978 TABLET, EFFERVESCENT ORAL at 05:12

## 2023-12-13 RX ADMIN — AMPICILLIN AND SULBACTAM 1.5 G: 1; .5 INJECTION, POWDER, FOR SOLUTION INTRAVENOUS at 05:12

## 2023-12-13 RX ADMIN — LABETALOL HYDROCHLORIDE 10 MG: 5 INJECTION, SOLUTION INTRAVENOUS at 05:12

## 2023-12-13 RX ADMIN — ENOXAPARIN SODIUM 40 MG: 40 INJECTION SUBCUTANEOUS at 05:12

## 2023-12-13 RX ADMIN — SILODOSIN 4 MG: 4 CAPSULE ORAL at 10:12

## 2023-12-13 RX ADMIN — SENNOSIDES AND DOCUSATE SODIUM 1 TABLET: 8.6; 5 TABLET ORAL at 08:12

## 2023-12-13 RX ADMIN — LATANOPROST 1 DROP: 50 SOLUTION OPHTHALMIC at 08:12

## 2023-12-13 RX ADMIN — AMLODIPINE BESYLATE 10 MG: 10 TABLET ORAL at 08:12

## 2023-12-13 RX ADMIN — CARVEDILOL 3.12 MG: 3.12 TABLET, FILM COATED ORAL at 10:12

## 2023-12-13 RX ADMIN — QUETIAPINE FUMARATE 25 MG: 25 TABLET ORAL at 09:12

## 2023-12-13 RX ADMIN — LACOSAMIDE 50 MG: 10 INJECTION INTRAVENOUS at 09:12

## 2023-12-13 RX ADMIN — ATORVASTATIN CALCIUM 40 MG: 40 TABLET, FILM COATED ORAL at 08:12

## 2023-12-13 RX ADMIN — AMPICILLIN AND SULBACTAM 1.5 G: 1; .5 INJECTION, POWDER, FOR SOLUTION INTRAVENOUS at 03:12

## 2023-12-13 RX ADMIN — CARVEDILOL 3.12 MG: 3.12 TABLET, FILM COATED ORAL at 08:12

## 2023-12-13 RX ADMIN — QUETIAPINE FUMARATE 12.5 MG: 25 TABLET ORAL at 08:12

## 2023-12-13 RX ADMIN — INSULIN ASPART 2 UNITS: 100 INJECTION, SOLUTION INTRAVENOUS; SUBCUTANEOUS at 07:12

## 2023-12-13 RX ADMIN — LACOSAMIDE 50 MG: 10 INJECTION INTRAVENOUS at 08:12

## 2023-12-13 RX ADMIN — BISACODYL 10 MG: 10 SUPPOSITORY RECTAL at 08:12

## 2023-12-13 NOTE — PLAN OF CARE
[facility] Medicine ICU Acceptance Note    Date of Admit: 12/9/2023  Date of Transfer / Stepdown: 12/13/2023  Hira, C/J, H, L, Onc (IV chemo w/in 1 month), Gyn/Onc, or other special case?: No   ICU team stepping patient down: NCC  Accepting  team: [facility]    Brief History of Present Illness:      Eden Cardenas is a 89 y.o. female with history of HTN, HLD, glaucoma that presents with tSAH s/p mechanical fall from ground level. Per chart review, although the fall was unwitnessed, family heard a yell and the sound of the patient hitting the floor. No AC/AP. No LOC. She was brought to the ED, where she was found to be confused with inappropriate speech. She sustained a laceration to the back of her head, which was repaired in the ED. CTH showed bifrontal tSAH as well as a left parietoccipital calvarial fracture with minimal associated pneumocephalus and adjacent SDH. CT T-Spine negative for acute fractures or dislocations. The patient will be admitted to [facility] for close monitoring and a higher level of care.      Hospital/ICU Course:     12/10/2023 d/c EEG, delirium and agitation   12/11/2023 Follow-up CTH this AM stable. Seroquel started last night d/t increased agitation - increased drowsiness today. Will discontinue.  12/12/2023 No issues overnight. No epileptiform activity per Epilepsy, EEG discontinued. More alert for family at bedside. CTH tonight, possible stepdown tomorrow pending results.   12/13/2023 precedex off, resume seroquel.  Completed treatment for UTI.      Consultants and Procedures:     Consultants:  Neurosurgery    Procedures:    None    Transfer Information:     Diet:  NPO.  On tube feeds    Physical Activity:  PT/OT following    To Do / Pending Studies / Follow ups:  - NPO per SLP  - Likely needs PEG    Patient has been accepted by [facility] Medicine Team B, who will assume care of the patient upon arrival to the floor from the ICU. Please contact ICU team with any concerns prior to arrival.  Please contact Hospital Medicine at [phone] or [phone] (please do NOT leave a voicemail) when patient arrives to the floor.    Anoop Orellana MD  Hospital Medicine Staff

## 2023-12-13 NOTE — NURSING TRANSFER
Nursing Transfer Note      12/13/2023   3:25 PM    Nurse giving handoff: MARIAH Angel  Nurse receiving handoff:MARIAH Paula    Reason patient is being transferred: Step-down    Transfer To: [facility]    Transfer via bed    Transfer with cardiac monitoring    Transported by RN    Transfer Vital Signs:  Blood Pressure:155/69 (99)  Heart Rate: 103  O2: 100%; room air  Temperature:97.5 oral  Respirations:16    Telemetry: Box Number [id number]  Order for Tele Monitor? Yes    Additional Lines: N/A    4eyes on Skin: yes    Medicines sent: Yes (insulin, eye drops)    Any special needs or follow-up needed: No    Patient belongings transferred with patient: Yes (tan bag with clothes and personal items; solid metal water bottle/canteen)    Chart send with patient: Yes    Notified: daughter (Belinda)    Patient reassessed at: 12/1323 1615  Upon arrival to floor: cardiac monitor applied, patient oriented to room, call bell in reach, and bed in lowest position

## 2023-12-13 NOTE — PROGRESS NOTES
Patient arrived to unit in bed, transported by MARIAH Angel. Patient VSS.NAD. Safety precautions continued. Will continue to monitor.

## 2023-12-13 NOTE — PLAN OF CARE
[facility] Care Plan    POC reviewed with Eden Cardenas and family at 0300. Pt progressing toward goals. Will continue to monitor. See below and flowsheets for full assessment and VS info.     Precedex started for agitation (agitation coincided with needing to pass BM and urinate)  CTH completed  Hypertension treated with PRN medication      Neuro:  Donell Coma Scale  Best Eye Response: 4-->(E4) spontaneous  Best Motor Response: 5-->(M5) localizes pain  Best Verbal Response: 2-->(V2) incomprehensible speech  Ruso Coma Scale Score: 11  Assessment Qualifiers: patient not sedated/intubated  Pupil PERRLA: yes     24hr Temp:  [97.6 °F (36.4 °C)-98.4 °F (36.9 °C)]     CV:   Rhythm: sinus tachycardia  BP goals:   SBP < 160  MAP > 65    Resp: room air          Plan: N/A    GI/:     Diet/Nutrition Received: tube feeding  Last Bowel Movement: 12/12/23  Voiding Characteristics: external catheter    Intake/Output Summary (Last 24 hours) at 12/13/2023 0705  Last data filed at 12/13/2023 0601  Gross per 24 hour   Intake 1027.83 ml   Output 800 ml   Net 227.83 ml     Unmeasured Output  Urine Occurrence: 1  Stool Occurrence: 1  Pad Count: 1    Labs/Accuchecks:  Recent Labs   Lab 12/13/23 0224   WBC 12.55   RBC 3.05*   HGB 9.9*   HCT 28.8*         Recent Labs   Lab 12/13/23 0224      K 3.7   CO2 23      BUN 30   CREATININE 0.7   ALKPHOS 89   ALT 12   AST 15   BILITOT 0.4      Recent Labs   Lab 12/09/23  2120   INR 1.0   APTT <21.0      Recent Labs   Lab 12/09/23  1741   TROPONINI 0.006       Electrolytes: Electrolytes replaced  Accuchecks: ACHS    Gtts:   dexmedeTOMIDine (Precedex) infusion (titrating) 0.6 mcg/kg/hr (12/13/23 0601)       LDA/Wounds:  Lines/Drains/Airways       Drain  Duration             Female External Urinary Catheter 12/09/23 2300 3 days         NG/OG Tube 12/11/23 1252 Right nostril 1 day              Peripheral Intravenous Line  Duration                  Midline Catheter  Insertion/Assessment  - Single Lumen 12/10/23 1745 Left cephalic vein (lateral side of arm) 18g x 10cm 2 days         Peripheral IV - Single Lumen 12/12/23 0642 20 G Anterior;Distal;Left Lower leg 1 day                  Wounds: Yes  Wound care consulted: No

## 2023-12-13 NOTE — PROGRESS NOTES
[facility]  Neurocritical Care  Progress Note    Admit Date: 12/9/2023  Service Date: 12/13/2023  Length of Stay: 4    Subjective:     Chief Complaint: SAH (subarachnoid hemorrhage)    History of Present Illness: Eden Cardenas is a 89 y.o. female with history of HTN, HLD, glaucoma that presents with tSAH s/p mechanical fall from ground level. Per chart review, although the fall was unwitnessed, family heard a yell and the sound of the patient hitting the floor. No AC/AP. No LOC. She was brought to the ED, where she was found to be confused with inappropriate speech. She sustained a laceration to the back of her head, which was repaired in the ED. CTH showed bifrontal tSAH as well as a left parietoccipital calvarial fracture with minimal associated pneumocephalus and adjacent SDH. CT T-Spine negative for acute fractures or dislocations. The patient will be admitted to [facility] for close monitoring and a higher level of care.     Hospital Course: 12/10/2023 d/c EEG, delirium and agitation   12/11/2023 Follow-up CTH this AM stable. Seroquel started last night d/t increased agitation - increased drowsiness today. Will discontinue.  12/12/2023 No issues overnight. No epileptiform activity per Epilepsy, EEG discontinued. More alert for family at bedside. CTH tonight, possible stepdown tomorrow pending results.   12/13/2023 precedex off, resume seroquel with daily EKGs, transfer to     Interval History: See hospital course.     Review of Systems: Unable to obtain a complete ROS due to level of consciousness.     Vitals:   Temp: 97.8 °F (36.6 °C)  Pulse: 96  Rhythm: sinus tachycardia  BP: (!) 154/70  MAP (mmHg): 101  Resp: 16  SpO2: 99 %    Temp  Min: 97.6 °F (36.4 °C)  Max: 98 °F (36.7 °C)  Pulse  Min: 74  Max: 128  BP  Min: 107/55  Max: 180/71  MAP (mmHg)  Min: 71  Max: 108  Resp  Min: 12  Max: 34  SpO2  Min: 92 %  Max: 100 %    12/12 0701 - 12/13 0700  In: 1027.8 [I.V.:48.1]  Out: 800 [Urine:800]    Unmeasured Output  Urine Occurrence: 1  Stool Occurrence: 1  Pad Count: 1     Examination:   Constitutional: Well-nourished and -developed. No apparent distress.   Eyes: Conjunctiva clear, anicteric. Lids no lesions.  Head/Ears/Nose/Mouth/Throat/Neck: Moist mucous membranes. External ears, nose atraumatic.   Cardiovascular: Regular rhythm. No murmurs. No leg edema.  Respiratory: Comfortable respirations. Clear to auscultation.  Gastrointestinal: No hernia. Soft, nondistended, nontender. + bowel sounds.    Neurologic:  -GCS E4V2M5  -Alert. Disoriented x 3.    -Cranial nerves grossly intact, particularly   -Motor VALLADARES, but does not follow any commands  -Sensation intact      Medications:   Continuous ScheduledamLODIPine, 10 mg, Daily  ampicillin-sulbactim (UNASYN) IVPB, 1.5 g, Q8H  atorvastatin, 40 mg, Daily  bisacodyL, 10 mg, Daily  carvediloL, 3.125 mg, BID  enoxparin, 40 mg, Q24H (prophylaxis, 1700)  lacosamide (VIMPAT) IVPB, 50 mg, Q12H  latanoprost, 1 drop, QHS  QUEtiapine, 12.5 mg, BID  senna-docusate 8.6-50 mg, 1 tablet, BID  silodosin, 4 mg, Daily    PRNacetaminophen, 650 mg, Q6H PRN  bisacodyL, 10 mg, Daily PRN  dextrose 10%, 12.5 g, PRN  dextrose 10%, 25 g, PRN  glucagon (human recombinant), 1 mg, PRN  glucose, 16 g, PRN  glucose, 24 g, PRN  hydrALAZINE, 10 mg, Q4H PRN  insulin aspart U-100, 0-10 Units, QID (AC + HS) PRN  labetalol, 10 mg, Q4H PRN  magnesium oxide, 800 mg, PRN  magnesium oxide, 800 mg, PRN  magnesium sulfate IVPB, 2 g, PRN  magnesium sulfate IVPB, 4 g, PRN  midazolam, 2 mg, Once PRN  ondansetron, 4 mg, Q8H PRN  potassium bicarbonate, 35 mEq, PRN  potassium bicarbonate, 50 mEq, PRN  potassium bicarbonate, 60 mEq, PRN  potassium, sodium phosphates, 2 packet, PRN  potassium, sodium phosphates, 2 packet, PRN  potassium, sodium phosphates, 2 packet, PRN  prochlorperazine, 5 mg, Q6H PRN  sodium chloride 0.9%, 10 mL, PRN       Today I independently reviewed pertinent medications,  "lines/drains/airways, imaging, cardiology results, laboratory results, microbiology results,   ISTAT: No results for input(s): "PH", "PCO2", "PO2", "POCSATURATED", "HCO3", "BE", "POCNA", "POCK", "POCTCO2", "POCGLU", "POCICA", "POCLAC", "SAMPLE" in the last 24 hours.   Chem:   Recent Labs   Lab 12/13/23 0224      K 3.7      CO2 23   *   BUN 30   CREATININE 0.7   CALCIUM 9.1   MG 2.4   PHOS 2.8   ANIONGAP 10   PROT 6.6   ALBUMIN 2.6*   BILITOT 0.4   ALKPHOS 89   AST 15   ALT 12     Heme:   Recent Labs   Lab 12/13/23 0224   WBC 12.55   HGB 9.9*   HCT 28.8*        Endo:   Recent Labs   Lab 12/12/23  2120 12/13/23  0723 12/13/23  1108   POCTGLUCOSE 148* 166* 120*          Assessment/Plan:     Neuro  * SAH (subarachnoid hemorrhage)  Patient is a 89 y.o. female with history of HTN, HLD, and glaucoma who presented to the ED with AMS s/p mechanical fall from ground level. No LOC per family. No AC/AP.    -CTH (12/9/23): bilateral frontal lobe SAH; 5mm nodular hyperdensity near the left ICA; non-displaced left parietal/occipital fracture with associated pneumocephalus and minimal adjacent SDH  -CTA head/neck 12/10: worsening parenchymal contusions and SAH.  Adjacent mass effect and worsening edema adjacent to larger parenchymal contusions, no significant MLS.  New mild SAH along the right frontoparietal lobe.  Similar small left posterior convexity extra-axial hemorrhage and trace right frontal subdural hemorrhage.  New trace IVH.    -CT Temporal bone - Nondisplaced fracture of occipital and left temporal bones with diastasis of the adjacent sutures.  Fluid within the middle ear and mastoid air cells presumably due to fractures through the tegmen mastoideum with  adjacent pneumocephalus     -Admitted to [facility]  -VS/neuro checks q1h  -NSGY following  -CBC, CMP, Mag, Phos daily  -Goal eunatremia  -SBP goal < 160  -PRN labetalol/hydralazine/cardene  -Amlodipine daily  -Precedex PRN agitation  -Vimpat BID " x 7 days  -Seizure precautions  -HOB 30°   -PT/OT/SLP  -No epileptiform activity per Epilepsy - continuous EEG discontinued  -CTH stable, step down to hospital medicine     Temporal bone fracture  -ENT consulted  -Continue Unasyn for prophylaxis    Delirium  -seroquel 12.5 mg bid  -daily EKG, monitor qtc  -Delirium precautions    Cardiac/Vascular  Essential (primary) hypertension  -SBP goal < 160  -PRN labetalol/hydralazine  -amlodipine, carvedilol     Mixed hyperlipidemia  -Atorvastatin daily    Oncology  Leukocytosis  -U/A nitrite positive on admission  -Afebrile  -WBC still elevated, but decreased since yesterday  -CBC daily  -Urine culture + for gram negative rods  -Continue Unasyn          The patient is being Prophylaxed for:  Venous Thromboembolism with: Chemical  Stress Ulcer with: Not Applicable   Ventilator Pneumonia with: not applicable    Activity Orders            Elevate HOB Elevate (30-45 degrees) Elevate HOB to 30 - 45 degrees during feeding unless otherwise stated starting at 12/12 1236    Turn patient starting at 12/09 2200    Elevate HOB starting at 12/09 2100          DNR    Vinita Villagran PA-C  Neurocritical Care  [facility]

## 2023-12-13 NOTE — ASSESSMENT & PLAN NOTE
Patient is a 89 y.o. female with history of HTN, HLD, and glaucoma who presented to the ED with AMS s/p mechanical fall from ground level. No LOC per family. No AC/AP.    -CTH (12/9/23): bilateral frontal lobe SAH; 5mm nodular hyperdensity near the left ICA; non-displaced left parietal/occipital fracture with associated pneumocephalus and minimal adjacent SDH  -CTA head/neck 12/10: worsening parenchymal contusions and SAH.  Adjacent mass effect and worsening edema adjacent to larger parenchymal contusions, no significant MLS.  New mild SAH along the right frontoparietal lobe.  Similar small left posterior convexity extra-axial hemorrhage and trace right frontal subdural hemorrhage.  New trace IVH.    -CT Temporal bone - Nondisplaced fracture of occipital and left temporal bones with diastasis of the adjacent sutures.  Fluid within the middle ear and mastoid air cells presumably due to fractures through the tegmen mastoideum with  adjacent pneumocephalus     -Admitted to NCC  -VS/neuro checks q1h  -NSGY following  -CBC, CMP, Mag, Phos daily  -Goal eunatremia  -SBP goal < 160  -PRN labetalol/hydralazine/cardene  -Amlodipine daily  -Precedex PRN agitation  -Vimpat BID x 7 days  -Seizure precautions  -HOB 30°   -PT/OT/SLP  -No epileptiform activity per Epilepsy - continuous EEG discontinued  -CTH stable, step down to hospital medicine

## 2023-12-13 NOTE — PROVIDER TRANSFER
Neuro Critical Care Transfer of Care note    Date of Admit: 12/9/2023  Date of Transfer / Stepdown: 12/13/2023    Brief History of Present Illness:      Eden Cardenas is a 89 y.o. female with history of HTN, HLD, glaucoma that presents with tSAH s/p mechanical fall from ground level. Per chart review, although the fall was unwitnessed, family heard a yell and the sound of the patient hitting the floor. No AC/AP. No LOC. She was brought to the ED, where she was found to be confused with inappropriate speech. She sustained a laceration to the back of her head, which was repaired in the ED. CTH showed bifrontal tSAH as well as a left parietoccipital calvarial fracture with minimal associated pneumocephalus and adjacent SDH. CT T-Spine negative for acute fractures or dislocations. The patient will be admitted to [facility] for close monitoring and a higher level of care.          Hospital Course By Problem with Pertinent Findings:     SAH (subarachnoid hemorrhage)  Patient is a 89 y.o. female with history of HTN, HLD, and glaucoma who presented to the ED with AMS s/p mechanical fall from ground level. No LOC per family. No AC/AP.    -CTH (12/9/23): bilateral frontal lobe SAH; 5mm nodular hyperdensity near the left ICA; non-displaced left parietal/occipital fracture with associated pneumocephalus and minimal adjacent SDH  -CTA head/neck 12/10: worsening parenchymal contusions and SAH.  Adjacent mass effect and worsening edema adjacent to larger parenchymal contusions, no significant MLS.  New mild SAH along the right frontoparietal lobe.  Similar small left posterior convexity extra-axial hemorrhage and trace right frontal subdural hemorrhage.  New trace IVH.    -CT Temporal bone - Nondisplaced fracture of occipital and left temporal bones with diastasis of the adjacent sutures.  Fluid within the middle ear and mastoid air cells presumably due to fractures through the tegmen mastoideum with  adjacent pneumocephalus       -Admitted to [facility]  -VS/neuro checks q1h  -NSGY following  -CBC, CMP, Mag, Phos daily  -Goal eunatremia  -SBP goal < 160  -PRN labetalol/hydralazine/cardene  -Amlodipine daily  -Precedex PRN agitation  -Vimpat BID x 7 days  -Seizure precautions  -HOB 30°   -PT/OT/SLP  -No epileptiform activity per Epilepsy - continuous EEG discontinued  -CTH stable, step down to hospital medicine      Temporal bone fracture  -ENT consulted  -Continue Unasyn for prophylaxis     Delirium  -seroquel 12.5 mg bid  -daily EKG, monitor qtc  -Delirium precautions     Cardiac/Vascular  Essential (primary) hypertension  -SBP goal < 160  -PRN labetalol/hydralazine  -amlodipine, carvedilol      Mixed hyperlipidemia  -Atorvastatin daily     Oncology  Leukocytosis  -U/A nitrite positive on admission  -Afebrile  -WBC still elevated, but decreased since yesterday  -CBC daily  -Urine culture + for gram negative rods  -Continue Unasyn           Consultants and Procedures:     Consultants:  ENT  NSGY  Epilepsy  PT/OT/SLP    Procedures:    EEG    Transfer Information:     Diet:  TF    Physical Activity:  As tolerated    To Do / Pending Studies / Follow ups:  MBSS vs enteral access    Vinita Villagran  Neuro Crtical Care

## 2023-12-13 NOTE — PT/OT/SLP PROGRESS
Occupational Therapy   Treatment    Name: Eden Cardenas  MRN: [id number]  Admitting Diagnosis:  SAH (subarachnoid hemorrhage)       Recommendations:     Discharge Recommendations: Moderate Intensity Therapy  Discharge Equipment Recommendations:  to be determined by next level of care  Barriers to discharge:  Other (Comment) (skilled assistance required)    Assessment:     Eden Cardenas is a 89 y.o. female with a medical diagnosis of SAH (subarachnoid hemorrhage).  She presents with the following performance deficits affecting function:  weakness, impaired endurance, impaired self care skills, impaired functional mobility, gait instability, impaired balance, impaired cognition, decreased coordination, decreased upper extremity function, decreased lower extremity function, impaired coordination, impaired fine motor, abnormal tone, decreased safety awareness, impaired cardiopulmonary response to activity.     Pt with fair tolerance to the session this date. Pt difficult to arouse, but responded well once given cold wash cloth to face and sitting EOB. Pt able to follow simple one step commands 50% of the time. Pt performed dynamic reaching task with BUE to hit a target with 100% accuracy. Pt impulsive throughout the session wanting to stand. Pt assisted into a stand with two people present for safety, but Min A. Pt then able to perform 4 lateral steps towards HOB with Min A and BHHA. Pt with an elevated HR into the 130s so returned to supine and RN alerted. Pt would benefit from continued skilled acute OT services during this admission in order to maximize independence and safety with ADLs and functional mobility to ensure safe return to PLOF in the least restrictive environment. Patient currently demonstrates a need for moderate intensity therapy on a daily basis post acute secondary to a decline in functional status due to injury    Rehab Prognosis:  Fair; patient would benefit from acute skilled OT services to address  "these deficits and reach maximum level of function.       Plan:     Patient to be seen 3 x/week to address the above listed problems via self-care/home management, therapeutic activities, therapeutic exercises, neuromuscular re-education  Plan of Care Expires: 01/08/24  Plan of Care Reviewed with: patient    Subjective     "Yes" Pt would respond to every question asked     Chief Complaint: None stated   Patient/Family Comments/goals: To return to PLOF  Pain/Comfort:  Pain Rating 1: 0/10    Objective:     Communicated with: RN prior to session.  Patient found HOB elevated with blood pressure cuff, telemetry, pulse ox (continuous), restraints upon OT entry to room.    General Precautions: Standard, fall, aspiration    Orthopedic Precautions:N/A  Braces: N/A  Respiratory Status: Room air     Occupational Performance:     Bed Mobility:    Patient completed Rolling/Turning to Right with minimum assistance  Patient completed Scooting/Bridging with minimum assistance  Patient completed Supine to Sit with moderate assistance  Patient completed Sit to Supine with moderate assistance   Pt sat EOB with Mod A progressing to SBA     Functional Mobility/Transfers:  Patient completed Sit <> Stand Transfer with minimum assistance  with  hand-held assist   Functional Mobility: Performed 4 steps towards HOB with Min A and B HHA with 2 people present for safety due to impulsivity and history of agitation.     Activities of Daily Living:  Lower Body Dressing: total assistance : To don/doff socks   Grooming: total A: to wash face with cold wash cloth for arousal     Therapeutic Activity:   Performed reaching with BUE to hit a target with 100% accuracy and increased time to perform   Able to follow these simple commands throughout the activity     [facility] 6 Click ADL: 9    Treatment & Education:  Discussed OT POC and answered all questions within OT scope of practice.  Whiteboard updated       Patient left HOB elevated with all lines " intact, call button in reach, bed alarm on, and restraints reapplied at end of session    GOALS:   Multidisciplinary Problems       Occupational Therapy Goals          Problem: Occupational Therapy    Goal Priority Disciplines Outcome Interventions   Occupational Therapy Goal     OT, PT/OT Ongoing, Progressing    Description: Goals to be met by: 12/24/2023     Patient will increase functional independence with ADLs by performing:    UE Dressing with Minimal Assistance.  LE Dressing with Moderate Assistance.  Grooming while EOB with Minimal Assistance.  Toileting from bedside commode with Maximum Assistance for hygiene and clothing management.   Supine to sit with Minimal Assistance.  Stand pivot transfer with Minimal Assistance using LRAD as needed.  Toilet transfer to toilet with Minimal Assistance using LRAD as needed.                         Time Tracking:     OT Date of Treatment: 12/13/23  OT Start Time: 1348  OT Stop Time: 1416  OT Total Time (min): 28 min    Billable Minutes:Therapeutic Activity 10  Neuromuscular Re-education 18    OT/FRANCY: OT          12/13/2023

## 2023-12-14 LAB
ALBUMIN SERPL BCP-MCNC: 2.8 G/DL (ref 3.5–5.2)
ALP SERPL-CCNC: 100 U/L (ref 55–135)
ALT SERPL W/O P-5'-P-CCNC: 14 U/L (ref 10–44)
ANION GAP SERPL CALC-SCNC: 11 MMOL/L (ref 8–16)
AST SERPL-CCNC: 17 U/L (ref 10–40)
BASOPHILS # BLD AUTO: 0.03 K/UL (ref 0–0.2)
BASOPHILS NFR BLD: 0.3 % (ref 0–1.9)
BILIRUB SERPL-MCNC: 0.5 MG/DL (ref 0.1–1)
BUN SERPL-MCNC: 28 MG/DL (ref 10–30)
CALCIUM SERPL-MCNC: 9.7 MG/DL (ref 8.7–10.5)
CHLORIDE SERPL-SCNC: 110 MMOL/L (ref 95–110)
CO2 SERPL-SCNC: 25 MMOL/L (ref 23–29)
CREAT SERPL-MCNC: 0.7 MG/DL (ref 0.5–1.4)
DIFFERENTIAL METHOD: ABNORMAL
EOSINOPHIL # BLD AUTO: 0 K/UL (ref 0–0.5)
EOSINOPHIL NFR BLD: 0.2 % (ref 0–8)
ERYTHROCYTE [DISTWIDTH] IN BLOOD BY AUTOMATED COUNT: 13.7 % (ref 11.5–14.5)
EST. GFR  (NO RACE VARIABLE): >60 ML/MIN/1.73 M^2
GLUCOSE SERPL-MCNC: 127 MG/DL (ref 70–110)
HCT VFR BLD AUTO: 31.3 % (ref 37–48.5)
HGB BLD-MCNC: 10.9 G/DL (ref 12–16)
IMM GRANULOCYTES # BLD AUTO: 0.07 K/UL (ref 0–0.04)
IMM GRANULOCYTES NFR BLD AUTO: 0.7 % (ref 0–0.5)
LYMPHOCYTES # BLD AUTO: 0.9 K/UL (ref 1–4.8)
LYMPHOCYTES NFR BLD: 9.9 % (ref 18–48)
MAGNESIUM SERPL-MCNC: 2.3 MG/DL (ref 1.6–2.6)
MCH RBC QN AUTO: 31.9 PG (ref 27–31)
MCHC RBC AUTO-ENTMCNC: 34.8 G/DL (ref 32–36)
MCV RBC AUTO: 92 FL (ref 82–98)
MONOCYTES # BLD AUTO: 1.1 K/UL (ref 0.3–1)
MONOCYTES NFR BLD: 11 % (ref 4–15)
NEUTROPHILS # BLD AUTO: 7.4 K/UL (ref 1.8–7.7)
NEUTROPHILS NFR BLD: 77.9 % (ref 38–73)
NRBC BLD-RTO: 0 /100 WBC
PHOSPHATE SERPL-MCNC: 2.5 MG/DL (ref 2.7–4.5)
PLATELET # BLD AUTO: 282 K/UL (ref 150–450)
PMV BLD AUTO: 9.9 FL (ref 9.2–12.9)
POCT GLUCOSE: 103 MG/DL (ref 70–110)
POCT GLUCOSE: 117 MG/DL (ref 70–110)
POCT GLUCOSE: 121 MG/DL (ref 70–110)
POCT GLUCOSE: 138 MG/DL (ref 70–110)
POTASSIUM SERPL-SCNC: 3.8 MMOL/L (ref 3.5–5.1)
PROT SERPL-MCNC: 7.2 G/DL (ref 6–8.4)
RBC # BLD AUTO: 3.42 M/UL (ref 4–5.4)
SODIUM SERPL-SCNC: 146 MMOL/L (ref 136–145)
WBC # BLD AUTO: 9.54 K/UL (ref 3.9–12.7)

## 2023-12-14 PROCEDURE — 51798 US URINE CAPACITY MEASURE: CPT

## 2023-12-14 PROCEDURE — 20600001 HC STEP DOWN PRIVATE ROOM

## 2023-12-14 PROCEDURE — 63600175 PHARM REV CODE 636 W HCPCS: Performed by: STUDENT IN AN ORGANIZED HEALTH CARE EDUCATION/TRAINING PROGRAM

## 2023-12-14 PROCEDURE — 85025 COMPLETE CBC W/AUTO DIFF WBC: CPT

## 2023-12-14 PROCEDURE — 63600175 PHARM REV CODE 636 W HCPCS

## 2023-12-14 PROCEDURE — 25000003 PHARM REV CODE 250: Performed by: REGISTERED NURSE

## 2023-12-14 PROCEDURE — 84100 ASSAY OF PHOSPHORUS: CPT

## 2023-12-14 PROCEDURE — 80053 COMPREHEN METABOLIC PANEL: CPT

## 2023-12-14 PROCEDURE — 25000003 PHARM REV CODE 250: Performed by: PSYCHIATRY & NEUROLOGY

## 2023-12-14 PROCEDURE — 25000003 PHARM REV CODE 250: Performed by: PHYSICIAN ASSISTANT

## 2023-12-14 PROCEDURE — 93005 ELECTROCARDIOGRAM TRACING: CPT

## 2023-12-14 PROCEDURE — 25000003 PHARM REV CODE 250

## 2023-12-14 PROCEDURE — 36415 COLL VENOUS BLD VENIPUNCTURE: CPT

## 2023-12-14 PROCEDURE — C9254 INJECTION, LACOSAMIDE: HCPCS

## 2023-12-14 PROCEDURE — 97530 THERAPEUTIC ACTIVITIES: CPT | Mod: CO

## 2023-12-14 PROCEDURE — 63600175 PHARM REV CODE 636 W HCPCS: Performed by: REGISTERED NURSE

## 2023-12-14 PROCEDURE — 93010 EKG 12-LEAD: ICD-10-PCS | Mod: ,,, | Performed by: INTERNAL MEDICINE

## 2023-12-14 PROCEDURE — 93010 ELECTROCARDIOGRAM REPORT: CPT | Mod: ,,, | Performed by: INTERNAL MEDICINE

## 2023-12-14 PROCEDURE — 83735 ASSAY OF MAGNESIUM: CPT

## 2023-12-14 PROCEDURE — 97535 SELF CARE MNGMENT TRAINING: CPT | Mod: CO

## 2023-12-14 RX ADMIN — ATORVASTATIN CALCIUM 40 MG: 40 TABLET, FILM COATED ORAL at 08:12

## 2023-12-14 RX ADMIN — SILODOSIN 4 MG: 4 CAPSULE ORAL at 08:12

## 2023-12-14 RX ADMIN — BISACODYL 10 MG: 10 SUPPOSITORY RECTAL at 08:12

## 2023-12-14 RX ADMIN — ENOXAPARIN SODIUM 40 MG: 40 INJECTION SUBCUTANEOUS at 04:12

## 2023-12-14 RX ADMIN — LACOSAMIDE 50 MG: 10 INJECTION INTRAVENOUS at 09:12

## 2023-12-14 RX ADMIN — CARVEDILOL 3.12 MG: 3.12 TABLET, FILM COATED ORAL at 08:12

## 2023-12-14 RX ADMIN — LATANOPROST 1 DROP: 50 SOLUTION OPHTHALMIC at 08:12

## 2023-12-14 RX ADMIN — HYDRALAZINE HYDROCHLORIDE 10 MG: 20 INJECTION, SOLUTION INTRAMUSCULAR; INTRAVENOUS at 08:12

## 2023-12-14 RX ADMIN — AMLODIPINE BESYLATE 10 MG: 10 TABLET ORAL at 08:12

## 2023-12-14 RX ADMIN — LACOSAMIDE 50 MG: 10 INJECTION INTRAVENOUS at 08:12

## 2023-12-14 RX ADMIN — QUETIAPINE FUMARATE 12.5 MG: 25 TABLET ORAL at 08:12

## 2023-12-14 RX ADMIN — SENNOSIDES AND DOCUSATE SODIUM 1 TABLET: 8.6; 5 TABLET ORAL at 08:12

## 2023-12-14 NOTE — SUBJECTIVE & OBJECTIVE
Interval History: Stepped down to hospital medicine. Pt opening eyes and able to answer some yes/no questions  Afebrile  Has NGT in place    Review of Systems  Objective:     Vital Signs (Most Recent):  Temp: 98.1 °F (36.7 °C) (12/14/23 1531)  Pulse: (!) 111 (12/14/23 1537)  Resp: 18 (12/14/23 1531)  BP: 139/86 (12/14/23 1531)  SpO2: 98 % (12/14/23 1537) Vital Signs (24h Range):  Temp:  [98.1 °F (36.7 °C)-98.7 °F (37.1 °C)] 98.1 °F (36.7 °C)  Pulse:  [106-128] 111  Resp:  [13-18] 18  SpO2:  [95 %-99 %] 98 %  BP: (131-158)/(72-86) 139/86     Weight: 51.2 kg (112 lb 14 oz)  Body mass index is 20 kg/m².    Intake/Output Summary (Last 24 hours) at 12/14/2023 1625  Last data filed at 12/14/2023 0100  Gross per 24 hour   Intake 120 ml   Output 1200 ml   Net -1080 ml         Physical Exam  Constitutional:       General: She is not in acute distress.     Appearance: She is ill-appearing. She is not toxic-appearing.   Cardiovascular:      Rate and Rhythm: Regular rhythm. Tachycardia present.      Heart sounds: No murmur heard.     No friction rub. No gallop.   Pulmonary:      Effort: Pulmonary effort is normal. No respiratory distress.   Abdominal:      General: Abdomen is flat. There is no distension.      Palpations: Abdomen is soft.      Tenderness: There is no abdominal tenderness. There is no guarding or rebound.   Musculoskeletal:      Right lower leg: No edema.      Left lower leg: No edema.   Neurological:      Mental Status: She is lethargic.             Significant Labs: All pertinent labs within the past 24 hours have been reviewed.    Significant Imaging: I have reviewed all pertinent imaging results/findings within the past 24 hours.

## 2023-12-14 NOTE — PT/OT/SLP PROGRESS
Occupational Therapy   Treatment    Name: Eden Cardenas  MRN: [id number]  Admitting Diagnosis:  SAH (subarachnoid hemorrhage)       Recommendations:     Discharge Recommendations: Moderate Intensity Therapy  Discharge Equipment Recommendations:  to be determined by next level of care  Barriers to discharge:  Other (Comment) (skilled assistance required)    Assessment:     Eden Cardenas is a 89 y.o. female with a medical diagnosis of SAH (subarachnoid hemorrhage).  She presents with the following performance deficits affecting function are weakness, impaired endurance, impaired self care skills, impaired functional mobility, impaired balance, impaired cognition, decreased safety awareness, impaired cardiopulmonary response to activity, decreased upper extremity function, decreased lower extremity function, gait instability, impaired coordination.     Pt demonstrated confusion requiring frequent re-orientation and unable to follow simple commands. Agitation noted with therapeutic activity during session, today. Heart rate increased to 138 with activity and decreased to 113 bpm at rest at the end of session. Due to pt's decline in functional status, she would continue to benefit from OT services to maximize independence and safety with ADLs, functional transfers, and OOB mobility.    Rehab Prognosis:  Fair; patient would benefit from acute skilled OT services to address these deficits and reach maximum level of function.       Plan:     Patient to be seen 3 x/week to address the above listed problems via self-care/home management, therapeutic activities, therapeutic exercises, neuromuscular re-education  Plan of Care Expires: 01/08/24  Plan of Care Reviewed with: patient    Subjective     Chief Complaint: Pt thought she had to use bathroom.  Patient/Family Comments/goals: None stated  Pain/Comfort:  Pain Rating 1:  (did not rate pain.)  Location - Side 1:  (generalized)  Pain Addressed 1: Distraction,  Reposition    Objective:     Communicated with: RN prior to session.  Patient found supine with blood pressure cuff, telemetry, restraints, pulse ox (continuous), NG tube upon OT entry to room.  A client care conference was completed by the OTR and the NUNEZ prior to treatment by the NUNEZ to discuss the patient's POC and current status.   General Precautions: Standard, aspiration, fall    Orthopedic Precautions:N/A  Braces: N/A  Respiratory Status: Room air     Occupational Performance:     Bed Mobility:    Patient completed Scooting with minimum assistance and verbal cueing to ensure proper technique  Patient completed Supine to Sit with minimum assistance  Patient completed Sit to Supine with moderate assistance x 2 persons for management of B Ues to refrain from pulling NGT and straps of restraints.  Patient sat upright EOB ~5 minutes with minimal assistance  Scoot HOB via draw sheet with total assistance x 2 persons    Functional Mobility/Transfers:  Patient completed Sit <> Stand Transfer with moderate assistance and of 2 persons  with hand-held assist and verbal cueing to ensure proper technique and safety.  Patient completed Toilet Transfer to BSC via Stand Pivot technique with moderate assistance with  hand-held assist and verbal cueing       Activities of Daily Living:   none      [facility] 6 Click ADL: 12    Treatment & Education:  Pt educated and instructed on the following:   FRANCY role  Stand-pivot t/f from EOB<>BSC to ensure proper technique and safety  Daily orientation      Patient left supine with HOB elevated ~30 degrees with call button in reach, all lines intact, bed alarm on, and restraints reapplied at end of session.    GOALS:   Multidisciplinary Problems       Occupational Therapy Goals          Problem: Occupational Therapy    Goal Priority Disciplines Outcome Interventions   Occupational Therapy Goal     OT, PT/OT Ongoing, Progressing    Description: Goals to be met by: 12/24/2023     Patient will  increase functional independence with ADLs by performing:    UE Dressing with Minimal Assistance.  LE Dressing with Moderate Assistance.  Grooming while EOB with Minimal Assistance.  Toileting from bedside commode with Maximum Assistance for hygiene and clothing management.   Supine to sit with Minimal Assistance. MET 12/14/2023  Stand pivot transfer with Minimal Assistance using LRAD as needed.  Toilet transfer to toilet with Minimal Assistance using LRAD as needed.                         Time Tracking:     OT Date of Treatment: 12/14/23  OT Start Time: 0842  OT Stop Time: 0911  OT Total Time (min): 29 min    Billable Minutes:Self Care/Home Management 10  Therapeutic Activity 19    OT/FRANCY: FRANCY     Number of FRANCY visits since last OT visit: 1    12/14/2023

## 2023-12-14 NOTE — PLAN OF CARE
Problem: Adult Inpatient Plan of Care  Goal: Plan of Care Review  12/13/2023 1852 by Sherrie Moses RN  Outcome: Ongoing, Progressing  12/13/2023 1852 by Sherrie Moses RN  Outcome: Ongoing, Not Progressing  Goal: Patient-Specific Goal (Individualized)  12/13/2023 1852 by Sherrie Moses RN  Outcome: Ongoing, Progressing  12/13/2023 1852 by Sherrie Moses RN  Outcome: Ongoing, Not Progressing  Goal: Absence of Hospital-Acquired Illness or Injury  12/13/2023 1852 by Sherrie Moses RN  Outcome: Ongoing, Progressing  12/13/2023 1852 by Sherrie Moses RN  Outcome: Ongoing, Not Progressing  Goal: Optimal Comfort and Wellbeing  12/13/2023 1852 by Sherrie Moses RN  Outcome: Ongoing, Progressing  12/13/2023 1852 by Sherrie Moses RN  Outcome: Ongoing, Not Progressing  Goal: Readiness for Transition of Care  12/13/2023 1852 by Sherrie Moses RN  Outcome: Ongoing, Progressing  12/13/2023 1852 by Sherrie Moses RN  Outcome: Ongoing, Not Progressing   Patient VSS. Tube feeedings provided, patient tolerating well. Patient bladder scanned due to no urine output, bladder scanned showed 578 ml. In and out cath done using sterile technique with 500 ml of urine returned. Safety maintained. Will continue to monitor.

## 2023-12-14 NOTE — RESPIRATORY THERAPY
RAPID RESPONSE RESPIRATORY CHART CHECK       Chart check completed. Please call [phone] for further concerns or assistance.

## 2023-12-14 NOTE — ASSESSMENT & PLAN NOTE
Chronic, controlled. Latest blood pressure and vitals reviewed-     Temp:  [98.1 °F (36.7 °C)-98.7 °F (37.1 °C)]   Pulse:  [106-128]   Resp:  [13-18]   BP: (131-158)/(72-86)   SpO2:  [95 %-99 %] .   Home meds for hypertension were reviewed and noted below.   Hypertension Medications               amLODIPine (NORVASC) 5 MG tablet Take 5 mg by mouth once daily.            While in the hospital, will manage blood pressure as follows; Continue home antihypertensive regimen    Will utilize p.r.n. blood pressure medication only if patient's blood pressure greater than 160/100 and she develops symptoms such as worsening chest pain or shortness of breath.

## 2023-12-14 NOTE — CARE UPDATE
"RAPID RESPONSE NURSE CHART REVIEW        Chart Reviewed: 12/13/2023, 8:15 PM    MRN: [id number]  Bed: 03076/90203 A    Dx: SAH (subarachnoid hemorrhage)    Eden Cardenas has no past medical history on file.    Last VS: /86 (Patient Position: Lying)   Pulse (!) 126   Temp 98.7 °F (37.1 °C) (Oral)   Resp 18   Ht 5' 3" (1.6 m)   Wt 51.7 kg (114 lb)   SpO2 95%   Breastfeeding No   BMI 20.19 kg/m²     24H Vital Sign Range:  Temp:  [97.5 °F (36.4 °C)-98.7 °F (37.1 °C)]   Pulse:  []   Resp:  [12-34]   BP: (107-178)/(55-86)   SpO2:  [92 %-100 %]     Level of Consciousness (AVPU): responds to pain    Recent Labs     12/11/23 0319 12/12/23 0319 12/13/23  0224   WBC 19.71* 17.30* 12.55   HGB 10.2* 10.5* 9.9*   HCT 29.4* 30.5* 28.8*    235 239       Recent Labs     12/11/23 0319 12/12/23 0319 12/13/23  0224    140 142   K 4.0 3.5 3.7    107 109   CO2 22* 22* 23   BUN 21 20 30   CREATININE 0.7 0.7 0.7   * 129* 164*   PHOS 2.5* 2.7 2.8   MG 2.1 2.1 2.4        No results for input(s): "PH", "PCO2", "PO2", "HCO3", "POCSATURATED", "BE" in the last 72 hours.     OXYGEN:  Flow (L/min): 2          MEWS score: 2    Charge RNPanfilo  contacted for tachycardia. Reports RN in pt room replacing EKG leads. No additional concerns verbalized at this time. Instructed to call [phone] for further concerns or assistance.    Anahi Nails RN        "

## 2023-12-14 NOTE — CARE UPDATE
"RAPID RESPONSE NURSE CHART REVIEW        Chart Reviewed: 12/14/2023, 10:40 AM    MRN: [id number]  Bed: 22546/14256 A    Dx: SAH (subarachnoid hemorrhage)    Eden Cardenas has no past medical history on file.    Last VS: BP (!) 158/83 (BP Location: Left leg, Patient Position: Lying)   Pulse (!) 126   Temp 98.1 °F (36.7 °C) (Oral)   Resp 18   Ht 5' 3" (1.6 m)   Wt 51.2 kg (112 lb 14 oz)   SpO2 97%   Breastfeeding No   BMI 20.00 kg/m²     24H Vital Sign Range:  Temp:  [97.5 °F (36.4 °C)-98.7 °F (37.1 °C)]   Pulse:  []   Resp:  [13-20]   BP: (131-158)/(63-86)   SpO2:  [95 %-100 %]     Level of Consciousness (AVPU): responds to pain    Recent Labs     12/12/23 0319 12/13/23 0224 12/14/23 0422   WBC 17.30* 12.55 9.54   HGB 10.5* 9.9* 10.9*   HCT 30.5* 28.8* 31.3*    239 282       Recent Labs     12/12/23 0319 12/13/23 0224 12/14/23  0422    142 146*   K 3.5 3.7 3.8    109 110   CO2 22* 23 25   BUN 20 30 28   CREATININE 0.7 0.7 0.7   * 164* 127*   PHOS 2.7 2.8 2.5*   MG 2.1 2.4 2.3        OXYGEN:  Flow (L/min): 2          MEWS score: 5    Rounding completed with charge MARIAH Gamboa. contacted for Tachycardia. No additional concerns verbalized at this time. Instructed to call [phone] for further concerns or assistance.    Carlin Sawant RN       "

## 2023-12-14 NOTE — PLAN OF CARE
Problem: Adult Inpatient Plan of Care  Goal: Plan of Care Review  Outcome: Ongoing, Progressing  Goal: Patient-Specific Goal (Individualized)  Outcome: Ongoing, Progressing  Goal: Absence of Hospital-Acquired Illness or Injury  Outcome: Ongoing, Progressing  Goal: Optimal Comfort and Wellbeing  Outcome: Ongoing, Progressing  Goal: Readiness for Transition of Care  Outcome: Ongoing, Progressing     Problem: Adjustment to Illness (Stroke, Hemorrhagic)  Goal: Optimal Coping  Outcome: Ongoing, Progressing     Problem: Bowel Elimination Impaired (Stroke, Hemorrhagic)  Goal: Effective Bowel Elimination  Outcome: Ongoing, Progressing     Problem: Cerebral Tissue Perfusion (Stroke, Hemorrhagic)  Goal: Optimal Cerebral Tissue Perfusion  Outcome: Ongoing, Progressing     Problem: Cognitive Impairment (Stroke, Hemorrhagic)  Goal: Optimal Cognitive Function  Outcome: Ongoing, Progressing     Problem: Communication Impairment (Stroke, Hemorrhagic)  Goal: Effective Communication Skills  Outcome: Ongoing, Progressing     Problem: Functional Ability Impaired (Stroke, Hemorrhagic)  Goal: Optimal Functional Ability  Outcome: Ongoing, Progressing     Problem: Pain (Stroke, Hemorrhagic)  Goal: Acceptable Pain Control  Outcome: Ongoing, Progressing     Problem: Respiratory Compromise (Stroke, Hemorrhagic)  Goal: Effective Oxygenation and Ventilation  Outcome: Ongoing, Progressing     Problem: Sensorimotor Impairment (Stroke, Hemorrhagic)  Goal: Improved Sensorimotor Function  Outcome: Ongoing, Progressing     Problem: Swallowing Impairment (Stroke, Hemorrhagic)  Goal: Optimal Eating and Swallowing Without Aspiration  Outcome: Ongoing, Progressing     Problem: Urinary Elimination Impaired (Stroke, Hemorrhagic)  Goal: Effective Urinary Elimination  Outcome: Ongoing, Progressing     Problem: Skin Injury Risk Increased  Goal: Skin Health and Integrity  Outcome: Ongoing, Progressing     Problem: Fall Injury Risk  Goal: Absence of Fall and  Fall-Related Injury  Outcome: Ongoing, Progressing     Problem: Restraint, Nonbehavioral (Nonviolent)  Goal: Absence of Harm or Injury  Outcome: Ongoing, Progressing     Problem: Impaired Wound Healing  Goal: Optimal Wound Healing  Outcome: Ongoing, Progressing     Problem: Infection  Goal: Absence of Infection Signs and Symptoms  Outcome: Ongoing, Progressing

## 2023-12-14 NOTE — ASSESSMENT & PLAN NOTE
- traumatic SAH after mechanical fall without LOC per family  - NSGY consulted, maximal medical management  - Patient DNR/DNI per family, with wishes for no operative intervention if the need were to arise  - LCM 50 mg BID x7 days per NSGY/NCC  - EEG 12/11>12/12 with diffuse slowing, more focal slowing in the left hemisphere, no seizures

## 2023-12-14 NOTE — HOSPITAL COURSE
Patient admitted to neuro critical care. Course as below:  12/10/2023 d/c EEG, delirium and agitation   12/11/2023 Follow-up CT this AM stable. Seroquel started last night d/t increased agitation - increased drowsiness today. Will discontinue.  12/12/2023 No issues overnight. No epileptiform activity per Epilepsy, EEG discontinued. More alert for family at bedside. CT tonight, possible stepdown tomorrow pending results.   12/13/2023 precedex off, resume seroquel.  Treatment for UTI    Patient stepped down to hospital medicine 12/14. Mentation improving. NGT removed and cleared by SLP. Patient had delirium which improved. Treated for UTI. Patient deemed ready for discharge. Plan discussed with pt, who was agreeable and amenable; medications were discussed and reviewed, outpatient follow-up arranged, ER precautions were given, all questions were answered to the pt's satisfaction, and Eden Cardenas  was subsequently discharged to inpatient rehab.

## 2023-12-14 NOTE — PROGRESS NOTES
[facility] - Intensive Care ([address] Medicine  Progress Note    Patient Name: Eden Cardenas  MRN: [id number]  Patient Class: IP- Inpatient   Admission Date: 12/9/2023  Length of Stay: 5 days  Attending Physician: Odin Maldonado*  Primary Care Provider: Valorie Del Real PT        Subjective:     Principal Problem:SAH (subarachnoid hemorrhage)        HPI:  Eden Cardenas is a 89 y.o. female with history of HTN, HLD, glaucoma that presents with tSAH s/p mechanical fall from ground level. Per chart review, although the fall was unwitnessed, family heard a yell and the sound of the patient hitting the floor. No AC/AP. No LOC. She was brought to the ED, where she was found to be confused with inappropriate speech. She sustained a laceration to the back of her head, which was repaired in the ED. CTH showed bifrontal tSAH as well as a left parietoccipital calvarial fracture with minimal associated pneumocephalus and adjacent SDH. CT T-Spine negative for acute fractures or dislocations. The patient will be admitted to [facility] for close monitoring and a higher level of care.       Overview/Hospital Course:  12/10/2023 d/c EEG, delirium and agitation   12/11/2023 Follow-up CTH this AM stable. Seroquel started last night d/t increased agitation - increased drowsiness today. Will discontinue.  12/12/2023 No issues overnight. No epileptiform activity per Epilepsy, EEG discontinued. More alert for family at bedside. CTH tonight, possible stepdown tomorrow pending results.   12/13/2023 precedex off, resume seroquel.  Completed treatment for UT    Interval History: Stepped down to hospital medicine. Pt opening eyes and able to answer some yes/no questions  Afebrile  Has NGT in place    Review of Systems  Objective:     Vital Signs (Most Recent):  Temp: 98.1 °F (36.7 °C) (12/14/23 1531)  Pulse: (!) 111 (12/14/23 1537)  Resp: 18 (12/14/23 1531)  BP: 139/86 (12/14/23 1531)  SpO2: 98 % (12/14/23 1537) Vital  Signs (24h Range):  Temp:  [98.1 °F (36.7 °C)-98.7 °F (37.1 °C)] 98.1 °F (36.7 °C)  Pulse:  [106-128] 111  Resp:  [13-18] 18  SpO2:  [95 %-99 %] 98 %  BP: (131-158)/(72-86) 139/86     Weight: 51.2 kg (112 lb 14 oz)  Body mass index is 20 kg/m².    Intake/Output Summary (Last 24 hours) at 12/14/2023 1625  Last data filed at 12/14/2023 0100  Gross per 24 hour   Intake 120 ml   Output 1200 ml   Net -1080 ml         Physical Exam  Constitutional:       General: She is not in acute distress.     Appearance: She is ill-appearing. She is not toxic-appearing.   Cardiovascular:      Rate and Rhythm: Regular rhythm. Tachycardia present.      Heart sounds: No murmur heard.     No friction rub. No gallop.   Pulmonary:      Effort: Pulmonary effort is normal. No respiratory distress.   Abdominal:      General: Abdomen is flat. There is no distension.      Palpations: Abdomen is soft.      Tenderness: There is no abdominal tenderness. There is no guarding or rebound.   Musculoskeletal:      Right lower leg: No edema.      Left lower leg: No edema.   Neurological:      Mental Status: She is lethargic.             Significant Labs: All pertinent labs within the past 24 hours have been reviewed.    Significant Imaging: I have reviewed all pertinent imaging results/findings within the past 24 hours.    Assessment/Plan:      * SAH (subarachnoid hemorrhage)  - traumatic SAH after mechanical fall without LOC per family  - NSGY consulted, maximal medical management  - Patient DNR/DNI per family, with wishes for no operative intervention if the need were to arise  - LCM 50 mg BID x7 days per NSGY/NCC  - EEG 12/11>12/12 with diffuse slowing, more focal slowing in the left hemisphere, no seizures      Leukocytosis  Treated for suspected UTI with 3 days unasyn      Temporal bone fracture  ENT consulted, no surgical intervention      Delirium  NPO per SLP recs  NGT in place  GOC      Essential (primary) hypertension  Chronic, controlled.  Latest blood pressure and vitals reviewed-     Temp:  [98.1 °F (36.7 °C)-98.7 °F (37.1 °C)]   Pulse:  [106-128]   Resp:  [13-18]   BP: (131-158)/(72-86)   SpO2:  [95 %-99 %] .   Home meds for hypertension were reviewed and noted below.   Hypertension Medications               amLODIPine (NORVASC) 5 MG tablet Take 5 mg by mouth once daily.            While in the hospital, will manage blood pressure as follows; Continue home antihypertensive regimen    Will utilize p.r.n. blood pressure medication only if patient's blood pressure greater than 160/100 and she develops symptoms such as worsening chest pain or shortness of breath.    Mixed hyperlipidemia  Continue statin        VTE Risk Mitigation (From admission, onward)           Ordered     enoxaparin injection 40 mg  Every 24 hours         12/11/23 1601     Reason for No Pharmacological VTE Prophylaxis  Once        Question:  Reasons:  Answer:  Active Bleeding    12/09/23 2108     IP VTE HIGH RISK PATIENT  Once         12/09/23 2108     Place sequential compression device  Until discontinued         12/09/23 2108                    Discharge Planning   ESTEFANY: 12/18/2023     Code Status: DNR   Is the patient medically ready for discharge?: No    Reason for patient still in hospital (select all that apply): Patient trending condition and Treatment  Discharge Plan A: Home Health, Home with family          Odin Maldonado MD  Department of Hospital Medicine   [facility] - Intensive Care ([facility]-)

## 2023-12-14 NOTE — HPI
Eden Cardenas is a 89 y.o. female with history of HTN, HLD, glaucoma that presents with tSAH s/p mechanical fall from ground level. Per chart review, although the fall was unwitnessed, family heard a yell and the sound of the patient hitting the floor. No AC/AP. No LOC. She was brought to the ED, where she was found to be confused with inappropriate speech. She sustained a laceration to the back of her head, which was repaired in the ED. CTH showed bifrontal tSAH as well as a left parietoccipital calvarial fracture with minimal associated pneumocephalus and adjacent SDH. CT T-Spine negative for acute fractures or dislocations. The patient will be admitted to [facility] for close monitoring and a higher level of care.

## 2023-12-15 LAB
ALBUMIN SERPL BCP-MCNC: 2.9 G/DL (ref 3.5–5.2)
ALP SERPL-CCNC: 91 U/L (ref 55–135)
ALT SERPL W/O P-5'-P-CCNC: 13 U/L (ref 10–44)
ANION GAP SERPL CALC-SCNC: 11 MMOL/L (ref 8–16)
AST SERPL-CCNC: 17 U/L (ref 10–40)
BASOPHILS # BLD AUTO: 0.04 K/UL (ref 0–0.2)
BASOPHILS NFR BLD: 0.4 % (ref 0–1.9)
BILIRUB SERPL-MCNC: 0.6 MG/DL (ref 0.1–1)
BUN SERPL-MCNC: 27 MG/DL (ref 10–30)
CALCIUM SERPL-MCNC: 9.7 MG/DL (ref 8.7–10.5)
CHLORIDE SERPL-SCNC: 109 MMOL/L (ref 95–110)
CO2 SERPL-SCNC: 26 MMOL/L (ref 23–29)
CREAT SERPL-MCNC: 0.7 MG/DL (ref 0.5–1.4)
DIFFERENTIAL METHOD: ABNORMAL
EOSINOPHIL # BLD AUTO: 0 K/UL (ref 0–0.5)
EOSINOPHIL NFR BLD: 0.1 % (ref 0–8)
ERYTHROCYTE [DISTWIDTH] IN BLOOD BY AUTOMATED COUNT: 13.4 % (ref 11.5–14.5)
EST. GFR  (NO RACE VARIABLE): >60 ML/MIN/1.73 M^2
GLUCOSE SERPL-MCNC: 115 MG/DL (ref 70–110)
HCT VFR BLD AUTO: 35.6 % (ref 37–48.5)
HGB BLD-MCNC: 11.8 G/DL (ref 12–16)
IMM GRANULOCYTES # BLD AUTO: 0.16 K/UL (ref 0–0.04)
IMM GRANULOCYTES NFR BLD AUTO: 1.6 % (ref 0–0.5)
LYMPHOCYTES # BLD AUTO: 1.4 K/UL (ref 1–4.8)
LYMPHOCYTES NFR BLD: 14 % (ref 18–48)
MAGNESIUM SERPL-MCNC: 2.2 MG/DL (ref 1.6–2.6)
MCH RBC QN AUTO: 32.7 PG (ref 27–31)
MCHC RBC AUTO-ENTMCNC: 33.1 G/DL (ref 32–36)
MCV RBC AUTO: 99 FL (ref 82–98)
MONOCYTES # BLD AUTO: 1.2 K/UL (ref 0.3–1)
MONOCYTES NFR BLD: 11.7 % (ref 4–15)
NEUTROPHILS # BLD AUTO: 7.4 K/UL (ref 1.8–7.7)
NEUTROPHILS NFR BLD: 72.2 % (ref 38–73)
NRBC BLD-RTO: 0 /100 WBC
PHOSPHATE SERPL-MCNC: 3.4 MG/DL (ref 2.7–4.5)
PLATELET # BLD AUTO: 282 K/UL (ref 150–450)
PMV BLD AUTO: 10.6 FL (ref 9.2–12.9)
POCT GLUCOSE: 101 MG/DL (ref 70–110)
POCT GLUCOSE: 131 MG/DL (ref 70–110)
POCT GLUCOSE: 140 MG/DL (ref 70–110)
POCT GLUCOSE: 153 MG/DL (ref 70–110)
POTASSIUM SERPL-SCNC: 3.4 MMOL/L (ref 3.5–5.1)
PROT SERPL-MCNC: 7.4 G/DL (ref 6–8.4)
RBC # BLD AUTO: 3.61 M/UL (ref 4–5.4)
SODIUM SERPL-SCNC: 146 MMOL/L (ref 136–145)
WBC # BLD AUTO: 10.3 K/UL (ref 3.9–12.7)

## 2023-12-15 PROCEDURE — 93010 ELECTROCARDIOGRAM REPORT: CPT | Mod: ,,, | Performed by: INTERNAL MEDICINE

## 2023-12-15 PROCEDURE — 25000003 PHARM REV CODE 250: Performed by: PSYCHIATRY & NEUROLOGY

## 2023-12-15 PROCEDURE — 97530 THERAPEUTIC ACTIVITIES: CPT

## 2023-12-15 PROCEDURE — 63600175 PHARM REV CODE 636 W HCPCS: Performed by: STUDENT IN AN ORGANIZED HEALTH CARE EDUCATION/TRAINING PROGRAM

## 2023-12-15 PROCEDURE — 92526 ORAL FUNCTION THERAPY: CPT

## 2023-12-15 PROCEDURE — 93005 ELECTROCARDIOGRAM TRACING: CPT

## 2023-12-15 PROCEDURE — 85025 COMPLETE CBC W/AUTO DIFF WBC: CPT

## 2023-12-15 PROCEDURE — 25000003 PHARM REV CODE 250: Performed by: REGISTERED NURSE

## 2023-12-15 PROCEDURE — 93010 EKG 12-LEAD: ICD-10-PCS | Mod: ,,, | Performed by: INTERNAL MEDICINE

## 2023-12-15 PROCEDURE — 63600175 PHARM REV CODE 636 W HCPCS: Performed by: REGISTERED NURSE

## 2023-12-15 PROCEDURE — 63600175 PHARM REV CODE 636 W HCPCS

## 2023-12-15 PROCEDURE — 80053 COMPREHEN METABOLIC PANEL: CPT

## 2023-12-15 PROCEDURE — 25000003 PHARM REV CODE 250: Performed by: STUDENT IN AN ORGANIZED HEALTH CARE EDUCATION/TRAINING PROGRAM

## 2023-12-15 PROCEDURE — 25000003 PHARM REV CODE 250: Performed by: PHYSICIAN ASSISTANT

## 2023-12-15 PROCEDURE — 83735 ASSAY OF MAGNESIUM: CPT

## 2023-12-15 PROCEDURE — 25000003 PHARM REV CODE 250

## 2023-12-15 PROCEDURE — 20600001 HC STEP DOWN PRIVATE ROOM

## 2023-12-15 PROCEDURE — 84100 ASSAY OF PHOSPHORUS: CPT

## 2023-12-15 PROCEDURE — C9254 INJECTION, LACOSAMIDE: HCPCS

## 2023-12-15 PROCEDURE — 36415 COLL VENOUS BLD VENIPUNCTURE: CPT

## 2023-12-15 PROCEDURE — 97535 SELF CARE MNGMENT TRAINING: CPT

## 2023-12-15 RX ADMIN — QUETIAPINE FUMARATE 12.5 MG: 25 TABLET ORAL at 08:12

## 2023-12-15 RX ADMIN — CARVEDILOL 3.12 MG: 3.12 TABLET, FILM COATED ORAL at 08:12

## 2023-12-15 RX ADMIN — POTASSIUM BICARBONATE 40 MEQ: 391 TABLET, EFFERVESCENT ORAL at 10:12

## 2023-12-15 RX ADMIN — SENNOSIDES AND DOCUSATE SODIUM 1 TABLET: 8.6; 5 TABLET ORAL at 08:12

## 2023-12-15 RX ADMIN — AMLODIPINE BESYLATE 10 MG: 10 TABLET ORAL at 08:12

## 2023-12-15 RX ADMIN — LACOSAMIDE 50 MG: 10 INJECTION INTRAVENOUS at 09:12

## 2023-12-15 RX ADMIN — LACOSAMIDE 50 MG: 10 INJECTION INTRAVENOUS at 08:12

## 2023-12-15 RX ADMIN — ATORVASTATIN CALCIUM 40 MG: 40 TABLET, FILM COATED ORAL at 08:12

## 2023-12-15 RX ADMIN — ACETAMINOPHEN 650 MG: 325 TABLET ORAL at 08:12

## 2023-12-15 RX ADMIN — LATANOPROST 1 DROP: 50 SOLUTION OPHTHALMIC at 08:12

## 2023-12-15 RX ADMIN — HYDRALAZINE HYDROCHLORIDE 10 MG: 20 INJECTION, SOLUTION INTRAMUSCULAR; INTRAVENOUS at 04:12

## 2023-12-15 RX ADMIN — SILODOSIN 4 MG: 4 CAPSULE ORAL at 08:12

## 2023-12-15 RX ADMIN — ENOXAPARIN SODIUM 40 MG: 40 INJECTION SUBCUTANEOUS at 04:12

## 2023-12-15 RX ADMIN — BISACODYL 10 MG: 10 SUPPOSITORY RECTAL at 08:12

## 2023-12-15 RX ADMIN — CARVEDILOL 3.12 MG: 3.12 TABLET, FILM COATED ORAL at 09:12

## 2023-12-15 NOTE — PT/OT/SLP PROGRESS
Speech Language Pathology Treatment    Patient Name:  Eden Cardenas   MRN:  [id number]  Admitting Diagnosis: SAH (subarachnoid hemorrhage)    Recommendations:                 General Recommendations:  Dysphagia therapy and Cognitive-linguistic therapy  Diet recommendations:  Regular Diet - IDDSI Level 7, Liquid Diet Level: Thin liquids - IDDSI Level 0   Aspiration Precautions: Continue alternate means of nutrition   General Precautions: Standard, aspiration, fall  Communication strategies:  none    Assessment:     Eden Cardenas is a 89 y.o. female with an SLP diagnosis of  gradually resolving dysphagia with improved alertness .      Subjective     RN in agreement with SLP attempting at this time   Pt sleeping upon arrival     Pain/Comfort:  Pain Rating 1: 0/10  Pain Rating Post-Intervention 1: 0/10Pt unable to report appearing comfortable     Respiratory Status: Room air    Objective:     Has the patient been evaluated by SLP for swallowing?   Yes  Keep patient NPO? No     Pt with NG tube since removed since previous visit. Pt easily woke to SLP verbal and tactile stimulation. Pt alertness much improved to prior therapy sessions. Pt able to tolerate sitting upright in bed for PO trials. Pt observed to consume several cyclic sips/swallows of thin liquids across 5oz without concern for aspiration. Vocal quality strong and clear following. Pt also demonstrating ability to functionally manage purees and regular solids. Pt also observed to manage medications whole with thin liquid sips. SLP discussed with pt and RN ability to advance diet to regular solids and thin liquids and medications whole. SLP contacted primary MD re: diet advancement. Speech to follow as needed to ensure diet tolerated. Ongoing monitoring of cognitive--linguistic skills warranted.      Goals:   Multidisciplinary Problems       SLP Goals          Problem: SLP    Goal Priority Disciplines Outcome   SLP Goal     SLP Ongoing, Progressing   Description:  Speech Language Pathology Goals  Goals expected to be met by 12/24    1. Pt will participate in ongoing swallow assessment to determine least restrictive PO diet when appropriate.  2. Pt will demonstrate orientation to self, place, situation , family members, date w/ min cues   3.  Pt will follow single step directions w/ 75% acc w/ min cues to enhance comprehension skills   4 Pt will complete yes/no questions w/ 75% acc w/ min cues to enhance comprehension skills   5.  Pt will participate in automatic speech tasks w/ 75% acc w/ min cues to enhance verbal expression skills   6.  Pt will participate in ongoing assessment of speech language and cognitive linguistic skills to help rule out deficits and determine therapeutic plan of care                                  Plan:     Patient to be seen:  3 x/week   Plan of Care expires:  01/09/24  Plan of Care reviewed with:  patient   SLP Follow-Up:  Yes       Discharge recommendations:      Barriers to Discharge:  Level of Skilled Assistance Needed      Time Tracking:     SLP Treatment Date:   12/15/23  Speech Start Time:  0751  Speech Stop Time:  0805     Speech Total Time (min):  14 min    Billable Minutes: Treatment Swallowing Dysfunction 6 and Self Care/Home Management Training 8    12/15/2023

## 2023-12-15 NOTE — SUBJECTIVE & OBJECTIVE
Interval History: No acute events. Patient continues to have improvement in mentation. SLP cleared patient. This am was eating breakfast and alert and conversant    Review of Systems  Objective:     Vital Signs (Most Recent):  Temp: 98.2 °F (36.8 °C) (12/15/23 1130)  Pulse: 93 (12/15/23 1130)  Resp: 15 (12/15/23 1130)  BP: 134/60 (12/15/23 1130)  SpO2: (!) 94 % (12/15/23 1130) Vital Signs (24h Range):  Temp:  [97.8 °F (36.6 °C)-98.7 °F (37.1 °C)] 98.2 °F (36.8 °C)  Pulse:  [] 93  Resp:  [15-22] 15  SpO2:  [93 %-99 %] 94 %  BP: (116-178)/(58-93) 134/60     Weight: 51.2 kg (112 lb 14 oz)  Body mass index is 20 kg/m².    Intake/Output Summary (Last 24 hours) at 12/15/2023 1526  Last data filed at 12/15/2023 0544  Gross per 24 hour   Intake --   Output 2600 ml   Net -2600 ml         Physical Exam  Constitutional:       General: She is not in acute distress.     Appearance: She is ill-appearing. She is not toxic-appearing.   Cardiovascular:      Rate and Rhythm: Regular rhythm. Tachycardia present.      Heart sounds: No murmur heard.     No friction rub. No gallop.   Pulmonary:      Effort: Pulmonary effort is normal. No respiratory distress.   Abdominal:      General: Abdomen is flat. There is no distension.      Palpations: Abdomen is soft.      Tenderness: There is no abdominal tenderness. There is no guarding or rebound.   Musculoskeletal:      Right lower leg: No edema.      Left lower leg: No edema.             Significant Labs: All pertinent labs within the past 24 hours have been reviewed.    Significant Imaging: I have reviewed all pertinent imaging results/findings within the past 24 hours.

## 2023-12-15 NOTE — PROGRESS NOTES
[facility] - Intensive Care ([address] Medicine  Progress Note    Patient Name: Eden Cardenas  MRN: [id number]  Patient Class: IP- Inpatient   Admission Date: 12/9/2023  Length of Stay: 6 days  Attending Physician: Odin Maldonado*  Primary Care Provider: Valorie Del Real PT        Subjective:     Principal Problem:SAH (subarachnoid hemorrhage)        HPI:  Eden Cardenas is a 89 y.o. female with history of HTN, HLD, glaucoma that presents with tSAH s/p mechanical fall from ground level. Per chart review, although the fall was unwitnessed, family heard a yell and the sound of the patient hitting the floor. No AC/AP. No LOC. She was brought to the ED, where she was found to be confused with inappropriate speech. She sustained a laceration to the back of her head, which was repaired in the ED. CTH showed bifrontal tSAH as well as a left parietoccipital calvarial fracture with minimal associated pneumocephalus and adjacent SDH. CT T-Spine negative for acute fractures or dislocations. The patient will be admitted to [facility] for close monitoring and a higher level of care.       Overview/Hospital Course:  12/10/2023 d/c EEG, delirium and agitation   12/11/2023 Follow-up CTH this AM stable. Seroquel started last night d/t increased agitation - increased drowsiness today. Will discontinue.  12/12/2023 No issues overnight. No epileptiform activity per Epilepsy, EEG discontinued. More alert for family at bedside. CTH tonight, possible stepdown tomorrow pending results.   12/13/2023 precedex off, resume seroquel.  Completed treatment for UT    Interval History: No acute events. Patient continues to have improvement in mentation. SLP cleared patient. This am was eating breakfast and alert and conversant    Review of Systems  Objective:     Vital Signs (Most Recent):  Temp: 98.2 °F (36.8 °C) (12/15/23 1130)  Pulse: 93 (12/15/23 1130)  Resp: 15 (12/15/23 1130)  BP: 134/60 (12/15/23 1130)  SpO2: (!) 94 %  (12/15/23 1130) Vital Signs (24h Range):  Temp:  [97.8 °F (36.6 °C)-98.7 °F (37.1 °C)] 98.2 °F (36.8 °C)  Pulse:  [] 93  Resp:  [15-22] 15  SpO2:  [93 %-99 %] 94 %  BP: (116-178)/(58-93) 134/60     Weight: 51.2 kg (112 lb 14 oz)  Body mass index is 20 kg/m².    Intake/Output Summary (Last 24 hours) at 12/15/2023 1526  Last data filed at 12/15/2023 0544  Gross per 24 hour   Intake --   Output 2600 ml   Net -2600 ml         Physical Exam  Constitutional:       General: She is not in acute distress.     Appearance: She is ill-appearing. She is not toxic-appearing.   Cardiovascular:      Rate and Rhythm: Regular rhythm. Tachycardia present.      Heart sounds: No murmur heard.     No friction rub. No gallop.   Pulmonary:      Effort: Pulmonary effort is normal. No respiratory distress.   Abdominal:      General: Abdomen is flat. There is no distension.      Palpations: Abdomen is soft.      Tenderness: There is no abdominal tenderness. There is no guarding or rebound.   Musculoskeletal:      Right lower leg: No edema.      Left lower leg: No edema.             Significant Labs: All pertinent labs within the past 24 hours have been reviewed.    Significant Imaging: I have reviewed all pertinent imaging results/findings within the past 24 hours.    Assessment/Plan:      * SAH (subarachnoid hemorrhage)  - traumatic SAH after mechanical fall without LOC per family  - NSGY consulted, maximal medical management  - Patient DNR/DNI per family, with wishes for no operative intervention if the need were to arise  - LCM 50 mg BID x7 days per NSGY/NCC  - EEG 12/11>12/12 with diffuse slowing, more focal slowing in the left hemisphere, no seizures      Leukocytosis  Treated for suspected UTI with 3 days unasyn      Temporal bone fracture  ENT consulted, no surgical intervention      Delirium  Continues to improve  SLP cleared for diet      Essential (primary) hypertension  Chronic, controlled. Latest blood pressure and vitals  reviewed-     Temp:  [98.1 °F (36.7 °C)-98.7 °F (37.1 °C)]   Pulse:  [106-128]   Resp:  [13-18]   BP: (131-158)/(72-86)   SpO2:  [95 %-99 %] .   Home meds for hypertension were reviewed and noted below.   Hypertension Medications               amLODIPine (NORVASC) 5 MG tablet Take 5 mg by mouth once daily.            While in the hospital, will manage blood pressure as follows; Continue home antihypertensive regimen    Will utilize p.r.n. blood pressure medication only if patient's blood pressure greater than 160/100 and she develops symptoms such as worsening chest pain or shortness of breath.    Mixed hyperlipidemia  Continue statin        VTE Risk Mitigation (From admission, onward)           Ordered     enoxaparin injection 40 mg  Every 24 hours         12/11/23 1601     Reason for No Pharmacological VTE Prophylaxis  Once        Question:  Reasons:  Answer:  Active Bleeding    12/09/23 2108     IP VTE HIGH RISK PATIENT  Once         12/09/23 2108     Place sequential compression device  Until discontinued         12/09/23 2108                    Discharge Planning   ESTEFANY: 12/18/2023     Code Status: DNR   Is the patient medically ready for discharge?: No    Reason for patient still in hospital (select all that apply): Patient trending condition and Treatment  Discharge Plan A: Home Health, Home with family        Odin Maldonado MD  Department of Hospital Medicine   [facility] - Intensive Care ([facility]

## 2023-12-15 NOTE — PLAN OF CARE
Problem: Adult Inpatient Plan of Care  Goal: Plan of Care Review  Outcome: Ongoing, Progressing  Goal: Patient-Specific Goal (Individualized)  Outcome: Ongoing, Progressing  Goal: Absence of Hospital-Acquired Illness or Injury  Outcome: Ongoing, Progressing  Goal: Optimal Comfort and Wellbeing  Outcome: Ongoing, Progressing  Goal: Readiness for Transition of Care  Outcome: Ongoing, Progressing     Problem: Adjustment to Illness (Stroke, Hemorrhagic)  Goal: Optimal Coping  Outcome: Ongoing, Progressing     Problem: Bowel Elimination Impaired (Stroke, Hemorrhagic)  Goal: Effective Bowel Elimination  Outcome: Ongoing, Progressing     Problem: Cerebral Tissue Perfusion (Stroke, Hemorrhagic)  Goal: Optimal Cerebral Tissue Perfusion  Outcome: Ongoing, Progressing     Problem: Cognitive Impairment (Stroke, Hemorrhagic)  Goal: Optimal Cognitive Function  Outcome: Ongoing, Progressing     Problem: Communication Impairment (Stroke, Hemorrhagic)  Goal: Effective Communication Skills  Outcome: Ongoing, Progressing     Problem: Functional Ability Impaired (Stroke, Hemorrhagic)  Goal: Optimal Functional Ability  Outcome: Ongoing, Progressing     Problem: Pain (Stroke, Hemorrhagic)  Goal: Acceptable Pain Control  Outcome: Ongoing, Progressing     Problem: Respiratory Compromise (Stroke, Hemorrhagic)  Goal: Effective Oxygenation and Ventilation  Outcome: Ongoing, Progressing     Problem: Sensorimotor Impairment (Stroke, Hemorrhagic)  Goal: Improved Sensorimotor Function  Outcome: Ongoing, Progressing     Problem: Swallowing Impairment (Stroke, Hemorrhagic)  Goal: Optimal Eating and Swallowing Without Aspiration  Outcome: Ongoing, Progressing     Problem: Urinary Elimination Impaired (Stroke, Hemorrhagic)  Goal: Effective Urinary Elimination  Outcome: Ongoing, Progressing     Problem: Skin Injury Risk Increased  Goal: Skin Health and Integrity  Outcome: Ongoing, Progressing     Problem: Fall Injury Risk  Goal: Absence of Fall and  Fall-Related Injury  Outcome: Ongoing, Progressing     Problem: Restraint, Nonbehavioral (Nonviolent)  Goal: Absence of Harm or Injury  Outcome: Ongoing, Progressing     Problem: Impaired Wound Healing  Goal: Optimal Wound Healing  Outcome: Ongoing, Progressing     Problem: Infection  Goal: Absence of Infection Signs and Symptoms  Outcome: Ongoing, Progressing   Pt Aox1-2, disoriented to time and situation. Pt easily redirected when stating that it was lunch time. Pt not in restraints entire shift. Telesitter at bedside. PRNs given see MAR. EKG completed this AM. Call light within reach, rounded per facility policy, safety measures in place.

## 2023-12-15 NOTE — CARE UPDATE
RAPID RESPONSE NURSE ROUND       Rounding completed with charge RN, Isaias for tachycardia reports no issues at this time. Instructed to call [phone] for further concerns or assistance.

## 2023-12-15 NOTE — PLAN OF CARE
Multiple SNF referrals sent through careport:      [facility] [employer] Phone: [phone]     [address] [place] [address]          [facility] Phone: [phone]     [address] [place] [address]          [facility] Phone: [phone]     [address] [place] [address]          [facility] Phone: [phone]     [address] [place] [address]          [facility] Phone: [phone]     [address] [place] [address]          [facility] Phone: [phone]     [address] [place] [address]          [facility] Phone: [phone]   [address] [facility], [address] [place] [address]          [facility] Phone: [phone]     [address] [place] [address]          [facility] Phone: [phone]   [address] [place] [address]          [facility] Phone: [phone]     [address] [place] [address]              Pending medical stability accepting facility

## 2023-12-16 LAB
ALBUMIN SERPL BCP-MCNC: 2.7 G/DL (ref 3.5–5.2)
ALP SERPL-CCNC: 90 U/L (ref 55–135)
ALT SERPL W/O P-5'-P-CCNC: 16 U/L (ref 10–44)
ANION GAP SERPL CALC-SCNC: 10 MMOL/L (ref 8–16)
AST SERPL-CCNC: 21 U/L (ref 10–40)
BASOPHILS # BLD AUTO: 0.05 K/UL (ref 0–0.2)
BASOPHILS NFR BLD: 0.3 % (ref 0–1.9)
BILIRUB SERPL-MCNC: 0.6 MG/DL (ref 0.1–1)
BUN SERPL-MCNC: 25 MG/DL (ref 10–30)
CALCIUM SERPL-MCNC: 9.3 MG/DL (ref 8.7–10.5)
CHLORIDE SERPL-SCNC: 103 MMOL/L (ref 95–110)
CO2 SERPL-SCNC: 25 MMOL/L (ref 23–29)
CREAT SERPL-MCNC: 0.7 MG/DL (ref 0.5–1.4)
DIFFERENTIAL METHOD: ABNORMAL
EOSINOPHIL # BLD AUTO: 0 K/UL (ref 0–0.5)
EOSINOPHIL NFR BLD: 0.3 % (ref 0–8)
ERYTHROCYTE [DISTWIDTH] IN BLOOD BY AUTOMATED COUNT: 13 % (ref 11.5–14.5)
EST. GFR  (NO RACE VARIABLE): >60 ML/MIN/1.73 M^2
GLUCOSE SERPL-MCNC: 165 MG/DL (ref 70–110)
HCT VFR BLD AUTO: 34.7 % (ref 37–48.5)
HGB BLD-MCNC: 11.7 G/DL (ref 12–16)
IMM GRANULOCYTES # BLD AUTO: 0.16 K/UL (ref 0–0.04)
IMM GRANULOCYTES NFR BLD AUTO: 1.1 % (ref 0–0.5)
LYMPHOCYTES # BLD AUTO: 1.3 K/UL (ref 1–4.8)
LYMPHOCYTES NFR BLD: 8.8 % (ref 18–48)
MAGNESIUM SERPL-MCNC: 2.1 MG/DL (ref 1.6–2.6)
MCH RBC QN AUTO: 32.7 PG (ref 27–31)
MCHC RBC AUTO-ENTMCNC: 33.7 G/DL (ref 32–36)
MCV RBC AUTO: 97 FL (ref 82–98)
MONOCYTES # BLD AUTO: 1.5 K/UL (ref 0.3–1)
MONOCYTES NFR BLD: 10.2 % (ref 4–15)
NEUTROPHILS # BLD AUTO: 11.5 K/UL (ref 1.8–7.7)
NEUTROPHILS NFR BLD: 79.3 % (ref 38–73)
NRBC BLD-RTO: 0 /100 WBC
PHOSPHATE SERPL-MCNC: 3 MG/DL (ref 2.7–4.5)
PLATELET # BLD AUTO: 291 K/UL (ref 150–450)
PMV BLD AUTO: 10.1 FL (ref 9.2–12.9)
POCT GLUCOSE: 133 MG/DL (ref 70–110)
POCT GLUCOSE: 151 MG/DL (ref 70–110)
POCT GLUCOSE: 163 MG/DL (ref 70–110)
POCT GLUCOSE: 183 MG/DL (ref 70–110)
POTASSIUM SERPL-SCNC: 3.1 MMOL/L (ref 3.5–5.1)
PROT SERPL-MCNC: 7 G/DL (ref 6–8.4)
RBC # BLD AUTO: 3.58 M/UL (ref 4–5.4)
SODIUM SERPL-SCNC: 138 MMOL/L (ref 136–145)
WBC # BLD AUTO: 14.53 K/UL (ref 3.9–12.7)

## 2023-12-16 PROCEDURE — 93010 EKG 12-LEAD: ICD-10-PCS | Mod: ,,, | Performed by: INTERNAL MEDICINE

## 2023-12-16 PROCEDURE — C9254 INJECTION, LACOSAMIDE: HCPCS

## 2023-12-16 PROCEDURE — 20600001 HC STEP DOWN PRIVATE ROOM

## 2023-12-16 PROCEDURE — 85025 COMPLETE CBC W/AUTO DIFF WBC: CPT

## 2023-12-16 PROCEDURE — 25000003 PHARM REV CODE 250: Performed by: PSYCHIATRY & NEUROLOGY

## 2023-12-16 PROCEDURE — 25000003 PHARM REV CODE 250

## 2023-12-16 PROCEDURE — 36415 COLL VENOUS BLD VENIPUNCTURE: CPT

## 2023-12-16 PROCEDURE — 83735 ASSAY OF MAGNESIUM: CPT

## 2023-12-16 PROCEDURE — 80053 COMPREHEN METABOLIC PANEL: CPT

## 2023-12-16 PROCEDURE — 63600175 PHARM REV CODE 636 W HCPCS

## 2023-12-16 PROCEDURE — 93005 ELECTROCARDIOGRAM TRACING: CPT

## 2023-12-16 PROCEDURE — 25000003 PHARM REV CODE 250: Performed by: STUDENT IN AN ORGANIZED HEALTH CARE EDUCATION/TRAINING PROGRAM

## 2023-12-16 PROCEDURE — 63600175 PHARM REV CODE 636 W HCPCS: Performed by: STUDENT IN AN ORGANIZED HEALTH CARE EDUCATION/TRAINING PROGRAM

## 2023-12-16 PROCEDURE — 63600175 PHARM REV CODE 636 W HCPCS: Performed by: REGISTERED NURSE

## 2023-12-16 PROCEDURE — 93010 ELECTROCARDIOGRAM REPORT: CPT | Mod: ,,, | Performed by: INTERNAL MEDICINE

## 2023-12-16 PROCEDURE — 25000003 PHARM REV CODE 250: Performed by: PHYSICIAN ASSISTANT

## 2023-12-16 PROCEDURE — 84100 ASSAY OF PHOSPHORUS: CPT

## 2023-12-16 RX ORDER — POTASSIUM CHLORIDE 20 MEQ/1
40 TABLET, EXTENDED RELEASE ORAL ONCE
Status: COMPLETED | OUTPATIENT
Start: 2023-12-16 | End: 2023-12-16

## 2023-12-16 RX ADMIN — ATORVASTATIN CALCIUM 40 MG: 40 TABLET, FILM COATED ORAL at 08:12

## 2023-12-16 RX ADMIN — POTASSIUM CHLORIDE 40 MEQ: 1500 TABLET, EXTENDED RELEASE ORAL at 09:12

## 2023-12-16 RX ADMIN — CARVEDILOL 3.12 MG: 3.12 TABLET, FILM COATED ORAL at 09:12

## 2023-12-16 RX ADMIN — ACETAMINOPHEN 650 MG: 325 TABLET ORAL at 05:12

## 2023-12-16 RX ADMIN — LACOSAMIDE 50 MG: 10 INJECTION INTRAVENOUS at 09:12

## 2023-12-16 RX ADMIN — SILODOSIN 4 MG: 4 CAPSULE ORAL at 09:12

## 2023-12-16 RX ADMIN — CARVEDILOL 3.12 MG: 3.12 TABLET, FILM COATED ORAL at 08:12

## 2023-12-16 RX ADMIN — AMLODIPINE BESYLATE 10 MG: 10 TABLET ORAL at 08:12

## 2023-12-16 RX ADMIN — LATANOPROST 1 DROP: 50 SOLUTION OPHTHALMIC at 09:12

## 2023-12-16 RX ADMIN — SENNOSIDES AND DOCUSATE SODIUM 1 TABLET: 8.6; 5 TABLET ORAL at 08:12

## 2023-12-16 RX ADMIN — HYDRALAZINE HYDROCHLORIDE 10 MG: 20 INJECTION, SOLUTION INTRAMUSCULAR; INTRAVENOUS at 03:12

## 2023-12-16 RX ADMIN — SENNOSIDES AND DOCUSATE SODIUM 1 TABLET: 8.6; 5 TABLET ORAL at 09:12

## 2023-12-16 RX ADMIN — ENOXAPARIN SODIUM 40 MG: 40 INJECTION SUBCUTANEOUS at 05:12

## 2023-12-16 NOTE — PT/OT/SLP PROGRESS
Physical Therapy Treatment    Patient Name:  Eden Cardenas   MRN:  [id number]    Recommendations:     Discharge Recommendations: Moderate Intensity Therapy  Discharge Equipment Recommendations: wheelchair, hospital bed, lift device  Barriers to discharge: Inaccessible home and Decreased caregiver support    Assessment:     Eden Cardenas is a 89 y.o. female admitted with a medical diagnosis of SAH (subarachnoid hemorrhage).  She presents with the following impairments/functional limitations: weakness, impaired endurance, impaired self care skills, impaired functional mobility, gait instability, impaired balance, impaired cognition, decreased upper extremity function, decreased lower extremity function, decreased safety awareness. Pt very lethargic this date and c/o dizziness. Pt with nystagmus upon returning to supine and BPPV suspected. Pt decline to further participate and returned herself back to bed. Pt would benefit from a moderate intensity/frequency therapy for: Dynamic/static standing/sitting balance through skilled balance training, strengthening with the use of skilled therapeutic exercises interventions, and mobility through adaptive equipment training. Pt continues to benefit from a collaborative PT/OT program to improve quality of life and focus on recovery of impairments.      Rehab Prognosis: Fair; patient would benefit from acute skilled PT services to address these deficits and reach maximum level of function.    Recent Surgery: * No surgery found *      Plan:     During this hospitalization, patient to be seen 3 x/week to address the identified rehab impairments via gait training, therapeutic activities, therapeutic exercises, neuromuscular re-education and progress toward the following goals:    Plan of Care Expires:  01/10/24    Subjective     Chief Complaint: dizzy  Patient/Family Comments/goals: wants to go back to sleep  Pain/Comfort:  Pain Rating 1: 0/10  Pain Rating Post-Intervention 1:  0/10      Objective:     Communicated with RN prior to session.  Patient found supine with telemetry, peripheral IV, pulse ox (continuous) upon PT entry to room.     General Precautions: Standard, aspiration, fall  Orthopedic Precautions: N/A  Braces: N/A  Respiratory Status: Room air     Functional Mobility:  Bed Mobility:     Scooting: moderate assistance  Supine to Sit: moderate assistance  Sit to Supine: stand by assistance  Transfers:     Sit to Stand: pt deferred 2/2 sleepy and dizzy  Balance:   Static Sitting: Matilda-modA  Dynamic Sitting: GERARDO      AM-PAC 6 CLICK MOBILITY  Turning over in bed (including adjusting bedclothes, sheets and blankets)?: 3  Sitting down on and standing up from a chair with arms (e.g., wheelchair, bedside commode, etc.): 2  Moving from lying on back to sitting on the side of the bed?: 2  Moving to and from a bed to a chair (including a wheelchair)?: 2  Need to walk in hospital room?: 1  Climbing 3-5 steps with a railing?: 1  Basic Mobility Total Score: 11       Treatment & Education:  Patient educated on role of therapy, goals of session, and benefits of mobilizing.   Discussed PT plan of care during hospitalization.   Patient educated on calling for assistance.   Patient educated on how their diagnosis impacts their mobility within PT scope of practice.   All questions answered within PT scope of practice.    Patient left HOB elevated with all lines intact, call button in reach, bed alarm on, and RN notified.    GOALS:   Multidisciplinary Problems       Physical Therapy Goals          Problem: Physical Therapy    Goal Priority Disciplines Outcome Goal Variances Interventions   Physical Therapy Goal     PT, PT/OT Ongoing, Progressing     Description: Goals to met by 12/24/2023    1. Supine to sit with MInimal Assistance  2. Sit to supine with MInimal Assistance  3. Rolling to Left and Right with Minimal Assistance.  4. Sit to stand transfer with Contact Guard Assistance  5. Bed to  chair transfer with Contact Guard Assistance using LRAD  6. Gait  x 25 feet with Contact Guard Assistance using LRAD   7. Sitting at edge of bed x10 minutes with Stand-by Assistance  8. Stand for 5 minutes with Stand-by Assistance using LRAD  9. Lower extremity exercise program x15 reps per Instruction, with assistance as needed in order to facilitate improved strength, improved postural control, and improvement in functional independence                       Time Tracking:     PT Received On: 12/15/23  PT Start Time: 1214     PT Stop Time: 1227  PT Total Time (min): 13 min     Billable Minutes: Therapeutic Activity 13    Treatment Type: Treatment  PT/PTA: PT     Number of PTA visits since last PT visit: 0     12/15/2023

## 2023-12-16 NOTE — CLINICAL REVIEW
IP Sepsis Screen (most recent)       Sepsis Screen (IP) - 12/16/23 1761       Is the patient's history or complaint suggestive of a possible infection? Yes  -    Are there at least two of the following signs and symptoms present? Yes  -    Sepsis signs/symptoms - Tachycardia Tachycardia     >90  -    Sepsis signs/symptoms - WBC WBC < 4,000 or WBC > 12,000  -    Are any of the following organ dysfunction criteria present and not considered to be due to a chronic condition? Yes  -    Organ Dysfunction Criteria - O2 O2 Saturation < 95% on room air  -    Initiate Sepsis Protocol No  -    Reason sepsis not considered Pt. receiving appropriate management   abx course complete -              User Key  (r) = Recorded By, (t) = Taken By, (c) = Cosigned By      Initials Name    Alsyha Culver RN

## 2023-12-16 NOTE — PROGRESS NOTES
[facility] ([address] Medicine  Progress Note    Patient Name: Eden Cardenas  MRN: [id number]  Patient Class: IP- Inpatient   Admission Date: 12/9/2023  Length of Stay: 7 days  Attending Physician: Odin Maldonado*  Primary Care Provider: Valorie Del Real PT        Subjective:     Principal Problem:SAH (subarachnoid hemorrhage)        HPI:  Eden Cardenas is a 89 y.o. female with history of HTN, HLD, glaucoma that presents with tSAH s/p mechanical fall from ground level. Per chart review, although the fall was unwitnessed, family heard a yell and the sound of the patient hitting the floor. No AC/AP. No LOC. She was brought to the ED, where she was found to be confused with inappropriate speech. She sustained a laceration to the back of her head, which was repaired in the ED. CTH showed bifrontal tSAH as well as a left parietoccipital calvarial fracture with minimal associated pneumocephalus and adjacent SDH. CT T-Spine negative for acute fractures or dislocations. The patient will be admitted to [facility] for close monitoring and a higher level of care.       Overview/Hospital Course:  12/10/2023 d/c EEG, delirium and agitation   12/11/2023 Follow-up CTH this AM stable. Seroquel started last night d/t increased agitation - increased drowsiness today. Will discontinue.  12/12/2023 No issues overnight. No epileptiform activity per Epilepsy, EEG discontinued. More alert for family at bedside. CTH tonight, possible stepdown tomorrow pending results.   12/13/2023 precedex off, resume seroquel.  Completed treatment for UTI    Patient stepped down to hospital medicine 12/14. Mentation improving. NGT removed and cleared by SLP.    Interval History: No acute events. Afebrile    Review of Systems  Objective:     Vital Signs (Most Recent):  Temp: 98.1 °F (36.7 °C) (12/16/23 1215)  Pulse: 69 (12/16/23 1215)  Resp: 18 (12/16/23 1215)  BP: (!) 166/86 (12/16/23 1215)  SpO2: 96 % (12/16/23 1215)  Vital Signs (24h Range):  Temp:  [97.8 °F (36.6 °C)-98.8 °F (37.1 °C)] 98.1 °F (36.7 °C)  Pulse:  [] 69  Resp:  [14-18] 18  SpO2:  [94 %-98 %] 96 %  BP: (119-182)/(65-93) 166/86     Weight: 51.2 kg (112 lb 14 oz)  Body mass index is 20 kg/m².    Intake/Output Summary (Last 24 hours) at 12/16/2023 1337  Last data filed at 12/16/2023 0545  Gross per 24 hour   Intake --   Output 1150 ml   Net -1150 ml         Physical Exam  Constitutional:       General: She is not in acute distress.     Appearance: She is ill-appearing. She is not toxic-appearing.   Cardiovascular:      Rate and Rhythm: Normal rate and regular rhythm.      Heart sounds: No murmur heard.     No friction rub. No gallop.   Pulmonary:      Effort: Pulmonary effort is normal. No respiratory distress.   Abdominal:      General: Abdomen is flat. There is no distension.      Palpations: Abdomen is soft.      Tenderness: There is no abdominal tenderness. There is no guarding or rebound.   Musculoskeletal:      Right lower leg: No edema.      Left lower leg: No edema.   Neurological:      General: No focal deficit present.             Significant Labs: All pertinent labs within the past 24 hours have been reviewed.    Significant Imaging: I have reviewed all pertinent imaging results/findings within the past 24 hours.    Assessment/Plan:      * SAH (subarachnoid hemorrhage)  - traumatic SAH after mechanical fall without LOC per family  - NSGY consulted, maximal medical management  - Patient DNR/DNI per family, with wishes for no operative intervention if the need were to arise  - LCM 50 mg BID x7 days per NSGY/NCC  - EEG 12/11>12/12 with diffuse slowing, more focal slowing in the left hemisphere, no seizures      Leukocytosis  Treated for suspected UTI with 3 days unasyn      Temporal bone fracture  ENT consulted, no surgical intervention      Delirium  Continues to improve  SLP cleared for diet      Essential (primary) hypertension  Chronic, controlled.  Latest blood pressure and vitals reviewed-     Temp:  [98.1 °F (36.7 °C)-98.7 °F (37.1 °C)]   Pulse:  [106-128]   Resp:  [13-18]   BP: (131-158)/(72-86)   SpO2:  [95 %-99 %] .   Home meds for hypertension were reviewed and noted below.   Hypertension Medications               amLODIPine (NORVASC) 5 MG tablet Take 5 mg by mouth once daily.            While in the hospital, will manage blood pressure as follows; Continue home antihypertensive regimen    Will utilize p.r.n. blood pressure medication only if patient's blood pressure greater than 160/100 and she develops symptoms such as worsening chest pain or shortness of breath.    Mixed hyperlipidemia  Continue statin        VTE Risk Mitigation (From admission, onward)           Ordered     enoxaparin injection 40 mg  Every 24 hours         12/11/23 1601     Reason for No Pharmacological VTE Prophylaxis  Once        Question:  Reasons:  Answer:  Active Bleeding    12/09/23 2108     IP VTE HIGH RISK PATIENT  Once         12/09/23 2108     Place sequential compression device  Until discontinued         12/09/23 2108                    Discharge Planning   ESTEFANY: 12/18/2023     Code Status: DNR   Is the patient medically ready for discharge?: No    Reason for patient still in hospital (select all that apply): Patient trending condition and Treatment  Discharge Plan A: Home Health, Home with family          Odin Maldonado MD  Department of Hospital Medicine   [facility] - Intensive Care ([facility]-)

## 2023-12-16 NOTE — SUBJECTIVE & OBJECTIVE
Interval History: No acute events. Afebrile    Review of Systems  Objective:     Vital Signs (Most Recent):  Temp: 98.1 °F (36.7 °C) (12/16/23 1215)  Pulse: 69 (12/16/23 1215)  Resp: 18 (12/16/23 1215)  BP: (!) 166/86 (12/16/23 1215)  SpO2: 96 % (12/16/23 1215) Vital Signs (24h Range):  Temp:  [97.8 °F (36.6 °C)-98.8 °F (37.1 °C)] 98.1 °F (36.7 °C)  Pulse:  [] 69  Resp:  [14-18] 18  SpO2:  [94 %-98 %] 96 %  BP: (119-182)/(65-93) 166/86     Weight: 51.2 kg (112 lb 14 oz)  Body mass index is 20 kg/m².    Intake/Output Summary (Last 24 hours) at 12/16/2023 1337  Last data filed at 12/16/2023 0545  Gross per 24 hour   Intake --   Output 1150 ml   Net -1150 ml         Physical Exam  Constitutional:       General: She is not in acute distress.     Appearance: She is ill-appearing. She is not toxic-appearing.   Cardiovascular:      Rate and Rhythm: Normal rate and regular rhythm.      Heart sounds: No murmur heard.     No friction rub. No gallop.   Pulmonary:      Effort: Pulmonary effort is normal. No respiratory distress.   Abdominal:      General: Abdomen is flat. There is no distension.      Palpations: Abdomen is soft.      Tenderness: There is no abdominal tenderness. There is no guarding or rebound.   Musculoskeletal:      Right lower leg: No edema.      Left lower leg: No edema.   Neurological:      General: No focal deficit present.             Significant Labs: All pertinent labs within the past 24 hours have been reviewed.    Significant Imaging: I have reviewed all pertinent imaging results/findings within the past 24 hours.

## 2023-12-16 NOTE — PLAN OF CARE
Problem: Adult Inpatient Plan of Care  Goal: Plan of Care Review  Outcome: Ongoing, Progressing  Goal: Patient-Specific Goal (Individualized)  Outcome: Ongoing, Progressing  Goal: Absence of Hospital-Acquired Illness or Injury  Outcome: Ongoing, Progressing  Goal: Optimal Comfort and Wellbeing  Outcome: Ongoing, Progressing  Goal: Readiness for Transition of Care  Outcome: Ongoing, Progressing     Problem: Adjustment to Illness (Stroke, Hemorrhagic)  Goal: Optimal Coping  Outcome: Ongoing, Progressing     Problem: Bowel Elimination Impaired (Stroke, Hemorrhagic)  Goal: Effective Bowel Elimination  Outcome: Ongoing, Progressing     Problem: Cerebral Tissue Perfusion (Stroke, Hemorrhagic)  Goal: Optimal Cerebral Tissue Perfusion  Outcome: Ongoing, Progressing     Problem: Cognitive Impairment (Stroke, Hemorrhagic)  Goal: Optimal Cognitive Function  Outcome: Ongoing, Progressing     Problem: Communication Impairment (Stroke, Hemorrhagic)  Goal: Effective Communication Skills  Outcome: Ongoing, Progressing     Problem: Functional Ability Impaired (Stroke, Hemorrhagic)  Goal: Optimal Functional Ability  Outcome: Ongoing, Progressing     Problem: Pain (Stroke, Hemorrhagic)  Goal: Acceptable Pain Control  Outcome: Ongoing, Progressing     Problem: Respiratory Compromise (Stroke, Hemorrhagic)  Goal: Effective Oxygenation and Ventilation  Outcome: Ongoing, Progressing     Problem: Sensorimotor Impairment (Stroke, Hemorrhagic)  Goal: Improved Sensorimotor Function  Outcome: Ongoing, Progressing     Problem: Swallowing Impairment (Stroke, Hemorrhagic)  Goal: Optimal Eating and Swallowing Without Aspiration  Outcome: Ongoing, Progressing     Problem: Urinary Elimination Impaired (Stroke, Hemorrhagic)  Goal: Effective Urinary Elimination  Outcome: Ongoing, Progressing     Problem: Skin Injury Risk Increased  Goal: Skin Health and Integrity  Outcome: Ongoing, Progressing     Problem: Fall Injury Risk  Goal: Absence of Fall and  Fall-Related Injury  Outcome: Ongoing, Progressing     Problem: Restraint, Nonbehavioral (Nonviolent)  Goal: Absence of Harm or Injury  Outcome: Ongoing, Progressing     Problem: Impaired Wound Healing  Goal: Optimal Wound Healing  Outcome: Ongoing, Progressing     Problem: Infection  Goal: Absence of Infection Signs and Symptoms  Outcome: Ongoing, Progressing   Pt Aox1-2, disoriented to time and situation. Pt easily redirected. Telesitter at bedside. PRNS given see MAR. Safety measures in place, rounded per facility policy.

## 2023-12-17 LAB
ALBUMIN SERPL BCP-MCNC: 2.9 G/DL (ref 3.5–5.2)
ALP SERPL-CCNC: 104 U/L (ref 55–135)
ALT SERPL W/O P-5'-P-CCNC: 18 U/L (ref 10–44)
ANION GAP SERPL CALC-SCNC: 12 MMOL/L (ref 8–16)
AST SERPL-CCNC: 21 U/L (ref 10–40)
BACTERIA #/AREA URNS AUTO: ABNORMAL /HPF
BASOPHILS # BLD AUTO: 0.05 K/UL (ref 0–0.2)
BASOPHILS NFR BLD: 0.3 % (ref 0–1.9)
BILIRUB SERPL-MCNC: 0.7 MG/DL (ref 0.1–1)
BILIRUB UR QL STRIP: NEGATIVE
BUN SERPL-MCNC: 18 MG/DL (ref 10–30)
CALCIUM SERPL-MCNC: 9.8 MG/DL (ref 8.7–10.5)
CHLORIDE SERPL-SCNC: 96 MMOL/L (ref 95–110)
CLARITY UR REFRACT.AUTO: ABNORMAL
CO2 SERPL-SCNC: 23 MMOL/L (ref 23–29)
COLOR UR AUTO: YELLOW
CREAT SERPL-MCNC: 0.7 MG/DL (ref 0.5–1.4)
DIFFERENTIAL METHOD: ABNORMAL
EOSINOPHIL # BLD AUTO: 0 K/UL (ref 0–0.5)
EOSINOPHIL NFR BLD: 0 % (ref 0–8)
ERYTHROCYTE [DISTWIDTH] IN BLOOD BY AUTOMATED COUNT: 12.6 % (ref 11.5–14.5)
EST. GFR  (NO RACE VARIABLE): >60 ML/MIN/1.73 M^2
GLUCOSE SERPL-MCNC: 131 MG/DL (ref 70–110)
GLUCOSE UR QL STRIP: ABNORMAL
HCT VFR BLD AUTO: 36.4 % (ref 37–48.5)
HGB BLD-MCNC: 12.4 G/DL (ref 12–16)
HGB UR QL STRIP: NEGATIVE
IMM GRANULOCYTES # BLD AUTO: 0.17 K/UL (ref 0–0.04)
IMM GRANULOCYTES NFR BLD AUTO: 0.9 % (ref 0–0.5)
KETONES UR QL STRIP: NEGATIVE
LEUKOCYTE ESTERASE UR QL STRIP: ABNORMAL
LYMPHOCYTES # BLD AUTO: 1.2 K/UL (ref 1–4.8)
LYMPHOCYTES NFR BLD: 6 % (ref 18–48)
MAGNESIUM SERPL-MCNC: 1.8 MG/DL (ref 1.6–2.6)
MCH RBC QN AUTO: 31.8 PG (ref 27–31)
MCHC RBC AUTO-ENTMCNC: 34.1 G/DL (ref 32–36)
MCV RBC AUTO: 93 FL (ref 82–98)
MICROSCOPIC COMMENT: ABNORMAL
MONOCYTES # BLD AUTO: 1.2 K/UL (ref 0.3–1)
MONOCYTES NFR BLD: 6.3 % (ref 4–15)
NEUTROPHILS # BLD AUTO: 16.6 K/UL (ref 1.8–7.7)
NEUTROPHILS NFR BLD: 86.5 % (ref 38–73)
NITRITE UR QL STRIP: NEGATIVE
NRBC BLD-RTO: 0 /100 WBC
PH UR STRIP: 7 [PH] (ref 5–8)
PHOSPHATE SERPL-MCNC: 3.2 MG/DL (ref 2.7–4.5)
PLATELET # BLD AUTO: 364 K/UL (ref 150–450)
PMV BLD AUTO: 11.1 FL (ref 9.2–12.9)
POCT GLUCOSE: 147 MG/DL (ref 70–110)
POTASSIUM SERPL-SCNC: 3.4 MMOL/L (ref 3.5–5.1)
PROT SERPL-MCNC: 7.9 G/DL (ref 6–8.4)
PROT UR QL STRIP: NEGATIVE
RBC # BLD AUTO: 3.9 M/UL (ref 4–5.4)
RBC #/AREA URNS AUTO: 2 /HPF (ref 0–4)
SODIUM SERPL-SCNC: 131 MMOL/L (ref 136–145)
SP GR UR STRIP: 1.01 (ref 1–1.03)
URN SPEC COLLECT METH UR: ABNORMAL
WBC # BLD AUTO: 19.16 K/UL (ref 3.9–12.7)
WBC #/AREA URNS AUTO: 17 /HPF (ref 0–5)

## 2023-12-17 PROCEDURE — 25000003 PHARM REV CODE 250: Performed by: REGISTERED NURSE

## 2023-12-17 PROCEDURE — 85025 COMPLETE CBC W/AUTO DIFF WBC: CPT

## 2023-12-17 PROCEDURE — 93010 ELECTROCARDIOGRAM REPORT: CPT | Mod: ,,, | Performed by: INTERNAL MEDICINE

## 2023-12-17 PROCEDURE — 20600001 HC STEP DOWN PRIVATE ROOM

## 2023-12-17 PROCEDURE — 97535 SELF CARE MNGMENT TRAINING: CPT | Mod: CO

## 2023-12-17 PROCEDURE — 83735 ASSAY OF MAGNESIUM: CPT

## 2023-12-17 PROCEDURE — 51702 INSERT TEMP BLADDER CATH: CPT

## 2023-12-17 PROCEDURE — 25000003 PHARM REV CODE 250: Performed by: PSYCHIATRY & NEUROLOGY

## 2023-12-17 PROCEDURE — 81001 URINALYSIS AUTO W/SCOPE: CPT | Performed by: STUDENT IN AN ORGANIZED HEALTH CARE EDUCATION/TRAINING PROGRAM

## 2023-12-17 PROCEDURE — 25000003 PHARM REV CODE 250: Performed by: PHYSICIAN ASSISTANT

## 2023-12-17 PROCEDURE — 51701 INSERT BLADDER CATHETER: CPT

## 2023-12-17 PROCEDURE — 93005 ELECTROCARDIOGRAM TRACING: CPT

## 2023-12-17 PROCEDURE — 87086 URINE CULTURE/COLONY COUNT: CPT | Performed by: STUDENT IN AN ORGANIZED HEALTH CARE EDUCATION/TRAINING PROGRAM

## 2023-12-17 PROCEDURE — 93010 EKG 12-LEAD: ICD-10-PCS | Mod: ,,, | Performed by: INTERNAL MEDICINE

## 2023-12-17 PROCEDURE — 80053 COMPREHEN METABOLIC PANEL: CPT

## 2023-12-17 PROCEDURE — 87077 CULTURE AEROBIC IDENTIFY: CPT | Performed by: STUDENT IN AN ORGANIZED HEALTH CARE EDUCATION/TRAINING PROGRAM

## 2023-12-17 PROCEDURE — 97530 THERAPEUTIC ACTIVITIES: CPT | Mod: CO

## 2023-12-17 PROCEDURE — 87088 URINE BACTERIA CULTURE: CPT | Performed by: STUDENT IN AN ORGANIZED HEALTH CARE EDUCATION/TRAINING PROGRAM

## 2023-12-17 PROCEDURE — 87186 SC STD MICRODIL/AGAR DIL: CPT | Performed by: STUDENT IN AN ORGANIZED HEALTH CARE EDUCATION/TRAINING PROGRAM

## 2023-12-17 PROCEDURE — 84100 ASSAY OF PHOSPHORUS: CPT

## 2023-12-17 PROCEDURE — 63600175 PHARM REV CODE 636 W HCPCS: Performed by: STUDENT IN AN ORGANIZED HEALTH CARE EDUCATION/TRAINING PROGRAM

## 2023-12-17 PROCEDURE — 25000003 PHARM REV CODE 250: Performed by: STUDENT IN AN ORGANIZED HEALTH CARE EDUCATION/TRAINING PROGRAM

## 2023-12-17 PROCEDURE — 36415 COLL VENOUS BLD VENIPUNCTURE: CPT

## 2023-12-17 PROCEDURE — 51798 US URINE CAPACITY MEASURE: CPT

## 2023-12-17 RX ORDER — QUETIAPINE FUMARATE 25 MG/1
25 TABLET, FILM COATED ORAL ONCE
Status: COMPLETED | OUTPATIENT
Start: 2023-12-17 | End: 2023-12-17

## 2023-12-17 RX ORDER — POTASSIUM CHLORIDE 20 MEQ/1
40 TABLET, EXTENDED RELEASE ORAL ONCE
Status: DISCONTINUED | OUTPATIENT
Start: 2023-12-17 | End: 2023-12-17

## 2023-12-17 RX ADMIN — CARVEDILOL 3.12 MG: 3.12 TABLET, FILM COATED ORAL at 09:12

## 2023-12-17 RX ADMIN — ENOXAPARIN SODIUM 40 MG: 40 INJECTION SUBCUTANEOUS at 05:12

## 2023-12-17 RX ADMIN — CEFTRIAXONE 1 G: 1 INJECTION, POWDER, FOR SOLUTION INTRAMUSCULAR; INTRAVENOUS at 05:12

## 2023-12-17 RX ADMIN — SENNOSIDES AND DOCUSATE SODIUM 1 TABLET: 8.6; 5 TABLET ORAL at 09:12

## 2023-12-17 RX ADMIN — POTASSIUM BICARBONATE 40 MEQ: 391 TABLET, EFFERVESCENT ORAL at 10:12

## 2023-12-17 RX ADMIN — SILODOSIN 4 MG: 4 CAPSULE ORAL at 09:12

## 2023-12-17 RX ADMIN — QUETIAPINE FUMARATE 25 MG: 25 TABLET ORAL at 06:12

## 2023-12-17 RX ADMIN — ATORVASTATIN CALCIUM 40 MG: 40 TABLET, FILM COATED ORAL at 09:12

## 2023-12-17 RX ADMIN — BISACODYL 10 MG: 10 SUPPOSITORY RECTAL at 09:12

## 2023-12-17 RX ADMIN — AMLODIPINE BESYLATE 10 MG: 10 TABLET ORAL at 09:12

## 2023-12-17 NOTE — PROGRESS NOTES
[facility] ([address] Medicine  Progress Note    Patient Name: Eden Cardenas  MRN: [id number]  Patient Class: IP- Inpatient   Admission Date: 12/9/2023  Length of Stay: 8 days  Attending Physician: Odin Maldonado*  Primary Care Provider: Valorie Del Real PT        Subjective:     Principal Problem:SAH (subarachnoid hemorrhage)        HPI:  Eden Cardenas is a 89 y.o. female with history of HTN, HLD, glaucoma that presents with tSAH s/p mechanical fall from ground level. Per chart review, although the fall was unwitnessed, family heard a yell and the sound of the patient hitting the floor. No AC/AP. No LOC. She was brought to the ED, where she was found to be confused with inappropriate speech. She sustained a laceration to the back of her head, which was repaired in the ED. CTH showed bifrontal tSAH as well as a left parietoccipital calvarial fracture with minimal associated pneumocephalus and adjacent SDH. CT T-Spine negative for acute fractures or dislocations. The patient will be admitted to [facility] for close monitoring and a higher level of care.       Overview/Hospital Course:  12/10/2023 d/c EEG, delirium and agitation   12/11/2023 Follow-up CTH this AM stable. Seroquel started last night d/t increased agitation - increased drowsiness today. Will discontinue.  12/12/2023 No issues overnight. No epileptiform activity per Epilepsy, EEG discontinued. More alert for family at bedside. CTH tonight, possible stepdown tomorrow pending results.   12/13/2023 precedex off, resume seroquel.  Completed treatment for UTI    Patient stepped down to hospital medicine 12/14. Mentation improving. NGT removed and cleared by SLP.    Interval History: No acute events. Pt seen this am doing well. Using her phone  Did have leukocytosis but afebrile    Review of Systems  Objective:     Vital Signs (Most Recent):  Temp: 98 °F (36.7 °C) (12/17/23 1116)  Pulse: 104 (12/17/23 1116)  Resp: 19  (12/17/23 1116)  BP: (!) 141/70 (12/17/23 1116)  SpO2: 95 % (12/17/23 1116) Vital Signs (24h Range):  Temp:  [97.4 °F (36.3 °C)-98 °F (36.7 °C)] 98 °F (36.7 °C)  Pulse:  [] 104  Resp:  [7-20] 19  SpO2:  [94 %-100 %] 95 %  BP: (136-173)/() 141/70     Weight: 51.2 kg (112 lb 14 oz)  Body mass index is 20 kg/m².    Intake/Output Summary (Last 24 hours) at 12/17/2023 1309  Last data filed at 12/17/2023 0243  Gross per 24 hour   Intake --   Output 1250 ml   Net -1250 ml         Physical Exam  Constitutional:       General: She is not in acute distress.     Appearance: She is ill-appearing. She is not toxic-appearing.   Cardiovascular:      Rate and Rhythm: Normal rate and regular rhythm.      Heart sounds: No murmur heard.     No friction rub. No gallop.   Pulmonary:      Effort: Pulmonary effort is normal. No respiratory distress.   Abdominal:      General: Abdomen is flat. There is no distension.      Palpations: Abdomen is soft.      Tenderness: There is no abdominal tenderness. There is no guarding or rebound.   Musculoskeletal:      Right lower leg: No edema.      Left lower leg: No edema.   Neurological:      General: No focal deficit present.             Significant Labs: All pertinent labs within the past 24 hours have been reviewed.    Significant Imaging: I have reviewed all pertinent imaging results/findings within the past 24 hours.    Assessment/Plan:      * SAH (subarachnoid hemorrhage)  - traumatic SAH after mechanical fall without LOC per family  - NSGY consulted, maximal medical management  - Patient DNR/DNI per family, with wishes for no operative intervention if the need were to arise  - LCM 50 mg BID x7 days per NSGY/NCC  - EEG 12/11>12/12 with diffuse slowing, more focal slowing in the left hemisphere, no seizures      Leukocytosis  Treated for suspected UTI with 3 days unasyn      Temporal bone fracture  ENT consulted, no surgical intervention      Delirium  Continues to improve  SLP  cleared for diet      Essential (primary) hypertension  Chronic, controlled. Latest blood pressure and vitals reviewed-     Temp:  [98.1 °F (36.7 °C)-98.7 °F (37.1 °C)]   Pulse:  [106-128]   Resp:  [13-18]   BP: (131-158)/(72-86)   SpO2:  [95 %-99 %] .   Home meds for hypertension were reviewed and noted below.   Hypertension Medications               amLODIPine (NORVASC) 5 MG tablet Take 5 mg by mouth once daily.            While in the hospital, will manage blood pressure as follows; Continue home antihypertensive regimen    Will utilize p.r.n. blood pressure medication only if patient's blood pressure greater than 160/100 and she develops symptoms such as worsening chest pain or shortness of breath.    Mixed hyperlipidemia  Continue statin        VTE Risk Mitigation (From admission, onward)           Ordered     enoxaparin injection 40 mg  Every 24 hours         12/11/23 1601     Reason for No Pharmacological VTE Prophylaxis  Once        Question:  Reasons:  Answer:  Active Bleeding    12/09/23 2108     IP VTE HIGH RISK PATIENT  Once         12/09/23 2108     Place sequential compression device  Until discontinued         12/09/23 2108                    Discharge Planning   ESTEFANY: 12/18/2023     Code Status: DNR   Is the patient medically ready for discharge?: No    Reason for patient still in hospital (select all that apply): Patient trending condition and Treatment  Discharge Plan A: Home Health, Home with family          Odin Maldonado MD  Department of Hospital Medicine   [facility] - Intensive Care ([facility]

## 2023-12-17 NOTE — PT/OT/SLP PROGRESS
"Occupational Therapy   Treatment    Name: Eden Cardenas  MRN: [id number]  Admitting Diagnosis:  SAH (subarachnoid hemorrhage)       Recommendations:     Discharge Recommendations: Moderate Intensity Therapy  Discharge Equipment Recommendations:  to be determined by next level of care  Barriers to discharge:       Assessment:     Eden Cardenas is a 89 y.o. female with a medical diagnosis of SAH (subarachnoid hemorrhage).  She presents with the following performance deficits affecting function: weakness, impaired functional mobility, impaired endurance, gait instability, impaired balance, impaired self care skills, decreased lower extremity function, decreased upper extremity function, impaired coordination, decreased safety awareness, impaired cognition.     Pt agreeable to therapy and tolerated session well. Pt is confused and disoriented. She kept requesting to get to the bathroom, even when sitting on bedside commode. Pt requiring light assistance with transfers and bed mobility today. She transferred to bedside commode twice with one person assistance. Pt putting different items (such as wipes and call button) to her ear trying to make phones calls, saying "hello". Pt able to voice needs but unable to clearly express needs. She is also impulsive. Pt was able to void on BSC and completed g/h tasks. Nurse notified    Rehab Prognosis:  Good; patient would benefit from acute skilled OT services to address these deficits and reach maximum level of function.       Plan:     Patient to be seen 3 x/week to address the above listed problems via self-care/home management, therapeutic activities, therapeutic exercises, neuromuscular re-education  Plan of Care Expires: 01/08/24  Plan of Care Reviewed with: patient, daughter    Subjective     Patient/Family Comments/goals: "I need to go to the chair" stated while pt is already in the chair  Pain/Comfort:  Pain Rating 1: other (see comments) (unrated)  Location - Orientation 1: " generalized  Location 1: abdomen  Pain Addressed 1: Reposition, Distraction  Pain Rating Post-Intervention 1:  (unrated)    Objective:     Communicated with: RN prior to session.  Patient found HOB elevated with pulse ox (continuous), telemetry upon OT entry to room.    General Precautions: Standard, fall, aspiration    Orthopedic Precautions:N/A  Braces: N/A  Respiratory Status: Room air     Occupational Performance:     Bed Mobility:    Patient completed Scooting/Bridging with moderate assistance  Patient completed Supine to Sit with minimum assistance     Functional Mobility/Transfers:  Patient completed Sit <> Stand Transfer with minimum assistance  with  no assistive device and hand-held assist x 2 trials  Patient completed Bed <> Chair Transfer using Stand Pivot technique with moderate assistance with no assistive device and hand-held assist  Patient completed Toilet Transfer Stand Pivot technique with moderate assistance with  no AD and bedside commode x 2 trials    Activities of Daily Living:  Grooming: stand by assistance for oral hygiene and facial care  Toileting: stand by assistance for pericare on BSC      AMPAC 6 Click ADL: 18    Treatment & Education:  Pt educated on OT POC and frequency during hospital stay.   Pt educated on importance of OOB activity to improve function and activity tolerance.  Addressed all patient questions/concerns within NUNEZ scope of practice.     Patient left up in chair with all lines intact, call button in reach, and RN notified    GOALS:   Multidisciplinary Problems       Occupational Therapy Goals          Problem: Occupational Therapy    Goal Priority Disciplines Outcome Interventions   Occupational Therapy Goal     OT, PT/OT Ongoing, Progressing    Description: Goals to be met by: 12/24/2023     Patient will increase functional independence with ADLs by performing:    UE Dressing with Minimal Assistance.  LE Dressing with Moderate Assistance.  Grooming while EOB with  Minimal Assistance.  Toileting from bedside commode with Maximum Assistance for hygiene and clothing management.   Supine to sit with Minimal Assistance. MET 12/14/2023  Stand pivot transfer with Minimal Assistance using LRAD as needed.  Toilet transfer to toilet with Minimal Assistance using LRAD as needed.                         Time Tracking:     OT Date of Treatment: 12/17/23  OT Start Time: 1051  OT Stop Time: 1113  OT  Start Time: 1124  OT Stop Time:1134  OT Total Time (min): 33 min    Billable Minutes:Self Care/Home Management 15  Therapeutic Activity 18    OT/FRANCY: FRANCY     Number of FRANCY visits since last OT visit: 2    12/17/2023

## 2023-12-17 NOTE — SUBJECTIVE & OBJECTIVE
Interval History: No acute events. Pt seen this am doing well. Using her phone  Did have leukocytosis but afebrile    Review of Systems  Objective:     Vital Signs (Most Recent):  Temp: 98 °F (36.7 °C) (12/17/23 1116)  Pulse: 104 (12/17/23 1116)  Resp: 19 (12/17/23 1116)  BP: (!) 141/70 (12/17/23 1116)  SpO2: 95 % (12/17/23 1116) Vital Signs (24h Range):  Temp:  [97.4 °F (36.3 °C)-98 °F (36.7 °C)] 98 °F (36.7 °C)  Pulse:  [] 104  Resp:  [7-20] 19  SpO2:  [94 %-100 %] 95 %  BP: (136-173)/() 141/70     Weight: 51.2 kg (112 lb 14 oz)  Body mass index is 20 kg/m².    Intake/Output Summary (Last 24 hours) at 12/17/2023 1309  Last data filed at 12/17/2023 0243  Gross per 24 hour   Intake --   Output 1250 ml   Net -1250 ml         Physical Exam  Constitutional:       General: She is not in acute distress.     Appearance: She is ill-appearing. She is not toxic-appearing.   Cardiovascular:      Rate and Rhythm: Normal rate and regular rhythm.      Heart sounds: No murmur heard.     No friction rub. No gallop.   Pulmonary:      Effort: Pulmonary effort is normal. No respiratory distress.   Abdominal:      General: Abdomen is flat. There is no distension.      Palpations: Abdomen is soft.      Tenderness: There is no abdominal tenderness. There is no guarding or rebound.   Musculoskeletal:      Right lower leg: No edema.      Left lower leg: No edema.   Neurological:      General: No focal deficit present.             Significant Labs: All pertinent labs within the past 24 hours have been reviewed.    Significant Imaging: I have reviewed all pertinent imaging results/findings within the past 24 hours.

## 2023-12-17 NOTE — PLAN OF CARE
Problem: Adult Inpatient Plan of Care  Goal: Plan of Care Review  Outcome: Ongoing, Progressing  Goal: Patient-Specific Goal (Individualized)  Outcome: Ongoing, Progressing  Goal: Absence of Hospital-Acquired Illness or Injury  Outcome: Ongoing, Progressing  Goal: Optimal Comfort and Wellbeing  Outcome: Ongoing, Progressing  Goal: Readiness for Transition of Care  Outcome: Ongoing, Progressing     Problem: Adjustment to Illness (Stroke, Hemorrhagic)  Goal: Optimal Coping  Outcome: Ongoing, Progressing     Problem: Bowel Elimination Impaired (Stroke, Hemorrhagic)  Goal: Effective Bowel Elimination  Outcome: Ongoing, Progressing     Problem: Cerebral Tissue Perfusion (Stroke, Hemorrhagic)  Goal: Optimal Cerebral Tissue Perfusion  Outcome: Ongoing, Progressing     Problem: Cognitive Impairment (Stroke, Hemorrhagic)  Goal: Optimal Cognitive Function  Outcome: Ongoing, Progressing     Problem: Communication Impairment (Stroke, Hemorrhagic)  Goal: Effective Communication Skills  Outcome: Ongoing, Progressing     Problem: Functional Ability Impaired (Stroke, Hemorrhagic)  Goal: Optimal Functional Ability  Outcome: Ongoing, Progressing     Problem: Pain (Stroke, Hemorrhagic)  Goal: Acceptable Pain Control  Outcome: Ongoing, Progressing     Problem: Respiratory Compromise (Stroke, Hemorrhagic)  Goal: Effective Oxygenation and Ventilation  Outcome: Ongoing, Progressing     Problem: Sensorimotor Impairment (Stroke, Hemorrhagic)  Goal: Improved Sensorimotor Function  Outcome: Ongoing, Progressing     Problem: Swallowing Impairment (Stroke, Hemorrhagic)  Goal: Optimal Eating and Swallowing Without Aspiration  Outcome: Ongoing, Progressing     Problem: Urinary Elimination Impaired (Stroke, Hemorrhagic)  Goal: Effective Urinary Elimination  Outcome: Ongoing, Progressing     Problem: Skin Injury Risk Increased  Goal: Skin Health and Integrity  Outcome: Ongoing, Progressing     Problem: Fall Injury Risk  Goal: Absence of Fall and  Fall-Related Injury  Outcome: Ongoing, Progressing     Problem: Restraint, Nonbehavioral (Nonviolent)  Goal: Absence of Harm or Injury  Outcome: Ongoing, Progressing     Problem: Impaired Wound Healing  Goal: Optimal Wound Healing  Outcome: Ongoing, Progressing     Problem: Infection  Goal: Absence of Infection Signs and Symptoms  Outcome: Ongoing, Progressing   Pt Aox1-2, VSS pt tachy. Performed in and out cath x1. Pt voided spontaneously post in and out x1. Pt turned and pivot to bedside commode x1 assist. Pt denies any current pain, safety measures in place, call light within reach.

## 2023-12-17 NOTE — CARE UPDATE
"RAPID RESPONSE NURSE CHART REVIEW        Chart Reviewed: 12/16/2023, 11:36 PM    MRN: [id number]  Bed: 74025/53387 A    Dx: SAH (subarachnoid hemorrhage)    Eden Cardenas has no past medical history on file.    Last VS: BP (!) 167/74   Pulse (!) 111   Temp 97.4 °F (36.3 °C)   Resp (!) 7   Ht 5' 3" (1.6 m)   Wt 51.2 kg (112 lb 14 oz)   SpO2 99%   Breastfeeding No   BMI 20.00 kg/m²     24H Vital Sign Range:  Temp:  [97.4 °F (36.3 °C)-98.1 °F (36.7 °C)]   Pulse:  []   Resp:  [7-20]   BP: (136-182)/(67-93)   SpO2:  [94 %-100 %]     Level of Consciousness (AVPU): responds to voice    Recent Labs     12/14/23  0422 12/15/23  0253 12/16/23  0506   WBC 9.54 10.30 14.53*   HGB 10.9* 11.8* 11.7*   HCT 31.3* 35.6* 34.7*    282 291       Recent Labs     12/14/23  0422 12/15/23  0253 12/16/23  0506   * 146* 138   K 3.8 3.4* 3.1*    109 103   CO2 25 26 25   BUN 28 27 25   CREATININE 0.7 0.7 0.7   * 115* 165*   PHOS 2.5* 3.4 3.0   MG 2.3 2.2 2.1        OXYGEN:  Flow (L/min): 2          MEWS score: 3    Rounding completed with charge MARIAH Angel.  Reports Tachy to 120s with stable pressure, pt in NAD. No additional concerns verbalized at this time. Instructed to call [phone] for further concerns or assistance.    Gagan Ervin RN        "

## 2023-12-18 LAB
ALBUMIN SERPL BCP-MCNC: 2.8 G/DL (ref 3.5–5.2)
ALP SERPL-CCNC: 97 U/L (ref 55–135)
ALT SERPL W/O P-5'-P-CCNC: 19 U/L (ref 10–44)
ANION GAP SERPL CALC-SCNC: 11 MMOL/L (ref 8–16)
AST SERPL-CCNC: 22 U/L (ref 10–40)
BASOPHILS # BLD AUTO: 0.05 K/UL (ref 0–0.2)
BASOPHILS NFR BLD: 0.4 % (ref 0–1.9)
BILIRUB SERPL-MCNC: 0.6 MG/DL (ref 0.1–1)
BUN SERPL-MCNC: 21 MG/DL (ref 10–30)
CALCIUM SERPL-MCNC: 9.5 MG/DL (ref 8.7–10.5)
CHLORIDE SERPL-SCNC: 93 MMOL/L (ref 95–110)
CO2 SERPL-SCNC: 26 MMOL/L (ref 23–29)
CREAT SERPL-MCNC: 0.7 MG/DL (ref 0.5–1.4)
DIFFERENTIAL METHOD: ABNORMAL
EOSINOPHIL # BLD AUTO: 0 K/UL (ref 0–0.5)
EOSINOPHIL NFR BLD: 0.1 % (ref 0–8)
ERYTHROCYTE [DISTWIDTH] IN BLOOD BY AUTOMATED COUNT: 12.4 % (ref 11.5–14.5)
EST. GFR  (NO RACE VARIABLE): >60 ML/MIN/1.73 M^2
GLUCOSE SERPL-MCNC: 127 MG/DL (ref 70–110)
HCT VFR BLD AUTO: 36 % (ref 37–48.5)
HGB BLD-MCNC: 12.5 G/DL (ref 12–16)
IMM GRANULOCYTES # BLD AUTO: 0.26 K/UL (ref 0–0.04)
IMM GRANULOCYTES NFR BLD AUTO: 1.9 % (ref 0–0.5)
LYMPHOCYTES # BLD AUTO: 1.2 K/UL (ref 1–4.8)
LYMPHOCYTES NFR BLD: 8.7 % (ref 18–48)
MAGNESIUM SERPL-MCNC: 1.8 MG/DL (ref 1.6–2.6)
MCH RBC QN AUTO: 32 PG (ref 27–31)
MCHC RBC AUTO-ENTMCNC: 34.7 G/DL (ref 32–36)
MCV RBC AUTO: 92 FL (ref 82–98)
MONOCYTES # BLD AUTO: 1.3 K/UL (ref 0.3–1)
MONOCYTES NFR BLD: 9.6 % (ref 4–15)
NEUTROPHILS # BLD AUTO: 10.8 K/UL (ref 1.8–7.7)
NEUTROPHILS NFR BLD: 79.3 % (ref 38–73)
NRBC BLD-RTO: 0 /100 WBC
PHOSPHATE SERPL-MCNC: 2.7 MG/DL (ref 2.7–4.5)
PLATELET # BLD AUTO: 340 K/UL (ref 150–450)
PMV BLD AUTO: 10.8 FL (ref 9.2–12.9)
POCT GLUCOSE: 119 MG/DL (ref 70–110)
POCT GLUCOSE: 130 MG/DL (ref 70–110)
POCT GLUCOSE: 152 MG/DL (ref 70–110)
POTASSIUM SERPL-SCNC: 3.2 MMOL/L (ref 3.5–5.1)
PROT SERPL-MCNC: 7.6 G/DL (ref 6–8.4)
RBC # BLD AUTO: 3.91 M/UL (ref 4–5.4)
SODIUM SERPL-SCNC: 130 MMOL/L (ref 136–145)
WBC # BLD AUTO: 13.59 K/UL (ref 3.9–12.7)

## 2023-12-18 PROCEDURE — 92507 TX SP LANG VOICE COMM INDIV: CPT

## 2023-12-18 PROCEDURE — 83735 ASSAY OF MAGNESIUM: CPT

## 2023-12-18 PROCEDURE — 25000003 PHARM REV CODE 250: Performed by: PHYSICIAN ASSISTANT

## 2023-12-18 PROCEDURE — 25000003 PHARM REV CODE 250: Performed by: REGISTERED NURSE

## 2023-12-18 PROCEDURE — 20600001 HC STEP DOWN PRIVATE ROOM

## 2023-12-18 PROCEDURE — 97535 SELF CARE MNGMENT TRAINING: CPT | Mod: CO

## 2023-12-18 PROCEDURE — 99222 1ST HOSP IP/OBS MODERATE 55: CPT | Mod: ,,, | Performed by: NURSE PRACTITIONER

## 2023-12-18 PROCEDURE — 92526 ORAL FUNCTION THERAPY: CPT

## 2023-12-18 PROCEDURE — 97530 THERAPEUTIC ACTIVITIES: CPT | Mod: CQ

## 2023-12-18 PROCEDURE — 25000003 PHARM REV CODE 250: Performed by: PSYCHIATRY & NEUROLOGY

## 2023-12-18 PROCEDURE — 84100 ASSAY OF PHOSPHORUS: CPT

## 2023-12-18 PROCEDURE — 36415 COLL VENOUS BLD VENIPUNCTURE: CPT

## 2023-12-18 PROCEDURE — 85025 COMPLETE CBC W/AUTO DIFF WBC: CPT

## 2023-12-18 PROCEDURE — 25000003 PHARM REV CODE 250: Performed by: STUDENT IN AN ORGANIZED HEALTH CARE EDUCATION/TRAINING PROGRAM

## 2023-12-18 PROCEDURE — 99222 PR INITIAL HOSPITAL CARE,LEVL II: ICD-10-PCS | Mod: ,,, | Performed by: NURSE PRACTITIONER

## 2023-12-18 PROCEDURE — 63600175 PHARM REV CODE 636 W HCPCS: Performed by: STUDENT IN AN ORGANIZED HEALTH CARE EDUCATION/TRAINING PROGRAM

## 2023-12-18 PROCEDURE — 80053 COMPREHEN METABOLIC PANEL: CPT

## 2023-12-18 RX ADMIN — ENOXAPARIN SODIUM 40 MG: 40 INJECTION SUBCUTANEOUS at 06:12

## 2023-12-18 RX ADMIN — CEFTRIAXONE 1 G: 1 INJECTION, POWDER, FOR SOLUTION INTRAMUSCULAR; INTRAVENOUS at 06:12

## 2023-12-18 RX ADMIN — SENNOSIDES AND DOCUSATE SODIUM 1 TABLET: 8.6; 5 TABLET ORAL at 08:12

## 2023-12-18 RX ADMIN — LATANOPROST 1 DROP: 50 SOLUTION OPHTHALMIC at 10:12

## 2023-12-18 RX ADMIN — SILODOSIN 4 MG: 4 CAPSULE ORAL at 08:12

## 2023-12-18 RX ADMIN — ATORVASTATIN CALCIUM 40 MG: 40 TABLET, FILM COATED ORAL at 08:12

## 2023-12-18 RX ADMIN — POTASSIUM BICARBONATE 40 MEQ: 391 TABLET, EFFERVESCENT ORAL at 09:12

## 2023-12-18 RX ADMIN — CARVEDILOL 3.12 MG: 3.12 TABLET, FILM COATED ORAL at 08:12

## 2023-12-18 RX ADMIN — BISACODYL 10 MG: 10 SUPPOSITORY RECTAL at 08:12

## 2023-12-18 RX ADMIN — AMLODIPINE BESYLATE 10 MG: 10 TABLET ORAL at 08:12

## 2023-12-18 NOTE — PLAN OF CARE
A&OX2 today. Progressively more anxious as day went on. Keeps yelling help. Doesn't understand that she has to stay in bed. Keeps wanting to get up to pee even though I keep reminding her she has a catheter. Ripped out her midline. Seroquel ordered. Attempting to replace IV.

## 2023-12-18 NOTE — PT/OT/SLP PROGRESS
Speech Language Pathology Treatment    Patient Name:  Eden Cardenas   MRN:  [id number]  Admitting Diagnosis: SAH (subarachnoid hemorrhage)    Recommendations:                 General Recommendations:  Cognitive-linguistic therapy  Diet recommendations:  Regular Diet - IDDSI Level 7, Liquid Diet Level: Thin liquids - IDDSI Level 0   Aspiration Precautions: 1 bite/sip at a time, HOB to 90 degrees, Small bites/sips, and Standard aspiration precautions   General Precautions: Standard, fall  Communication strategies:   Speak up    Assessment:     Eden Cardenas is a 89 y.o. female with an SLP diagnosis of Cognitive-Linguistic Impairment.      Subjective     Spoke with RN prior to session. Pt awake though confused.     Pain/Comfort:  Pain Rating 1: 10/10  Location - Side 1: Right  Location 1: hip    Respiratory Status: Room air    Objective:     Has the patient been evaluated by SLP for swallowing?   Yes  Keep patient NPO? No      Pt seen for ongoing diet check and cognitive-linguistic therapy. HOB raised and worked on intake of regular solids and thin liquids with cracker and cyclic sips of water from straw. Recommend she continue with as regular diet at this time.  Pt AAO x 3 to person, place and month with min assist. She was able to follow 1 step commands with 100% to complete OME. She was hard of hearing and demonstrated confusion across session requiring frequent repetitions and cuing.  Session terminated early 2/2 RN present for IV placement and pt easily distracted. Education provided re: role of SLP, diet recs, swallow precs, s/s aspiration and POC.  Pt verbalized understanding and agreement. SLP will continue to follow to address cognitive-linguistic deficits.    Goals:   Multidisciplinary Problems       SLP Goals          Problem: SLP    Goal Priority Disciplines Outcome   SLP Goal     SLP Ongoing, Progressing   Description: Speech Language Pathology Goals  Goals expected to be met by 12/24    1. Pt will  participate in ongoing swallow assessment to determine least restrictive PO diet when appropriate.  2. Pt will demonstrate orientation to self, place, situation , family members, date w/ min cues   3.  Pt will follow single step directions w/ 75% acc w/ min cues to enhance comprehension skills   4 Pt will complete yes/no questions w/ 75% acc w/ min cues to enhance comprehension skills   5.  Pt will participate in automatic speech tasks w/ 75% acc w/ min cues to enhance verbal expression skills   6.  Pt will participate in ongoing assessment of speech language and cognitive linguistic skills to help rule out deficits and determine therapeutic plan of care                                Plan:     Patient to be seen:  3 x/week   Plan of Care expires:  01/09/24  Plan of Care reviewed with:  patient   SLP Follow-Up:  Yes       Discharge recommendations:  Moderate Intensity Therapy   Barriers to Discharge:  None    Time Tracking:     SLP Treatment Date:   12/18/23  Speech Start Time:  1010  Speech Stop Time:  1020     Speech Total Time (min):  10 min    Billable Minutes: Speech Therapy Individual 5 and Treatment Swallowing Dysfunction 5    12/18/2023

## 2023-12-18 NOTE — PLAN OF CARE
12/18/23 1143   Post-Acute Status   Post-Acute Authorization Placement   Coverage Medicare part A&B/ BCBS   Discharge Delays None known at this time   Discharge Plan   Discharge Plan A Skilled Nursing Facility     Update on SNF placement:    1.[facility] [employer] Phone: [phone]   -Yes, willing to accept patient    2.[facility] Phone: [phone]   - Interested, but need more information  3.[facility] Phone: [phone]   -No, unable to accept patient Reason: No Available Bed    4.[facility] Phone: [phone]  -No, unable to accept patient  Reason: Care Needs Exceed Current Capacity    5.[facility] Phone: [phone]  -Referral Received    6.[facility] Phone: [phone]   - referral received  7.[facility] Phone: [phone]   - referral received  8.[facility] Phone: [phone]   - referral received   9.[facility] Phone: [phone]  - No response   10.[facility] Phone: [phone]  - no response    SW will follow.    Jessy Black LMSW  PRN - [facility]  [id number]      2 y/o M UTD on immunizations presents with fever for 1 day. No vomiting. No Tolerating PO. Had 4 wet diapers today. No abd distention.

## 2023-12-18 NOTE — PLAN OF CARE
12/18/23 1205   Post-Acute Status   Post-Acute Authorization Placement   Post-Acute Placement Status Referrals Sent   Coverage Medicare Part A & B   Discharge Delays None known at this time   Discharge Plan   Discharge Plan A Rehab     SW spoke with pt's daughter and POA regarding rehab placement.  Met with daughter to review discharge recommendation of rehab and is agreeable to plan    Patient/family provided list of facilities in-network with patient's payor plan. Providers that are owned, operated, or affiliated with [facility] are included on the list.     Notified that referral sent to below listed facilities from in-network list based on proximity to home/family support:   1.[facility] Phone: [phone]     2.[facility] Phone: [phone]     3.[facility] Phone: [phone]     4.[facility] Phone: [phone]    5.[facility] Phone: [phone]       Patient/family instructed to identify preference.    Preferred Facility: (if more than 1, listed in order of descending preference)  1.[facility] Phone: [phone]     2.[facility] Phone: [phone]    3. [facility] Phone: [phone]     4.[facility] Phone: [phone]    5.[facility] Phone: [phone]       If an additional preferred facility not listed above is identified, additional referral to be sent. If above facilities unable to accept, will send additional referrals to in-network providers.        12:13 PM   SW placed PMR for rehab    1:49 PM   Update on rehab:  [facility] Phone: [phone]   -Interested, but need more  information; Price will follow     2:15 PM   O- rehab reported;reviewed with Bryan GARNETT. We are recommending SNF. Therapy recommending mod intense therapy. 3 hours at rehab would be a lot for her. SW will follow        ASHA ThomasN - [facility]  [phone]

## 2023-12-18 NOTE — PT/OT/SLP PROGRESS
Occupational Therapy   Treatment    Name: Eden Cardenas  MRN: [id number]  Admitting Diagnosis:  SAH (subarachnoid hemorrhage)       Recommendations:     Discharge Recommendations: Moderate Intensity Therapy  Discharge Equipment Recommendations:  to be determined by next level of care  Barriers to discharge:       Assessment:     Eden Cardenas is a 89 y.o. female with a medical diagnosis of SAH (subarachnoid hemorrhage).  She presents with the following performance deficits affecting function: weakness, impaired functional mobility, impaired endurance, gait instability, impaired balance, impaired self care skills, decreased lower extremity function, decreased upper extremity function, impaired coordination, decreased safety awareness, impaired cognition.     Pt agreeable to therapy and tolerated session well. Pt required light assistance with transfers and bed mobility. She remains disoriented and confused. Pt sat EOB, c/o dizziness. While EOB, HR reading high 130s. When transferring to bedside chair, HR elevating to 140s but recovering back to resting HR. Nurse notified.      Rehab Prognosis:  Good; patient would benefit from acute skilled OT services to address these deficits and reach maximum level of function.       Plan:     Patient to be seen 3 x/week to address the above listed problems via self-care/home management, therapeutic activities, therapeutic exercises, neuromuscular re-education  Plan of Care Expires: 01/08/24  Plan of Care Reviewed with: patient    Subjective     Patient/Family Comments/goals: to get to chair  Pain/Comfort:  Pain Rating 1:  (unrated)    Objective:     Communicated with: RN prior to session.  Patient found HOB elevated with christensen catheter, telemetry upon OT entry to room.  A client care conference was completed by the OTR and the NUNEZ prior to treatment by the NUNEZ to discuss the patient's POC and current status.  Treatment supervised by MIHIR Mayer.    General  Precautions: Standard, fall, aspiration    Orthopedic Precautions:N/A  Braces: N/A  Respiratory Status: Room air     Occupational Performance:     Bed Mobility:    Patient completed Scooting/Bridging with maximal assistance  Patient completed Supine to Sit with minimum assistance     Functional Mobility/Transfers:  Patient completed Sit <> Stand Transfer with minimum assistance  with  no assistive device and hand-held assist x 2 trials  Patient completed Bed <> Chair Transfer using Stand Pivot technique with moderate assistance with no assistive device, requiring assistance to turn at the hips    Activities of Daily Living:  Grooming: supervision facial care seated EOB, pt stating she wanted to perform oral hygiene after she eats lunch.      [facility] 6 Click ADL: 18    Treatment & Education:  Pt educated on OT POC and frequency during hospital stay.   Pt educated on importance of OOB activity to improve function and activity tolerance.  Pt educated on pacing techniques to improve safety awareness.   Pt educated on proper hand placement and techniques for transfers to improve safety awareness.   Addressed all patient questions/concerns within NUNEZ scope of practice.     Patient left up in chair with all lines intact, call button in reach, and RN notified    GOALS:   Multidisciplinary Problems       Occupational Therapy Goals          Problem: Occupational Therapy    Goal Priority Disciplines Outcome Interventions   Occupational Therapy Goal     OT, PT/OT Ongoing, Progressing    Description: Goals to be met by: 12/24/2023     Patient will increase functional independence with ADLs by performing:    UE Dressing with Minimal Assistance.  LE Dressing with Moderate Assistance.  Grooming while EOB with Minimal Assistance.  Toileting from bedside commode with Maximum Assistance for hygiene and clothing management.   Supine to sit with Minimal Assistance. MET 12/14/2023  Stand pivot transfer with Minimal Assistance using LRAD  as needed.  Toilet transfer to toilet with Minimal Assistance using LRAD as needed.                         Time Tracking:     OT Date of Treatment: 12/18/23  OT Start Time: 1158  OT Stop Time: 1219  OT Total Time (min): 21 min    Billable Minutes:Self Care/Home Management 21    OT/FRANCY: FRANCY     Number of FRANCY visits since last OT visit: 3    12/18/2023

## 2023-12-18 NOTE — CONSULTS
20g x 1.75in PIV placed in Left FA by BELINDA using USG   Subjective:       Patient ID: Vick Gonzalez is a 64 y.o. male.    Chief Complaint: Cough, Wheezing, and Chest Congestion    64 yr old pleasant black male with DM II, anxiety, hypothyroidism, HLD, ss/p liver transplant, presents today for urgent evaluation of cough, SOB and wheezing. Onset less than a week ago and worsening. No sick contacts/smpoking or travel. Never had respiratory issues before. No fever or chills. Details as follows -    Cough  This is a new problem. The current episode started in the past 7 days. The problem has been unchanged. The cough is productive of sputum. Associated symptoms include shortness of breath and wheezing. Pertinent negatives include no chest pain, ear pain, myalgias, rash or rhinorrhea. Nothing aggravates the symptoms. He has tried OTC cough suppressant for the symptoms. The treatment provided no relief. His past medical history is significant for environmental allergies. There is no history of COPD.   Wheezing   This is a recurrent problem. The current episode started in the past 7 days. The problem occurs constantly. The problem has been unchanged. Associated symptoms include coughing and shortness of breath. Pertinent negatives include no chest pain, diarrhea, ear pain, rash, rhinorrhea or vomiting. Nothing aggravates the symptoms. He has tried OTC cough suppressant for the symptoms. The treatment provided no relief. There is no history of COPD.     Review of Systems   Constitutional: Negative.  Negative for activity change, diaphoresis and unexpected weight change.   HENT: Negative.  Negative for nasal congestion, ear pain, mouth sores, rhinorrhea and voice change.    Eyes: Negative.  Negative for pain, discharge and visual disturbance.   Respiratory: Positive for cough, shortness of breath and wheezing. Negative for apnea.    Cardiovascular: Negative.  Negative for chest pain and palpitations.   Gastrointestinal: Negative.  Negative for abdominal distention, anal  bleeding, diarrhea and vomiting.   Endocrine: Negative.  Negative for cold intolerance and polyuria.   Genitourinary: Negative.  Negative for decreased urine volume, difficulty urinating, discharge, frequency and scrotal swelling.   Musculoskeletal: Negative.  Negative for back pain, myalgias and neck stiffness.   Integumentary:  Negative for color change and rash. Negative.   Allergic/Immunologic: Positive for environmental allergies.   Neurological: Negative.  Negative for dizziness, speech difficulty, weakness and light-headedness.   Hematological: Negative.    Psychiatric/Behavioral: Negative.  Negative for agitation, dysphoric mood and suicidal ideas. The patient is not nervous/anxious.          PMH/PSH/FH/SH/MED/ALLERGY reviewed    Objective:       Vitals:    04/13/22 1423   BP: 126/64   Pulse: 76       Physical Exam  Constitutional:       Appearance: He is well-developed.   HENT:      Head: Normocephalic and atraumatic.      Right Ear: External ear normal.      Left Ear: External ear normal.      Nose: Nose normal.      Mouth/Throat:      Pharynx: No oropharyngeal exudate.   Eyes:      General: No scleral icterus.        Right eye: No discharge.         Left eye: No discharge.      Conjunctiva/sclera: Conjunctivae normal.      Pupils: Pupils are equal, round, and reactive to light.   Neck:      Thyroid: No thyromegaly.      Vascular: No JVD.      Trachea: No tracheal deviation.   Cardiovascular:      Rate and Rhythm: Normal rate and regular rhythm.      Heart sounds: Normal heart sounds. No murmur heard.    No friction rub. No gallop.   Pulmonary:      Effort: Pulmonary effort is normal. No respiratory distress.      Breath sounds: No stridor. Wheezing (b/l generalized wheeze+) present. No rales.   Chest:      Chest wall: No tenderness.   Abdominal:      General: Bowel sounds are normal. There is no distension.      Palpations: Abdomen is soft. There is no mass.      Tenderness: There is no abdominal  tenderness. There is no guarding or rebound.      Hernia: No hernia is present.   Musculoskeletal:         General: No tenderness. Normal range of motion.      Cervical back: Normal range of motion and neck supple.   Lymphadenopathy:      Cervical: No cervical adenopathy.   Skin:     General: Skin is warm and dry.      Coloration: Skin is not pale.      Findings: No erythema or rash.   Neurological:      Mental Status: He is alert and oriented to person, place, and time.      Cranial Nerves: No cranial nerve deficit.      Motor: No abnormal muscle tone.      Coordination: Coordination normal.      Deep Tendon Reflexes: Reflexes are normal and symmetric. Reflexes normal.   Psychiatric:         Behavior: Behavior normal.         Thought Content: Thought content normal.         Judgment: Judgment normal.         Assessment:       Problem List Items Addressed This Visit    None     Visit Diagnoses     Cough    -  Primary    Relevant Medications    albuterol-ipratropium 2.5 mg-0.5 mg/3 mL nebulizer solution 3 mL (Completed)    promethazine-dextromethorphan (PROMETHAZINE-DM) 6.25-15 mg/5 mL Syrp    albuterol (VENTOLIN HFA) 90 mcg/actuation inhaler    Other Relevant Orders    X-Ray Chest PA And Lateral (Completed)    Wheezing        Relevant Medications    albuterol-ipratropium 2.5 mg-0.5 mg/3 mL nebulizer solution 3 mL (Completed)    promethazine-dextromethorphan (PROMETHAZINE-DM) 6.25-15 mg/5 mL Syrp    albuterol (VENTOLIN HFA) 90 mcg/actuation inhaler    Other Relevant Orders    X-Ray Chest PA And Lateral (Completed)    SOB (shortness of breath)        Relevant Medications    albuterol-ipratropium 2.5 mg-0.5 mg/3 mL nebulizer solution 3 mL (Completed)    promethazine-dextromethorphan (PROMETHAZINE-DM) 6.25-15 mg/5 mL Syrp    albuterol (VENTOLIN HFA) 90 mcg/actuation inhaler    Other Relevant Orders    X-Ray Chest PA And Lateral (Completed)          Plan:           Vick was seen today for cough, wheezing and chest  congestion.    Diagnoses and all orders for this visit:    Cough  -     albuterol-ipratropium 2.5 mg-0.5 mg/3 mL nebulizer solution 3 mL  -     X-Ray Chest PA And Lateral; Future  -     promethazine-dextromethorphan (PROMETHAZINE-DM) 6.25-15 mg/5 mL Syrp; Take 5 mLs by mouth 2 (two) times daily as needed (cough).  -     albuterol (VENTOLIN HFA) 90 mcg/actuation inhaler; Inhale 2 puffs into the lungs every 6 (six) hours as needed for Wheezing or Shortness of Breath. Rescue    Wheezing  -     albuterol-ipratropium 2.5 mg-0.5 mg/3 mL nebulizer solution 3 mL  -     X-Ray Chest PA And Lateral; Future  -     promethazine-dextromethorphan (PROMETHAZINE-DM) 6.25-15 mg/5 mL Syrp; Take 5 mLs by mouth 2 (two) times daily as needed (cough).  -     albuterol (VENTOLIN HFA) 90 mcg/actuation inhaler; Inhale 2 puffs into the lungs every 6 (six) hours as needed for Wheezing or Shortness of Breath. Rescue    SOB (shortness of breath)  -     albuterol-ipratropium 2.5 mg-0.5 mg/3 mL nebulizer solution 3 mL  -     X-Ray Chest PA And Lateral; Future  -     promethazine-dextromethorphan (PROMETHAZINE-DM) 6.25-15 mg/5 mL Syrp; Take 5 mLs by mouth 2 (two) times daily as needed (cough).  -     albuterol (VENTOLIN HFA) 90 mcg/actuation inhaler; Inhale 2 puffs into the lungs every 6 (six) hours as needed for Wheezing or Shortness of Breath. Rescue      duoneb nebulization treatment done - pt felt some relief    Declines steroid injection/pills due to diabetes    CXR done - normal    Sent inhaler and cough med    ER precautions given      Likely asthma variant vs allergies     Non improvement warrants pulmonology consultation    Spent adequate time in obtaining history and explaining differentials    25 minutes spent during this visit of which greater than 50% devoted to face-face counseling and coordination of care regarding diagnosis and management plan    Follow up if symptoms worsen or fail to improve.

## 2023-12-18 NOTE — PT/OT/SLP PROGRESS
"Physical Therapy Treatment    Patient Name:  Eden Cardenas   MRN:  [id number]    Recommendations:     Discharge Recommendations: Moderate Intensity Therapy  Discharge Equipment Recommendations: wheelchair, hospital bed, lift device  Barriers to discharge: Inaccessible home and Decreased caregiver support    Assessment:     Eden Cardenas is a 89 y.o. female admitted with a medical diagnosis of SAH (subarachnoid hemorrhage).  She presents with the following impairments/functional limitations: weakness, impaired functional mobility, impaired endurance, gait instability, impaired balance, impaired self care skills, decreased lower extremity function, decreased upper extremity function, impaired coordination, decreased safety awareness, impaired cognition .    Pt confused at this time but agreeable to therapy. Pt requires light assistance with functional mobility and max verbal cues. Pt with increased HR during session. (High 130s). HR recovers with rest. Pt making good  progress towards therapy goals. Pt will continue to benefit from skilled therapy services.    Rehab Prognosis: Fair; patient would benefit from acute skilled PT services to address these deficits and reach maximum level of function.    Recent Surgery: * No surgery found *      Plan:     During this hospitalization, patient to be seen 3 x/week to address the identified rehab impairments via gait training, therapeutic activities, therapeutic exercises, neuromuscular re-education and progress toward the following goals:    Plan of Care Expires:  01/10/24    Subjective     Chief Complaint: Dizziness while at EOB  Patient/Family Comments/goals: "I want to eat" "Can I get in the chair"  Pain/Comfort:  Pain Rating 1:  (pain unrated)  Location - Side 1: Right  Location - Orientation 1: lateral  Location 1: other (see comments) (trunk)  Pain Addressed 1: Reposition, Distraction  Pain Rating Post-Intervention 1: other (see comments) (no further mention of " pain)      Objective:     Communicated with RN (Jade)  prior to session.  Patient found HOB elevated with telemetry, christensen catheter upon PTA entry to room.     General Precautions: Standard, fall, aspiration  Orthopedic Precautions: N/A  Braces: N/A  Respiratory Status: Room air     Functional Mobility:  Bed Mobility:     Scooting: anteriorly to EOB maximal assistance  Supine to Sit: minimum assistance for Trunk/BLEs  Transfers:     Sit to Stand:  from EOB minimum assistance with no AD and hand-held assist.Pt requires verbal and tactile cues for hand placement and safety.  Bed to Chair: moderate assistance with  no AD and hand-held assist  using  Stand Pivot. Pt requiring verbal cues for sequencing and facilitation.       AM-PAC 6 CLICK MOBILITY  Turning over in bed (including adjusting bedclothes, sheets and blankets)?: 3  Sitting down on and standing up from a chair with arms (e.g., wheelchair, bedside commode, etc.): 2  Moving from lying on back to sitting on the side of the bed?: 2  Moving to and from a bed to a chair (including a wheelchair)?: 2  Need to walk in hospital room?: 1  Climbing 3-5 steps with a railing?: 1  Basic Mobility Total Score: 11       Treatment & Education:  Patient provided with daily orientation and goals of this PT session.     Pt performs the following BLE therex while seated in bedside chair:  X10 PROM LAQ and Hip flexion Pt requires verbal cues and demos for technique and form. Verbal cues required for attention to task during therex.    Pt educated to call for assistance and to transfer with hospital staff only.  Also, pt was educated on the effects of prolonged immobility and the importance of performing OOB activity and exercises to promote healing and reduce recovery time    Patient left up in chair with all lines intact, call button in reach, and RN notified.    GOALS:   Multidisciplinary Problems       Physical Therapy Goals          Problem: Physical Therapy    Goal Priority  Disciplines Outcome Goal Variances Interventions   Physical Therapy Goal     PT, PT/OT Ongoing, Progressing     Description: Goals to met by 12/24/2023    1. Supine to sit with MInimal Assistance  2. Sit to supine with MInimal Assistance  3. Rolling to Left and Right with Minimal Assistance.  4. Sit to stand transfer with Contact Guard Assistance  5. Bed to chair transfer with Contact Guard Assistance using LRAD  6. Gait  x 25 feet with Contact Guard Assistance using LRAD   7. Sitting at edge of bed x10 minutes with Stand-by Assistance  8. Stand for 5 minutes with Stand-by Assistance using LRAD  9. Lower extremity exercise program x15 reps per Instruction, with assistance as needed in order to facilitate improved strength, improved postural control, and improvement in functional independence                       Time Tracking:     PT Received On: 12/18/23  PT Start Time: 1158     PT Stop Time: 1217  PT Total Time (min): 19 min     Billable Minutes: Therapeutic Activity 19    Treatment Type: Treatment  PT/PTA: PTA     Number of PTA visits since last PT visit: 1     12/18/2023

## 2023-12-18 NOTE — SUBJECTIVE & OBJECTIVE
Interval History: No acute events. Pt feels well and denies pain. Remains disoriented but was texting on her phone following evaluation.     Review of Systems  Objective:     Vital Signs (Most Recent):  Temp: 97.9 °F (36.6 °C) (12/18/23 0700)  Pulse: 86 (12/18/23 1108)  Resp: 20 (12/18/23 0700)  BP: (!) 158/74 (12/18/23 0700)  SpO2: 98 % (12/18/23 0700) Vital Signs (24h Range):  Temp:  [97.4 °F (36.3 °C)-98.2 °F (36.8 °C)] 97.9 °F (36.6 °C)  Pulse:  [] 86  Resp:  [16-20] 20  SpO2:  [97 %-98 %] 98 %  BP: (129-170)/(72-89) 158/74     Weight: 51.2 kg (112 lb 14 oz)  Body mass index is 20 kg/m².    Intake/Output Summary (Last 24 hours) at 12/18/2023 1253  Last data filed at 12/18/2023 0522  Gross per 24 hour   Intake --   Output 1270 ml   Net -1270 ml         Physical Exam  Constitutional:       General: She is not in acute distress.     Appearance: She is ill-appearing. She is not toxic-appearing.   Cardiovascular:      Rate and Rhythm: Normal rate and regular rhythm.      Heart sounds: No murmur heard.     No friction rub. No gallop.   Pulmonary:      Effort: Pulmonary effort is normal. No respiratory distress.   Abdominal:      General: Abdomen is flat. There is no distension.      Palpations: Abdomen is soft.      Tenderness: There is no abdominal tenderness. There is no guarding or rebound.   Musculoskeletal:      Right lower leg: No edema.      Left lower leg: No edema.   Neurological:      General: No focal deficit present.             Significant Labs: All pertinent labs within the past 24 hours have been reviewed.    Significant Imaging: I have reviewed all pertinent imaging results/findings within the past 24 hours.

## 2023-12-19 VITALS
DIASTOLIC BLOOD PRESSURE: 82 MMHG | TEMPERATURE: 99 F | RESPIRATION RATE: 20 BRPM | SYSTOLIC BLOOD PRESSURE: 182 MMHG | WEIGHT: 112.88 LBS | BODY MASS INDEX: 20 KG/M2 | OXYGEN SATURATION: 98 % | HEART RATE: 94 BPM | HEIGHT: 63 IN

## 2023-12-19 LAB
ALBUMIN SERPL BCP-MCNC: 2.4 G/DL (ref 3.5–5.2)
ALP SERPL-CCNC: 87 U/L (ref 55–135)
ALT SERPL W/O P-5'-P-CCNC: 23 U/L (ref 10–44)
ANION GAP SERPL CALC-SCNC: 11 MMOL/L (ref 8–16)
AST SERPL-CCNC: 25 U/L (ref 10–40)
BACTERIA UR CULT: ABNORMAL
BASOPHILS # BLD AUTO: 0.07 K/UL (ref 0–0.2)
BASOPHILS NFR BLD: 0.6 % (ref 0–1.9)
BILIRUB SERPL-MCNC: 0.6 MG/DL (ref 0.1–1)
BUN SERPL-MCNC: 18 MG/DL (ref 10–30)
CALCIUM SERPL-MCNC: 8.7 MG/DL (ref 8.7–10.5)
CHLORIDE SERPL-SCNC: 93 MMOL/L (ref 95–110)
CO2 SERPL-SCNC: 25 MMOL/L (ref 23–29)
CREAT SERPL-MCNC: 0.6 MG/DL (ref 0.5–1.4)
DIFFERENTIAL METHOD: ABNORMAL
EOSINOPHIL # BLD AUTO: 0 K/UL (ref 0–0.5)
EOSINOPHIL NFR BLD: 0.2 % (ref 0–8)
ERYTHROCYTE [DISTWIDTH] IN BLOOD BY AUTOMATED COUNT: 12 % (ref 11.5–14.5)
EST. GFR  (NO RACE VARIABLE): >60 ML/MIN/1.73 M^2
GLUCOSE SERPL-MCNC: 118 MG/DL (ref 70–110)
HCT VFR BLD AUTO: 31.7 % (ref 37–48.5)
HGB BLD-MCNC: 11.2 G/DL (ref 12–16)
IMM GRANULOCYTES # BLD AUTO: 0.38 K/UL (ref 0–0.04)
IMM GRANULOCYTES NFR BLD AUTO: 3.2 % (ref 0–0.5)
LYMPHOCYTES # BLD AUTO: 1.6 K/UL (ref 1–4.8)
LYMPHOCYTES NFR BLD: 13.7 % (ref 18–48)
MAGNESIUM SERPL-MCNC: 1.7 MG/DL (ref 1.6–2.6)
MCH RBC QN AUTO: 31.9 PG (ref 27–31)
MCHC RBC AUTO-ENTMCNC: 35.3 G/DL (ref 32–36)
MCV RBC AUTO: 90 FL (ref 82–98)
MONOCYTES # BLD AUTO: 1.7 K/UL (ref 0.3–1)
MONOCYTES NFR BLD: 14.3 % (ref 4–15)
NEUTROPHILS # BLD AUTO: 8.1 K/UL (ref 1.8–7.7)
NEUTROPHILS NFR BLD: 68 % (ref 38–73)
NRBC BLD-RTO: 0 /100 WBC
OSMOLALITY SERPL: 272 MOSM/KG (ref 275–295)
OSMOLALITY UR: 419 MOSM/KG (ref 50–1200)
PHOSPHATE SERPL-MCNC: 2.4 MG/DL (ref 2.7–4.5)
PLATELET # BLD AUTO: 396 K/UL (ref 150–450)
PMV BLD AUTO: 10.3 FL (ref 9.2–12.9)
POCT GLUCOSE: 117 MG/DL (ref 70–110)
POCT GLUCOSE: 121 MG/DL (ref 70–110)
POTASSIUM SERPL-SCNC: 3.2 MMOL/L (ref 3.5–5.1)
PROT SERPL-MCNC: 6.7 G/DL (ref 6–8.4)
RBC # BLD AUTO: 3.51 M/UL (ref 4–5.4)
SODIUM SERPL-SCNC: 129 MMOL/L (ref 136–145)
SODIUM UR-SCNC: 68 MMOL/L (ref 20–250)
WBC # BLD AUTO: 11.84 K/UL (ref 3.9–12.7)

## 2023-12-19 PROCEDURE — 80053 COMPREHEN METABOLIC PANEL: CPT

## 2023-12-19 PROCEDURE — 97535 SELF CARE MNGMENT TRAINING: CPT

## 2023-12-19 PROCEDURE — 83935 ASSAY OF URINE OSMOLALITY: CPT | Performed by: STUDENT IN AN ORGANIZED HEALTH CARE EDUCATION/TRAINING PROGRAM

## 2023-12-19 PROCEDURE — 97530 THERAPEUTIC ACTIVITIES: CPT

## 2023-12-19 PROCEDURE — 85025 COMPLETE CBC W/AUTO DIFF WBC: CPT

## 2023-12-19 PROCEDURE — 84100 ASSAY OF PHOSPHORUS: CPT

## 2023-12-19 PROCEDURE — 25000003 PHARM REV CODE 250: Performed by: PSYCHIATRY & NEUROLOGY

## 2023-12-19 PROCEDURE — 36415 COLL VENOUS BLD VENIPUNCTURE: CPT

## 2023-12-19 PROCEDURE — 99232 SBSQ HOSP IP/OBS MODERATE 35: CPT | Mod: ,,, | Performed by: NURSE PRACTITIONER

## 2023-12-19 PROCEDURE — 83735 ASSAY OF MAGNESIUM: CPT

## 2023-12-19 PROCEDURE — 99232 PR SUBSEQUENT HOSPITAL CARE,LEVL II: ICD-10-PCS | Mod: ,,, | Performed by: NURSE PRACTITIONER

## 2023-12-19 PROCEDURE — 25000003 PHARM REV CODE 250: Performed by: PHYSICIAN ASSISTANT

## 2023-12-19 PROCEDURE — 83930 ASSAY OF BLOOD OSMOLALITY: CPT | Performed by: STUDENT IN AN ORGANIZED HEALTH CARE EDUCATION/TRAINING PROGRAM

## 2023-12-19 PROCEDURE — 36415 COLL VENOUS BLD VENIPUNCTURE: CPT | Performed by: STUDENT IN AN ORGANIZED HEALTH CARE EDUCATION/TRAINING PROGRAM

## 2023-12-19 PROCEDURE — 84300 ASSAY OF URINE SODIUM: CPT | Performed by: STUDENT IN AN ORGANIZED HEALTH CARE EDUCATION/TRAINING PROGRAM

## 2023-12-19 PROCEDURE — 25000003 PHARM REV CODE 250: Performed by: STUDENT IN AN ORGANIZED HEALTH CARE EDUCATION/TRAINING PROGRAM

## 2023-12-19 RX ORDER — AMLODIPINE BESYLATE 5 MG/1
10 TABLET ORAL DAILY
Start: 2023-12-19

## 2023-12-19 RX ORDER — CIPROFLOXACIN 500 MG/1
500 TABLET ORAL 2 TIMES DAILY
Qty: 6 TABLET | Refills: 0 | Status: SHIPPED | OUTPATIENT
Start: 2023-12-19 | End: 2023-12-22

## 2023-12-19 RX ORDER — CARVEDILOL 6.25 MG/1
6.25 TABLET ORAL 2 TIMES DAILY
Qty: 60 TABLET | Refills: 11
Start: 2023-12-19 | End: 2024-12-18

## 2023-12-19 RX ORDER — LABETALOL HCL 20 MG/4 ML
10 SYRINGE (ML) INTRAVENOUS EVERY 4 HOURS PRN
Status: CANCELLED | OUTPATIENT
Start: 2023-12-19

## 2023-12-19 RX ORDER — AMOXICILLIN 250 MG
1 CAPSULE ORAL 2 TIMES DAILY
Start: 2023-12-19

## 2023-12-19 RX ORDER — LABETALOL HCL 20 MG/4 ML
10 SYRINGE (ML) INTRAVENOUS EVERY 4 HOURS PRN
Status: DISCONTINUED | OUTPATIENT
Start: 2023-12-19 | End: 2023-12-19 | Stop reason: HOSPADM

## 2023-12-19 RX ORDER — SILODOSIN 4 MG/1
4 CAPSULE ORAL DAILY
Qty: 30 CAPSULE | Refills: 11
Start: 2023-12-20 | End: 2024-12-19

## 2023-12-19 RX ORDER — CARVEDILOL 6.25 MG/1
6.25 TABLET ORAL 2 TIMES DAILY
Status: DISCONTINUED | OUTPATIENT
Start: 2023-12-19 | End: 2023-12-19 | Stop reason: HOSPADM

## 2023-12-19 RX ORDER — CARVEDILOL 6.25 MG/1
6.25 TABLET ORAL 2 TIMES DAILY
Status: DISCONTINUED | OUTPATIENT
Start: 2023-12-19 | End: 2023-12-19

## 2023-12-19 RX ORDER — SODIUM,POTASSIUM PHOSPHATES 280-250MG
2 POWDER IN PACKET (EA) ORAL
Status: DISCONTINUED | OUTPATIENT
Start: 2023-12-19 | End: 2023-12-19 | Stop reason: HOSPADM

## 2023-12-19 RX ADMIN — CARVEDILOL 6.25 MG: 6.25 TABLET, FILM COATED ORAL at 11:12

## 2023-12-19 RX ADMIN — ATORVASTATIN CALCIUM 40 MG: 40 TABLET, FILM COATED ORAL at 11:12

## 2023-12-19 RX ADMIN — AMLODIPINE BESYLATE 10 MG: 10 TABLET ORAL at 11:12

## 2023-12-19 RX ADMIN — SENNOSIDES AND DOCUSATE SODIUM 1 TABLET: 8.6; 5 TABLET ORAL at 11:12

## 2023-12-19 RX ADMIN — POTASSIUM BICARBONATE 40 MEQ: 391 TABLET, EFFERVESCENT ORAL at 11:12

## 2023-12-19 RX ADMIN — POTASSIUM & SODIUM PHOSPHATES POWDER PACK 280-160-250 MG 2 PACKET: 280-160-250 PACK at 11:12

## 2023-12-19 RX ADMIN — SILODOSIN 4 MG: 4 CAPSULE ORAL at 11:12

## 2023-12-19 NOTE — SUBJECTIVE & OBJECTIVE
Interval History 12/19/2023:  Patient is seen for follow-up PM&R evaluation and recommendations: Participating with therapy this morning. Following commands.     HPI, Past Medical, Family, and Social History remains the same as documented in the initial encounter.    Scheduled Medications:    amLODIPine  10 mg Per NG tube Daily    atorvastatin  40 mg Per NG tube Daily    bisacodyL  10 mg Rectal Daily    carvediloL  6.25 mg Per NG tube BID    cefTRIAXone (ROCEPHIN) IVPB  1 g Intravenous Q24H    enoxparin  40 mg Subcutaneous Q24H (prophylaxis, 1700)    latanoprost  1 drop Both Eyes QHS    senna-docusate 8.6-50 mg  1 tablet Per NG tube BID    silodosin  4 mg Per NG tube Daily       Diagnostic Results: Labs: Reviewed  ECG: Reviewed  CT: Reviewed    PRN Medications: acetaminophen, bisacodyL, dextrose 10%, dextrose 10%, glucagon (human recombinant), glucose, glucose, hydrALAZINE, insulin aspart U-100, labetalol, ondansetron, prochlorperazine, sodium chloride 0.9%    Review of Systems   Constitutional:  Positive for activity change. Negative for fatigue.   Musculoskeletal:  Positive for gait problem.   Neurological:  Positive for weakness.   Psychiatric/Behavioral:  Negative for agitation and behavioral problems.      Objective:     Vital Signs (Most Recent):  Temp: 97.9 °F (36.6 °C) (12/19/23 0800)  Pulse: 91 (12/19/23 0800)  Resp: 20 (12/19/23 0800)  BP: (!) 177/94 (12/19/23 0800)  SpO2: (!) 91 % (12/19/23 0800)    Vital Signs (24h Range):  Temp:  [97.5 °F (36.4 °C)-98.2 °F (36.8 °C)] 97.9 °F (36.6 °C)  Pulse:  [] 91  Resp:  [17-20] 20  SpO2:  [91 %-97 %] 91 %  BP: (138-179)/(71-94) 177/94         Physical Exam  Vitals and nursing note reviewed.   Constitutional:       Appearance: She is well-developed.   Pulmonary:      Effort: Pulmonary effort is normal. No respiratory distress.   Musculoskeletal:      Cervical back: Normal range of motion.      Comments: Deconditioned and generalized weakness   Skin:      General: Skin is warm and dry.   Neurological:      Mental Status: She is alert.      Motor: Weakness present.      Gait: Gait abnormal.      Comments: Confusion present  Working with therapy    Psychiatric:      Comments: Lee stewart

## 2023-12-19 NOTE — PLAN OF CARE
Transportation scheduled for 2pm via wheelchair van through [facility].  Team and bedside nurse updated to the above in secure chat.  Bedside nurse given report information in secure chat.  Will continue to follow for d/c needs.    Peter Augustin RN   Case Management  [phone]

## 2023-12-19 NOTE — PLAN OF CARE
Problem: Adult Inpatient Plan of Care  Goal: Plan of Care Review  Outcome: Ongoing, Progressing  Goal: Patient-Specific Goal (Individualized)  Outcome: Ongoing, Progressing  Flowsheets (Taken 12/19/2023 0515)  Anxieties, Fears or Concerns: no restraints. no further anxiety noted  Individualized Care Needs: no mitts.  Goal: Absence of Hospital-Acquired Illness or Injury  Outcome: Ongoing, Progressing  Intervention: Identify and Manage Fall Risk  Flowsheets (Taken 12/19/2023 0515)  Safety Promotion/Fall Prevention:   assistive device/personal item within reach   side rails raised x 3   supervised activity   /camera at bedside   nonskid shoes/socks when out of bed  Intervention: Prevent Skin Injury  Flowsheets (Taken 12/19/2023 0515)  Body Position: position changed independently  Skin Protection:   adhesive use limited   transparent dressing maintained   tubing/devices free from skin contact  Intervention: Prevent and Manage VTE (Venous Thromboembolism) Risk  Flowsheets (Taken 12/19/2023 0515)  Activity Management: Rolling - L1  VTE Prevention/Management:   ambulation promoted   bleeding risk assessed   fluids promoted  Range of Motion: active ROM (range of motion) encouraged  Intervention: Prevent Infection  Flowsheets (Taken 12/19/2023 0515)  Infection Prevention:   cohorting utilized   rest/sleep promoted   environmental surveillance performed   equipment surfaces disinfected   hand hygiene promoted   personal protective equipment utilized  Goal: Optimal Comfort and Wellbeing  Outcome: Ongoing, Progressing  Goal: Readiness for Transition of Care  Outcome: Ongoing, Progressing     Problem: Skin Injury Risk Increased  Goal: Skin Health and Integrity  Outcome: Ongoing, Progressing  Intervention: Optimize Skin Protection  Flowsheets (Taken 12/19/2023 0244)  Pressure Reduction Techniques: frequent weight shift encouraged  Pressure Reduction Devices: foam padding utilized  Skin Protection:   adhesive  use limited   transparent dressing maintained   tubing/devices free from skin contact   incontinence pads utilized  Head of Bed (HOB) Positioning: HOB at 15 degrees     Problem: Fall Injury Risk  Goal: Absence of Fall and Fall-Related Injury  Outcome: Ongoing, Progressing  Intervention: Identify and Manage Contributors  Flowsheets (Taken 12/19/2023 0244)  Self-Care Promotion:   independence encouraged   BADL personal objects within reach   BADL personal routines maintained   meal set-up provided   safe use of adaptive equipment encouraged  Medication Review/Management: medications reviewed  Intervention: Promote Injury-Free Environment  Flowsheets (Taken 12/19/2023 0244)  Safety Promotion/Fall Prevention:   assistive device/personal item within reach   instructed to call staff for mobility   lighting adjusted   room near unit station   nonskid shoes/socks when out of bed   Slept thru the shift. No injuries noted. Voices no needs or complaints at this time.  Will continue to monitor

## 2023-12-19 NOTE — CONSULTS
Inpatient consult to Physical Medicine Rehab  Consult performed by: Elizabeth Salcido NP  Consult ordered by: Odin Maldonado MD  Reason for consult: Assess rehab needs      Reviewed patient history and current admission.  Recommending rehab now. Thank you.     CARLENE Mondragon, FNP-C  Physical Medicine & Rehabilitation   12/19/2023

## 2023-12-19 NOTE — ASSESSMENT & PLAN NOTE
- CTH showed bifrontal tSAH as well as a left parietoccipital calvarial fracture with minimal associated pneumocephalus and adjacent SDH.   - CT T-Spine negative for acute fractures or dislocations.   - CTH stable.     - Related to prolonged/acute hospital course.     Recommendations  -  Encourage mobility, OOB in chair at least 3 hours per day, and early ambulation as appropriate  -  PT/OT evaluate and treat  -  Pain management  -  Monitor for and prevent skin breakdown and pressure ulcers  Early mobility, repositioning/weight shifting every 20-30 minutes when sitting, turn patient every 2 hours, proper mattress/overlay and chair cushioning, pressure relief/heel protector boots  -  DVT prophylaxis    -  Reviewed discharge options (IP rehab, SNF, HH therapy, and OP therapy)

## 2023-12-19 NOTE — NURSING
Patient IV removed with catheter intact; transported via wheelchair van with personal belongings to [facility].

## 2023-12-19 NOTE — ASSESSMENT & PLAN NOTE
- EEG 12/11>12/12 with diffuse slowing, more focal slowing in the left hemisphere, no seizures.   - Seroquel started

## 2023-12-19 NOTE — HPI
Date of Service: 11/19/2018    This is a followup visit.      CHIEF COMPLAINT:  1.  Lesion within the left throat.  2.  Left-sided low back pain, ongoing since 10/2017.  3.  History of hypoglycemia.    PROBLEM LIST:  1.  Left-sided low back pain.  2.  Left sacroiliac joint dysfunction.  3.  Pain over the coccyx.    4.  Possible ankylosing spondylitis.  5.  History of depression in the past.    6.  Gastroesophageal reflux disease.  7.  Sleep apnea history, uses a CPAP machine at night.  8.  Noninsulin-dependent type 2 diabetes mellitus.  Blood sugar monitoring device in place.    INTERVAL HISTORY:  This man was [facility] on 10[facility]/2018.  He has chronic pain in the lower back pain, is worse on the left side, ongoing since 10/2017.  He has multilevel lumbar degenerative disk disease, left side sacroiliac joint dysfunction.  He had a left sacroiliac joint injection and a caudal epidural steroid injection on 10/08/2018.  He states he has had over 70% improvement in pain.  He would like to have repeat injection performed.  This will be deferred until 12/31/2018.  He did not have any side effects or problems after the last injection.  It should be noted that sometime in 10/2018 he was seen by Dr. Tapia.  He had a biopsy of a lesion in his mouth and he says that it was benign.  He does not smoke cigarettes.  He denies use of marijuana, Cocaine, Heroin or other recreational drugs.    MEDICATIONS:  For pain,  he is on the following medications:  1.  Hydrocodone/Acetaminophen 5/325 mg 1-2 tablets p.o. q.h.s.  2.  Meloxicam 50 mg p.o. every day.  3.  Gabapentin 300 mg, takes 2 capsules in the morning, 4 capsules at night.    He has diabetes mellitus and is on Metformin and Insulin.    Past Medical History:   Diagnosis Date   • Abdominal hernia    • Anxiety    • Arthritis     back   • Chronic back pain    • Depression     suicide attempt in past   • Diverticulitis    • Esophageal reflux    • HLD  Eden Cardenas is a 89-year-old female with PMHx of HTN, HLD, and glaucoma. Patient [place] [facility] [place] for a fall and was found to be confused with inappropriate speech. She sustained a laceration to the back of her head, which was repaired in the ED. CTH showed bifrontal tSAH as well as a left parietoccipital calvarial fracture with minimal associated pneumocephalus and adjacent SDH. CT T-Spine negative for acute fractures or dislocations. CTH stable. EEG 12/11>12/12 with diffuse slowing, more focal slowing in the left hemisphere, no seizures. Hospital course complicated by delirium and agitation (Seroquel started) and UTI (completed treatment).     Functional History: Patient lives with grandson and pt recently moved into their home and lives downstairs. Prior to admission, needing assistance from family.    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/2018   • NIDDM (non-insulin dependent diabetes mellitus)     Type 2, Checks Blood Sugars BID   • Other chronic pain     back   • PONV (postoperative nausea and vomiting) 2016    Nausea Only   • Sleep apnea     Uses CPAP nightly   • Small bowel obstruction (CMS/HCC) 9/5/2013   • Unspecified otitis media    • Wears prescription eyeglasses        Past Surgical History:   Procedure Laterality Date   • Hernia repair  X 2    Umbilical Hernia by Dr Shin   • Hernia repair  2009    Umbilical Hernia W/ Mesh by Dr Cardenas   • Inguinal hernia repair      As a Child   • Shoulder arthroscopy Left 11/19/2014    w/ rotator cuff repair by Dr. Valadez   • Shoulder arthroscopy Right 10/26/2016    w/ Rotator cuff repair by Dr. Valadez at [facility]   • Tympanostomy tube placement  X 4    As a Child       Patient Active Problem List   Diagnosis   • Type II or unspecified type diabetes mellitus without mention of complication, not stated as uncontrolled   • Other and unspecified hyperlipidemia   • Chronic low back pain   • Pain in joint, shoulder region   • DEANDRE (obstructive sleep apnea)   • Left shoulder pain   • Depression   • Suicidal ideation   • Suicide attempt (CMS/HCC)   • Bilateral hip pain   • Osteoarthrosis, unspecified whether generalized or localized, pelvic region and thigh   • Opioid type dependence, unspecified   • Adjustment disorder with depressed mood   • Osteoarthritis of spine with radiculopathy, cervical region   • Lumbosacral spondylosis without myelopathy   • Right shoulder pain   • HLA B27 positive       Family History   Problem Relation Age of Onset   • Depression Mother    • Cancer Father    • Diabetes Father    • Hypertension Father    • Heart disease Maternal Grandfather        Social History     Socioeconomic History   • Marital status: Single     Spouse name: Not on file   • Number of children: Not on file   • Years of education: Not on file   • Highest  education level: Not on file   Social Needs   • Financial resource strain: Not on file   • Food insecurity - worry: Not on file   • Food insecurity - inability: Not on file   • Transportation needs - medical: Not on file   • Transportation needs - non-medical: Not on file   Occupational History   • Not on file   Tobacco Use   • Smoking status: Never Smoker   • Smokeless tobacco: Never Used   Substance and Sexual Activity   • Alcohol use: Yes     Alcohol/week: 0.0 - 0.6 oz     Comment: [employer]   • Drug use: No     Comment: pt denies past or current   • Sexual activity: No   Other Topics Concern   • Not on file   Social History Narrative   • Not on file       Current Outpatient Medications   Medication Sig Dispense Refill   • [START ON 12/3/2018] HYDROcodone-acetaminophen (NORCO) 5-325 MG per tablet Take 1-2 tablets by mouth nightly as needed for Pain. 30DS 60 tablet 0   • gabapentin (NEURONTIN) 300 MG capsule Take 2 capsules by mouth in AM and 4 capsules by mouth at bedtime. 90DS 540 capsule 2   • meloxicam (MOBIC) 15 MG tablet Take 1 tablet by mouth daily. 30 tablet 1   • DISPENSE otc joint therapy     • atorvastatin (LIPITOR) 80 MG tablet TAKE 1 TABLET BY MOUTH DAILY. NEEDS LABS PRIOR TO FURTHER REFILLS. 90 tablet 0   • Continuous Blood Gluc Sensor (FREESTYLE LISA SENSOR SYSTEM) [facility] Change sensor every 10 days. E11.65 DM 2 9 each 1   • metformin (GLUCOPHAGE) 1000 MG tablet Take 1 tablet by mouth 2 times daily (with meals). Must be seen for further refills! 180 tablet 2   • insulin degludec (TRESIBA FLEXTOUCH) 100 UNIT/ML pen-injector Inject 30 Units into the skin daily. (Patient taking differently: Inject 30 Units into the skin 2 times daily before breakfast and at night. ) 15 mL 5   • mirtazapine (REMERON) 45 MG tablet TAKE 1 TABLET BY MOUTH NIGHTLY. 90 tablet 1   • clonazePAM (KLONOPIN) 1 MG tablet 1 to 1 1/2 tab QHS PRN 45 tablet 5   • Continuous Blood Gluc Sensor (FREESTYLE LISA SENSOR SYSTEM) [facility]  Change sensor every 10 days. 3 each 5   • insulin aspart (NOVOLOG FLEXPEN) 100 UNIT/ML pen-injector Inject 10 Units into the skin 3 times daily (before meals). -199 +1, 200-299 +2, 300-349 +3, >350 +4. (Patient taking differently: Inject 10 Units into the skin 3 times daily (before meals). -199 +1, 200-299 +2, 300-349 +3, >350 +4.) 45 mL 3   • diclofenac (VOLTAREN) 1 % gel Apply 4g to affected area QID prn pain 5 Tube 3   • Insulin Pen Needle (B-D U/F PEN NEEDLE) 31G X 5 MM Misc USE TO INJECT INSULIN 4 TIMES A DAY E11.65, DM2 400 each 3   • JUBLIA 10 % topical solution APPLY AFFECTED NAILS EVERY DAY 8 mL 0   • blood glucose test strips (ONE TOUCH ULTRA TEST) One Touch Ultra- to test blood glucose 4 times daily. DM2 E11.65. Must complete Dec. appt for refills 400 strip 0   • ONETOUCH DELICA LANCETS 33G Misc To test blood glucose 4 times daily. DM2 E11.65 400 each 0   • Blood Glucose Monitoring Suppl (ONE TOUCH ULTRA MINI) W/DEVICE Kit To test blood glucose 4 times daily. DM2 250.00 1 kit 0   • fish oil-omega-3 fatty acids 1000 MG CAPS Take 2,000 mg by mouth nightly. Dose: 2 Caps (= 2000 mg)      • Daily Multiple Vitamins TABS Take 1 tablet by mouth nightly.  30 tablet      No current facility-administered medications for this encounter.        ALLERGIES:  Pseudofed [[employer] nasal decongestant]    HERNIA REPAIR                                   X 2             Comment: Umbilical Hernia by Dr Shin    HERNIA REPAIR                                   2009            Comment: Umbilical Hernia W/ Mesh by Dr Cardenas    TYMPANOSTOMY TUBE PLACEMENT                     X 4             Comment: As a Child    INGUINAL HERNIA REPAIR                                          Comment: As a Child    SHOULDER ARTHROSCOPY                            11/19/2014      Comment: w/ rotator cuff repair by Dr. Valadez    SHOULDER ARTHROSCOPY                            10/26/2016      Comment: w/ Rotator cuff repair by Dr[facility]z at  [facility]    WBC (K/mcL)   Date Value   11/13/2018 5.9     RBC (mil/mcL)   Date Value   11/13/2018 5.73     HCT (%)   Date Value   11/13/2018 48.9     HGB (g/dL)   Date Value   11/13/2018 16.4     PLT (K/mcL)   Date Value   11/13/2018 226   02/14/2014 303       Sodium (mmol/L)   Date Value   10/05/2018 142     Potassium (mmol/L)   Date Value   10/05/2018 3.7     Chloride (mmol/L)   Date Value   10/05/2018 106     Glucose (mg/dL)   Date Value   10/05/2018 164 (H)     CALCIUM (mg/dL)   Date Value   10/05/2018 9.0     Carbon Dioxide (mmol/L)   Date Value   10/05/2018 27     BUN (mg/dL)   Date Value   10/05/2018 21 (H)     Creatinine (mg/dL)   Date Value   10/05/2018 0.99       AST/SGOT (Units/L)   Date Value   08/03/2018 10     ALT/SGPT (Units/L)   Date Value   08/03/2018 33     No results found for: GGTP  ALK PHOSPHATASE (Units/L)   Date Value   08/03/2018 73     TOTAL BILIRUBIN (mg/dL)   Date Value   08/03/2018 0.5       INR (no units)   Date Value   10/05/2018 1.0       SYSTEMIC REVIEW:  Fourteen systems reviewed, all grossly normal apart from the above, please see HPI and database.  CONSTITUTIONAL:  No recent fevers, weight loss or chills.  RESPIRATORY:  No cough or sputum.  CARDIOVASCULAR:  No angina or palpitations.  HEMATOLOGIC:  Not on anticoagulants.  Does not bruise or bleed easily.  GENITOURINARY:  No dysuria or frequency.  GASTROINTESTINAL:  No nausea or vomiting.  BREASTS:  Negative.  SKIN:  Negative.  HEENT:  Negative.  ENDOCRINE:  Diabetic.  Blood sugar appears to be well controlled.  No heat or cold intolerance.  PSYCHOLOGIC/PSYCHIATRIC:  Intermittent anxiety, occasional depression, but no depression at this time.  IMMUNOLOGIC:  Negative.  BREASTS:  Negative.  MUSCULOSKELETAL:  As above.  NEUROLOGIC:  Occasional burning in the feet.  All other systems are grossly normal.    PHYSICAL EXAMINATION:  VITAL SIGNS:  His blood pressure is 140/85 mmHg, rates his pain as a 6/10 on a pain scale.  Temperature is 98.3  degrees Fahrenheit, heart rate is 98 per minute, respiratory rate is 16 per minute, saturation 94% on room air oxygen.  CHEST:  Clear to auscultation bilaterally.  CARDIOVASCULAR:  Regular rate and rhythm.  First and second heart sounds are heard.  No audible murmurs.  ABDOMEN:  Full, soft, nontender, no palpable masses.  Normal bowel sounds.  No calf or thigh swelling or tenderness.  Normal range of motion of lumbosacral spine in flexion, extension.    ASSESSMENT AND PLAN:  In summary, this 49-year-old man has low back pain, lower extremity radicular pain, worse on the left side.  He did have a lesion within the left side of his throat and this has been biopsied and states it was benign.  He has sacroiliac joint dysfunction on the left side, ankylosing spondylitis, multilevel lumbar degenerative disk disease, multilevel lumbosacral spondylosis.    PLAN OF CARE:  1.  I reviewed his opioid agreement.  He understands the risk of addiction, tolerance, dependence, respiratory depression, death and lethargy. Does not demonstrate any aberrant or abnormal drug behavior.  He says his pain is well maintained and controlled with the pain medications described above without any side effects.  2.  I reviewed his [place] PDMP for 24 months.  3.  He had a urine toxicology evaluation in 04/2018 within normal limits.  He will have a repeat in 2019,  possibly the first visit in 2019.  4.  He was given a prescription and will continue to utilize Hydrocodone, Gabapentin, Voltaren gel and Meloxicam.  5.  He can follow up with his ENT surgeon with his lesion in the mouth.  6.  He will be seen on the 12/31/2018 which is a Monday for left-sided sacroiliac joint injection and a caudal epidural steroid injection.  He was discharged home in good condition.      Dictated By: Jayjay Kaufman MD  Signing Provider: MD CARLEY Rich/MISAEL ([id number])  DD: 11/19/2018 13:44:37 TD: 11/21/2018 04:26:00    Copy Sent To:

## 2023-12-19 NOTE — PT/OT/SLP PROGRESS
Occupational Therapy   Treatment    Name: Eden Cardenas  MRN: [id number]  Admitting Diagnosis:  SAH (subarachnoid hemorrhage)       Recommendations:     Discharge Recommendations: Moderate Intensity Therapy  Discharge Equipment Recommendations:  to be determined by next level of care  Barriers to discharge:   (increased skilled (A) required)    Assessment:     Eden Cardenas is a 89 y.o. female with a medical diagnosis of SAH (subarachnoid hemorrhage).  She presents with performance deficits affecting function are weakness, impaired balance, decreased safety awareness, impaired endurance, impaired self care skills, decreased upper extremity function, impaired functional mobility, gait instability, decreased lower extremity function, impaired cognition.   Pt required max encouragement to participate with functional mobility. Pt was able to perform seated ADLs and transfer to the chair with Min-Mod A 2/2 posterior lean. Pt will continue to benefit from skilled OT services to address impairments listed above to maximize independence with ADLs and functional mobility to ensure safe return to PLOF.   Rehab Prognosis:  Good; patient would benefit from acute skilled OT services to address these deficits and reach maximum level of function.       Plan:     Patient to be seen 3 x/week to address the above listed problems via neuromuscular re-education, therapeutic exercises, therapeutic activities, self-care/home management  Plan of Care Expires: 01/08/24  Plan of Care Reviewed with: patient    Subjective     Chief Complaint: wanting to get back to sleep  Patient/Family Comments/goals: go to sleep  Pain/Comfort:  Pain Rating 1: other (see comments) (unrated)  Pain Rating Post-Intervention 1: other (see comments) (unrated)    Objective:     Communicated with: RN prior to session.  Patient found supine with christensen catheter, telemetry upon OT entry to room.    General Precautions: Standard, fall    Orthopedic  Precautions:N/A  Braces: N/A     Occupational Performance:     Bed Mobility:    Patient completed Rolling/Turning to Left with  minimum assistance  Patient completed Supine to Sit with minimum assistance     Functional Mobility/Transfers:  Patient completed Sit <> Stand Transfer with minimum assistance  with  HHA  Patient completed Bed <> Chair Transfer using Step Transfer technique with minimum assistance and moderate assistance with HHA  Functional Mobility: Pt required Min A then progressed to Mod A for bed>chair transfer 2/2 posterior lean    Activities of Daily Living:  Grooming: stand by assistance oral hygiene seated EOB  Lower Body Dressing: total assistance donning socks      [facility] 6 Click ADL: 18    Treatment & Education:  Pt educated on   Role of OT and OT POC  Safe transfer techniques and proper body mechanics for fall prevention and improved independence with functional transfers  Importance of OOB activities to increase endurance and tolerance for increased participation in daily ADLs    Utilizing the call bell to request for assistance with all functional mobility to ensure safety during hospital stay.    Pt verbalized understanding and all questions were addressed within the scope of OT.     Patient left up in chair with all lines intact, call button in reach, and RN notified    GOALS:   Multidisciplinary Problems       Occupational Therapy Goals          Problem: Occupational Therapy    Goal Priority Disciplines Outcome Interventions   Occupational Therapy Goal     OT, PT/OT Ongoing, Progressing    Description: Goals to be met by: 12/24/2023     Patient will increase functional independence with ADLs by performing:    UE Dressing with Minimal Assistance.  LE Dressing with Moderate Assistance.  Grooming while EOB with Minimal Assistance.  Toileting from bedside commode with Maximum Assistance for hygiene and clothing management.   Supine to sit with Minimal Assistance. MET 12/14/2023  Stand pivot  transfer with Minimal Assistance using LRAD as needed.  Toilet transfer to toilet with Minimal Assistance using LRAD as needed.                         Time Tracking:     OT Date of Treatment: 12/19/23  OT Start Time: 0916  OT Stop Time: 0934  OT Total Time (min): 18 min    Billable Minutes:Self Care/Home Management 18    OT/FRANCY: OT     Number of FRANCY visits since last OT visit: 3    12/19/2023

## 2023-12-19 NOTE — PLAN OF CARE
Problem: Skin Injury Risk Increased  Goal: Skin Health and Integrity  Outcome: Ongoing, Progressing  Intervention: Optimize Skin Protection  Flowsheets (Taken 12/19/2023 0244)  Pressure Reduction Techniques: frequent weight shift encouraged  Pressure Reduction Devices: foam padding utilized  Skin Protection:   adhesive use limited   transparent dressing maintained   tubing/devices free from skin contact   incontinence pads utilized  Head of Bed (HOB) Positioning: HOB at 15 degrees     Problem: Fall Injury Risk  Goal: Absence of Fall and Fall-Related Injury  Outcome: Ongoing, Progressing  Intervention: Identify and Manage Contributors  Flowsheets (Taken 12/19/2023 0244)  Self-Care Promotion:   independence encouraged   BADL personal objects within reach   BADL personal routines maintained   meal set-up provided   safe use of adaptive equipment encouraged  Medication Review/Management: medications reviewed  Intervention: Promote Injury-Free Environment  Flowsheets (Taken 12/19/2023 0244)  Safety Promotion/Fall Prevention:   assistive device/personal item within reach   instructed to call staff for mobility   lighting adjusted   room near unit station   nonskid shoes/socks when out of bed   Pt remains injury free during this shift. Slept thru the night. VSS, turns self in the bed. No noted delirium this shift.

## 2023-12-19 NOTE — NURSING
INT dislodged during sleep. Cath intact , no bleeding noted. Cardiac monitor leads replaced. Awakens with verbal stimulus, then back to sleep. Will continue to monitor.

## 2023-12-19 NOTE — SUBJECTIVE & OBJECTIVE
History reviewed. No pertinent past medical history.  Past Surgical History:   Procedure Laterality Date    MIDLINE CATHETER PLACEMENT  12/10/2023     Review of patient's allergies indicates:  No Known Allergies    Scheduled Medications:    amLODIPine  10 mg Per NG tube Daily    atorvastatin  40 mg Per NG tube Daily    bisacodyL  10 mg Rectal Daily    carvediloL  3.125 mg Per NG tube BID    cefTRIAXone (ROCEPHIN) IVPB  1 g Intravenous Q24H    enoxparin  40 mg Subcutaneous Q24H (prophylaxis, 1700)    latanoprost  1 drop Both Eyes QHS    senna-docusate 8.6-50 mg  1 tablet Per NG tube BID    silodosin  4 mg Per NG tube Daily       PRN Medications: acetaminophen, bisacodyL, dextrose 10%, dextrose 10%, glucagon (human recombinant), glucose, glucose, hydrALAZINE, insulin aspart U-100, labetalol, ondansetron, prochlorperazine, sodium chloride 0.9%    Family History    None       Tobacco Use    Smoking status: Not on file    Smokeless tobacco: Not on file   Substance and Sexual Activity    Alcohol use: Not on file    Drug use: Not on file    Sexual activity: Not on file     Review of Systems   Constitutional:  Positive for activity change. Negative for fatigue.   Musculoskeletal:  Positive for gait problem.   Neurological:  Positive for weakness.   Psychiatric/Behavioral:  Negative for agitation and behavioral problems.      Objective:     Vital Signs (Most Recent):  Temp: 97.9 °F (36.6 °C) (12/19/23 0800)  Pulse: 91 (12/19/23 0800)  Resp: 20 (12/19/23 0800)  BP: (!) 177/94 (12/19/23 0800)  SpO2: (!) 91 % (12/19/23 0800)    Vital Signs (24h Range):  Temp:  [97.5 °F (36.4 °C)-98.2 °F (36.8 °C)] 97.9 °F (36.6 °C)  Pulse:  [] 91  Resp:  [17-20] 20  SpO2:  [91 %-97 %] 91 %  BP: (138-179)/(71-94) 177/94     Body mass index is 20 kg/m².     Physical Exam  Vitals and nursing note reviewed.   Constitutional:       Appearance: She is well-developed.      Comments: Drowsy sitting up in chair sleeping   Pulmonary:      Effort:  Pulmonary effort is normal. No respiratory distress.   Musculoskeletal:      Cervical back: Normal range of motion.      Comments: Deconditioned and generalized weakness   Skin:     General: Skin is warm and dry.   Neurological:      Motor: Weakness present.      Gait: Gait abnormal.      Comments: Confusion present  drowsy   Psychiatric:      Comments: Camera sitter               Diagnostic Results: Labs: Reviewed  ECG: Reviewed  CT: Reviewed

## 2023-12-19 NOTE — PLAN OF CARE
Spoke with pt's daughter regarding [facility] acceptance, family is agreeable to [facility].  Team updated via secure chat, will continue to follow for d/c needs.    Peter Augustin RN CM  Case Management  [id number]

## 2023-12-19 NOTE — PROGRESS NOTES
"[facility] - Intensive Care ([facility]-)  Adult Nutrition  Progress Note    SUMMARY       Recommendations    1. Continue Regular diet (texture per SLP).   2. RD following.    Goals: Meet % EEN, EPN by RD f/u date  Nutrition Goal Status: goal not met  Communication of RD Recs: other (comment) (POC)    Assessment and Plan    Nutrition Problem  Inadequate energy intake     Related to (etiology):   Inability to consume sufficient energy      Signs and Symptoms (as evidenced by):   Diet advanced for lunch today form NPO    Interventions/Recommendations (treatment strategy):  Collaboration with other providers     Nutrition Diagnosis Status:   Resolved     Reason for Assessment    Reason For Assessment: RD follow-up  Diagnosis: hemorrhage  Relevant Medical History: HTN, HLD  Interdisciplinary Rounds: did not attend    General Information Comments: Pt scheduled to discharge today - Diet per SLP & TFs discontinued. Per RN documentation, pt tolerating diet w/ 0-25% PO intake. Unsure of energy intake PTA/UBW (no wt hx in chart).  Nutrition Discharge Planning: Adequate PO intake    Nutrition/Diet History    Spiritual, Cultural Beliefs, Latter day Practices, Values that Affect Care: no  Food Allergies: NKFA  Factors Affecting Nutritional Intake: decreased appetite    Anthropometrics    Temp: 98.5 °F (36.9 °C)  Height: 5' 3" (160 cm)  Height (inches): 63 in  Weight Method: Bed Scale  Weight: 51.2 kg (112 lb 14 oz)  Weight (lb): 112.88 lb  Ideal Body Weight (IBW), Female: 115 lb  % Ideal Body Weight, Female (lb): 98.16 %  BMI (Calculated): 20  BMI Grade: 18.5-24.9 - normal    Lab/Procedures/Meds    Pertinent Labs Reviewed: reviewed  Pertinent Labs Comments: Na 129  Pertinent Medications Reviewed: reviewed  Pertinent Medications Comments: -    Estimated/Assessed Needs    Weight Used For Calorie Calculations: 51.2 kg (112 lb 14 oz)    Energy Calorie Requirements (kcal): 1536 kcal/d  Energy Need Method: Kcal/kg (30 " kcal/kg)    Protein Requirements: 56 g/d (1.1 g/kg)  Weight Used For Protein Calculations: 51.2 kg (112 lb 14 oz)    Estimated Fluid Requirement Method: other (see comments) (Per MD or 1 mL/kcal)  RDA Method (mL): 1536    Nutrition Prescription Ordered    Current Diet Order: Regular  Current Nutrition Support Formula Ordered: Other (Comment) (Discontinued)    Evaluation of Received Nutrient/Fluid Intake    I/O: -7L since admit    Comments: LBM: 12/15    Tolerance: tolerating    Nutrition Risk    Level of Risk/Frequency of Follow-up:  (1x/week)     Monitor and Evaluation    Food and Nutrient Intake: energy intake, food and beverage intake  Food and Nutrient Adminstration: diet order  Physical Activity and Function: nutrition-related ADLs and IADLs  Anthropometric Measurements: weight, weight change  Biochemical Data, Medical Tests and Procedures: inflammatory profile, lipid profile, glucose/endocrine profile  Nutrition-Focused Physical Findings: overall appearance     Nutrition Follow-Up    RD Follow-up?: Yes

## 2023-12-19 NOTE — CONSULTS
[facility] - Intensive Care ([place] [address])  Physical Medicine & Rehab  Consult Note    Patient Name: Eden Cardenas  MRN: [id number]  Admission Date: 12/9/2023  Hospital Length of Stay: 10 days  Attending Physician: Odin Maldonado*     Inpatient consult to Physical Medicine & Rehabilitation  Consult performed by: Elizabeth Salcido NP  Consult requested by:  Odin Maldonado*    Collaborating Physician: Brenna Damon MD  Reason for Consult:  Assess rehabilitation needs      Consults  Subjective:     Principal Problem: SAH (subarachnoid hemorrhage)    HPI: Eden Cardenas is a 89-year-old female with PMHx of HTN, HLD, and glaucoma. Patient presented to [facility] on 12/10/23 for a fall and was found to be confused with inappropriate speech. She sustained a laceration to the back of her head, which was repaired in the ED. CTH showed bifrontal tSAH as well as a left parietoccipital calvarial fracture with minimal associated pneumocephalus and adjacent SDH. CT T-Spine negative for acute fractures or dislocations. CTH stable. EEG 12/11>12/12 with diffuse slowing, more focal slowing in the left hemisphere, no seizures. Hospital course complicated by delirium and agitation (Seroquel started) and UTI (completed treatment).     Functional History: Patient lives with grandson and pt recently moved into their home and lives downstairs. Prior to admission, needing assistance from family.     Hospital Course:   12/18/23: Participated w/ PT. Sit to Stand:  from EOB minimum assistance with no AD and hand-held assist.Pt requires verbal and tactile cues for hand placement and safety. Bed to Chair: moderate assistance with  no AD and hand-held assist  using  Stand Pivot. Pt requiring verbal cues for sequencing and facilitation.      History reviewed. No pertinent past medical history.  Past Surgical History:   Procedure Laterality Date    MIDLINE CATHETER PLACEMENT  12/10/2023     Review of patient's allergies indicates:  No  Known Allergies    Scheduled Medications:    amLODIPine  10 mg Per NG tube Daily    atorvastatin  40 mg Per NG tube Daily    bisacodyL  10 mg Rectal Daily    carvediloL  3.125 mg Per NG tube BID    cefTRIAXone (ROCEPHIN) IVPB  1 g Intravenous Q24H    enoxparin  40 mg Subcutaneous Q24H (prophylaxis, 1700)    latanoprost  1 drop Both Eyes QHS    senna-docusate 8.6-50 mg  1 tablet Per NG tube BID    silodosin  4 mg Per NG tube Daily     PRN Medications: acetaminophen, bisacodyL, dextrose 10%, dextrose 10%, glucagon (human recombinant), glucose, glucose, hydrALAZINE, insulin aspart U-100, labetalol, ondansetron, prochlorperazine, sodium chloride 0.9%    Family History    None       Tobacco Use    Smoking status: Not on file    Smokeless tobacco: Not on file   Substance and Sexual Activity    Alcohol use: Not on file    Drug use: Not on file    Sexual activity: Not on file     Review of Systems   Constitutional:  Positive for activity change. Negative for fatigue.   Musculoskeletal:  Positive for gait problem.   Neurological:  Positive for weakness.   Psychiatric/Behavioral:  Negative for agitation and behavioral problems.      Objective:     Vital Signs (Most Recent):  Temp: 97.9 °F (36.6 °C) (12/19/23 0800)  Pulse: 91 (12/19/23 0800)  Resp: 20 (12/19/23 0800)  BP: (!) 177/94 (12/19/23 0800)  SpO2: (!) 91 % (12/19/23 0800)    Vital Signs (24h Range):  Temp:  [97.5 °F (36.4 °C)-98.2 °F (36.8 °C)] 97.9 °F (36.6 °C)  Pulse:  [] 91  Resp:  [17-20] 20  SpO2:  [91 %-97 %] 91 %  BP: (138-179)/(71-94) 177/94     Body mass index is 20 kg/m².     Physical Exam  Vitals and nursing note reviewed.   Constitutional:       Appearance: She is well-developed.      Comments: Drowsy sitting up in chair sleeping   Pulmonary:      Effort: Pulmonary effort is normal. No respiratory distress.   Musculoskeletal:      Cervical back: Normal range of motion.      Comments: Deconditioned and generalized weakness   Skin:     General: Skin is  warm and dry.   Neurological:      Motor: Weakness present.      Gait: Gait abnormal.      Comments: Confusion present  drowsy   Psychiatric:      Comments: Camera sitter     Diagnostic Results:   Labs: Reviewed  ECG: Reviewed  CT: Reviewed    Assessment/Plan:     * SAH (subarachnoid hemorrhage)  - CTH showed bifrontal tSAH as well as a left parietoccipital calvarial fracture with minimal associated pneumocephalus and adjacent SDH.   - CT T-Spine negative for acute fractures or dislocations.   - CTH stable.     - Related to prolonged/acute hospital course.     Recommendations  -  Encourage mobility, OOB in chair at least 3 hours per day, and early ambulation as appropriate  -  PT/OT evaluate and treat  -  Pain management  -  Monitor for and prevent skin breakdown and pressure ulcers  Early mobility, repositioning/weight shifting every 20-30 minutes when sitting, turn patient every 2 hours, proper mattress/overlay and chair cushioning, pressure relief/heel protector boots  -  DVT prophylaxis    -  Reviewed discharge options (IP rehab, SNF, HH therapy, and OP therapy)     Delirium  - EEG 12/11>12/12 with diffuse slowing, more focal slowing in the left hemisphere, no seizures.   - Seroquel started    PM&R Recommendation:     At this time, the PM&R team has reviewed this patient's ongoing medical case including inpatient diagnosis, medical history, clinical examination, labs, vitals, current social and functional history to provide the post-acute recommendation as follows:     RECOMMENDATIONS: SNF    We will sign off.      Thank you for your consult.     Elizabeth Salcido NP  Department of Physical Medicine & Rehab  [facility] - Intensive Care ([place])

## 2023-12-19 NOTE — PROGRESS NOTES
[facility] - Intensive Care ([place] [address])  Physical Medicine & Rehab  Progress Note    Patient Name: Eden Cardenas  MRN: [id number]  Admission Date: 12/9/2023  Length of Stay: 10 days  Attending Physician: Odin Maldonado*    Subjective:     Principal Problem:SAH (subarachnoid hemorrhage)    Hospital Course:   12/18/23: Participated w/ PT. Sit to Stand:  from EOB minimum assistance with no AD and hand-held assist.Pt requires verbal and tactile cues for hand placement and safety. Bed to Chair: moderate assistance with  no AD and hand-held assist  using  Stand Pivot. Pt requiring verbal cues for sequencing and facilitation.      Interval History 12/19/2023:  Patient is seen for follow-up PM&R evaluation and recommendations: Participating with therapy this morning. Following commands. Na 129 today.     HPI, Past Medical, Family, and Social History remains the same as documented in the initial encounter.    Scheduled Medications:    amLODIPine  10 mg Per NG tube Daily    atorvastatin  40 mg Per NG tube Daily    bisacodyL  10 mg Rectal Daily    carvediloL  6.25 mg Per NG tube BID    cefTRIAXone (ROCEPHIN) IVPB  1 g Intravenous Q24H    enoxparin  40 mg Subcutaneous Q24H (prophylaxis, 1700)    latanoprost  1 drop Both Eyes QHS    senna-docusate 8.6-50 mg  1 tablet Per NG tube BID    silodosin  4 mg Per NG tube Daily     Diagnostic Results:   Labs: Reviewed  ECG: Reviewed  CT: Reviewed    PRN Medications: acetaminophen, bisacodyL, dextrose 10%, dextrose 10%, glucagon (human recombinant), glucose, glucose, hydrALAZINE, insulin aspart U-100, labetalol, ondansetron, prochlorperazine, sodium chloride 0.9%    Review of Systems   Constitutional:  Positive for activity change. Negative for fatigue.   Musculoskeletal:  Positive for gait problem.   Neurological:  Positive for weakness.   Psychiatric/Behavioral:  Negative for agitation and behavioral problems.      Objective:     Vital Signs (Most Recent):  Temp: 97.9 °F (36.6  °C) (12/19/23 0800)  Pulse: 91 (12/19/23 0800)  Resp: 20 (12/19/23 0800)  BP: (!) 177/94 (12/19/23 0800)  SpO2: (!) 91 % (12/19/23 0800)    Vital Signs (24h Range):  Temp:  [97.5 °F (36.4 °C)-98.2 °F (36.8 °C)] 97.9 °F (36.6 °C)  Pulse:  [] 91  Resp:  [17-20] 20  SpO2:  [91 %-97 %] 91 %  BP: (138-179)/(71-94) 177/94      Physical Exam  Vitals and nursing note reviewed.   Constitutional:       Appearance: She is well-developed.   Pulmonary:      Effort: Pulmonary effort is normal. No respiratory distress.   Musculoskeletal:      Cervical back: Normal range of motion.      Comments: Deconditioned and generalized weakness   Skin:     General: Skin is warm and dry.   Neurological:      Mental Status: She is alert.      Motor: Weakness present.      Gait: Gait abnormal.      Comments: Confusion present  Working with therapy    Psychiatric:      Comments: Camera sitter     Assessment/Plan:      * SAH (subarachnoid hemorrhage)  - CTH showed bifrontal tSAH as well as a left parietoccipital calvarial fracture with minimal associated pneumocephalus and adjacent SDH.   - CT T-Spine negative for acute fractures or dislocations.   - CTH stable.     - Related to prolonged/acute hospital course.     Recommendations  -  Encourage mobility, OOB in chair at least 3 hours per day, and early ambulation as appropriate  -  PT/OT evaluate and treat  -  Pain management  -  Monitor for and prevent skin breakdown and pressure ulcers  Early mobility, repositioning/weight shifting every 20-30 minutes when sitting, turn patient every 2 hours, proper mattress/overlay and chair cushioning, pressure relief/heel protector boots  -  DVT prophylaxis    -  Reviewed discharge options (IP rehab, SNF, HH therapy, and OP therapy)     Delirium  - EEG 12/11>12/12 with diffuse slowing, more focal slowing in the left hemisphere, no seizures.   - Seroquel started    PM&R Recommendation:     At this time, the PM&R team has reviewed this patient's ongoing  medical case and changing our recommendation.     RECOMMENDATIONS: inpatient rehabilitation due to good motivation/participation with therapies, has been determined to tolerate 3 hours of therapy and good potential for recovery.     The patient will be admitted for comprehensive interdisciplinary inpatient rehabilitation to address the impairments due to medical diagnosis of SAH. The patient will benefit from an inpatient rehabilitation program to promote functional recovery, implement compensatory strategies and will undergo assessment for needs for durable medical equipment for safe discharge to the community. This patient will benefit from a coordinated interdisciplinary rehabilitation program services that require close monitoring and treatment with 24-hour rehabilitative nursing and physical/occupational therapies for 3 hours/day for 5 days/week.This interdisciplinary program will be performed under the direction of a physiatrist.    MEDICAL STABILITY:     At this time, barriers for post-acute rehab admission include improvement in Sodium.     We will continue to follow.     Elizabeth Salcido NP  Department of Physical Medicine & Rehab   [facility] - Intensive Care ([place])

## 2023-12-19 NOTE — HOSPITAL COURSE
12/18/23: Participated w/ PT. Sit to Stand:  from EOB minimum assistance with no AD and hand-held assist.Pt requires verbal and tactile cues for hand placement and safety. Bed to Chair: moderate assistance with  no AD and hand-held assist  using  Stand Pivot. Pt requiring verbal cues for sequencing and facilitation.

## 2023-12-19 NOTE — PLAN OF CARE
[facility]    FACILITY TRANSFER ORDERS      Patient Name: Eden Cardenas  YOB: 1929    PCP: Valorie Del Real PT   PCP Address: No address on file  PCP Phone Number: None  PCP Fax: None    Encounter Date: 12/19/2023    Admit to: IPR    Vital Signs:  Routine    Diagnoses:   Active Hospital Problems    Diagnosis  POA    *SAH (subarachnoid hemorrhage) [I60.9]  Yes    Leukocytosis [D72.829]  No    Delirium [R41.0]  Yes    Temporal bone fracture [S02.19XA]  Yes    Mixed hyperlipidemia [E78.2]  Yes     Chronic    Essential (primary) hypertension [I10]  Yes     Chronic      Resolved Hospital Problems   No resolved problems to display.       Allergies:Review of patient's allergies indicates:  No Known Allergies    Diet: regular diet and fluid restriction: 1800  Encourage po    Activities: Activity as tolerated    Goals of Care Treatment Preferences:  Code Status: DNR      Nursing: Per facility protocol     Labs: BMP Once in two days    CONSULTS:    Physical Therapy to evaluate and treat.  and Occupational Therapy to evaluate and treat.    MISCELLANEOUS CARE:  Wheeler Care: Empty Wheeler bag every shift. Change Wheeler every month. and Routine Skin for Bedridden Patients: Apply moisture barrier cream to all skin folds and wet areas in perineal area daily and after baths and all bowel movements.    WOUND CARE ORDERS  None    Medications: Review discharge medications with patient and family and provide education.      Current Discharge Medication List        START taking these medications    Details   carvediloL (COREG) 6.25 MG tablet Take 1 tablet (6.25 mg total) by mouth 2 (two) times daily.  Qty: 60 tablet, Refills: 11    Comments: .      ciprofloxacin HCl (CIPRO) 500 MG tablet Take 1 tablet (500 mg total) by mouth 2 (two) times daily. for 3 days  Qty: 6 tablet, Refills: 0      senna-docusate 8.6-50 mg (PERICOLACE) 8.6-50 mg per tablet Take 1 tablet by mouth 2 (two) times daily.      silodosin  (RAPAFLO) 4 mg Cap capsule Take 1 capsule (4 mg total) by mouth once daily.  Qty: 30 capsule, Refills: 11           CONTINUE these medications which have CHANGED    Details   amLODIPine (NORVASC) 5 MG tablet Take 2 tablets (10 mg total) by mouth once daily.    Comments: .           CONTINUE these medications which have NOT CHANGED    Details   atorvastatin (LIPITOR) 10 MG tablet Take 10 mg by mouth once daily.      latanoprost 0.005 % ophthalmic solution Place 1 drop into both eyes every evening.           _________________________________  Odin Maldonado MD  12/19/2023

## 2023-12-20 NOTE — PT/OT/SLP PROGRESS
Physical Therapy Co-Treatment  Co-treatment performed due to patient's multiple deficits requiring two skilled therapists to appropriately and safely adress patient's strength and endurance while facilitating functional tasks in addition to accommodating for patient's activity tolerance.     Patient Name:  Eden Cardenas   MRN:  [id number]    Recommendations:     Discharge Recommendations: Moderate Intensity Therapy  Discharge Equipment Recommendations: wheelchair, walker, rolling, lift device, hospital bed  Barriers to discharge: Inaccessible home and Decreased caregiver support    Assessment:     Eden Cardenas is a 89 y.o. female admitted with a medical diagnosis of SAH (subarachnoid hemorrhage).  She presents with the following impairments/functional limitations: weakness, impaired endurance, impaired self care skills, impaired functional mobility, gait instability, impaired balance, impaired cognition, decreased coordination, decreased upper extremity function, decreased lower extremity function, decreased safety awareness. Pt progressed to ambulating 7ft to bedside chair. Pt required Matilda for forward ambulation and modA for backwards ambulation. Pt would benefit from a moderate intensity/frequency therapy for: Dynamic/static standing/sitting balance through skilled balance training, strengthening with the use of skilled therapeutic exercises interventions, and mobility through adaptive equipment training. Pt continues to benefit from a collaborative PT/OT program to improve quality of life and focus on recovery of impairments.    Rehab Prognosis: Good; patient would benefit from acute skilled PT services to address these deficits and reach maximum level of function.    Recent Surgery: * No surgery found *      Plan:     During this hospitalization, patient to be seen 3 x/week to address the identified rehab impairments via gait training, therapeutic activities, therapeutic exercises, neuromuscular re-education and  progress toward the following goals:    Plan of Care Expires:  01/10/23    Subjective     Chief Complaint: sleep  Patient/Family Comments/goals: willing to participate in therapy  Pain/Comfort:  Pain Rating 1: 0/10  Pain Rating Post-Intervention 1: 0/10      Objective:     Communicated with RN prior to session.  Patient found supine with telemetry, christensen catheter upon PT entry to room.     General Precautions: Standard, fall  Orthopedic Precautions: N/A  Braces: N/A  Respiratory Status: Room air     Functional Mobility:  Bed Mobility:     Rolling Left:  minimum assistance  Scooting: minimum assistance  Supine to Sit: minimum assistance  Transfers:     Sit to Stand:  minimum assistance with no AD and hand-held assist, posterior lean  Gait: 5 ft forward with Matilda and 2 ft backward with modA, no AD, HHA, decreased gait speed and step length, Vcing for stepping and turning  Balance:   Static Sitting: CGA-Matilda  Dynamic Sitting: Matilda-CGA  Static Standing: Matilda  Dynamic Standing: Matilda-modA with HHA      AM-PAC 6 CLICK MOBILITY  Turning over in bed (including adjusting bedclothes, sheets and blankets)?: 3  Sitting down on and standing up from a chair with arms (e.g., wheelchair, bedside commode, etc.): 3  Moving from lying on back to sitting on the side of the bed?: 3  Moving to and from a bed to a chair (including a wheelchair)?: 3  Need to walk in hospital room?: 3  Climbing 3-5 steps with a railing?: 2  Basic Mobility Total Score: 17       Treatment & Education:  Patient educated on role of therapy, goals of session, and benefits of mobilizing.   Discussed PT plan of care during hospitalization.   Patient educated on calling for assistance.   Patient educated on how their diagnosis impacts their mobility within PT scope of practice.   All questions answered within PT scope of practice.    Patient left up in chair with all lines intact, call button in reach, and RN notified.    GOALS:   Multidisciplinary Problems        Physical Therapy Goals          Problem: Physical Therapy    Goal Priority Disciplines Outcome Goal Variances Interventions   Physical Therapy Goal     PT, PT/OT Ongoing, Progressing     Description: Goals to met by 12/24/2023    1. Supine to sit with MInimal Assistance  2. Sit to supine with MInimal Assistance  3. Rolling to Left and Right with Minimal Assistance.  4. Sit to stand transfer with Contact Guard Assistance  5. Bed to chair transfer with Contact Guard Assistance using LRAD  6. Gait  x 25 feet with Contact Guard Assistance using LRAD   7. Sitting at edge of bed x10 minutes with Stand-by Assistance  8. Stand for 5 minutes with Stand-by Assistance using LRAD  9. Lower extremity exercise program x15 reps per Instruction, with assistance as needed in order to facilitate improved strength, improved postural control, and improvement in functional independence                       Time Tracking:     PT Received On: 12/19/23  PT Start Time: 0916     PT Stop Time: 0934  PT Total Time (min): 18 min     Billable Minutes: Therapeutic Activity 18    Treatment Type: Treatment  PT/PTA: PT     Number of PTA visits since last PT visit: 0     12/19/2023

## 2023-12-23 NOTE — DISCHARGE SUMMARY
[facility] - Intensive Care ([place] [address])  [facility] Medicine  Discharge Summary      Patient Name: Eden Cardenas  MRN: [id number]  CINTHIA: [id number]  Patient Class: IP- Inpatient  Admission Date: 12/9/2023  Hospital Length of Stay: 10 days  Discharge Date and Time: 12/19/23  Attending Physician: No att. providers found   Discharging Provider: Odin Maldonado MD  Primary Care Provider: Valorie Del Real PT  [facility] Medicine Team: [facility] MED  Odin Maldonado MD  Primary Care Team: [facility]    HPI:   Eden Cardenas is a 89 y.o. female with history of HTN, HLD, glaucoma that presents with tSAH s/p mechanical fall from ground level. Per chart review, although the fall was unwitnessed, family heard a yell and the sound of the patient hitting the floor. No AC/AP. No LOC. She was brought to the ED, where she was found to be confused with inappropriate speech. She sustained a laceration to the back of her head, which was repaired in the ED. CTH showed bifrontal tSAH as well as a left parietoccipital calvarial fracture with minimal associated pneumocephalus and adjacent SDH. CT T-Spine negative for acute fractures or dislocations. The patient will be admitted to [facility] for close monitoring and a higher level of care.             Hospital Course:   Patient admitted to neuro critical care. Course as below:  12/10/2023 d/c EEG, delirium and agitation   12/11/2023 Follow-up CTH this AM stable. Seroquel started last night d/t increased agitation - increased drowsiness today. Will discontinue.  12/12/2023 No issues overnight. No epileptiform activity per Epilepsy, EEG discontinued. More alert for family at bedside. CTH tonight, possible stepdown tomorrow pending results.   12/13/2023 precedex off, resume seroquel.  Treatment for UTI    Patient stepped down to hospital medicine 12/14. Mentation improving. NGT removed and cleared by SLP. Patient had delirium which improved. Treated for UTI. Patient deemed  ready for discharge. Plan discussed with pt, who was agreeable and amenable; medications were discussed and reviewed, outpatient follow-up arranged, ER precautions were given, all questions were answered to the pt's satisfaction, and Eden Cardenas  was subsequently discharged to inpatient rehab.       Goals of Care Treatment Preferences:  Code Status: DNR      Consults:   Consults (From admission, onward)          Status Ordering Provider     Inpatient consult to Physical Medicine Rehab  Once        Provider:  Adalberto Ventura MD    Completed ADALBERTO VENTURA     Inpatient consult to Midline team  Once        Provider:  (Not yet assigned)    Completed ADALBERTO VENTURA     Inpatient consult to Registered Dietitian/Nutritionist  Once        Provider:  (Not yet assigned)    Completed EDUARD ABREU     Inpatient consult to ENT  Once        Provider:  (Not yet assigned)    Completed JEFFY MACHADO     Inpatient consult to Physical Medicine Rehab  Once        Provider:  (Not yet assigned)    Completed SUZIE ROQUE     Inpatient consult to Registered Dietitian/Nutritionist  Once        Provider:  (Not yet assigned)    Completed SUZIE ROQUE     Inpatient consult to Neurosurgery  Once        Provider:  (Not yet assigned)    Completed ALYSIA VIDALES            No new Assessment & Plan notes have been filed under this hospital service since the last note was generated.  Service: Hospital Medicine    Final Active Diagnoses:    Diagnosis Date Noted POA    PRINCIPAL PROBLEM:  SAH (subarachnoid hemorrhage) [I60.9] 12/09/2023 Yes    Leukocytosis [D72.829] 12/11/2023 No    Delirium [R41.0] 12/10/2023 Yes    Temporal bone fracture [S02.19XA] 12/10/2023 Yes    Mixed hyperlipidemia [E78.2] 12/09/2023 Yes     Chronic    Essential (primary) hypertension [I10] 12/09/2023 Yes     Chronic      Problems Resolved During this Admission:       Discharged Condition:  good    Disposition: Rehab Facility    Follow Up:    Patient Instructions:   No discharge procedures on file.    Significant Diagnostic Studies: N/A    Pending Diagnostic Studies:       Procedure Component Value Units Date/Time    EKG 12-lead [[id number]]     Order Status: Sent Lab Status: No result     EKG 12-lead [[id number]]     Order Status: Sent Lab Status: No result            Medications:  Reconciled Home Medications:      Medication List        START taking these medications      carvediloL 6.25 MG tablet  Commonly known as: COREG  Take 1 tablet (6.25 mg total) by mouth 2 (two) times daily.     senna-docusate 8.6-50 mg 8.6-50 mg per tablet  Commonly known as: PERICOLACE  Take 1 tablet by mouth 2 (two) times daily.     silodosin 4 mg Cap capsule  Commonly known as: RAPAFLO  Take 1 capsule (4 mg total) by mouth once daily.            CHANGE how you take these medications      amLODIPine 5 MG tablet  Commonly known as: NORVASC  Take 2 tablets (10 mg total) by mouth once daily.  What changed: how much to take            CONTINUE taking these medications      atorvastatin 10 MG tablet  Commonly known as: LIPITOR  Take 10 mg by mouth once daily.     latanoprost 0.005 % ophthalmic solution  Place 1 drop into both eyes every evening.            STOP taking these medications      desmopressin 0.2 MG tablet  Commonly known as: DDAVP     imipramine 25 MG tablet  Commonly known as: TOFRANIL     meloxicam 7.5 MG tablet  Commonly known as: MOBIC     MYRBETRIQ 25 mg Tb24 ER tablet  Generic drug: mirabegron     traMADoL 50 mg tablet  Commonly known as: ULTRAM            ASK your doctor about these medications      ciprofloxacin HCl 500 MG tablet  Commonly known as: CIPRO  Take 1 tablet (500 mg total) by mouth 2 (two) times daily. for 3 days  Ask about: Should I take this medication?              Indwelling Lines/Drains at time of discharge:   Lines/Drains/Airways       None                   Time spent on the discharge of  patient: 35 minutes         Odni Maldonado MD  Department of Hospital Medicine  [facility] - Intensive Care ([place]

## 2023-12-27 LAB
POCT GLUCOSE: 131 MG/DL (ref 70–110)
POCT GLUCOSE: 155 MG/DL (ref 70–110)

## 2024-03-24 NOTE — DISCHARGE NOTE OB - CAREGIVER NAME
Past Medical History:   Diagnosis Date    Cancer     CML (chronic myelocytic leukemia)     DM2 (diabetes mellitus, type 2)     HTN (hypertension)     NAFLD (nonalcoholic fatty liver disease)     Neuropathy      Past Surgical History:   Procedure Laterality Date    ABDOMINAL SURGERY       SECTION      x4    CHOLECYSTECTOMY        Current Facility-Administered Medications on File Prior to Encounter   Medication Dose Route Frequency Provider Last Rate Last Admin    [COMPLETED] acetaminophen suppository 650 mg  650 mg Rectal ED 1 Time Bobby Us MD   650 mg at 24 1815    [COMPLETED] ceFEPIme (MAXIPIME) 2 g in dextrose 5 % in water (D5W) 100 mL IVPB (MB+)  2 g Intravenous ED 1 Time Bobby Us MD   Stopped at 24 1849    [COMPLETED] HYDROmorphone injection 0.5 mg  0.5 mg Intravenous ED 1 Time Bobby Us MD   0.5 mg at 24 1552    [COMPLETED] HYDROmorphone injection 1 mg  1 mg Intravenous ED 1 Time Bobby Us MD   1 mg at 24 1450    [COMPLETED] iohexoL (OMNIPAQUE 350) 350 mg iodine/mL injection        100 mL at 24 1615    [COMPLETED] morphine injection 4 mg  4 mg Intravenous ED 1 Time Bobby Us MD   4 mg at 24 1314    [COMPLETED] sodium chloride 0.9% bolus 1,000 mL 1,000 mL  1,000 mL Intravenous ED 1 Time Bobby Us MD   Stopped at 24 1855    [DISCONTINUED] cefTRIAXone (ROCEPHIN) 2 g in dextrose 5 % in water (D5W) 100 mL IVPB (MB+)  2 g Intravenous ED 1 Time Bobby Us MD        [DISCONTINUED] clevidipine (CLEVIPREX) 50 mg/100 mL infusion  0-16 mg/hr Intravenous Continuous Bobby Us MD 6 mL/hr at 24 1906 3 mg/hr at 24 1906    [DISCONTINUED] hydrALAZINE injection 10 mg  10 mg Intravenous ED 1 Time Bobby Us MD        [DISCONTINUED] piperacillin-tazobactam (ZOSYN) 4.5 g in dextrose 5 % in water (D5W) 100 mL IVPB (MB+)  4.5 g Intravenous ED 1 Time Bobby Us MD        [DISCONTINUED] sodium chloride 0.9% bolus 500  mL 500 mL  500 mL Intravenous ED 1 Time Bobby Us MD        [DISCONTINUED] vancomycin (VANCOCIN) 2,000 mg in dextrose 5 % (D5W) 500 mL IVPB  2,000 mg Intravenous Once Bobby Us MD        [DISCONTINUED] vancomycin 1,500 mg in dextrose 5 % (D5W) 250 mL IVPB (Vial-Mate)  1,500 mg Intravenous Q12H Bobby Us MD         Current Outpatient Medications on File Prior to Encounter   Medication Sig Dispense Refill    acyclovir (ZOVIRAX) 400 MG tablet Take 1 tablet (400 mg total) by mouth 2 (two) times daily. 60 tablet 11    albuterol (PROVENTIL/VENTOLIN HFA) 90 mcg/actuation inhaler Inhale 2 puffs into the lungs every 4 (four) hours as needed for Wheezing. Rescue      allopurinoL (ZYLOPRIM) 300 MG tablet Take 300 mg by mouth once daily.      amLODIPine (NORVASC) 10 MG tablet Take 10 mg by mouth once daily.      atorvastatin (LIPITOR) 40 MG tablet Take 40 mg by mouth once daily.      fluconazole (DIFLUCAN) 200 MG Tab Take 400 mg by mouth once daily.      furosemide (LASIX) 20 MG tablet Take 1 tablet (20 mg total) by mouth once daily. 7 tablet 0    gabapentin (NEURONTIN) 300 MG capsule Take 2 capsules TID. (Patient taking differently: Take 800 capsules by mouth 3 (three) times daily. Take 1 capsules TID) 42 capsule 0    HYDROcodone-acetaminophen (NORCO) 5-325 mg per tablet Take 1 tablet by mouth every 6 (six) hours as needed for Pain.      insulin lispro 100 unit/mL injection Inject 22 Units into the skin 3 (three) times daily before meals. Takes 22 units TID AC while on chemo - (Gives between 12 to 15 units TID AC when not on chemo) (Patient taking differently: Inject 20 Units into the skin 3 (three) times daily before meals. Takes 22 units TID AC while on chemo - (Gives between 12 to 15 units TID AC when not on chemo)) 10 mL 2    insulin NPH (NOVOLIN N NPH U-100 INSULIN) 100 unit/mL injection 20 Units before breakfast. No longer 20 in AM      insulin  unit/mL injection Inject 50 Units into the skin  every evening.      levoFLOXacin (LEVAQUIN) 500 MG tablet Take 500 mg by mouth every 24 hours.      losartan (COZAAR) 25 MG tablet Take 1 tablet (25 mg total) by mouth once daily. 90 tablet 3    metFORMIN (GLUCOPHAGE) 1000 MG tablet Take 1,000 mg by mouth 2 (two) times daily with meals.      metoprolol tartrate (LOPRESSOR) 25 MG tablet Take 25 mg by mouth 2 (two) times daily.      montelukast (SINGULAIR) 10 mg tablet Take 10 mg by mouth once daily.      morphine (MSIR) 15 MG tablet Take 30 mg by mouth 2 (two) times a day.      omeprazole (PRILOSEC) 40 MG capsule Take 1 capsule by mouth once daily.      ondansetron (ZOFRAN) 4 MG tablet Take 2 tablets (8 mg total) by mouth every 6 (six) hours as needed for Nausea. 360 tablet 0    PONATinib (ICLUSIG) 30 mg Tab Take 30 mg by mouth Daily.      prochlorperazine (COMPAZINE) 10 MG tablet Take 1 tablet (10 mg total) by mouth 4 (four) times daily. 120 tablet 1    triamcinolone acetonide 0.1% (KENALOG) 0.1 % cream Apply topically 2 (two) times daily. for 14 days 45 g 0      Allergies: Patient has no known allergies.  Family History   Problem Relation Age of Onset    Diabetes Mother     Diabetes Father     Cancer Neg Hx      Social History     Tobacco Use    Smoking status: Never    Smokeless tobacco: Never   Substance Use Topics    Alcohol use: Not Currently    Drug use: Not Currently     Review of Systems   Constitutional:  Positive for activity change, chills and fever.   HENT:  Positive for trouble swallowing. Negative for hearing loss and voice change.    Eyes:  Negative for photophobia, pain and visual disturbance.   Respiratory:  Positive for shortness of breath (tachypnea 2/2 pain). Negative for apnea and cough.    Cardiovascular:  Negative for chest pain, palpitations and leg swelling.   Gastrointestinal:  Positive for nausea and vomiting. Negative for diarrhea.   Endocrine: Negative for polydipsia, polyphagia and polyuria.   Genitourinary:  Negative for decreased  urine volume, difficulty urinating and dysuria.   Musculoskeletal:  Positive for back pain and gait problem. Negative for neck pain.        Positive for hip and upper BLE pain   Skin:  Positive for color change. Negative for pallor and rash.   Allergic/Immunologic: Positive for immunocompromised state (on chemotherapy). Negative for environmental allergies and food allergies.   Neurological:  Positive for weakness and headaches. Negative for tremors, seizures, syncope, facial asymmetry, speech difficulty and numbness.   Hematological:  Negative for adenopathy. Does not bruise/bleed easily.   Psychiatric/Behavioral:  Positive for agitation (secondary to pain) and sleep disturbance. The patient is nervous/anxious.    All other systems reviewed and are negative.    Objective:     Vitals:    Pulse: (!) 114  BP: 123/77  MAP (mmHg): 93  Resp: (!) 33  SpO2: 96 %    Temp  Min: 98.1 °F (36.7 °C)  Max: 101.7 °F (38.7 °C)  Pulse  Min: 77  Max: 129  BP  Min: 123/77  Max: 170/87  MAP (mmHg)  Min: 93  Max: 114  Resp  Min: 18  Max: 33  SpO2  Min: 95 %  Max: 97 %    No intake/output data recorded.            Physical Exam  Vitals and nursing note reviewed.   Constitutional:       General: She is in acute distress.      Appearance: She is obese. She is ill-appearing.      Interventions: Nasal cannula in place.   HENT:      Head: Normocephalic and atraumatic. No contusion or laceration.      Right Ear: External ear normal. No decreased hearing noted.      Left Ear: External ear normal. No decreased hearing noted.      Nose: Nose normal. No nasal deformity or signs of injury.      Mouth/Throat:      Lips: Pink. No lesions.      Mouth: Mucous membranes are moist. No lacerations or angioedema.   Eyes:      General: Lids are normal. No allergic shiner or scleral icterus.  Neck:      Trachea: Phonation normal.   Cardiovascular:      Rate and Rhythm: Regular rhythm. Tachycardia present. No extrasystoles are present.     Pulses: No  decreased pulses.   Pulmonary:      Effort: Tachypnea present. No bradypnea or respiratory distress.      Comments: Increased WOB  Abdominal:      General: Abdomen is protuberant. There is no distension.      Palpations: Abdomen is soft.   Musculoskeletal:      Cervical back: Neck supple. No edema or erythema.      Right lower leg: No edema.      Left lower leg: No edema.   Skin:     General: Skin is warm and dry.      Capillary Refill: Capillary refill takes less than 2 seconds.      Findings: Rash present. Rash is scaling (Large erythematous annular scaling plaque on abdomen).   Neurological:      Comments:   Mentation: Awake, alert, and oriented x4  Following commands all extremities as able  Fluent spontaneous speech  Normal attention and concentration  GCS: 15 (E4V5M6)  CN: hearing intact to conversational speech  EOMI, PERRL  No facial asymmetry  No aphasia, no dysarthria, limited exam 2/2 language  Failed bedside swallow  SILT, equal sensation bilaterally  JAZMYN, no BUE drift  RUE 5/5, RLE 5/5  LUE 3/5, LLE 3/5 -- BLE weakness 2/2 pain  Coordination: limited exam, appears intact  Gait: not tested for safety   Psychiatric:         Attention and Perception: Attention normal.         Mood and Affect: Mood is anxious. Affect is tearful (crying from pain).         Speech: Speech normal.         Behavior: Behavior is agitated (visibly in pain). Behavior is cooperative.        Today I personally reviewed pertinent medications, lines/drains/airways, imaging, cardiology results, laboratory results, microbiology results,     Britney Santamaria

## 2024-09-22 NOTE — ED PROVIDER NOTE - IV ALTEPLASE INCLUSION HIDDEN
Problem: Potential for Falls  Goal: Patient will remain free of falls  Description: INTERVENTIONS:  - Educate patient/family on patient safety including physical limitations  - Instruct patient to call for assistance with activity   - Consult OT/PT to assist with strengthening/mobility   - Keep Call bell within reach  - Keep bed low and locked with side rails adjusted as appropriate  - Keep care items and personal belongings within reach  - Initiate and maintain comfort rounds  - Make Fall Risk Sign visible to staff  - Offer Toileting every 2 Hours, in advance of need  - Initiate/Maintain bed alarm  - Obtain necessary fall risk management equipment: bed alarm call bell  - Apply yellow socks and bracelet for high fall risk patients  - Consider moving patient to room near nurses station  Outcome: Progressing     Problem: PAIN - ADULT  Goal: Verbalizes/displays adequate comfort level or baseline comfort level  Description: Interventions:  - Encourage patient to monitor pain and request assistance  - Assess pain using appropriate pain scale  - Administer analgesics based on type and severity of pain and evaluate response  - Implement non-pharmacological measures as appropriate and evaluate response  - Consider cultural and social influences on pain and pain management  - Notify physician/advanced practitioner if interventions unsuccessful or patient reports new pain  Outcome: Progressing     Problem: INFECTION - ADULT  Goal: Absence or prevention of progression during hospitalization  Description: INTERVENTIONS:  - Assess and monitor for signs and symptoms of infection  - Monitor lab/diagnostic results  - Monitor all insertion sites, i.e. indwelling lines, tubes, and drains  - Monitor endotracheal if appropriate and nasal secretions for changes in amount and color  - [place] appropriate cooling/warming therapies per order  - Administer medications as ordered  - Instruct and encourage patient and family to use good  hand hygiene technique  - Identify and instruct in appropriate isolation precautions for identified infection/condition  Outcome: Progressing  Goal: Absence of fever/infection during neutropenic period  Description: INTERVENTIONS:  - Monitor WBC    Outcome: Progressing     Problem: SAFETY ADULT  Goal: Patient will remain free of falls  Description: INTERVENTIONS:  - Educate patient/family on patient safety including physical limitations  - Instruct patient to call for assistance with activity   - Consult OT/PT to assist with strengthening/mobility   - Keep Call bell within reach  - Keep bed low and locked with side rails adjusted as appropriate  - Keep care items and personal belongings within reach  - Initiate and maintain comfort rounds  - Make Fall Risk Sign visible to staff  - Offer Toileting every 2 Hours, in advance of need  - Initiate/Maintain bed alarm  - Obtain necessary fall risk management equipment: bed alarm call bell  - Apply yellow socks and bracelet for high fall risk patients  - Consider moving patient to room near nurses station  Outcome: Progressing  Goal: Maintain or return to baseline ADL function  Description: INTERVENTIONS:  -  Assess patient's ability to carry out ADLs; assess patient's baseline for ADL function and identify physical deficits which impact ability to perform ADLs (bathing, care of mouth/teeth, toileting, grooming, dressing, etc.)  - Assess/evaluate cause of self-care deficits   - Assess range of motion  - Assess patient's mobility; develop plan if impaired  - Assess patient's need for assistive devices and provide as appropriate  - Encourage maximum independence but intervene and supervise when necessary  - Involve family in performance of ADLs  - Assess for home care needs following discharge   - Consider OT consult to assist with ADL evaluation and planning for discharge  - Provide patient education as appropriate  Outcome: Progressing  Goal: Maintains/Returns to pre  admission functional level  Description: INTERVENTIONS:  - Perform AM-PAC 6 Click Basic Mobility/ Daily Activity assessment daily.  - Set and communicate daily mobility goal to care team and patient/family/caregiver.   - Collaborate with rehabilitation services on mobility goals if consulted  - Perform Range of Motion 4 times a day.  - Reposition patient every 2 hours.  - Dangle patient 3 times a day  - Stand patient 3 times a day  - Ambulate patient 3 times a day  - Out of bed to chair 3 times a day   - Out of bed for meals 3 times a day  - Out of bed for toileting  - Record patient progress and toleration of activity level   Outcome: Progressing     Problem: DISCHARGE PLANNING  Goal: Discharge to home or other facility with appropriate resources  Description: INTERVENTIONS:  - Identify barriers to discharge w/patient and caregiver  - Arrange for needed discharge resources and transportation as appropriate  - Identify discharge learning needs (meds, wound care, etc.)  - Arrange for interpretive services to assist at discharge as needed  - Refer to Case Management Department for coordinating discharge planning if the patient needs post-hospital services based on physician/advanced practitioner order or complex needs related to functional status, cognitive ability, or social support system  Outcome: Progressing     Problem: Knowledge Deficit  Goal: Patient/family/caregiver demonstrates understanding of disease process, treatment plan, medications, and discharge instructions  Description: Complete learning assessment and assess knowledge base.  Interventions:  - Provide teaching at level of understanding  - Provide teaching via preferred learning methods  Outcome: Progressing     Problem: Prexisting or High Potential for Compromised Skin Integrity  Goal: Skin integrity is maintained or improved  Description: INTERVENTIONS:  - Identify patients at risk for skin breakdown  - Assess and monitor skin integrity  - Assess  and monitor nutrition and hydration status  - Monitor labs   - Assess for incontinence   - Turn and reposition patient  - Assist with mobility/ambulation  - Relieve pressure over bony prominences  - Avoid friction and shearing  - Provide appropriate hygiene as needed including keeping skin clean and dry  - Evaluate need for skin moisturizer/barrier cream  - Collaborate with interdisciplinary team   - Patient/family teaching  - Consider wound care consult   Outcome: Progressing     Problem: GASTROINTESTINAL - ADULT  Goal: Maintains or returns to baseline bowel function  Description: INTERVENTIONS:  - Assess bowel function  - Encourage oral fluids to ensure adequate hydration  - Administer IV fluids if ordered to ensure adequate hydration  - Administer ordered medications as needed  - Encourage mobilization and activity  - Consider nutritional services referral to assist patient with adequate nutrition and appropriate food choices  Outcome: Progressing     Problem: GENITOURINARY - ADULT  Goal: Urinary catheter remains patent  Description: INTERVENTIONS:  - Assess patency of urinary catheter  - If patient has a chronic hanks, consider changing catheter if non-functioning  - Follow guidelines for intermittent irrigation of non-functioning urinary catheter  Outcome: Progressing     Problem: METABOLIC, FLUID AND ELECTROLYTES - ADULT  Goal: Electrolytes maintained within normal limits  Description: INTERVENTIONS:  - Monitor labs and assess patient for signs and symptoms of electrolyte imbalances  - Administer electrolyte replacement as ordered  - Monitor response to electrolyte replacements, including repeat lab results as appropriate  - Instruct patient on fluid and nutrition as appropriate  Outcome: Progressing     Problem: SKIN/TISSUE INTEGRITY - ADULT  Goal: Skin Integrity remains intact(Skin Breakdown Prevention)  Description: Assess:  -Perform Goldy assessment every   -Clean and moisturize skin every   -Inspect skin  when repositioning, toileting, and assisting with ADLS  -Assess under medical devices such as  every   -Assess extremities for adequate circulation and sensation     Bed Management:  -Have minimal linens on bed & keep smooth, unwrinkled  -Change linens as needed when moist or perspiring  -Avoid sitting or lying in one position for more than  hours while in bed  -Keep HOB at degrees     Toileting:  -Offer bedside commode  -Assess for incontinence every   -Use incontinent care products after each incontinent episode such as     Activity:  -Mobilize patient  times a day  -Encourage activity and walks on unit  -Encourage or provide ROM exercises   -Turn and reposition patient every  Hours  -Use appropriate equipment to lift or move patient in bed  -Instruct/ Assist with weight shifting every  when out of bed in chair  -Consider limitation of chair time  hour intervals    Skin Care:  -Avoid use of baby powder, tape, friction and shearing, hot water or constrictive clothing  -Relieve pressure over bony prominences using   -Do not massage red bony areas    Next Steps:  -Teach patient strategies to minimize risks such as    -Consider consults to  interdisciplinary teams such as   Outcome: Progressing  Goal: Incision(s), wounds(s) or drain site(s) healing without S/S of infection  Description: INTERVENTIONS  - Assess and document dressing, incision, wound bed, drain sites and surrounding tissue  - Provide patient and family education  - Perform skin care/dressing changes every   Outcome: Progressing  Goal: Pressure injury heals and does not worsen  Description: Interventions:  - Implement low air loss mattress or specialty surface (Criteria met)  - Apply silicone foam dressing  - Instruct/assist with weight shifting every  minutes when in chair   - Limit chair time to  hour intervals  - Use special pressure reducing interventions such as  when in chair   - Apply fecal or urinary incontinence containment device   - Perform  passive or active ROM every   - Turn and reposition patient & offload bony prominences every  hours   - Utilize friction reducing device or surface for transfers   - Consider consults to  interdisciplinary teams such as   - Use incontinent care products after each incontinent episode such as   - Consider nutrition services referral as needed  Outcome: Progressing     Problem: HEMATOLOGIC - ADULT  Goal: Maintains hematologic stability  Description: INTERVENTIONS  - Assess for signs and symptoms of bleeding or hemorrhage  - Monitor labs  - Administer supportive blood products/factors as ordered and appropriate  Outcome: Progressing     Problem: Nutrition/Hydration-ADULT  Goal: Nutrient/Hydration intake appropriate for improving, restoring or maintaining nutritional needs  Description: Monitor and assess patient's nutrition/hydration status for malnutrition. Collaborate with interdisciplinary team and initiate plan and interventions as ordered.  Monitor patient's weight and dietary intake as ordered or per policy. Utilize nutrition screening tool and intervene as necessary. Determine patient's food preferences and provide high-protein, high-caloric foods as appropriate.     INTERVENTIONS:  - Monitor oral intake, urinary output, labs, and treatment plans  - Assess nutrition and hydration status and recommend course of action  - Evaluate amount of meals eaten  - Assist patient with eating if necessary   - Allow adequate time for meals  - Recommend/ encourage appropriate diets, oral nutritional supplements, and vitamin/mineral supplements  - Order, calculate, and assess calorie counts as needed  - Recommend, monitor, and adjust tube feedings and TPN/PPN based on assessed needs  - Assess need for intravenous fluids  - Provide specific nutrition/hydration education as appropriate  - Include patient/family/caregiver in decisions related to nutrition  Outcome: Progressing      show

## 2025-01-20 NOTE — PROGRESS NOTES
-- DO NOT REPLY / DO NOT REPLY ALL --  -- This inbox is not monitored. If this was sent to the wrong provider or department, reroute message to P ECO Reroute pool. --  -- Message is from Engagement Center Operations (ECO) --    General Patient Message: Patient calling  states  [facility]  Mail  delivery  needs  Prior AUTH  FOR HIS  90 DAY SUPPLY of  atorvastatin (LIPITOR) 40 MG tablet .Please reply as  soon as possible  he has not have his medication for the past three weeks.  Caller Information       Contact Date/Time Type Contact Phone/Fax    01/20/2025 10:24 AM CST Phone (Incoming) eve [phone]            Alternative phone number: NA    Can a detailed message be left? Yes - Voicemail   Patient has been advised the message will be addressed within 2-3 business days.              Copied from CRM #[id number]. Topic: MW Medication/Rx - MW Prior Auth Request/Status  >> Jan 20, 2025 10:27 AM Carla BARCENAS wrote:  eve called regarding a prior authorization    Prior authorization request for medication - Status. Selected 'Wrap Up CRM' and created new Telephone Encounter after clicking 'Convert to Clinical Call'. Selected reason for call 'Medication Issue'. Sent Pt message template and routed as routine priority per Clinician KB page to appropriate clinician pool, including medication information and any other relevant information. Readback full message.   [facility] - Intensive Care ([address] Medicine  Progress Note    Patient Name: Eden Cardenas  MRN: [id number]  Patient Class: IP- Inpatient   Admission Date: 12/9/2023  Length of Stay: 9 days  Attending Physician: Odin Maldonado*  Primary Care Provider: Valorie Del Real PT        Subjective:     Principal Problem:SAH (subarachnoid hemorrhage)        HPI:  Eden Cardenas is a 89 y.o. female with history of HTN, HLD, glaucoma that presents with tSAH s/p mechanical fall from ground level. Per chart review, although the fall was unwitnessed, family heard a yell and the sound of the patient hitting the floor. No AC/AP. No LOC. She was brought to the ED, where she was found to be confused with inappropriate speech. She sustained a laceration to the back of her head, which was repaired in the ED. CTH showed bifrontal tSAH as well as a left parietoccipital calvarial fracture with minimal associated pneumocephalus and adjacent SDH. CT T-Spine negative for acute fractures or dislocations. The patient will be admitted to [facility] for close monitoring and a higher level of care.       Overview/Hospital Course:  12/10/2023 d/c EEG, delirium and agitation   12/11/2023 Follow-up CTH this AM stable. Seroquel started last night d/t increased agitation - increased drowsiness today. Will discontinue.  12/12/2023 No issues overnight. No epileptiform activity per Epilepsy, EEG discontinued. More alert for family at bedside. CTH tonight, possible stepdown tomorrow pending results.   12/13/2023 precedex off, resume seroquel.  Completed treatment for UTI    Patient stepped down to hospital medicine 12/14. Mentation improving. NGT removed and cleared by SLP.    Interval History: No acute events. Pt feels well and denies pain. Remains disoriented but was texting on her phone following evaluation.     Review of Systems  Objective:     Vital Signs (Most Recent):  Temp: 97.9 °F (36.6 °C) (12/18/23 0700)  Pulse: 86  (12/18/23 1108)  Resp: 20 (12/18/23 0700)  BP: (!) 158/74 (12/18/23 0700)  SpO2: 98 % (12/18/23 0700) Vital Signs (24h Range):  Temp:  [97.4 °F (36.3 °C)-98.2 °F (36.8 °C)] 97.9 °F (36.6 °C)  Pulse:  [] 86  Resp:  [16-20] 20  SpO2:  [97 %-98 %] 98 %  BP: (129-170)/(72-89) 158/74     Weight: 51.2 kg (112 lb 14 oz)  Body mass index is 20 kg/m².    Intake/Output Summary (Last 24 hours) at 12/18/2023 1253  Last data filed at 12/18/2023 0522  Gross per 24 hour   Intake --   Output 1270 ml   Net -1270 ml         Physical Exam  Constitutional:       General: She is not in acute distress.     Appearance: She is ill-appearing. She is not toxic-appearing.   Cardiovascular:      Rate and Rhythm: Normal rate and regular rhythm.      Heart sounds: No murmur heard.     No friction rub. No gallop.   Pulmonary:      Effort: Pulmonary effort is normal. No respiratory distress.   Abdominal:      General: Abdomen is flat. There is no distension.      Palpations: Abdomen is soft.      Tenderness: There is no abdominal tenderness. There is no guarding or rebound.   Musculoskeletal:      Right lower leg: No edema.      Left lower leg: No edema.   Neurological:      General: No focal deficit present.             Significant Labs: All pertinent labs within the past 24 hours have been reviewed.    Significant Imaging: I have reviewed all pertinent imaging results/findings within the past 24 hours.    Assessment/Plan:      * SAH (subarachnoid hemorrhage)  - traumatic SAH after mechanical fall without LOC per family  - NSGY consulted, maximal medical management  - Patient DNR/DNI per family, with wishes for no operative intervention if the need were to arise  - LCM 50 mg BID x7 days per NSGY/NCC  - EEG 12/11>12/12 with diffuse slowing, more focal slowing in the left hemisphere, no seizures      Leukocytosis  Treated for suspected UTI with rocephin      Temporal bone fracture  ENT consulted, no surgical  intervention      Delirium  Continues to improve  SLP cleared for diet      Essential (primary) hypertension  Chronic, controlled. Latest blood pressure and vitals reviewed-     Temp:  [98.1 °F (36.7 °C)-98.7 °F (37.1 °C)]   Pulse:  [106-128]   Resp:  [13-18]   BP: (131-158)/(72-86)   SpO2:  [95 %-99 %] .   Home meds for hypertension were reviewed and noted below.   Hypertension Medications               amLODIPine (NORVASC) 5 MG tablet Take 5 mg by mouth once daily.            While in the hospital, will manage blood pressure as follows; Continue home antihypertensive regimen    Will utilize p.r.n. blood pressure medication only if patient's blood pressure greater than 160/100 and she develops symptoms such as worsening chest pain or shortness of breath.    Mixed hyperlipidemia  Continue statin        VTE Risk Mitigation (From admission, onward)           Ordered     enoxaparin injection 40 mg  Every 24 hours         12/11/23 1601     Reason for No Pharmacological VTE Prophylaxis  Once        Question:  Reasons:  Answer:  Active Bleeding    12/09/23 2108     IP VTE HIGH RISK PATIENT  Once         12/09/23 2108     Place sequential compression device  Until discontinued         12/09/23 2108                    Discharge Planning   ESTEFANY: 12/19/2023     Code Status: DNR   Is the patient medically ready for discharge?: No    Reason for patient still in hospital (select all that apply): Patient trending condition and Treatment  Discharge Plan A: Rehab   Discharge Delays: None known at this time      Odin Maldonado MD  Department of Hospital Medicine   [facility] - Intensive Care ([place]-14)

## 2025-06-30 NOTE — ED ADULT NURSE NOTE - OBJECTIVE STATEMENT
Pt arrived to ED[facility]OX[facility], spont unlab breathing on RA, NAD. PT is c/o dizziness that began approximately [id number]. Pt states she was seated at home and the onset was sudden. Pt had an episode of emesis and then called 911. Upon arrival Dr. Wolfe notified and called stroke code. Pt has no obvious neuro deficits. no arm drift, slurred speech, facial droop, or vision changes. Expected Date Of Service: 06/30/2025 Bill For Surgical Tray: no Billing Type: Third-Party Bill Performing Laboratory: 0